# Patient Record
Sex: FEMALE | Race: WHITE | NOT HISPANIC OR LATINO | Employment: UNEMPLOYED | URBAN - METROPOLITAN AREA
[De-identification: names, ages, dates, MRNs, and addresses within clinical notes are randomized per-mention and may not be internally consistent; named-entity substitution may affect disease eponyms.]

---

## 2017-01-17 ENCOUNTER — TRANSCRIBE ORDERS (OUTPATIENT)
Dept: ADMINISTRATIVE | Facility: HOSPITAL | Age: 52
End: 2017-01-17

## 2017-01-17 DIAGNOSIS — M54.2 NECK PAIN: Primary | ICD-10-CM

## 2017-01-23 ENCOUNTER — HOSPITAL ENCOUNTER (OUTPATIENT)
Dept: NEUROLOGY | Facility: AMBULATORY SURGERY CENTER | Age: 52
Discharge: HOME | End: 2017-01-23
Payer: COMMERCIAL

## 2017-01-23 DIAGNOSIS — M54.2 NECK PAIN: ICD-10-CM

## 2017-01-23 DIAGNOSIS — R20.2 TINGLING IN EXTREMITIES: ICD-10-CM

## 2017-01-23 PROCEDURE — 95911 NRV CNDJ TEST 9-10 STUDIES: CPT

## 2017-01-23 PROCEDURE — 95886 MUSC TEST DONE W/N TEST COMP: CPT

## 2017-05-17 ENCOUNTER — HOSPITAL ENCOUNTER (OUTPATIENT)
Dept: RADIOLOGY | Facility: HOSPITAL | Age: 52
Discharge: HOME | End: 2017-05-17
Payer: COMMERCIAL

## 2017-05-17 ENCOUNTER — TRANSCRIBE ORDERS (OUTPATIENT)
Dept: RADIOLOGY | Facility: HOSPITAL | Age: 52
End: 2017-05-17

## 2017-05-17 DIAGNOSIS — R52 PAIN: ICD-10-CM

## 2017-05-17 DIAGNOSIS — R52 PAIN: Primary | ICD-10-CM

## 2017-05-17 PROCEDURE — 73522 X-RAY EXAM HIPS BI 3-4 VIEWS: CPT

## 2017-06-06 ENCOUNTER — TRANSCRIBE ORDERS (OUTPATIENT)
Dept: ADMINISTRATIVE | Facility: HOSPITAL | Age: 52
End: 2017-06-06

## 2017-06-06 ENCOUNTER — ALLSCRIPTS OFFICE VISIT (OUTPATIENT)
Dept: OTHER | Facility: OTHER | Age: 52
End: 2017-06-06

## 2017-06-06 DIAGNOSIS — M79.604 RIGHT LEG PAIN: Primary | ICD-10-CM

## 2017-06-06 DIAGNOSIS — M79.604 PAIN OF RIGHT LEG: ICD-10-CM

## 2017-06-13 ENCOUNTER — HOSPITAL ENCOUNTER (OUTPATIENT)
Dept: RADIOLOGY | Facility: HOSPITAL | Age: 52
Discharge: HOME | End: 2017-06-13
Attending: ORTHOPAEDIC SURGERY
Payer: COMMERCIAL

## 2017-06-13 DIAGNOSIS — M79.604 PAIN OF RIGHT LEG: ICD-10-CM

## 2017-06-13 PROCEDURE — 72148 MRI LUMBAR SPINE W/O DYE: CPT

## 2017-07-10 ENCOUNTER — GENERIC CONVERSION - ENCOUNTER (OUTPATIENT)
Dept: OTHER | Facility: OTHER | Age: 52
End: 2017-07-10

## 2018-01-11 NOTE — MISCELLANEOUS
Signatures   Electronically signed by : Cassius Ponce DO; Jul 10 2017 10:23AM EST                       (Author)

## 2018-01-12 VITALS
RESPIRATION RATE: 18 BRPM | DIASTOLIC BLOOD PRESSURE: 81 MMHG | BODY MASS INDEX: 33.49 KG/M2 | HEART RATE: 108 BPM | SYSTOLIC BLOOD PRESSURE: 161 MMHG | WEIGHT: 207.5 LBS

## 2019-06-20 ENCOUNTER — HOSPITAL ENCOUNTER (EMERGENCY)
Facility: HOSPITAL | Age: 54
Discharge: HOME | End: 2019-06-20
Attending: EMERGENCY MEDICINE | Admitting: EMERGENCY MEDICINE
Payer: COMMERCIAL

## 2019-06-20 VITALS
OXYGEN SATURATION: 99 % | RESPIRATION RATE: 17 BRPM | HEART RATE: 72 BPM | TEMPERATURE: 98.2 F | BODY MASS INDEX: 32.88 KG/M2 | SYSTOLIC BLOOD PRESSURE: 124 MMHG | DIASTOLIC BLOOD PRESSURE: 59 MMHG | WEIGHT: 203.71 LBS

## 2019-06-20 DIAGNOSIS — R51.9 HEADACHE: Primary | ICD-10-CM

## 2019-06-20 PROCEDURE — 99284 EMERGENCY DEPT VISIT MOD MDM: CPT | Performed by: EMERGENCY MEDICINE

## 2019-06-20 PROCEDURE — 96372 THER/PROPH/DIAG INJ SC/IM: CPT

## 2019-06-20 PROCEDURE — 99283 EMERGENCY DEPT VISIT LOW MDM: CPT

## 2019-06-20 RX ORDER — KETOROLAC TROMETHAMINE 30 MG/ML
15 INJECTION, SOLUTION INTRAMUSCULAR; INTRAVENOUS ONCE
Status: COMPLETED | OUTPATIENT
Start: 2019-06-20 | End: 2019-06-20

## 2019-06-20 RX ORDER — ACETAMINOPHEN 325 MG/1
975 TABLET ORAL ONCE
Status: COMPLETED | OUTPATIENT
Start: 2019-06-20 | End: 2019-06-20

## 2019-06-20 RX ORDER — METOCLOPRAMIDE 10 MG/1
10 TABLET ORAL ONCE
Status: COMPLETED | OUTPATIENT
Start: 2019-06-20 | End: 2019-06-20

## 2019-06-20 RX ADMIN — METOCLOPRAMIDE HYDROCHLORIDE 10 MG: 10 TABLET ORAL at 14:32

## 2019-06-20 RX ADMIN — ACETAMINOPHEN 975 MG: 325 TABLET ORAL at 15:31

## 2019-06-20 RX ADMIN — KETOROLAC TROMETHAMINE 15 MG: 30 INJECTION, SOLUTION INTRAMUSCULAR at 14:32

## 2019-11-08 ENCOUNTER — OFFICE VISIT (OUTPATIENT)
Dept: FAMILY MEDICINE CLINIC | Facility: CLINIC | Age: 54
End: 2019-11-08
Payer: COMMERCIAL

## 2019-11-08 VITALS
RESPIRATION RATE: 16 BRPM | HEIGHT: 64 IN | OXYGEN SATURATION: 98 % | DIASTOLIC BLOOD PRESSURE: 85 MMHG | BODY MASS INDEX: 34.56 KG/M2 | HEART RATE: 104 BPM | TEMPERATURE: 98 F | SYSTOLIC BLOOD PRESSURE: 120 MMHG | WEIGHT: 202.4 LBS

## 2019-11-08 DIAGNOSIS — F32.A DEPRESSION, UNSPECIFIED DEPRESSION TYPE: ICD-10-CM

## 2019-11-08 DIAGNOSIS — M25.551 PAIN OF BOTH HIP JOINTS: ICD-10-CM

## 2019-11-08 DIAGNOSIS — Z82.49 FAMILY HISTORY OF BRAIN ANEURYSM: ICD-10-CM

## 2019-11-08 DIAGNOSIS — K21.9 GASTROESOPHAGEAL REFLUX DISEASE, ESOPHAGITIS PRESENCE NOT SPECIFIED: ICD-10-CM

## 2019-11-08 DIAGNOSIS — Z00.00 ANNUAL PHYSICAL EXAM: ICD-10-CM

## 2019-11-08 DIAGNOSIS — Z12.39 SCREENING FOR BREAST CANCER: ICD-10-CM

## 2019-11-08 DIAGNOSIS — M25.552 PAIN OF BOTH HIP JOINTS: ICD-10-CM

## 2019-11-08 DIAGNOSIS — F17.219 CIGARETTE NICOTINE DEPENDENCE WITH NICOTINE-INDUCED DISORDER: Primary | ICD-10-CM

## 2019-11-08 DIAGNOSIS — Z12.11 SCREEN FOR COLON CANCER: ICD-10-CM

## 2019-11-08 PROCEDURE — 99396 PREV VISIT EST AGE 40-64: CPT | Performed by: FAMILY MEDICINE

## 2019-11-08 PROCEDURE — 3725F SCREEN DEPRESSION PERFORMED: CPT | Performed by: FAMILY MEDICINE

## 2019-11-08 PROCEDURE — 4004F PT TOBACCO SCREEN RCVD TLK: CPT | Performed by: FAMILY MEDICINE

## 2019-11-08 PROCEDURE — 99203 OFFICE O/P NEW LOW 30 MIN: CPT | Performed by: FAMILY MEDICINE

## 2019-11-08 RX ORDER — RANITIDINE 150 MG/1
150 TABLET ORAL 2 TIMES DAILY
COMMUNITY
End: 2020-06-09 | Stop reason: ALTCHOICE

## 2019-11-08 RX ORDER — PAROXETINE 10 MG/1
10 TABLET, FILM COATED ORAL DAILY
Qty: 30 TABLET | Refills: 5 | Status: SHIPPED | OUTPATIENT
Start: 2019-11-08 | End: 2020-06-09 | Stop reason: SDUPTHER

## 2019-11-08 RX ORDER — VARENICLINE TARTRATE 25 MG
KIT ORAL
Qty: 53 TABLET | Refills: 0 | Status: SHIPPED | OUTPATIENT
Start: 2019-11-08 | End: 2020-06-07

## 2019-11-08 RX ORDER — CALCIUM CARBONATE 200(500)MG
1 TABLET,CHEWABLE ORAL DAILY
COMMUNITY
End: 2020-06-09 | Stop reason: ALTCHOICE

## 2019-11-08 RX ORDER — ACETAMINOPHEN 500 MG
500 TABLET ORAL EVERY 6 HOURS PRN
COMMUNITY

## 2019-11-08 NOTE — PROGRESS NOTES
Assessment/Plan:         Diagnoses and all orders for this visit:    Cigarette nicotine dependence with nicotine-induced disorder  -     varenicline (CHANTIX RAFAEL) 0 5 MG X 11 & 1 MG X 42 tablet; Take one 0 5mg tab by mouth 1x daily for 3 days, then increase to one 0 5mg tab 2x daily for 3 days, then increase to one 1mg tab 2x daily    Depression, unspecified depression type  -     PARoxetine (PAXIL) 10 mg tablet; Take 1 tablet (10 mg total) by mouth daily    Pain of both hip joints  -     Ambulatory referral to Orthopedic Surgery; Future    Gastroesophageal reflux disease, esophagitis presence not specified  -     Ambulatory referral to Gastroenterology; Future    Annual physical exam  -     Ambulatory referral to Obstetrics / Gynecology; Future  -     CBC and differential; Future  -     Comprehensive metabolic panel; Future  -     Lipid Panel with Direct LDL reflex; Future  -     TSH, 3rd generation with Free T4 reflex; Future    Screening for breast cancer  -     Mammo screening bilateral w cad; Future    Screen for colon cancer  -     Ambulatory referral to Colorectal Surgery; Future    Other orders  -     acetaminophen (TYLENOL) 500 mg tablet; Take 500 mg by mouth every 6 (six) hours as needed for mild pain  -     ranitidine (ZANTAC) 150 mg tablet; Take 150 mg by mouth 2 (two) times a day  -     calcium carbonate (TUMS) 500 mg chewable tablet; Chew 1 tablet daily          Subjective:      Patient ID: Bryan Humphries is a 46 y o  female  HPI    Pt is here to establish care  Not seen PCP for more than 3 years  Pt states she had back pain and b/l hip pain for years  Constant  Worse recently  6/10, sharp pain  It affect her sleep  Pain does not radiating to legs, but sometimes radiating to groin area  She is on tylenol and motrin for pain  Saw orthopedics before  Would like to see them again  C/o GERD for years  Felt reflux symptoms constant  Denies nausea, vomiting or abdominal pain  Pt states she take zantac bid and TUMs which helped some  Saw GI Dr Rock Tamayo before  Would like to see him again  Depression---for years  She was on paxil 10mg qd before which helped  She would like to restart it  Denies suicidal ideations  Denies anxiety  Mother and brother passed away because of brain aneurysm  She had MRI and MRA done in 2015 which were unremarkable  Smoke 1ppd for 30 years  Would like to quit  Chantix helped her before  No alcohol  No drugs  The following portions of the patient's history were reviewed and updated as appropriate: allergies, current medications, past family history, past medical history, past social history, past surgical history and problem list     Review of Systems   Constitutional: Negative for appetite change, chills and fever  HENT: Negative for congestion, ear pain, sinus pain and sore throat  Eyes: Negative for discharge and itching  Respiratory: Negative for apnea, cough, chest tightness, shortness of breath and wheezing  Cardiovascular: Negative for chest pain, palpitations and leg swelling  Gastrointestinal: Negative for abdominal pain, anal bleeding, constipation, diarrhea, nausea and vomiting  Endocrine: Negative for cold intolerance, heat intolerance and polyuria  Genitourinary: Negative for difficulty urinating and dysuria  Musculoskeletal: Positive for arthralgias and back pain  Negative for myalgias  Skin: Negative for rash  Neurological: Negative for dizziness and headaches  Psychiatric/Behavioral: Negative for agitation  Objective:      /85 (BP Location: Left arm, Patient Position: Sitting, Cuff Size: Large)   Pulse 104   Temp 98 °F (36 7 °C) (Tympanic)   Resp 16   Ht 5' 3 75" (1 619 m)   Wt 91 8 kg (202 lb 6 4 oz)   SpO2 98%   BMI 35 01 kg/m²          Physical Exam   Constitutional: She appears well-developed  No distress  HENT:   Head: Normocephalic     Right Ear: External ear normal  Left Ear: External ear normal    Nose: Nose normal    Mouth/Throat: Oropharynx is clear and moist    Eyes: Pupils are equal, round, and reactive to light  Conjunctivae are normal  Right eye exhibits no discharge  Left eye exhibits no discharge  Neck: Normal range of motion  No thyromegaly present  Cardiovascular: Normal rate, regular rhythm and normal heart sounds  Exam reveals no gallop and no friction rub  No murmur heard  Pulmonary/Chest: Effort normal and breath sounds normal  No respiratory distress  She has no wheezes  She has no rales  She exhibits no tenderness  Abdominal: Soft  Bowel sounds are normal    Musculoskeletal: Normal range of motion  She exhibits no edema  Lower back tenderness, b/l hip tenderness, no swollen or erythema   Lymphadenopathy:     She has no cervical adenopathy  Neurological: She is alert  Psychiatric: She has a normal mood and affect

## 2019-11-08 NOTE — PROGRESS NOTES
[address]    NAME: Ryanne Schwab  AGE: 46 y o  SEX: female  : 1965     DATE: 2019  Chief Complaint   Patient presents with   [address] patient  Preventative PE  Health Maintenance   Topic Date Due    Hepatitis C Screening  1965    CRC Screening: Colonoscopy  1965    Pneumococcal Vaccine: Pediatrics (0 to 5 Years) and At-Risk Patients (6 to 59 Years) (1 of 1 - PPSV23) 1970    BMI: Followup Plan  1982    DTaP,Tdap,and Td Vaccines (1 - Tdap) 1985    Cervical Cancer Screening  1985    MAMMOGRAM  2016    INFLUENZA VACCINE  2019    Depression Screening PHQ  2020    BMI: Adult  2020    Pneumococcal Vaccine: 65+ Years (1 of 2 - PCV13) 2029    HEPATITIS B VACCINES  Aged Out     Depression Screening Follow-up Plan: Patient's depression screening was positive with a PHQ-2 score of 2  Their PHQ-9 score was 7  Patient assessed for underlying major depression  They have no active suicidal ideations  Brief counseling provided and recommend additional follow-up/re-evaluation next office visit  Tobacco Cessation Counseling: Tobacco cessation counseling and education was provided  The patient is sincerely urged to quit consumption of tobacco  She is ready to quit tobacco  The numerous health risks of tobacco consumption were discussed  Prescribed the following medications: varenicline (Chantix)  BMI Counseling: Body mass index is 35 01 kg/m²  The BMI is above normal  Nutrition recommendations include reducing portion sizes, decreasing overall calorie intake and 3-5 servings of fruits/vegetables daily  Exercise recommendations include moderate aerobic physical activity for 150 minutes/week        Assessment and Plan:     Problem List Items Addressed This Visit        Digestive    Gastroesophageal reflux disease    Relevant Medications    ranitidine (ZANTAC) 150 mg tablet    calcium carbonate (TUMS) 500 mg chewable tablet    Other Relevant Orders    Ambulatory referral to Gastroenterology       Cardiovascular and Mediastinum    Family history of brain aneurysm     2015 MRI and MRA unremarkable  Nervous and Auditory    Cigarette nicotine dependence with nicotine-induced disorder - Primary    Relevant Medications    varenicline (CHANTIX RAFAEL) 0 5 MG X 11 & 1 MG X 42 tablet    PARoxetine (PAXIL) 10 mg tablet       Other    Depression     PHQ-9 score 7 today  Restart paxil 10mg QD  SE educated pt  Relevant Medications    varenicline (CHANTIX RAFAEL) 0 5 MG X 11 & 1 MG X 42 tablet    PARoxetine (PAXIL) 10 mg tablet    Pain of both hip joints    Relevant Orders    Ambulatory referral to Orthopedic Surgery    Screening for breast cancer    Relevant Orders    Mammo screening bilateral w cad    Screen for colon cancer    Relevant Orders    Ambulatory referral to Colorectal Surgery      Other Visit Diagnoses     Annual physical exam        Relevant Orders    Ambulatory referral to Obstetrics / Gynecology    CBC and differential    Comprehensive metabolic panel    Lipid Panel with Direct LDL reflex    TSH, 3rd generation with Free T4 reflex          Immunizations and preventive care screenings were discussed with patient today  Appropriate education was printed on patient's after visit summary  Counseling:  Alcohol/drug use: discussed moderation in alcohol intake, the recommendations for healthy alcohol use, and avoidance of illicit drug use  Dental Health: discussed importance of regular tooth brushing, flossing, and dental visits  Injury prevention: discussed safety/seat belts, safety helmets, smoke detectors, carbon dioxide detectors, and smoking near bedding or upholstery    Sexual health: discussed sexually transmitted diseases, partner selection, use of condoms, avoidance of unintended pregnancy, and contraceptive alternatives  · Exercise: the importance of regular exercise/physical activity was discussed  Recommend exercise 3-5 times per week for at least 30 minutes  Refused flu shot  Give mammogram script  Refer to OBGYN for pap  Give colonoscopy script  RTO in 6 months or sooner as needed  Return in about 6 months (around 2020) for Recheck  Chief Complaint:     Chief Complaint   Patient presents with   [facility] Establish Care     New patient  Preventative PE  History of Present Illness:     Adult Annual Physical   Patient here for a comprehensive physical exam  The patient reports problems - see note  Diet and Physical Activity  · Diet/Nutrition: well balanced diet  · Exercise: walking  Depression Screening  PHQ-9 Depression Screening    PHQ-9:    Frequency of the following problems over the past two weeks:       Little interest or pleasure in doing things:  1 - several days  Feeling down, depressed, or hopeless:  1 - several days  Trouble falling or staying asleep, or sleeping too much:  3 - nearly every day  Feeling tired or having little energy:  2 - more than half the days  Poor appetite or overeatin - not at all  Feeling bad about yourself - or that you are a failure or have let yourself or your family down:  0 - not at all  Trouble concentrating on things, such as reading the newspaper or watching television:  0 - not at all  Moving or speaking so slowly that other people could have noticed  Or the opposite - being so fidgety or restless that you have been moving around a lot more than usual:  0 - not at all  Thoughts that you would be better off dead, or of hurting yourself in some way:  0 - not at all  PHQ-2 Score:  2  PHQ-9 Score:  7       General Health  · Sleep: sleeps poorly  · Hearing: normal - bilateral   · Vision: wears glasses  · Dental: no dental visits for >1 year         /GYN Health  · Last menstrual period: postmenapause, no abnormal vaginal discharge, FU Dr Medina Kemp  · History of STDs?: no      Review of Systems:     Review of Systems   Constitutional: Negative for appetite change, chills and fever  HENT: Negative for congestion, ear pain, sinus pain and sore throat  Eyes: Negative for discharge and itching  Respiratory: Negative for apnea, cough, chest tightness, shortness of breath and wheezing  Cardiovascular: Negative for chest pain, palpitations and leg swelling  Gastrointestinal: Negative for abdominal pain, anal bleeding, constipation, diarrhea, nausea and vomiting  Endocrine: Negative for cold intolerance, heat intolerance and polyuria  Genitourinary: Negative for difficulty urinating and dysuria  Musculoskeletal: Positive for arthralgias and back pain  Negative for myalgias  Skin: Negative for rash  Neurological: Negative for dizziness and headaches  Psychiatric/Behavioral: Negative for agitation  Past Medical History:     Past Medical History:   Diagnosis Date    Back pain     Depression     GERD (gastroesophageal reflux disease)       Past Surgical History:     Past Surgical History:   Procedure Laterality Date    CERVICAL DISC SURGERY      CHOLECYSTECTOMY      FOOT SURGERY      [place] EGD TRANSORAL BIOPSY SINGLE/MULTIPLE N/A 4/20/2016    Procedure: ESOPHAGOGASTRODUODENOSCOPY (EGD); Surgeon: Annie Reardon MD;  Location: [facility];   Service: Gastroenterology    TONSILLECTOMY      TUBAL LIGATION        Social History:     Social History     Socioeconomic History    Marital status: /Civil Union     Spouse name: None    Number of children: None    Years of education: None    Highest education level: None   Occupational History    Occupation: Caregiver   Social Needs    Financial resource strain: Not very hard    Food insecurity:     Worry: Never true     Inability: Never true    Transportation needs:     Medical: Yes     Non-medical: Yes   Tobacco Use    Smoking status: Current Every Day Smoker     Packs/day: 1 00     Types: Cigarettes    Smokeless tobacco: Never Used   Substance and Sexual Activity    Alcohol use: No    Drug use: No    Sexual activity: Not Currently   Lifestyle    Physical activity:     Days per week: 7 days     Minutes per session: 30 min    Stress: Rather much   Relationships    Social connections:     Talks on phone: Never     Gets together: Never     Attends [employer] service: Never     Active member of club or organization: No     Attends meetings of clubs or organizations: Never     Relationship status:     Intimate partner violence:     Fear of current or ex partner: No     Emotionally abused: No     Physically abused: No     Forced sexual activity: No   Other Topics Concern    None   Social History Narrative    None      Family History:     Family History   Problem Relation Age of Onset    Aneurysm Mother     Heart attack Father     Aneurysm Sister     Aneurysm Brother     Mental illness Son       Current Medications:     Current Outpatient Medications   Medication Sig Dispense Refill    acetaminophen (TYLENOL) 500 mg tablet Take 500 mg by mouth every 6 (six) hours as needed for mild pain      calcium carbonate (TUMS) 500 mg chewable tablet Chew 1 tablet daily      ibuprofen (MOTRIN) 600 mg tablet Take 600 mg by mouth every 8 (eight) hours as needed for mild pain   ranitidine (ZANTAC) 150 mg tablet Take 150 mg by mouth 2 (two) times a day      buPROPion (WELLBUTRIN XL) 150 mg 24 hr tablet Take 150 mg by mouth daily   cholecalciferol (VITAMIN D3) 1,000 units tablet Take 1,000 Units by mouth daily   multivitamin (THERAGRAN) TABS Take 1 tablet by mouth daily   omeprazole (PriLOSEC) 20 mg delayed release capsule Take 20 mg by mouth 2 (two) times a day   PARoxetine (PAXIL) 10 mg tablet Take 1 tablet (10 mg total) by mouth daily 30 tablet 5    traMADol (ULTRAM) 50 mg tablet Take 100 mg by mouth every 6 (six) hours as needed for moderate pain        varenicline (CHANTIX RAFAEL) 0 5 MG X 11 & 1 MG X 42 tablet Take one 0 5mg tab by mouth 1x daily for 3 days, then increase to one 0 5mg tab 2x daily for 3 days, then increase to one 1mg tab 2x daily 53 tablet 0     No current facility-administered medications for this visit  Allergies:     No Known Allergies   Physical Exam:     /85 (BP Location: Left arm, Patient Position: Sitting, Cuff Size: Large)   Pulse 104   Temp 98 °F (36 7 °C) (Tympanic)   Resp 16   Ht 5' 3 75" (1 619 m)   Wt 91 8 kg (202 lb 6 4 oz)   SpO2 98%   BMI 35 01 kg/m²     Physical Exam   Constitutional: She appears well-developed  No distress  HENT:   Head: Normocephalic  Right Ear: External ear normal    Left Ear: External ear normal    Nose: Nose normal    Mouth/Throat: Oropharynx is clear and moist    Eyes: Pupils are equal, round, and reactive to light  Conjunctivae are normal  Right eye exhibits no discharge  Left eye exhibits no discharge  Neck: Normal range of motion  No thyromegaly present  Cardiovascular: Normal rate, regular rhythm and normal heart sounds  Exam reveals no gallop and no friction rub  No murmur heard  Pulmonary/Chest: Effort normal and breath sounds normal  No respiratory distress  She has no wheezes  She has no rales  She exhibits no tenderness  Abdominal: Soft  Bowel sounds are normal  She exhibits no distension and no mass  There is no tenderness  There is no rebound and no guarding  Musculoskeletal: Normal range of motion  She exhibits no edema  Lower back tenderness and b/l hip tenderness, no swollen or erythema   Lymphadenopathy:     She has no cervical adenopathy  Neurological: She is alert  Psychiatric: She has a normal mood and affect         Berger MD Raul   [address] [place] [address]

## 2020-06-07 ENCOUNTER — HOSPITAL ENCOUNTER (EMERGENCY)
Facility: HOSPITAL | Age: 55
Discharge: HOME | End: 2020-06-07
Attending: EMERGENCY MEDICINE | Admitting: EMERGENCY MEDICINE
Payer: COMMERCIAL

## 2020-06-07 ENCOUNTER — APPOINTMENT (EMERGENCY)
Dept: RADIOLOGY | Facility: HOSPITAL | Age: 55
End: 2020-06-07
Payer: COMMERCIAL

## 2020-06-07 VITALS
HEART RATE: 70 BPM | RESPIRATION RATE: 18 BRPM | TEMPERATURE: 98.3 F | DIASTOLIC BLOOD PRESSURE: 77 MMHG | SYSTOLIC BLOOD PRESSURE: 119 MMHG | OXYGEN SATURATION: 97 %

## 2020-06-07 DIAGNOSIS — R07.9 CHEST PAIN: Primary | ICD-10-CM

## 2020-06-07 DIAGNOSIS — M54.9 BACK PAIN: ICD-10-CM

## 2020-06-07 LAB
ALBUMIN SERPL BCP-MCNC: 3.6 G/DL (ref 3.5–5)
ALP SERPL-CCNC: 71 U/L (ref 46–116)
ALT SERPL W P-5'-P-CCNC: 49 U/L (ref 12–78)
ANION GAP SERPL CALCULATED.3IONS-SCNC: 6 MMOL/L (ref 4–13)
AST SERPL W P-5'-P-CCNC: 30 U/L (ref 5–45)
ATRIAL RATE: 83 BPM
BASOPHILS # BLD AUTO: 0.04 THOUSANDS/ΜL (ref 0–0.1)
BASOPHILS NFR BLD AUTO: 0 % (ref 0–1)
BILIRUB SERPL-MCNC: 0.21 MG/DL (ref 0.2–1)
BUN SERPL-MCNC: 15 MG/DL (ref 5–25)
CALCIUM SERPL-MCNC: 9.4 MG/DL (ref 8.3–10.1)
CHLORIDE SERPL-SCNC: 106 MMOL/L (ref 100–108)
CO2 SERPL-SCNC: 28 MMOL/L (ref 21–32)
CREAT SERPL-MCNC: 0.89 MG/DL (ref 0.6–1.3)
EOSINOPHIL # BLD AUTO: 0.16 THOUSAND/ΜL (ref 0–0.61)
EOSINOPHIL NFR BLD AUTO: 2 % (ref 0–6)
ERYTHROCYTE [DISTWIDTH] IN BLOOD BY AUTOMATED COUNT: 13.1 % (ref 11.6–15.1)
GFR SERPL CREATININE-BSD FRML MDRD: 73 ML/MIN/1.73SQ M
GLUCOSE SERPL-MCNC: 96 MG/DL (ref 65–140)
HCT VFR BLD AUTO: 44.8 % (ref 34.8–46.1)
HGB BLD-MCNC: 14.5 G/DL (ref 11.5–15.4)
IMM GRANULOCYTES # BLD AUTO: 0.02 THOUSAND/UL (ref 0–0.2)
IMM GRANULOCYTES NFR BLD AUTO: 0 % (ref 0–2)
LIPASE SERPL-CCNC: 212 U/L (ref 73–393)
LYMPHOCYTES # BLD AUTO: 3.65 THOUSANDS/ΜL (ref 0.6–4.47)
LYMPHOCYTES NFR BLD AUTO: 36 % (ref 14–44)
MCH RBC QN AUTO: 31.7 PG (ref 26.8–34.3)
MCHC RBC AUTO-ENTMCNC: 32.4 G/DL (ref 31.4–37.4)
MCV RBC AUTO: 98 FL (ref 82–98)
MONOCYTES # BLD AUTO: 0.61 THOUSAND/ΜL (ref 0.17–1.22)
MONOCYTES NFR BLD AUTO: 6 % (ref 4–12)
NEUTROPHILS # BLD AUTO: 5.62 THOUSANDS/ΜL (ref 1.85–7.62)
NEUTS SEG NFR BLD AUTO: 56 % (ref 43–75)
NRBC BLD AUTO-RTO: 0 /100 WBCS
P AXIS: 67 DEGREES
PLATELET # BLD AUTO: 288 THOUSANDS/UL (ref 149–390)
PMV BLD AUTO: 9.8 FL (ref 8.9–12.7)
POTASSIUM SERPL-SCNC: 4 MMOL/L (ref 3.5–5.3)
PR INTERVAL: 140 MS
PROT SERPL-MCNC: 7.4 G/DL (ref 6.4–8.2)
QRS AXIS: 95 DEGREES
QRSD INTERVAL: 82 MS
QT INTERVAL: 342 MS
QTC INTERVAL: 401 MS
RBC # BLD AUTO: 4.57 MILLION/UL (ref 3.81–5.12)
SODIUM SERPL-SCNC: 140 MMOL/L (ref 136–145)
T WAVE AXIS: 63 DEGREES
TROPONIN I SERPL-MCNC: <0.02 NG/ML
VENTRICULAR RATE: 83 BPM
WBC # BLD AUTO: 10.1 THOUSAND/UL (ref 4.31–10.16)

## 2020-06-07 PROCEDURE — 84484 ASSAY OF TROPONIN QUANT: CPT | Performed by: EMERGENCY MEDICINE

## 2020-06-07 PROCEDURE — 71045 X-RAY EXAM CHEST 1 VIEW: CPT

## 2020-06-07 PROCEDURE — 85025 COMPLETE CBC W/AUTO DIFF WBC: CPT | Performed by: EMERGENCY MEDICINE

## 2020-06-07 PROCEDURE — 99285 EMERGENCY DEPT VISIT HI MDM: CPT

## 2020-06-07 PROCEDURE — 99285 EMERGENCY DEPT VISIT HI MDM: CPT | Performed by: EMERGENCY MEDICINE

## 2020-06-07 PROCEDURE — 93010 ELECTROCARDIOGRAM REPORT: CPT | Performed by: INTERNAL MEDICINE

## 2020-06-07 PROCEDURE — 80053 COMPREHEN METABOLIC PANEL: CPT | Performed by: EMERGENCY MEDICINE

## 2020-06-07 PROCEDURE — 93005 ELECTROCARDIOGRAM TRACING: CPT

## 2020-06-07 PROCEDURE — 36415 COLL VENOUS BLD VENIPUNCTURE: CPT

## 2020-06-07 PROCEDURE — 83690 ASSAY OF LIPASE: CPT | Performed by: EMERGENCY MEDICINE

## 2020-06-07 RX ORDER — LIDOCAINE 50 MG/G
2 PATCH TOPICAL ONCE
Status: DISCONTINUED | OUTPATIENT
Start: 2020-06-07 | End: 2020-06-07 | Stop reason: HOSPADM

## 2020-06-07 RX ADMIN — LIDOCAINE 2 PATCH: 50 PATCH TOPICAL at 16:55

## 2020-06-09 ENCOUNTER — OFFICE VISIT (OUTPATIENT)
Dept: FAMILY MEDICINE CLINIC | Facility: CLINIC | Age: 55
End: 2020-06-09
Payer: COMMERCIAL

## 2020-06-09 VITALS
HEART RATE: 96 BPM | HEIGHT: 64 IN | OXYGEN SATURATION: 97 % | SYSTOLIC BLOOD PRESSURE: 120 MMHG | TEMPERATURE: 98.9 F | DIASTOLIC BLOOD PRESSURE: 86 MMHG | BODY MASS INDEX: 34.46 KG/M2 | WEIGHT: 201.87 LBS | RESPIRATION RATE: 12 BRPM

## 2020-06-09 DIAGNOSIS — Z11.59 NEED FOR HEPATITIS C SCREENING TEST: ICD-10-CM

## 2020-06-09 DIAGNOSIS — K21.9 GASTROESOPHAGEAL REFLUX DISEASE, ESOPHAGITIS PRESENCE NOT SPECIFIED: ICD-10-CM

## 2020-06-09 DIAGNOSIS — F32.A DEPRESSION, UNSPECIFIED DEPRESSION TYPE: ICD-10-CM

## 2020-06-09 DIAGNOSIS — F17.219 CIGARETTE NICOTINE DEPENDENCE WITH NICOTINE-INDUCED DISORDER: ICD-10-CM

## 2020-06-09 DIAGNOSIS — Z13.220 SCREENING FOR HYPERLIPIDEMIA: ICD-10-CM

## 2020-06-09 DIAGNOSIS — M62.838 MUSCLE SPASM: Primary | ICD-10-CM

## 2020-06-09 DIAGNOSIS — Z13.29 SCREENING FOR THYROID DISORDER: ICD-10-CM

## 2020-06-09 PROCEDURE — 3008F BODY MASS INDEX DOCD: CPT | Performed by: FAMILY MEDICINE

## 2020-06-09 PROCEDURE — 4004F PT TOBACCO SCREEN RCVD TLK: CPT | Performed by: FAMILY MEDICINE

## 2020-06-09 PROCEDURE — 99214 OFFICE O/P EST MOD 30 MIN: CPT | Performed by: FAMILY MEDICINE

## 2020-06-09 RX ORDER — METHOCARBAMOL 500 MG/1
500 TABLET, FILM COATED ORAL 3 TIMES DAILY
Qty: 30 TABLET | Refills: 0 | Status: SHIPPED | OUTPATIENT
Start: 2020-06-09 | End: 2020-07-08 | Stop reason: ALTCHOICE

## 2020-06-09 RX ORDER — PAROXETINE 10 MG/1
10 TABLET, FILM COATED ORAL DAILY
Qty: 30 TABLET | Refills: 5 | Status: SHIPPED | OUTPATIENT
Start: 2020-06-09 | End: 2020-07-21 | Stop reason: HOSPADM

## 2020-06-15 ENCOUNTER — TELEPHONE (OUTPATIENT)
Dept: FAMILY MEDICINE CLINIC | Facility: CLINIC | Age: 55
End: 2020-06-15

## 2020-06-16 DIAGNOSIS — M51.26 LUMBAR DISC HERNIATION: Primary | ICD-10-CM

## 2020-06-16 RX ORDER — PREDNISONE 10 MG/1
TABLET ORAL
Qty: 28 TABLET | Refills: 0 | Status: SHIPPED | OUTPATIENT
Start: 2020-06-16 | End: 2020-07-08 | Stop reason: ALTCHOICE

## 2020-06-16 RX ORDER — GABAPENTIN 300 MG/1
300 CAPSULE ORAL 3 TIMES DAILY
Qty: 30 CAPSULE | Refills: 1 | Status: SHIPPED | OUTPATIENT
Start: 2020-06-16 | End: 2020-07-08 | Stop reason: ALTCHOICE

## 2020-06-24 ENCOUNTER — TELEMEDICINE (OUTPATIENT)
Dept: GASTROENTEROLOGY | Facility: CLINIC | Age: 55
End: 2020-06-24
Payer: COMMERCIAL

## 2020-06-24 VITALS — BODY MASS INDEX: 34.31 KG/M2 | WEIGHT: 201 LBS | HEIGHT: 64 IN

## 2020-06-24 DIAGNOSIS — F17.219 CIGARETTE NICOTINE DEPENDENCE WITH NICOTINE-INDUCED DISORDER: ICD-10-CM

## 2020-06-24 DIAGNOSIS — K21.9 GASTROESOPHAGEAL REFLUX DISEASE, ESOPHAGITIS PRESENCE NOT SPECIFIED: Primary | ICD-10-CM

## 2020-06-24 DIAGNOSIS — Z12.11 SCREEN FOR COLON CANCER: ICD-10-CM

## 2020-06-24 PROCEDURE — 99244 OFF/OP CNSLTJ NEW/EST MOD 40: CPT | Performed by: INTERNAL MEDICINE

## 2020-06-24 RX ORDER — PANTOPRAZOLE SODIUM 40 MG/1
40 TABLET, DELAYED RELEASE ORAL DAILY
Qty: 30 TABLET | Refills: 3 | Status: SHIPPED | OUTPATIENT
Start: 2020-06-24 | End: 2020-10-15 | Stop reason: SDUPTHER

## 2020-06-24 RX ORDER — CALCIUM CARBONATE 200(500)MG
1 TABLET,CHEWABLE ORAL DAILY
COMMUNITY
End: 2020-07-14

## 2020-06-26 ENCOUNTER — TELEPHONE (OUTPATIENT)
Dept: GASTROENTEROLOGY | Facility: CLINIC | Age: 55
End: 2020-06-26

## 2020-06-26 DIAGNOSIS — Z12.11 SCREEN FOR COLON CANCER: Primary | ICD-10-CM

## 2020-07-08 ENCOUNTER — TELEPHONE (OUTPATIENT)
Dept: FAMILY MEDICINE CLINIC | Facility: CLINIC | Age: 55
End: 2020-07-08

## 2020-07-08 DIAGNOSIS — M51.26 LUMBAR DISC HERNIATION: Primary | ICD-10-CM

## 2020-07-08 RX ORDER — MELOXICAM 15 MG/1
15 TABLET ORAL DAILY
Qty: 30 TABLET | Refills: 0 | Status: SHIPPED | OUTPATIENT
Start: 2020-07-08 | End: 2020-08-07

## 2020-07-08 NOTE — TELEPHONE ENCOUNTER
Patient called and stated that her medication is not helping and her back is locking up and she got stuck on the toilet for 30 mins  She has an appt this ortho in a week   Please advise

## 2020-07-13 ENCOUNTER — TELEPHONE (OUTPATIENT)
Dept: OBGYN CLINIC | Facility: HOSPITAL | Age: 55
End: 2020-07-13

## 2020-07-13 NOTE — TELEPHONE ENCOUNTER
Left a message with patients  to give us a call back  If the patient calls back, please ask the new COVID questions and document on the appointment line      Thank you

## 2020-07-14 ENCOUNTER — OFFICE VISIT (OUTPATIENT)
Dept: OBGYN CLINIC | Facility: HOSPITAL | Age: 55
End: 2020-07-14
Payer: COMMERCIAL

## 2020-07-14 ENCOUNTER — HOSPITAL ENCOUNTER (OUTPATIENT)
Dept: RADIOLOGY | Facility: HOSPITAL | Age: 55
Discharge: HOME | End: 2020-07-14
Attending: ORTHOPAEDIC SURGERY
Payer: COMMERCIAL

## 2020-07-14 VITALS
HEART RATE: 112 BPM | SYSTOLIC BLOOD PRESSURE: 123 MMHG | DIASTOLIC BLOOD PRESSURE: 85 MMHG | WEIGHT: 200.2 LBS | BODY MASS INDEX: 34.18 KG/M2 | HEIGHT: 64 IN

## 2020-07-14 DIAGNOSIS — M54.16 RADICULOPATHY, LUMBAR REGION: Primary | ICD-10-CM

## 2020-07-14 DIAGNOSIS — M16.0 PRIMARY OSTEOARTHRITIS OF BOTH HIPS: ICD-10-CM

## 2020-07-14 DIAGNOSIS — M25.552 PAIN OF BOTH HIP JOINTS: ICD-10-CM

## 2020-07-14 DIAGNOSIS — M25.551 PAIN OF BOTH HIP JOINTS: ICD-10-CM

## 2020-07-14 PROCEDURE — 4004F PT TOBACCO SCREEN RCVD TLK: CPT | Performed by: ORTHOPAEDIC SURGERY

## 2020-07-14 PROCEDURE — 73522 X-RAY EXAM HIPS BI 3-4 VIEWS: CPT

## 2020-07-14 PROCEDURE — 3008F BODY MASS INDEX DOCD: CPT | Performed by: ORTHOPAEDIC SURGERY

## 2020-07-14 PROCEDURE — 99203 OFFICE O/P NEW LOW 30 MIN: CPT | Performed by: ORTHOPAEDIC SURGERY

## 2020-07-14 NOTE — PROGRESS NOTES
Assessment:  1  Radiculopathy, lumbar region  MRI lumbar spine wo contrast    Ambulatory referral to Pain Management   2  Primary osteoarthritis of both hips  Ambulatory referral to Orthopedic Surgery    CANCELED: XR hips bilateral 2 vw w pelvis if performed       Plan:  The patient has an examination consistent with lumbar radiculopathy, with bilateral hip osteoarthritis  I have discussed with the patient the pathophysiology of this diagnosis and reviewed how the examination correlates with this diagnosis  Discussed with the patient that although she does have B/L hip OA the majority of her symptoms are related to her sudden worsening lumbar radiculopathy  Patient has had a sudden worsening of her symptoms since her last MRI in 2017 to include weakness in the LLE  I recommend that we repeat the MRI and have her follow up with spine and pain  Patient has been compliant with her physical therapist directed home exercise program which she had learned in the past this has not been beneficial in reducing her pain symptoms  Follow Up:  Spine and Pain    The above stated was discussed in layman's terms and the patient expressed understanding  All questions were answered to the patient's satisfaction  Subjective:   Tyshawn Pride is a 53 y o  female who presents with a chief complaint of Bilateral hip pain left > right  The pain began several year(s) ago but had a sudden worsening of her symptoms and is not associated with an acute injury  The patient describes the pain as aching, burning and sharp in intensity,  intermittent in timing, and localizes the pain to the bilateral groin, left buttock pain with radicular pain to the left ankle  The pain is worse with standing and walking and relieved by rest   The pain is associated with numbness and tingling  The pain is not associated with constitutional symptoms  The patient is awoken at night by the pain and numbness     The patient states she has pain even at rest   She states she was stuck on her toilet for 20 minutes the other day because she couldn't get up  She states she also get a burning pain under her right breast and radiate posteriorly to the midline spine  He states all of her flips developed a hole on the ball of her foot because her gait is off  Patient does have a history of lumbar radiculopathy  Past Medical History:   Diagnosis Date    Back pain     Depression     GERD (gastroesophageal reflux disease)        Past Surgical History:   Procedure Laterality Date    CERVICAL DISC SURGERY      CHOLECYSTECTOMY      FOOT SURGERY      [place] EGD TRANSORAL BIOPSY SINGLE/MULTIPLE N/A 4/20/2016    Procedure: ESOPHAGOGASTRODUODENOSCOPY (EGD); Surgeon: Eddie Wu MD;  Location: [facility];   Service: Gastroenterology    TONSILLECTOMY      TUBAL LIGATION      UPPER GASTROINTESTINAL ENDOSCOPY         Family History   Problem Relation Age of Onset    Aneurysm Mother     Heart attack Father     Aneurysm Sister     Aneurysm Brother     Mental illness Son        Social History     Occupational History    Occupation: Caregiver   Tobacco Use    Smoking status: Current Every Day Smoker     Packs/day: 1 00     Types: Cigarettes    Smokeless tobacco: Never Used   Substance and Sexual Activity    Alcohol use: No    Drug use: No    Sexual activity: Not Currently         Current Outpatient Medications:     acetaminophen (TYLENOL) 500 mg tablet, Take 500 mg by mouth every 6 (six) hours as needed for mild pain, Disp: , Rfl:     meloxicam (MOBIC) 15 mg tablet, Take 1 tablet (15 mg total) by mouth daily, Disp: 30 tablet, Rfl: 0    pantoprazole (PROTONIX) 40 mg tablet, Take 1 tablet (40 mg total) by mouth daily, Disp: 30 tablet, Rfl: 3    PARoxetine (PAXIL) 10 mg tablet, Take 1 tablet (10 mg total) by mouth daily, Disp: 30 tablet, Rfl: 5    No Known Allergies      Objective:  Vitals:    07/14/20 0808   BP: 123/85   Pulse: (!) 112       Review of Systems   Constitutional: Negative for chills and fever  HENT: Negative for drooling and sneezing  Eyes: Negative for redness  Respiratory: Negative for cough and wheezing  Gastrointestinal: Negative for nausea and vomiting  Musculoskeletal:        Please see ortho exam   Psychiatric/Behavioral: Negative for behavioral problems  The patient is not nervous/anxious  Right Hip Exam     Tenderness   The patient is experiencing no tenderness  Range of Motion   The patient has normal right hip ROM  Muscle Strength   The patient has normal right hip strength  Other   Erythema: absent  Sensation: normal  Pulse: present      Left Hip Exam     Tenderness   The patient is experiencing no tenderness  Range of Motion   The patient has normal left hip ROM  Left hip flexion: with groin and lumbar pain  Left hip external rotation: produces lumbar pain  Other   Erythema: absent  Sensation: normal  Pulse: present    Comments:    ELOISA produces lumbar pain  Straight leg raise produces lumbar pain without radic  Hip flexion 5/5  Knee flexion 5/5  Knee extension 5/5  Ankle dorsi flexion 4+/5  Ankle plantar flexion 4+/5            Physical Exam   Constitutional: She is oriented to person, place, and time  She appears well-developed and well-nourished  Eyes: Pupils are equal, round, and reactive to light  Pulmonary/Chest: Effort normal and breath sounds normal    Neurological: She is alert and oriented to person, place, and time  Skin: Skin is warm and dry  Psychiatric: She has a normal mood and affect  Her behavior is normal  Judgment and thought content normal    Vitals reviewed  Diagnostics, reviewed and taken today if performed as documented:     The attending physician has personally reviewed the pertinent films in PACS and interpretation is as follows: Bilateral hip x-rays demonstrates no fracture or dislocation, degenerative changes present  Procedures, if performed today:      None performed        Scribe Attestation    I,:   Jorge L Roman am acting as a scribe while in the presence of the attending physician :        I,:   Ulises Juan MD personally performed the services described in this documentation    as scribed in my presence :                Portions of the record may have been created with voice recognition software  Occasional wrong word or "sound a like" substitutions may have occurred due to the inherent limitations of voice recognition software  Read the chart carefully and recognize, using context, where substitutions have occurred

## 2020-07-17 ENCOUNTER — TELEPHONE (OUTPATIENT)
Dept: OBGYN CLINIC | Facility: HOSPITAL | Age: 55
End: 2020-07-17

## 2020-07-17 ENCOUNTER — HOSPITAL ENCOUNTER (EMERGENCY)
Facility: HOSPITAL | Age: 55
Discharge: PSYCHIATRIC OR SUBSTANCE USE FACILITY | End: 2020-07-18
Attending: EMERGENCY MEDICINE
Payer: COMMERCIAL

## 2020-07-17 DIAGNOSIS — T42.6X1A GABAPENTIN OVERDOSE: Primary | ICD-10-CM

## 2020-07-17 DIAGNOSIS — R45.851 SUICIDAL IDEATIONS: ICD-10-CM

## 2020-07-17 DIAGNOSIS — F43.9 STRESS AT HOME: ICD-10-CM

## 2020-07-17 DIAGNOSIS — F41.9 ANXIETY: ICD-10-CM

## 2020-07-17 DIAGNOSIS — M54.16 RADICULOPATHY, LUMBAR REGION: Primary | ICD-10-CM

## 2020-07-17 LAB
ALBUMIN SERPL BCP-MCNC: 3.2 G/DL (ref 3.5–5)
ALP SERPL-CCNC: 52 U/L (ref 46–116)
ALT SERPL W P-5'-P-CCNC: 27 U/L (ref 12–78)
ANION GAP SERPL CALCULATED.3IONS-SCNC: 7 MMOL/L (ref 4–13)
APAP SERPL-MCNC: <2 UG/ML (ref 10–20)
AST SERPL W P-5'-P-CCNC: 15 U/L (ref 5–45)
BASOPHILS # BLD AUTO: 0.03 THOUSANDS/ΜL (ref 0–0.1)
BASOPHILS NFR BLD AUTO: 0 % (ref 0–1)
BILIRUB SERPL-MCNC: 0.18 MG/DL (ref 0.2–1)
BUN SERPL-MCNC: 16 MG/DL (ref 5–25)
CALCIUM SERPL-MCNC: 8.6 MG/DL (ref 8.3–10.1)
CHLORIDE SERPL-SCNC: 111 MMOL/L (ref 100–108)
CO2 SERPL-SCNC: 23 MMOL/L (ref 21–32)
CREAT SERPL-MCNC: 0.73 MG/DL (ref 0.6–1.3)
EOSINOPHIL # BLD AUTO: 0.28 THOUSAND/ΜL (ref 0–0.61)
EOSINOPHIL NFR BLD AUTO: 3 % (ref 0–6)
ERYTHROCYTE [DISTWIDTH] IN BLOOD BY AUTOMATED COUNT: 13.3 % (ref 11.6–15.1)
ETHANOL EXG-MCNC: 0 MG/DL
ETHANOL SERPL-MCNC: <3 MG/DL (ref 0–3)
GFR SERPL CREATININE-BSD FRML MDRD: 93 ML/MIN/1.73SQ M
GLUCOSE SERPL-MCNC: 127 MG/DL (ref 65–140)
HCT VFR BLD AUTO: 39.9 % (ref 34.8–46.1)
HGB BLD-MCNC: 12.6 G/DL (ref 11.5–15.4)
IMM GRANULOCYTES # BLD AUTO: 0.03 THOUSAND/UL (ref 0–0.2)
IMM GRANULOCYTES NFR BLD AUTO: 0 % (ref 0–2)
LYMPHOCYTES # BLD AUTO: 3.4 THOUSANDS/ΜL (ref 0.6–4.47)
LYMPHOCYTES NFR BLD AUTO: 39 % (ref 14–44)
MCH RBC QN AUTO: 32.1 PG (ref 26.8–34.3)
MCHC RBC AUTO-ENTMCNC: 31.6 G/DL (ref 31.4–37.4)
MCV RBC AUTO: 102 FL (ref 82–98)
MONOCYTES # BLD AUTO: 0.65 THOUSAND/ΜL (ref 0.17–1.22)
MONOCYTES NFR BLD AUTO: 7 % (ref 4–12)
NEUTROPHILS # BLD AUTO: 4.37 THOUSANDS/ΜL (ref 1.85–7.62)
NEUTS SEG NFR BLD AUTO: 51 % (ref 43–75)
NRBC BLD AUTO-RTO: 0 /100 WBCS
PLATELET # BLD AUTO: 283 THOUSANDS/UL (ref 149–390)
PMV BLD AUTO: 10.3 FL (ref 8.9–12.7)
POTASSIUM SERPL-SCNC: 4.4 MMOL/L (ref 3.5–5.3)
PROT SERPL-MCNC: 6.3 G/DL (ref 6.4–8.2)
RBC # BLD AUTO: 3.92 MILLION/UL (ref 3.81–5.12)
SALICYLATES SERPL-MCNC: 3 MG/DL (ref 3–20)
SODIUM SERPL-SCNC: 141 MMOL/L (ref 136–145)
WBC # BLD AUTO: 8.76 THOUSAND/UL (ref 4.31–10.16)

## 2020-07-17 PROCEDURE — 80320 DRUG SCREEN QUANTALCOHOLS: CPT | Performed by: EMERGENCY MEDICINE

## 2020-07-17 PROCEDURE — 85025 COMPLETE CBC W/AUTO DIFF WBC: CPT | Performed by: EMERGENCY MEDICINE

## 2020-07-17 PROCEDURE — 84443 ASSAY THYROID STIM HORMONE: CPT | Performed by: EMERGENCY MEDICINE

## 2020-07-17 PROCEDURE — 99285 EMERGENCY DEPT VISIT HI MDM: CPT | Performed by: EMERGENCY MEDICINE

## 2020-07-17 PROCEDURE — 80307 DRUG TEST PRSMV CHEM ANLYZR: CPT | Performed by: EMERGENCY MEDICINE

## 2020-07-17 PROCEDURE — 36415 COLL VENOUS BLD VENIPUNCTURE: CPT | Performed by: EMERGENCY MEDICINE

## 2020-07-17 PROCEDURE — 80329 ANALGESICS NON-OPIOID 1 OR 2: CPT | Performed by: EMERGENCY MEDICINE

## 2020-07-17 PROCEDURE — 82075 ASSAY OF BREATH ETHANOL: CPT | Performed by: EMERGENCY MEDICINE

## 2020-07-17 PROCEDURE — 80053 COMPREHEN METABOLIC PANEL: CPT | Performed by: EMERGENCY MEDICINE

## 2020-07-17 PROCEDURE — 93005 ELECTROCARDIOGRAM TRACING: CPT

## 2020-07-17 PROCEDURE — 81025 URINE PREGNANCY TEST: CPT | Performed by: EMERGENCY MEDICINE

## 2020-07-17 PROCEDURE — 99285 EMERGENCY DEPT VISIT HI MDM: CPT

## 2020-07-17 PROCEDURE — U0003 INFECTIOUS AGENT DETECTION BY NUCLEIC ACID (DNA OR RNA); SEVERE ACUTE RESPIRATORY SYNDROME CORONAVIRUS 2 (SARS-COV-2) (CORONAVIRUS DISEASE [COVID-19]), AMPLIFIED PROBE TECHNIQUE, MAKING USE OF HIGH THROUGHPUT TECHNOLOGIES AS DESCRIBED BY CMS-2020-01-R: HCPCS | Performed by: EMERGENCY MEDICINE

## 2020-07-17 RX ADMIN — ACTIVATED CHARCOAL 50 G: 208 SUSPENSION ORAL at 19:39

## 2020-07-17 NOTE — PROGRESS NOTES
Addendum:  The patient states performing lumbar spine stretching and strengthening exercises 2-3 times weekly for at least the past 6 weeks
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
## 2020-07-17 NOTE — TELEPHONE ENCOUNTER
Patient sees Dr Wilber Victoria  MRI is calling because the patient's MRI script needs to be e-signed by the doctor  They are asking if this could be completed  It will be in the inbasket

## 2020-07-17 NOTE — ED ATTENDING ATTESTATION
7/17/2020  Ligia Bradford DO, saw and evaluated the patient  I have discussed the patient with the resident/non-physician practitioner and agree with the resident's/non-physician practitioner's findings, Plan of Care, and MDM as documented in the resident's/non-physician practitioner's note, except where noted  All available labs and Radiology studies were reviewed  I was present for key portions of any procedure(s) performed by the resident/non-physician practitioner and I was immediately available to provide assistance  At this point I agree with the current assessment done in the Emergency Department  I have conducted an independent evaluation of this patient a history and physical is as follows:      53 yof with anxiety and depression that she attributes to arguing with   +Passive SI w/o plan, no HI  No hallucinations  She feels safe at home  Past Medical History:   Diagnosis Date    Back pain     Depression     GERD (gastroesophageal reflux disease)      Past Surgical History:   Procedure Laterality Date    CERVICAL DISC SURGERY      CHOLECYSTECTOMY      FOOT SURGERY      [place] EGD TRANSORAL BIOPSY SINGLE/MULTIPLE N/A 4/20/2016    Procedure: ESOPHAGOGASTRODUODENOSCOPY (EGD); Surgeon: Silver Downey MD;  Location: [facility]; Service: Gastroenterology    TONSILLECTOMY      TUBAL LIGATION      UPPER GASTROINTESTINAL ENDOSCOPY         /61 (BP Location: Right arm)   Pulse 88   Temp 98 3 °F (36 8 °C)   Resp 20   Ht 5' 3" (1 6 m)   Wt 90 7 kg (200 lb)   SpO2 97%   BMI 35 43 kg/m²   Anxious, A&Ox4, CTA, RRR, abd soft/NT, ambulates well  Patient requesting resources for counseling      7:23 PM  Patient further discloses that she overdosed on a handful of gabapentin because she wants to die  She is requesting inpatient psychiatric treatment    Overdose was 1 hour ago when she will be given 50 g of activated charcoal, observe for 6 hours and then and disposition to Psychiatry of asymptomatic at midnight tonight  Symptoms to observe for include CNS depression, ataxia and nystagmus  Asymptomatic prior to minute, will admit for observation overnight  12:08 AM  Patient is resting comfortably but is awake and and can ambulate well  She is medically cleared and awaiting crisis eval to sign a 201       ED Course         Critical Care Time  Procedures

## 2020-07-18 ENCOUNTER — HOSPITAL ENCOUNTER (INPATIENT)
Facility: HOSPITAL | Age: 55
LOS: 3 days | Discharge: HOME | DRG: 751 | End: 2020-07-21
Attending: PSYCHIATRY & NEUROLOGY | Admitting: PSYCHIATRY & NEUROLOGY
Payer: COMMERCIAL

## 2020-07-18 VITALS
HEART RATE: 55 BPM | DIASTOLIC BLOOD PRESSURE: 52 MMHG | BODY MASS INDEX: 35.44 KG/M2 | SYSTOLIC BLOOD PRESSURE: 103 MMHG | TEMPERATURE: 98.3 F | OXYGEN SATURATION: 97 % | WEIGHT: 200 LBS | HEIGHT: 63 IN | RESPIRATION RATE: 16 BRPM

## 2020-07-18 DIAGNOSIS — G47.00 INSOMNIA: Primary | ICD-10-CM

## 2020-07-18 DIAGNOSIS — F41.9 ANXIETY: ICD-10-CM

## 2020-07-18 DIAGNOSIS — F33.2 MDD (MAJOR DEPRESSIVE DISORDER), RECURRENT SEVERE, WITHOUT PSYCHOSIS (HCC): ICD-10-CM

## 2020-07-18 PROBLEM — F15.10 METHAMPHETAMINE ABUSE, EPISODIC (HCC): Status: ACTIVE | Noted: 2020-07-18

## 2020-07-18 LAB
AMPHETAMINES SERPL QL SCN: POSITIVE
ATRIAL RATE: 64 BPM
BARBITURATES UR QL: NEGATIVE
BENZODIAZ UR QL: NEGATIVE
COCAINE UR QL: NEGATIVE
EXT PREG TEST URINE: NEGATIVE
EXT. CONTROL ED NAV: NORMAL
METHADONE UR QL: NEGATIVE
OPIATES UR QL SCN: NEGATIVE
OXYCODONE+OXYMORPHONE UR QL SCN: NEGATIVE
P AXIS: 75 DEGREES
PCP UR QL: NEGATIVE
PR INTERVAL: 150 MS
QRS AXIS: 78 DEGREES
QRSD INTERVAL: 88 MS
QT INTERVAL: 400 MS
QTC INTERVAL: 412 MS
SARS-COV-2 RNA RESP QL NAA+PROBE: NEGATIVE
T WAVE AXIS: 70 DEGREES
THC UR QL: NEGATIVE
TSH SERPL DL<=0.05 MIU/L-ACNC: 1.46 UIU/ML (ref 0.36–3.74)
VENTRICULAR RATE: 64 BPM

## 2020-07-18 PROCEDURE — 99222 1ST HOSP IP/OBS MODERATE 55: CPT | Performed by: PSYCHIATRY & NEUROLOGY

## 2020-07-18 PROCEDURE — 93010 ELECTROCARDIOGRAM REPORT: CPT | Performed by: INTERNAL MEDICINE

## 2020-07-18 RX ORDER — LORAZEPAM 2 MG/ML
1 INJECTION INTRAMUSCULAR EVERY 6 HOURS PRN
Status: CANCELLED | OUTPATIENT
Start: 2020-07-18

## 2020-07-18 RX ORDER — ACETAMINOPHEN 325 MG/1
650 TABLET ORAL EVERY 6 HOURS PRN
Status: DISCONTINUED | OUTPATIENT
Start: 2020-07-18 | End: 2020-07-21 | Stop reason: HOSPADM

## 2020-07-18 RX ORDER — OLANZAPINE 10 MG/1
10 INJECTION, POWDER, LYOPHILIZED, FOR SOLUTION INTRAMUSCULAR 2 TIMES DAILY PRN
Status: CANCELLED | OUTPATIENT
Start: 2020-07-18

## 2020-07-18 RX ORDER — LORAZEPAM 2 MG/ML
1 INJECTION INTRAMUSCULAR EVERY 6 HOURS PRN
Status: DISCONTINUED | OUTPATIENT
Start: 2020-07-18 | End: 2020-07-21 | Stop reason: HOSPADM

## 2020-07-18 RX ORDER — ACETAMINOPHEN 325 MG/1
975 TABLET ORAL ONCE
Status: COMPLETED | OUTPATIENT
Start: 2020-07-18 | End: 2020-07-18

## 2020-07-18 RX ORDER — LORAZEPAM 0.5 MG/1
1 TABLET ORAL EVERY 6 HOURS PRN
Status: CANCELLED | OUTPATIENT
Start: 2020-07-18

## 2020-07-18 RX ORDER — MAGNESIUM HYDROXIDE/ALUMINUM HYDROXICE/SIMETHICONE 120; 1200; 1200 MG/30ML; MG/30ML; MG/30ML
30 SUSPENSION ORAL EVERY 4 HOURS PRN
Status: CANCELLED | OUTPATIENT
Start: 2020-07-18

## 2020-07-18 RX ORDER — BENZTROPINE MESYLATE 1 MG/1
1 TABLET ORAL EVERY 6 HOURS PRN
Status: DISCONTINUED | OUTPATIENT
Start: 2020-07-18 | End: 2020-07-21 | Stop reason: HOSPADM

## 2020-07-18 RX ORDER — BENZTROPINE MESYLATE 1 MG/ML
1 INJECTION INTRAMUSCULAR; INTRAVENOUS EVERY 6 HOURS PRN
Status: CANCELLED | OUTPATIENT
Start: 2020-07-18

## 2020-07-18 RX ORDER — IBUPROFEN 600 MG/1
600 TABLET ORAL EVERY 6 HOURS PRN
Status: DISCONTINUED | OUTPATIENT
Start: 2020-07-18 | End: 2020-07-21 | Stop reason: HOSPADM

## 2020-07-18 RX ORDER — ACETAMINOPHEN 325 MG/1
650 TABLET ORAL EVERY 6 HOURS PRN
Status: CANCELLED | OUTPATIENT
Start: 2020-07-18

## 2020-07-18 RX ORDER — TRAZODONE HYDROCHLORIDE 50 MG/1
50 TABLET ORAL
Status: CANCELLED | OUTPATIENT
Start: 2020-07-18

## 2020-07-18 RX ORDER — MAGNESIUM HYDROXIDE/ALUMINUM HYDROXICE/SIMETHICONE 120; 1200; 1200 MG/30ML; MG/30ML; MG/30ML
30 SUSPENSION ORAL EVERY 4 HOURS PRN
Status: DISCONTINUED | OUTPATIENT
Start: 2020-07-18 | End: 2020-07-21 | Stop reason: HOSPADM

## 2020-07-18 RX ORDER — BENZTROPINE MESYLATE 1 MG/ML
1 INJECTION INTRAMUSCULAR; INTRAVENOUS EVERY 6 HOURS PRN
Status: DISCONTINUED | OUTPATIENT
Start: 2020-07-18 | End: 2020-07-21 | Stop reason: HOSPADM

## 2020-07-18 RX ORDER — PAROXETINE HYDROCHLORIDE 20 MG/1
20 TABLET, FILM COATED ORAL DAILY
Status: DISCONTINUED | OUTPATIENT
Start: 2020-07-18 | End: 2020-07-21 | Stop reason: HOSPADM

## 2020-07-18 RX ORDER — IBUPROFEN 400 MG/1
400 TABLET ORAL EVERY 6 HOURS PRN
Status: DISCONTINUED | OUTPATIENT
Start: 2020-07-18 | End: 2020-07-19 | Stop reason: ALTCHOICE

## 2020-07-18 RX ORDER — GABAPENTIN 300 MG/1
300 CAPSULE ORAL 3 TIMES DAILY
COMMUNITY
End: 2020-07-21 | Stop reason: HOSPADM

## 2020-07-18 RX ORDER — TRAZODONE HYDROCHLORIDE 50 MG/1
50 TABLET ORAL
Status: DISCONTINUED | OUTPATIENT
Start: 2020-07-18 | End: 2020-07-21 | Stop reason: HOSPADM

## 2020-07-18 RX ORDER — IBUPROFEN 800 MG/1
800 TABLET ORAL EVERY 6 HOURS PRN
Status: DISCONTINUED | OUTPATIENT
Start: 2020-07-18 | End: 2020-07-21 | Stop reason: HOSPADM

## 2020-07-18 RX ORDER — BENZTROPINE MESYLATE 1 MG/1
1 TABLET ORAL EVERY 6 HOURS PRN
Status: CANCELLED | OUTPATIENT
Start: 2020-07-18

## 2020-07-18 RX ORDER — NICOTINE 21 MG/24HR
21 PATCH, TRANSDERMAL 24 HOURS TRANSDERMAL DAILY
Status: DISCONTINUED | OUTPATIENT
Start: 2020-07-18 | End: 2020-07-21 | Stop reason: HOSPADM

## 2020-07-18 RX ORDER — LORAZEPAM 1 MG/1
1 TABLET ORAL EVERY 6 HOURS PRN
Status: DISCONTINUED | OUTPATIENT
Start: 2020-07-18 | End: 2020-07-21 | Stop reason: HOSPADM

## 2020-07-18 RX ORDER — OLANZAPINE 10 MG/1
10 INJECTION, POWDER, LYOPHILIZED, FOR SOLUTION INTRAMUSCULAR 2 TIMES DAILY PRN
Status: DISCONTINUED | OUTPATIENT
Start: 2020-07-18 | End: 2020-07-21 | Stop reason: HOSPADM

## 2020-07-18 RX ADMIN — ACETAMINOPHEN 650 MG: 325 TABLET, FILM COATED ORAL at 22:19

## 2020-07-18 RX ADMIN — PAROXETINE HYDROCHLORIDE 20 MG: 20 TABLET, FILM COATED ORAL at 12:31

## 2020-07-18 RX ADMIN — ACETAMINOPHEN 975 MG: 325 TABLET, FILM COATED ORAL at 05:25

## 2020-07-18 RX ADMIN — TRAZODONE HYDROCHLORIDE 50 MG: 50 TABLET ORAL at 21:09

## 2020-07-18 RX ADMIN — IBUPROFEN 800 MG: 800 TABLET ORAL at 12:24

## 2020-07-18 RX ADMIN — NICOTINE 21 MG: 21 PATCH TRANSDERMAL at 12:24
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
## 2020-07-18 NOTE — ED NOTES
Patient is accepted at [address]    Patient is accepted by Dr Casey Gomez per [address] [facility] is arranged with [facility]	[place] is scheduled for 0830     Patient may go to the floor at anytime except shift change          Nurse report is to be called to [phone] prior to patient transfer

## 2020-07-18 NOTE — TREATMENT PLAN
TREATMENT PLAN REVIEW - [address] [facility] 53 y o  1965 female MRN: [id number]    [address] [place] [address] Room / Bed: [place] [address]/[facility] 976-42 Encounter: [id number]          Admit Date/Time:  7/18/2020  8:48 AM    Treatment Team: Attending Provider: Baylee Camilo DO; Medications RN: Poornima Thomas RN; Registered Nurse: Christine Patricia RN; Registered Nurse: Clau Almonte RN    Diagnosis: Principal Problem:    MDD (major depressive disorder), recurrent severe, without psychosis ([facility] )  Active Problems:    Anxiety    Methamphetamine abuse, episodic ([place] [address] )      Patient Strengths: family ties, patient is on a voluntary commitment      Patient Barriers: family conflict    Short Term Goals: decrease in depressive symptoms, decrease in anxiety symptoms    Long Term Goals: improvement in depression, improvement in anxiety, free of suicidal thoughts, improved insight    Progress Towards Goals: starting psychiatric medications as prescribed    Recommended Treatment: medication management, patient medication education, group therapy, milieu therapy, continued Behavioral Health psychiatric evaluation/assessment process    Treatment Frequency: daily medication monitoring, group and milieu therapy daily, monitoring through interdisciplinary rounds, monitoring through weekly patient care conferences    Expected Discharge Date:  5-6 days    Discharge Plan: referral for outpatient medication management with a psychiatrist, referral for outpatient psychotherapy, referrals as indicated, return to previous living arrangement, consider PHP      Treatment Plan Created/Updated By: Merlyn Driver III, DO
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
## 2020-07-18 NOTE — ED NOTES
Insurance Authorization for admission:   [facility]  Phone number:[phone]   Spoke to [facility]     4 days approved  Level of care: [facility]  Review on 7/21/2020  Authorization # CALL UPON ARRIVAL        EVS (Eligibility Verification System) called  [phone]    Automated system indicates:ELIGIBLE
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
## 2020-07-18 NOTE — PROGRESS NOTES
Belongings Pt   Admitted With    At bedside:  Glasses  Bra   Underwear  sandals    Locker:  Tank top  shorts
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
## 2020-07-18 NOTE — ED NOTES
Patient calm and cooperative when leaving the department via EMS to go to [facility], [facility]  07/18/20 1894

## 2020-07-19 PROBLEM — Z72.0 TOBACCO ABUSE: Status: ACTIVE | Noted: 2020-07-19

## 2020-07-19 PROBLEM — Z00.8 MEDICAL CLEARANCE FOR PSYCHIATRIC ADMISSION: Status: ACTIVE | Noted: 2019-11-08

## 2020-07-19 LAB
BASOPHILS # BLD AUTO: 0.03 THOUSANDS/ΜL (ref 0–0.1)
BASOPHILS NFR BLD AUTO: 0 % (ref 0–1)
EOSINOPHIL # BLD AUTO: 0.19 THOUSAND/ΜL (ref 0–0.61)
EOSINOPHIL NFR BLD AUTO: 2 % (ref 0–6)
ERYTHROCYTE [DISTWIDTH] IN BLOOD BY AUTOMATED COUNT: 13.4 % (ref 11.6–15.1)
HCT VFR BLD AUTO: 42.6 % (ref 34.8–46.1)
HGB BLD-MCNC: 13.5 G/DL (ref 11.5–15.4)
IMM GRANULOCYTES # BLD AUTO: 0.03 THOUSAND/UL (ref 0–0.2)
IMM GRANULOCYTES NFR BLD AUTO: 0 % (ref 0–2)
LYMPHOCYTES # BLD AUTO: 3.14 THOUSANDS/ΜL (ref 0.6–4.47)
LYMPHOCYTES NFR BLD AUTO: 33 % (ref 14–44)
MCH RBC QN AUTO: 32.1 PG (ref 26.8–34.3)
MCHC RBC AUTO-ENTMCNC: 31.7 G/DL (ref 31.4–37.4)
MCV RBC AUTO: 101 FL (ref 82–98)
MONOCYTES # BLD AUTO: 0.62 THOUSAND/ΜL (ref 0.17–1.22)
MONOCYTES NFR BLD AUTO: 7 % (ref 4–12)
NEUTROPHILS # BLD AUTO: 5.53 THOUSANDS/ΜL (ref 1.85–7.62)
NEUTS SEG NFR BLD AUTO: 58 % (ref 43–75)
NRBC BLD AUTO-RTO: 0 /100 WBCS
PLATELET # BLD AUTO: 276 THOUSANDS/UL (ref 149–390)
PMV BLD AUTO: 10.3 FL (ref 8.9–12.7)
RBC # BLD AUTO: 4.2 MILLION/UL (ref 3.81–5.12)
WBC # BLD AUTO: 9.54 THOUSAND/UL (ref 4.31–10.16)

## 2020-07-19 PROCEDURE — 85025 COMPLETE CBC W/AUTO DIFF WBC: CPT | Performed by: PSYCHIATRY & NEUROLOGY

## 2020-07-19 PROCEDURE — 99232 SBSQ HOSP IP/OBS MODERATE 35: CPT | Performed by: PSYCHIATRY & NEUROLOGY

## 2020-07-19 PROCEDURE — 99253 IP/OBS CNSLTJ NEW/EST LOW 45: CPT | Performed by: PHYSICIAN ASSISTANT

## 2020-07-19 RX ORDER — PANTOPRAZOLE SODIUM 40 MG/1
40 TABLET, DELAYED RELEASE ORAL
Status: DISCONTINUED | OUTPATIENT
Start: 2020-07-19 | End: 2020-07-21 | Stop reason: HOSPADM

## 2020-07-19 RX ADMIN — IBUPROFEN 600 MG: 600 TABLET ORAL at 15:16

## 2020-07-19 RX ADMIN — ACETAMINOPHEN 650 MG: 325 TABLET, FILM COATED ORAL at 08:34

## 2020-07-19 RX ADMIN — PANTOPRAZOLE SODIUM 40 MG: 40 TABLET, DELAYED RELEASE ORAL at 08:33

## 2020-07-19 RX ADMIN — NICOTINE 21 MG: 21 PATCH TRANSDERMAL at 08:34

## 2020-07-19 RX ADMIN — PAROXETINE HYDROCHLORIDE 20 MG: 20 TABLET, FILM COATED ORAL at 08:33

## 2020-07-19 RX ADMIN — ACETAMINOPHEN 650 MG: 325 TABLET, FILM COATED ORAL at 21:04

## 2020-07-19 RX ADMIN — TRAZODONE HYDROCHLORIDE 50 MG: 50 TABLET ORAL at 21:05

## 2020-07-19 NOTE — PROGRESS NOTES
Pt remains pleasant and cooperative throughout the day  Denies SI/HI  Mild anxiety and depression  Pt attending some groups  Denies any concerns at this time

## 2020-07-19 NOTE — TREATMENT TEAM
AM rounds- tearful in the afternoon  patient signed 72hr notice  Denies SI  Diarrhea yesterday  Slept overnight

## 2020-07-19 NOTE — ASSESSMENT & PLAN NOTE
· Vital signs stable at time of assessment  · CBC, CMP, TSH WNL  · EKG:  NSR, normal QTc  · Patient appears medically stable at this time for inpatient psychiatric treatment

## 2020-07-19 NOTE — PLAN OF CARE
Problem: SELF HARM/SUICIDALITY  Goal: Will have no self-injury during hospital stay  Description  INTERVENTIONS:  - Q 15 MINUTES: Routine safety checks  - Q WAKING SHIFT & PRN: Assess risk to determine if routine checks are adequate to maintain patient safety  - Encourage patient to participate actively in care by formulating a plan to combat response to suicidal ideation, identify supports and resources  Outcome: Progressing     Problem: DEPRESSION  Goal: Will be euthymic at discharge  Description  INTERVENTIONS:  - Administer medication as ordered  - Provide emotional support via 1:1 interaction with staff  - Encourage involvement in milieu/groups/activities  - Monitor for social isolation  Outcome: Progressing     Problem: ANXIETY  Goal: Will report anxiety at manageable levels  Description  INTERVENTIONS:  - Administer medication as ordered  - Teach and encourage coping skills  - Provide emotional support  - Assess patient/family for anxiety and ability to cope  Outcome: Progressing  Goal: By discharge: Patient will verbalize 2 strategies to deal with anxiety  Description  Interventions:  - Identify any obvious source/trigger to anxiety  - Staff will assist patient in applying identified coping technique/skills  - Encourage attendance of scheduled groups and activities  Outcome: Progressing     Problem: DISCHARGE PLANNING  Goal: Discharge to home or other facility with appropriate resources  Description  INTERVENTIONS:  - Identify barriers to discharge w/patient and caregiver  - Arrange for needed discharge resources and transportation as appropriate  - Identify discharge learning needs (meds, wound care, etc )  - Arrange for interpretive services to assist at discharge as needed  - Refer to Case Management Department for coordinating discharge planning if the patient needs post-hospital services based on physician/advanced practitioner order or complex needs related to functional status, cognitive ability, or social support system  Outcome: Progressing     Problem: Ineffective Coping  Goal: Participates in unit activities  Description  Interventions:  - Provide therapeutic environment   - Provide required programming   - Redirect inappropriate behaviors   Outcome: Not Progressing

## 2020-07-19 NOTE — CONSULTS
Consult- Jhonathan Rosen 1965, 53 y o  female MRN: [id number]    Unit/Bed#: [facility] 255-02 Encounter: [id number]    Primary Care Provider: Austin Paz MD   Date and time admitted to hospital: 7/18/2020  8:48 AM      Inpatient consult for Medical Clearance for [address] patient  Consult performed by: Yony Bennett PA-C  Consult ordered by: Joseph Christopher MD        Medical clearance for psychiatric admission  Assessment & Plan  · Vital signs stable at time of assessment  · CBC, CMP, TSH WNL  · EKG:  NSR, normal QTc  · Patient appears medically stable at this time for inpatient psychiatric treatment    Tobacco abuse  Assessment & Plan  · Counseled cessation  · Nicotine patch ordered    Methamphetamine abuse, episodic ([facility] )  Assessment & Plan  · UDS + methamphetamines  · Counseled cessation    Gastroesophageal reflux disease  Assessment & Plan  · Continue PPI    * MDD (major depressive disorder), recurrent severe, without psychosis ([facility] )  Assessment & Plan  · Presented to the ED with depression/anxiety, suicide attempt with OD on gabapentin and protonix  · Patient was medically cleared from the ED by medical toxicology  · Further plan per psychiatry      VTE Prophylaxis: Reason for no pharmacologic prophylaxis low risk, ambulate  / reason for no mechanical VTE prophylaxis psychiatric unit, ambulate     Recommendations for Discharge:  · Follow up with PCP after discharge    Counseling / Coordination of Care Time: 30 minutes  Greater than 50% of total time spent on patient counseling and coordination of care  Collaboration of Care: Were Recommendations Directly Discussed with Primary Treatment Team? - No     History of Present Illness:    Jhonathan Rosen is a 53 y o  female who is originally admitted to the psychiatry service due to depression/anxiety/suicide attempt  We are consulted for medical clearance  Past medical history significant for tobacco abuse, methamphetamine abuse, depression, GERD    Patient presented to the ED after reportedly attempting to overdose on gabapentin due to increasing depression/anxiety  Patient was medically cleared from the ED by medical toxicology  Currently patient denies any physical complaints including chest pain/palpitations, shortness of breath, nausea/vomiting, abdominal pain  Review of Systems:    Review of Systems   Constitutional: Negative for chills, fatigue, fever and unexpected weight change  HENT: Negative for congestion, sore throat and trouble swallowing  Eyes: Negative for photophobia, pain and visual disturbance  Respiratory: Negative for cough, shortness of breath and wheezing  Cardiovascular: Negative for chest pain, palpitations and leg swelling  Gastrointestinal: Negative for abdominal pain, constipation, diarrhea, nausea and vomiting  Endocrine: Negative for polyuria  Genitourinary: Negative for difficulty urinating, dysuria, flank pain, hematuria and urgency  Musculoskeletal: Negative for back pain, myalgias, neck pain and neck stiffness  Skin: Negative for pallor and rash  Neurological: Negative for dizziness, tremors, syncope, weakness, light-headedness, numbness and headaches  Hematological: Does not bruise/bleed easily  Psychiatric/Behavioral: Positive for dysphoric mood and suicidal ideas  Negative for agitation and confusion  The patient is nervous/anxious  Past Medical and Surgical History:     Past Medical History:   Diagnosis Date    Back pain     Depression     GERD (gastroesophageal reflux disease)        Past Surgical History:   Procedure Laterality Date    CERVICAL DISC SURGERY      CHOLECYSTECTOMY      FOOT SURGERY      TX EGD TRANSORAL BIOPSY SINGLE/MULTIPLE N/A 4/20/2016    Procedure: ESOPHAGOGASTRODUODENOSCOPY (EGD); Surgeon: Clifford Juarez MD;  Location: [facility];   Service: Gastroenterology    TONSILLECTOMY      TUBAL LIGATION      UPPER GASTROINTESTINAL ENDOSCOPY         Meds/Allergies:    all medications and allergies reviewed    Allergies: No Known Allergies    Social History:     Marital Status: /Civil Union    Substance Use History:   Social History     Substance and Sexual Activity   Alcohol Use No    Frequency: Never    Binge frequency: Never     Social History     Tobacco Use   Smoking Status Current Every Day Smoker    Packs/day: 1 00    Types: Cigarettes   Smokeless Tobacco Never Used     Social History     Substance and Sexual Activity   Drug Use No       Family History:    Family History   Problem Relation Age of Onset    Aneurysm Mother     Heart attack Father     Aneurysm Sister     Aneurysm Brother     Mental illness Son        Physical Exam:     Vitals:   Blood Pressure: 97/51 (07/18/20 1620)  Pulse: 55 (07/18/20 1620)  Temperature: (!) 96 8 °F (36 °C) (07/18/20 1620)  Temp Source: Tympanic (07/18/20 1620)  Respirations: 16 (07/18/20 1620)  Height: 5' 5" (165 1 cm) (07/18/20 0850)  Weight - Scale: 91 4 kg (201 lb 9 6 oz) (07/18/20 0850)  SpO2: 97 % (07/18/20 0850)    Physical Exam   Constitutional: She is oriented to person, place, and time  Vital signs are normal  She appears well-developed  Appears comfortable, no acute distress   HENT:   Head: Normocephalic  Eyes: Pupils are equal, round, and reactive to light  Conjunctivae and EOM are normal  No scleral icterus  Neck: Normal range of motion  Cardiovascular: Normal rate, regular rhythm and normal heart sounds  No murmur heard  Pulmonary/Chest: Effort normal and breath sounds normal  No respiratory distress  She has no wheezes  She has no rhonchi  She has no rales  Abdominal: Soft  Bowel sounds are normal  There is no tenderness  There is no rigidity, no rebound and no guarding  Musculoskeletal: She exhibits no edema, tenderness or deformity  Able to ambulate without difficulty   Neurological: She is alert and oriented to person, place, and time  Skin: Skin is warm and dry     Psychiatric: She has a normal mood and affect  Her speech is normal and behavior is normal    Nursing note and vitals reviewed  Additional Data:     Lab Results: I have personally reviewed pertinent reports  Results from last 7 days   Lab Units 07/17/20 2001   WBC Thousand/uL 8 76   HEMOGLOBIN g/dL 12 6   HEMATOCRIT % 39 9   PLATELETS Thousands/uL 283   NEUTROS PCT % 51   LYMPHS PCT % 39   MONOS PCT % 7   EOS PCT % 3     Results from last 7 days   Lab Units 07/17/20 2001   SODIUM mmol/L 141   POTASSIUM mmol/L 4 4   CHLORIDE mmol/L 111*   CO2 mmol/L 23   BUN mg/dL 16   CREATININE mg/dL 0 73   ANION GAP mmol/L 7   CALCIUM mg/dL 8 6   ALBUMIN g/dL 3 2*   TOTAL BILIRUBIN mg/dL 0 18*   ALK PHOS U/L 52   ALT U/L 27   AST U/L 15   GLUCOSE RANDOM mg/dL 127             No results found for: HGBA1C            Imaging: I have personally reviewed pertinent reports  No orders to display       EKG, Pathology, and Other Studies Reviewed on Admission:   · Reviewed as above    ** Please Note: This note has been constructed using a voice recognition system   **
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
## 2020-07-19 NOTE — ASSESSMENT & PLAN NOTE
· Presented to the ED with depression/anxiety, suicide attempt with OD on gabapentin and protonix  · Patient was medically cleared from the ED by medical toxicology  · Further plan per psychiatry

## 2020-07-19 NOTE — PROGRESS NOTES
Seclusive to room, but cooperative with assessment when approached  Denies SI/HI & A/V hallucinations  Rates her depression at a 5/10 & anxiety a 6/10  Denies questions/concerns, but does state that she needs to cancel an MRI scheduled for Tuesday 7/22  Asked if a relative should cancel it  Advised that it would be ok if she had someone to postpone it, or that she could do that with her  on Monday  Did not attend group, though encouraged to do so  States that she will attend groups tomorrow  No other changes in status

## 2020-07-20 ENCOUNTER — TELEPHONE (OUTPATIENT)
Dept: FAMILY MEDICINE CLINIC | Facility: CLINIC | Age: 55
End: 2020-07-20

## 2020-07-20 PROCEDURE — 99232 SBSQ HOSP IP/OBS MODERATE 35: CPT | Performed by: PSYCHIATRY & NEUROLOGY

## 2020-07-20 RX ORDER — DIAPER,BRIEF,INFANT-TODD,DISP
EACH MISCELLANEOUS 4 TIMES DAILY PRN
Status: DISCONTINUED | OUTPATIENT
Start: 2020-07-20 | End: 2020-07-21 | Stop reason: HOSPADM

## 2020-07-20 RX ADMIN — IBUPROFEN 600 MG: 600 TABLET ORAL at 14:49

## 2020-07-20 RX ADMIN — TRAZODONE HYDROCHLORIDE 50 MG: 50 TABLET ORAL at 21:58

## 2020-07-20 RX ADMIN — HYDROCORTISONE: 1 CREAM TOPICAL at 14:50

## 2020-07-20 RX ADMIN — PAROXETINE HYDROCHLORIDE 20 MG: 20 TABLET, FILM COATED ORAL at 08:14

## 2020-07-20 RX ADMIN — PANTOPRAZOLE SODIUM 40 MG: 40 TABLET, DELAYED RELEASE ORAL at 06:28

## 2020-07-20 RX ADMIN — ACETAMINOPHEN 650 MG: 325 TABLET, FILM COATED ORAL at 21:57

## 2020-07-20 RX ADMIN — ACETAMINOPHEN 650 MG: 325 TABLET, FILM COATED ORAL at 08:14

## 2020-07-20 RX ADMIN — NICOTINE 21 MG: 21 PATCH TRANSDERMAL at 08:15

## 2020-07-20 NOTE — PROGRESS NOTES
Remains pleasant & cooperative  Denies SI/HI & A/V hallucinations  Reports that depression and anxiety are still moderate, but denies questions/concerns  Will continue to monitor

## 2020-07-20 NOTE — PROGRESS NOTES
Progress Note - [address] 53 y o  female MRN: [id number]  Unit/Bed#: [facility] 255-02 Encounter: [id number]    Assessment/Plan   Principal Problem:    MDD (major depressive disorder), recurrent severe, without psychosis ([facility] )  Active Problems:    Gastroesophageal reflux disease    Medical clearance for psychiatric admission    Anxiety    Methamphetamine abuse, episodic ([facility])    Tobacco abuse      Behavior over the last 24 hours:  improved  Sleep: normal  Appetite: normal  Medication side effects: No  ROS: no complaints    Mental Status Evaluation:  Appearance:  age appropriate and casually dressed   Behavior:  cooperative   Speech:  normal pitch and normal volume   Mood:  euthymic   Affect:  normal   Thought Process:  goal directed and logical   Thought Content:  normal   Perceptual Disturbances: None   Risk Potential: Suicidal Ideations none  Homicidal Ideations none  Potential for Aggression No   Sensorium:  person, place, time/date and situation   Memory:  recent and remote memory grossly intact   Consciousness:  alert and awake    Attention: attention span and concentration were age appropriate   Insight:  limited   Judgment: limited   Gait/Station: normal gait/station and normal balance   Motor Activity: no abnormal movements     Progress Toward Goals: Patient is compliant with medications and denies side effects  She stated she has responded well to dose increase on Paxil from 10 mg to 20 mg and switching Paxil from HS to AM has helped improved her sleep  No longer on Gabapentin (s/p OD) stated with Trazodone at bedtime and prn Tylenol she has managed to sleep and managed her pain at night  Signed 72 hr notice and is due tomorrow  She agrees to f/u at [facility] because it is within walking distance from her home  Recommended Treatment: Continue with group therapy, milieu therapy and occupational therapy        Risks, benefits and possible side effects of Medications:   Risks, benefits, and possible side effects of medications explained to patient and patient verbalizes understanding  Medications:   all current active meds have been reviewed, continue current psychiatric medications and current meds:   Current Facility-Administered Medications   Medication Dose Route Frequency    acetaminophen (TYLENOL) tablet 650 mg  650 mg Oral Q6H PRN    aluminum-magnesium hydroxide-simethicone (MYLANTA) 200-200-20 mg/5 mL oral suspension 30 mL  30 mL Oral Q4H PRN    benztropine (COGENTIN) injection 1 mg  1 mg Intramuscular Q6H PRN    benztropine (COGENTIN) tablet 1 mg  1 mg Oral Q6H PRN    hydrocortisone 1 % cream   Topical 4x Daily PRN    ibuprofen (MOTRIN) tablet 600 mg  600 mg Oral Q6H PRN    ibuprofen (MOTRIN) tablet 800 mg  800 mg Oral Q6H PRN    LORazepam (ATIVAN) injection 1 mg  1 mg Intramuscular Q6H PRN    LORazepam (ATIVAN) tablet 1 mg  1 mg Oral Q6H PRN    magnesium hydroxide (MILK OF MAGNESIA) 400 mg/5 mL oral suspension 30 mL  30 mL Oral Daily PRN    nicotine (NICODERM CQ) 21 mg/24 hr TD 24 hr patch 21 mg  21 mg Transdermal Daily    nicotine polacrilex (NICORETTE) gum 2 mg  2 mg Oral Q2H PRN    OLANZapine (ZyPREXA) IM injection 10 mg  10 mg Intramuscular BID PRN    pantoprazole (PROTONIX) EC tablet 40 mg  40 mg Oral Early Morning    PARoxetine (PAXIL) tablet 20 mg  20 mg Oral Daily    traZODone (DESYREL) tablet 50 mg  50 mg Oral HS PRN     Labs: I have personally reviewed all pertinent laboratory/tests results     Most Recent Labs:   Lab Results   Component Value Date    WBC 9 54 07/19/2020    RBC 4 20 07/19/2020    HGB 13 5 07/19/2020    HCT 42 6 07/19/2020     07/19/2020    RDW 13 4 07/19/2020    NEUTROABS 5 53 07/19/2020    SODIUM 141 07/17/2020    K 4 4 07/17/2020     (H) 07/17/2020    CO2 23 07/17/2020    BUN 16 07/17/2020    CREATININE 0 73 07/17/2020    GLUC 127 07/17/2020    CALCIUM 8 6 07/17/2020    AST 15 07/17/2020    ALT 27 07/17/2020 ALKPHOS 52 07/17/2020    TP 6 3 (L) 07/17/2020    ALB 3 2 (L) 07/17/2020    TBILI 0 18 (L) 07/17/2020    HDL 47 07/27/2015    TRIG 123 07/27/2015    LDLCALC 76 07/27/2015    [id number] 1 460 07/17/2020       Counseling / Coordination of Care  Total floor / unit time spent today n/a minutes  Greater than 50% of total time was spent with the patient and / or family counseling and / or coordination of care   A description of the counseling / coordination of care:

## 2020-07-20 NOTE — PLAN OF CARE
Problem: SELF HARM/SUICIDALITY  Goal: Will have no self-injury during hospital stay  Description  INTERVENTIONS:  - Q 15 MINUTES: Routine safety checks  - Q WAKING SHIFT & PRN: Assess risk to determine if routine checks are adequate to maintain patient safety  - Encourage patient to participate actively in care by formulating a plan to combat response to suicidal ideation, identify supports and resources  Outcome: Progressing     Problem: DEPRESSION  Goal: Will be euthymic at discharge  Description  INTERVENTIONS:  - Administer medication as ordered  - Provide emotional support via 1:1 interaction with staff  - Encourage involvement in milieu/groups/activities  - Monitor for social isolation  Outcome: Progressing     Problem: ANXIETY  Goal: Will report anxiety at manageable levels  Description  INTERVENTIONS:  - Administer medication as ordered  - Teach and encourage coping skills  - Provide emotional support  - Assess patient/family for anxiety and ability to cope  Outcome: Progressing  Goal: By discharge: Patient will verbalize 2 strategies to deal with anxiety  Description  Interventions:  - Identify any obvious source/trigger to anxiety  - Staff will assist patient in applying identified coping technique/skills  - Encourage attendance of scheduled groups and activities  Outcome: Progressing

## 2020-07-20 NOTE — PROGRESS NOTES
Pt claimed that she was feeling safe here and was glad that she came in for treatment  After just this 3 day stay, she denies any SI or urges  Able to CFS  Depression on a rating scale = #0  Anxiety is = #2  Pt feels that the Paxil being moved to day time vs  Bedtime will help with mood, along with the increase of dose from 10 mg to 20 mg  Attending group setting here and appetite is reported fair  (C/O food here not tasting good rather than an issue with her appetite) Using Hydrocortisone Cream for skin rash on arms an hands prn, claiming it is worse upon awakening  Looking to leave treatment tomorrow
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
## 2020-07-20 NOTE — PROGRESS NOTES
07/20/20 1000 07/20/20 1035 07/20/20 1100   Activity/Group Checklist   Group Community meeting  (goal setting) Admission/Discharge  (Admisson Self Assessment) Nursing Education  (nursing students-coping skills)   Attendance Attended Attended Attended   Attendance Duration (min) 16-30 16-30  --    Interactions Interacted appropriately Interacted appropriately  --    Affect/Mood Appropriate Appropriate  --    Goals Achieved Able to listen to others; Able to engage in interactions; Identified feelings Identified feelings; Identified triggers; Discussed coping strategies  --       07/20/20 1315   Activity/Group Checklist   Group Life Skills  (leisure exploration)   Attendance Attended   Attendance Duration (min) 46-60   Interactions Interacted appropriately   Affect/Mood Appropriate;Bright   Goals Achieved Discussed coping strategies; Able to listen to others; Able to engage in interactions; Able to self-disclose Medication: CLOPIDOGREL BISULFATE 75 MG     Date of last refill: 5/7/19 (#90/3)  [place]    Last office visit: 5/7/19  Due back to clinic per last office note:  1 year  Date next office visit scheduled:    Future [employer]

## 2020-07-20 NOTE — PROGRESS NOTES
Treatment Plan Meeting:  Diagnosis of major depressive disorder, anxiety, & methamphetamine abuse episodic discussed  Reviewed short term goals for decrease in depression, anxiety, & free of suicidal thoughts, & improved insight  Pt signed a 72 hour notice on 7/18 at 1800, so at this time, discharge is planned for tomorrow  All parties in agreement & treatment plan was signed       07/20/20 0935   Team Meeting   Meeting Type Tx Team Meeting   Initial Conference Date 07/20/20   Next Conference Date 08/15/20   Team Members Present   Team Members Present Physician;;Nurse   Physician Team Member Dr Lucho Beaulieu Team Member [facility] Management Team Member Ebenezer Fan   Patient/Family Present   Patient Present No   Patient's Family Present No

## 2020-07-20 NOTE — PROGRESS NOTES
Pt remains pleasant and cooperative  Denies SI/HI, expressed having mild anxiety  Denies depression  Social with peers on the unit  Denies any questions or concerns at this time

## 2020-07-20 NOTE — PROGRESS NOTES
Requested & received trazadone 50 mg po prn/sleep at 2105  Good effect obtained, as observed asleep within the hr  Will continue to monitor

## 2020-07-20 NOTE — QUICK NOTE
Pt requested and received PRN Tylenol 650mg po at 0814 for Left leg pain  Upon reassessment pt with decrease in pain, mildly effective for use

## 2020-07-21 VITALS
HEART RATE: 60 BPM | RESPIRATION RATE: 17 BRPM | HEIGHT: 65 IN | TEMPERATURE: 96.6 F | BODY MASS INDEX: 33.59 KG/M2 | SYSTOLIC BLOOD PRESSURE: 117 MMHG | OXYGEN SATURATION: 97 % | DIASTOLIC BLOOD PRESSURE: 56 MMHG | WEIGHT: 201.6 LBS

## 2020-07-21 LAB
HBV CORE AB SER QL: NORMAL
HBV CORE IGM SER QL: NORMAL
HBV SURFACE AG SER QL: NORMAL
HCV AB SER QL: NORMAL

## 2020-07-21 PROCEDURE — 99239 HOSP IP/OBS DSCHRG MGMT >30: CPT | Performed by: PHYSICIAN ASSISTANT

## 2020-07-21 PROCEDURE — 86704 HEP B CORE ANTIBODY TOTAL: CPT | Performed by: PSYCHIATRY & NEUROLOGY

## 2020-07-21 PROCEDURE — 87340 HEPATITIS B SURFACE AG IA: CPT | Performed by: PSYCHIATRY & NEUROLOGY

## 2020-07-21 PROCEDURE — 86705 HEP B CORE ANTIBODY IGM: CPT | Performed by: PSYCHIATRY & NEUROLOGY

## 2020-07-21 PROCEDURE — 86803 HEPATITIS C AB TEST: CPT | Performed by: PSYCHIATRY & NEUROLOGY

## 2020-07-21 RX ORDER — TRAZODONE HYDROCHLORIDE 50 MG/1
50 TABLET ORAL
Qty: 30 TABLET | Refills: 2 | Status: SHIPPED | OUTPATIENT
Start: 2020-07-21 | End: 2021-04-30 | Stop reason: HOSPADM

## 2020-07-21 RX ORDER — PAROXETINE HYDROCHLORIDE 20 MG/1
20 TABLET, FILM COATED ORAL DAILY
Qty: 30 TABLET | Refills: 2 | Status: SHIPPED | OUTPATIENT
Start: 2020-07-22 | End: 2021-01-13 | Stop reason: ALTCHOICE

## 2020-07-21 RX ADMIN — ACETAMINOPHEN 650 MG: 325 TABLET, FILM COATED ORAL at 08:06

## 2020-07-21 RX ADMIN — PAROXETINE HYDROCHLORIDE 20 MG: 20 TABLET, FILM COATED ORAL at 08:04

## 2020-07-21 RX ADMIN — NICOTINE 21 MG: 21 PATCH TRANSDERMAL at 08:12

## 2020-07-21 RX ADMIN — PANTOPRAZOLE SODIUM 40 MG: 40 TABLET, DELAYED RELEASE ORAL at 06:30

## 2020-07-21 RX ADMIN — HYDROCORTISONE: 1 CREAM TOPICAL at 08:07

## 2020-07-21 NOTE — DISCHARGE INSTRUCTIONS
Anxiety   WHAT YOU SHOULD KNOW:   Anxiety is a condition that causes you to feel excessive worry, uneasiness, or fear  Family or work stress, smoking, caffeine, and alcohol can increase your risk for anxiety  Certain medicines or health conditions can also increase your risk  Anxiety may begin gradually, and can become a long-term condition if it is not managed or treated  AFTER YOU LEAVE:   Medicines:   · Medicines  can help you feel more calm and relaxed, and decrease your symptoms  · Take your medicine as directed  Contact your healthcare provider if you think your medicine is not helping or if you have side effects  Tell him if you are allergic to any medicine  Keep a list of the medicines, vitamins, and herbs you take  Include the amounts, and when and why you take them  Bring the list or the pill bottles to follow-up visits  Carry your medicine list with you in case of an emergency  Follow up with your healthcare provider within 2 weeks or as directed:  Write down your questions so you remember to ask them during your visits  Manage anxiety:   · Go to counseling as directed  Cognitive behavioral therapy can help you understand and change how you react to events that trigger your symptoms  · Find ways to manage your symptoms  Activities such as exercise, meditation, or listening to music can help you relax  · Practice deep breathing  Breathing can change how your body reacts to stress  Focus on taking slow, deep breaths several times a day, or during an anxiety attack  Breathe in through your nose, and out through your mouth  · Avoid caffeine  Caffeine can make your symptoms worse  Avoid foods or drinks that are meant to increase your energy level  · Limit or avoid alcohol  Ask your healthcare provider if alcohol is safe for you  You may not be able to drink alcohol if you take certain anxiety or depression medicines  Limit alcohol to 1 drink per day if you are a woman   Limit alcohol to 2 drinks per day if you are a man  A drink of alcohol is 12 ounces of beer, 5 ounces of wine, or 1½ ounces of liquor  Contact your healthcare provider if:   · Your symptoms get worse or do not get better with treatment  · You think your medicine may be causing side effects  · Your anxiety keeps you from doing your regular daily activities  · You have new symptoms since your last visit  · You have questions or concerns about your condition or care  Seek care immediately or call 911 if:   · You have chest pain, tightness, or heaviness that may spread to your shoulders, arms, jaw, neck, or back  · You feel like hurting yourself or someone else  · You feel dizzy, lightheaded, or faint  © 2014 [address] is for End User's use only and may not be sold, redistributed or otherwise used for commercial purposes  All illustrations and images included in CareNotes® are the copyrighted property of A D A M , Inc  or Andrea Madrigal  The above information is an  only  It is not intended as medical advice for individual conditions or treatments  Talk to your doctor, nurse or pharmacist before following any medical regimen to see if it is safe and effective for you  Depression   WHAT YOU NEED TO KNOW:   Depression is a medical condition that causes feelings of sadness or hopelessness that do not go away  Depression may cause you to lose interest in things you used to enjoy  These feelings may interfere with your daily life  DISCHARGE INSTRUCTIONS:   Call 911 for any of the following:   · You think about harming yourself or someone else  Contact your healthcare provider if:   · Your symptoms do not improve  · You cannot make it to your next appointment  · You have new symptoms  · You have questions or concerns about your condition or care  Medicines:   · Antidepressants  may be given to improve or balance your mood   You may need to take this medicine for several weeks before you begin to feel better  · Take your medicine as directed  Contact your healthcare provider if you think your medicine is not helping or if you have side effects  Tell him of her if you are allergic to any medicine  Keep a list of the medicines, vitamins, and herbs you take  Include the amounts, and when and why you take them  Bring the list or the pill bottles to follow-up visits  Carry your medicine list with you in case of an emergency  Therapy  may be used to treat your depression  A therapist will help you learn to cope with your thoughts and feelings  This can be done alone or in a group  It may also be done with family members or a significant other  Self-care:   · Get regular physical activity  Try to exercise for 30 minutes, 3 to 5 days a week  Work with your healthcare provider to develop an exercise plan that you enjoy  Physical activity may improve your symptoms  · Get enough sleep  Create a routine to help you relax before bed  You can listen to music, read, or do yoga  Try to go to bed and wake up at the same time every day  Sleep is important for emotional health  · Eat a variety of healthy foods from all of the food groups  A healthy meal plan is low in fat, salt, and added sugar  Ask your healthcare provider for more information about a meal plan that is right for you  · Do not drink alcohol or use drugs  Alcohol and drugs can make your symptoms worse  Follow up with your healthcare provider as directed: Your healthcare provider will monitor your progress at follow-up visits  He or she will also monitor your medicine if you take antidepressants  Your healthcare provider will ask if the medicine is helping  Tell him or her about any side effects or problems you may have with your medicine  The type or amount of medicine may need to be changed  Write down your questions so you remember to ask them during your visits    © 2017 [facility] [address] is for End User's use only and may not be sold, redistributed or otherwise used for commercial purposes  All illustrations and images included in CareNotes® are the copyrighted property of A D A M , Inc  or Andrea Madrigal  The above information is an  only  It is not intended as medical advice for individual conditions or treatments  Talk to your doctor, nurse or pharmacist before following any medical regimen to see if it is safe and effective for you

## 2020-07-21 NOTE — PROGRESS NOTES
Pt is awake this morning for breakfast, social with roommate  Denies SI, HI, AVH  Calm, cooperative  Denies questions or concerns, excited to discharge today

## 2020-07-21 NOTE — PROGRESS NOTES
Status: Pt has a hepatitis panel pending/  She is social & pleasant & calm on the unit     Medication: no changes / PRN - Tylenol, Hydrocortisone 1% cream, Motrin, & Trazodone  D/C: Today / Pt was referred to [facility], CM is just waiting for a call back      07/21/20 9720   Team Meeting   Meeting Type Daily Rounds   Team Members Present   Team Members Present Physician;;Nurse;Occupational Therapist   Physician Team Member Dr Edgar Hodge / Tiffany Mcdonald Team Member [address] Management Team Member Meaghan Stanford / Monica   OT Team Member Priscila   Patient/Family Present   Patient Present No   Patient's Family Present No

## 2020-07-21 NOTE — DISCHARGE SUMMARY
Discharge Summary - Suki [address] 53 y o  female MRN: [id number]  Unit/Bed#: [facility] 064-55 Encounter: [id number]     Admission Date:   Admission Orders (From admission, onward)     Ordered        07/18/20 0850  ED TO DIFFERENT CAMPUS IP [facility] UNIT or INPATIENT MEDICAL UNIT to [facility] (using Discharge Readmit Navigator) - Admit Patient to [address] [place] [address]  Once                         Discharge Date: 7/21/2020    Attending Psychiatrist: Mary Quintero DO    Reason for Admission/HPI:   History of Present Illness     Patient is a 79-year-old female who presented to  [facility] ED due to intentional overdose of 5-6 capsules of 300mg Neurontin and 4-5 tabs of 40mg Protonix  Precipitating event is getting in an argument with her , being accused of being unfaithful, and continued verbal altercations  Patient was evaluated by medical toxicology physician and medically cleared  On initial psychiatric evaluation patient reported additional stressors of finances and being increasingly isolated  She did admit that she did abused methamphetamine before the fight with her   She did not elaborate on methamphetamine abuse  She was recently placed on Paxil 1 month ago due to increased depression and anxiety  She reported ongoing depressive symptoms of poor sleep, lack of energy, anhedonia, and suicidal thoughts  She did report increased anxiety without panic attacks  She did not show signs of ayse  She does not have history of ayse  She denied psychosis  She denied delusional material   She did not suggest symptoms consistent with PTSD diagnosis    Patient denied previous inpatient psychiatric hospitalization, denied previous suicide attempts, and did not have current outpatient psychiatrist or therapist     Psychosocial Stressors:  Marital, financial, methamphetamine abuse    Hospital Course:   Behavioral Health Medications:   current meds:   Current Facility-Administered Medications   Medication Dose Route Frequency    acetaminophen (TYLENOL) tablet 650 mg  650 mg Oral Q6H PRN    aluminum-magnesium hydroxide-simethicone (MYLANTA) 200-200-20 mg/5 mL oral suspension 30 mL  30 mL Oral Q4H PRN    benztropine (COGENTIN) injection 1 mg  1 mg Intramuscular Q6H PRN    benztropine (COGENTIN) tablet 1 mg  1 mg Oral Q6H PRN    hydrocortisone 1 % cream   Topical 4x Daily PRN    ibuprofen (MOTRIN) tablet 600 mg  600 mg Oral Q6H PRN    ibuprofen (MOTRIN) tablet 800 mg  800 mg Oral Q6H PRN    LORazepam (ATIVAN) injection 1 mg  1 mg Intramuscular Q6H PRN    LORazepam (ATIVAN) tablet 1 mg  1 mg Oral Q6H PRN    magnesium hydroxide (MILK OF MAGNESIA) 400 mg/5 mL oral suspension 30 mL  30 mL Oral Daily PRN    nicotine (NICODERM CQ) 21 mg/24 hr TD 24 hr patch 21 mg  21 mg Transdermal Daily    nicotine polacrilex (NICORETTE) gum 2 mg  2 mg Oral Q2H PRN    OLANZapine (ZyPREXA) IM injection 10 mg  10 mg Intramuscular BID PRN    pantoprazole (PROTONIX) EC tablet 40 mg  40 mg Oral Early Morning    PARoxetine (PAXIL) tablet 20 mg  20 mg Oral Daily    traZODone (DESYREL) tablet 50 mg  50 mg Oral HS PRN       Patient was admitted to [address] [place] [address] inpatient psychiatric unit on voluntary 201 commitment for safety and stabilization  On admission patient was increased on Paxil to 20mg QD for depression/anxiety  She did not require additional titration of medications  She tolerated medications with no acute side effects  She did require Trazodone 50mg HS PRN for insomnia, which was effective  She signed 72 hour notice and did not endorse 302 behavior  Her mood brightened over the course of her treatment, and she was seen in [facility] interacting appropriately with peers  She did not demonstrate dangerous behavior to self or others during her inpatient stay    On day of discharge patient had reduced depression, controllable anxiety, denied psychosis, did not show signs of ayse, and denied suicidal/homicidal ideations  Mental Status at time of Discharge:     Appearance:  casually dressed   Behavior:  cooperative   Speech:  normal pitch and normal volume   Mood:  euthymic   Affect:  mood-congruent   Thought Process:  normal   Thought Content:  normal   Perceptual Disturbances: None   Risk Potential: Denied SI/HI  Potential for aggression: No   Sensorium:  person, place and time/date   Cognition:  recent and remote memory grossly intact   Consciousness:  alert and awake    Attention: attention span and concentration were age appropriate   Insight:  fair   Judgment: fair   Gait/Station: normal gait/station and normal balance   Motor Activity: no abnormal movements       Discharge Diagnosis:   MDD (major depressive disorder), recurrent severe, without psychosis   Anxiety  Methamphetamine abuse, episodic       Discharge Medications:  See after visit summary for reconciled discharge medications provided to patient and family  Discharge instructions/Information to patient and family:   See after visit summary for information provided to patient and family  Provisions for Follow-Up Care:  See after visit summary for information related to follow-up care and any pertinent home health orders  Discharge Statement   I spent 32 minutes discharging the patient  This time was spent on the day of discharge  I had direct contact with the patient on the day of discharge  On day of discharge patient had mental status exam performed, discharge instructions/medications reviewed, and outpatient planning discussed  She was given 1 month plus 2 refills of scripts  She denied tobacco cessation therapy after discharge      Tierra Johnson PA-C

## 2020-07-21 NOTE — DISCHARGE INSTR - OTHER ORDERS
Hannah Clement is a confidential 7 days/week telephone support service manned by trained mental health consumers  [facility] operates daily but is not able to accept calls between 2AM-6AM    [facility] provides support, a listening ear and can provide information about available services  [facility] specializes in the concerns of mental health consumers, their families and friends  However, we are also here for anyone who has a mental health concern, is confused about or just doesn't know anything about mental health or where to get information  To reach Hannah Clement, call [phone] accepts calls between 6:00 AM to 10:00 AM and from 4:00 PM to 12:00 AM      Text CONNECT to [phone] from anywhere in the [place], anytime, about any type of crisis  A live, trained Crisis Counselor receives the text and lets you know that they are here to listen  The volunteer Crisis Counselor will help you move from a hot moment to a cool moment  [facility] ([facility]) AT [facility] - licensed telephone and mobile crisis services that provide mental health assessments to all age groups regardless of income or insurance  [facility] operates 24-hour/7 days a week  [address] [place] [address] assists consumer who are experiencing a mental health emergency and lack the resources to assist themselves  Immediate intervention for suicidal and depressed individuals with home visits/outreach being top priority  Crisis can be contacted at [phone]  The [facility]ness [facility]) offers various education & support groups for you & your family  For more information visit their website at http://www [employer]/     Dial 2-1-1 to get connected/get help  Free, confidential information & referral available 24/7: Aging Services, Child & Youth Services, Counseling, Education/Training, Food/Shelter/Clothing, Health Services, Parenting, Substance Abuse, Support Groups, Volunteer Opportunities, & much more    Phone: 2-1-1 or [phone], Web: DAVID [email or web address] [facility], Email: August@yahoo com          What you need to know aboutcoronavirus disease 2019 (COVID-19)     What is coronavirus disease 2019 (COVID-19)? Coronavirus disease 2019 (COVID-19) is a respiratory illness that can spread from person to person  The virus that causes COVID-19 is a novel coronavirus that was first identified during an investigation into an outbreak in [place], [place]  Can people in the [place]  get COVID-19? Yes  COVID-19 is spreading from person to person in parts of the [place] [facility]  Risk of infection with COVID-19 is higher for people who are close contacts of someone known to have COVID-19, for example healthcare workers, or household members  Other people at higher risk for infection are those who live in or have recently been in an area with ongoing spread of COVID-19  Learn more about places with ongoing spread at   [employer] tn  html#geographic  Have there been cases of COVID-19 in the [place] ?   Yes  The first case of COVID-19 in the [place] was reported on January 21, 2020  The current count of cases of COVID-19 in the [place] is available on [employer] at [facility]  How does COVID-19 spread? The virus that causes COVID-19 probably emerged from an animal source, but is now spreading from person to person  The virus is thought to spread mainly between people who are in close contact with one another (within about 6 feet) through respiratory droplets produced when an infected person coughs or sneezes  It also may be possible that a person can get COVID-19 by touching a surface or object that has the virus on it and then touching their own mouth, nose, or possibly their eyes, but this is not thought to be the main way the virus spreads   Learn what is known about the spread of newly emerged coronaviruses at [employer]  What are the symptoms of COVID-19? Patients with COVID-19 have had mild to severe respiratory illness with symptoms of   fever   cough   shortness of breath    What are severe complications from this virus? Some patients have pneumonia in both lungs, multi-organ failure and in some cases death  How can I help protect myself? People can help protect themselves from respiratory illness with everyday preventive actions  Avoid close contact with people who are sick  Avoid touching your eyes, nose, and mouth withunwashed hands  Wash your hands often with soap and water for at least 20 seconds  Use an alcohol-based hand  that contains at least 60% alcohol if soap and water are not available  If you are sick, to keep from spreading respiratory illness to others, you should   Stay home when you are sick  Cover your cough or sneeze with a tissue, then throw the tissue in the trash  Clean and disinfect frequently touched objectsand surfaces  What should I do if I recently traveled from an area with ongoing spread of COVID-19? If you have traveled from an affected area, there may be restrictions on your movements for up to 2 weeks  If you develop symptoms during that period (fever, cough, trouble breathing), seek medical advice  Call the office of your health care provider before you go, and tell them about your travel and your symptoms  They will give you instructions on how to get care without exposing other people to your illness  While sick, avoid contact with people, don't go out and delay any travel to reduce the possibility of spreading illness to others  Is there a vaccine? There is currently no vaccine to protect against COVID-19  The best way to prevent infection is to take everyday preventive actions, like avoiding close contact with people who are sick and washing your hands often  Is there a treatment? There is no specific antiviral treatment for COVID-19  People with COVID-19 can seek medical care to helprelieve symptoms  For more information: www cdc gov/MDVUJ89TQ 471540-E 03/03/2020            What to do if you are sick withcoronavirus disease 2019 (COVID-19)     If you are sick with COVID-19 or suspect you are infected with the virus that causes COVID-19, follow the steps below to help prevent the disease from spreading to people in your home and community  Stay home except to get medical care   You should restrict activities outside your home, except for getting medical care  Do not go to work, school, or public areas  Avoid using public transportation, ride-sharing, or taxis  Separate yourself from other people and animals inyour home  People: As much as possible, you should stay in a specific room and away from other people in your home  Also, you should use a separate bathroom, if available  Animals: Do not handle pets or other animals while sick  See COVID-19 and Animals for more information  Call ahead before visiting your doctor   If you have a medical appointment, call the healthcare provider and tell them that you have or may have COVID-19  This will help the healthcare provider's office take steps to keep other people from getting infected or exposed  Wear a facemask  You should wear a facemask when you are around other people (e g , sharing a room or vehicle) or pets and before you enter a healthcare provider's office  If you are not able to wear a facemask (for example, because it causes trouble breathing), then people who live with you should not stay in the same room with you, or they should wear a facemask if they enteryour room  Cover your coughs and sneezes   Cover your mouth and nose with a tissue when you cough or sneeze   Throw used tissues in a lined trash can; immediately wash your hands with soap and water for at least 20 seconds or clean your hands with an alcohol-based hand  that contains at least 60 to 95% alcohol, covering all surfaces of your hands and rubbing them together until they feel dry  Soap and water should be used preferentially if hands are visibly dirty  Avoid sharing personal household items   You should not share dishes, drinking glasses, cups, eating utensils, towels, or bedding with other people or pets in your home  After using these items, they should be washed thoroughly with soap and water  Clean your hands often  Wash your hands often with soap and water for at least 20 seconds  If soap and water are not available, clean your hands with an alcohol-based hand  that contains at least 60% alcohol, covering all surfaces of your hands and rubbing them together until they feel dry  Soap and water should be used preferentially if hands are visibly dirty  Avoid touching your eyes, nose, and mouth with unwashed hands  Clean all "high-touch" surfaces every day  High touch surfaces include counters, tabletops, doorknobs, bathroom fixtures, toilets, phones, keyboards, tablets, and bedside tables  Also, clean any surfaces that may have blood, stool, or body fluids on them  Use a household cleaning spray or wipe, according to the label instructions  Labels contain instructions for safe and effective use of the cleaning product including precautions you should take when applying the product, such as wearing gloves and making sure you have good ventilation during use of the product  Monitor your symptoms  Seek prompt medical attention if your illness is worsening (e g , difficulty breathing)  Before seeking care, call your healthcare provider and tell them that you have, or are being evaluated for, COVID-19  Put on a facemask before you enter the facility  These steps will help the healthcare provider's office to keep other people in the office or waiting room from getting infectedor exposed     Ask your healthcare provider to call the local or [facility] health department  Persons who are placed under active monitoring or facilitated self-monitoring should follow instructions provided by their local health department or occupational health professionals, as appropriate  If you have a medical emergency and need to call 911, notify the dispatch personnel that you have, or are being evaluated for COVID-19  If possible, put on a facemask before emergency medical services arrive  Discontinuing home isolation  Patients with confirmed COVID-19 should remain under home isolation precautions until the risk of secondary transmission to others is thought to be low  The decision to discontinue home isolation precautions should be made on a case-by-case basis, in consultation with healthcare providers and state and local health departments  For more information: www cdc gov/SNVKB12UH 827285-M 02/24/2020           Stay home when you are sick,except to get medical care  Wash your hands often with soap and water for at least 20 seconds  Cover your cough or sneeze with a tissue, then throw the tissue in the trash  Clean and disinfect frequently touched objects and surfaces  Avoid touching your eyes, nose, and mouth  STOP THE SPREAD OF GERMS  For more information: www cdc gov/COVID19 Avoid close contact with people who are sick  Help prevent the spread of respiratory diseases like COVID-19

## 2020-07-21 NOTE — NURSING NOTE
AVS reviewed and prescriptions given  Pt expresses understanding  Denies questions or concerns  Pt discharged to home via son

## 2020-07-21 NOTE — CASE MANAGEMENT
CM contacted [facility] @ [phone] & was able to get Pt an appointment for 7/28 at 10:00 AM with [place]  This appointment will be in person, however, Pt can speak to the therapist at that time about doing phone sessions in the future if she that is what she is requesting  CM met with Pt who verbalized readiness for discharge  CM reviewed the above & Pt agreeable  She said that she would prefer in person, however, is concerned about bring germs/viruses home to her mother-in-law    Pt had no questions about her d/c plans & confirmed her son had said he would pick her up at 11 AM

## 2020-07-21 NOTE — BH TRANSITION RECORD
Contact Information: If you have any questions, concerns, pended studies, tests and/or procedures, or emergencies regarding your inpatient behavioral health visit  Please contact [place] [facility] [phone] and ask to speak to a , nurse or physician  A contact is available 24 hours/ 7 days a week at this number  Summary of Procedures Performed During your Stay:  Below is a list of major procedures performed during your hospital stay and a summary of results:  - No major procedures performed  Pending Studies (From admission, onward)     Start     Ordered    07/21/20 0600  Chronic Hepatitis Panel  Once      07/20/20 1311              If studies are pending at discharge, follow up with your PCP and/or referring provider

## 2020-07-26 ENCOUNTER — HOSPITAL ENCOUNTER (OUTPATIENT)
Dept: RADIOLOGY | Facility: HOSPITAL | Age: 55
Discharge: HOME | End: 2020-07-26
Attending: ORTHOPAEDIC SURGERY
Payer: COMMERCIAL

## 2020-07-26 DIAGNOSIS — M54.16 RADICULOPATHY, LUMBAR REGION: ICD-10-CM

## 2020-07-26 PROCEDURE — 72148 MRI LUMBAR SPINE W/O DYE: CPT

## 2020-07-27 ENCOUNTER — TELEPHONE (OUTPATIENT)
Dept: FAMILY MEDICINE CLINIC | Facility: CLINIC | Age: 55
End: 2020-07-27

## 2020-07-27 NOTE — TELEPHONE ENCOUNTER
Patient was discharged from a psychiatric hospital last Tuesday and is experiencing anxiety this afternoon    Patient's anxiety medication was increased at [facility] upon discharge last week - she is going to contact them-she has an appointment 7/28/2020 with her therapist

## 2020-07-31 ENCOUNTER — CONSULT (OUTPATIENT)
Dept: PAIN MEDICINE | Facility: CLINIC | Age: 55
End: 2020-07-31
Payer: COMMERCIAL

## 2020-07-31 VITALS
BODY MASS INDEX: 35.93 KG/M2 | WEIGHT: 202.8 LBS | HEART RATE: 72 BPM | SYSTOLIC BLOOD PRESSURE: 118 MMHG | DIASTOLIC BLOOD PRESSURE: 80 MMHG | TEMPERATURE: 98.9 F | HEIGHT: 63 IN

## 2020-07-31 DIAGNOSIS — M46.1 SACROILIITIS (HCC): ICD-10-CM

## 2020-07-31 DIAGNOSIS — M48.061 SPINAL STENOSIS OF LUMBAR REGION, UNSPECIFIED WHETHER NEUROGENIC CLAUDICATION PRESENT: ICD-10-CM

## 2020-07-31 DIAGNOSIS — M51.16 LUMBAR DISC DISEASE WITH RADICULOPATHY: ICD-10-CM

## 2020-07-31 DIAGNOSIS — M54.16 RADICULOPATHY, LUMBAR REGION: Primary | ICD-10-CM

## 2020-07-31 DIAGNOSIS — M47.816 LUMBAR SPONDYLOSIS: ICD-10-CM

## 2020-07-31 PROCEDURE — 99244 OFF/OP CNSLTJ NEW/EST MOD 40: CPT | Performed by: ANESTHESIOLOGY

## 2020-07-31 PROCEDURE — 3008F BODY MASS INDEX DOCD: CPT | Performed by: ANESTHESIOLOGY

## 2020-07-31 NOTE — PATIENT INSTRUCTIONS
Epidural Steroid Injection   WHAT YOU NEED TO KNOW:   What do I need to know about an epidural steroid injection (MARY)? An MARY is a procedure to inject steroid medicine into the epidural space  The epidural space is between your spinal cord and vertebrae  Steroids reduce inflammation and fluid buildup in your spine that may be causing pain  You may be given pain medicine along with the steroids  How do I prepare for an MARY? Your healthcare provider will talk to you about how to prepare for your procedure  He will tell you what medicines to take or not take on the day of your procedure  You may need to stop taking blood thinners or other medicines several days before your procedure  You may need to adjust any diabetes medicine you take on the day of your procedure  Steroid medicine can increase your blood sugar level  What will happen during an MARY? · You will be given medicine to numb the procedure area  You will be awake for the procedure, but you will not feel pain  You may also be given medicine to help you relax during the procedure  Contrast liquid will be used to help your healthcare provider see the area better  Tell the healthcare provider if you have ever had an allergic reaction to contrast liquid  · Your healthcare provider may place the needle into your neck area, middle of your back, or tailbone area  He may inject the medicine next to the nerves that are causing your pain  He may instead inject the medicine into a larger area of the epidural space  This helps the medicine spread to more nerves  Your healthcare provider will use a fluoroscope to help guide the needle to the right place  A fluoroscope is a type of x-ray  After the procedure, a bandage will be placed over the injection site to prevent infection  What are the risks of an MARY? You may have temporary or permanent nerve damage or paralysis   You may have bleeding or develop a serious infection, such as meningitis (swelling of the brain coverings)  An abscess may also develop  An abscess is a pus-filled area under the skin  You may need surgery to fix the abscess  You may have a seizure, anxiety, or trouble sleeping  If you are a man, you may have temporary erectile dysfunction (not able to have an erection)  CARE AGREEMENT:   You have the right to help plan your care  Learn about your health condition and how it may be treated  Discuss treatment options with your caregivers to decide what care you want to receive  You always have the right to refuse treatment  The above information is an  only  It is not intended as medical advice for individual conditions or treatments  Talk to your doctor, nurse or pharmacist before following any medical regimen to see if it is safe and effective for you  © 2017 [facility] Information is for End User's use only and may not be sold, redistributed or otherwise used for commercial purposes  All illustrations and images included in CareNotes® are the copyrighted property of A D A M , Inc  or Andrea Madrigal

## 2020-07-31 NOTE — PROGRESS NOTES
Assessment  1  Radiculopathy, lumbar region    2  Lumbar disc disease with radiculopathy    3  Lumbar spondylosis    4  Spinal stenosis of lumbar region, unspecified whether neurogenic claudication present    5  Sacroiliitis 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  The patient had a recent flare of her symptoms without any recent trauma or inciting event roughly 3-4 weeks ago  The patient did have an updated MRI of her lumbar spine revealing degenerative disc disease and spondylosis with varying degrees of central and foraminal stenosis from L3-4-L5-S1, particularly at L4-5 and L5-S1  The patient was previously taking gabapentin, however this was discontinued as the patient try to overdose on her gabapentin and Protonix  The patient does currently feel mentally stable  Physical therapy has been ineffective in the past although she has not tried any recent physical therapy  Tylenol and NSAIDs are ineffective  1  I will schedule the patient for left L4 and L5 TFESI to reduce the inflammatory component of her pain  2  I did offer the patient physical therapy, however the patient declined  3  I will follow up the patient in 4 weeks      Complete risks and benefits including bleeding, infection, tissue reaction, nerve injury and allergic reaction were discussed  The approach was demonstrated using models and literature was provided  Verbal and written consent was obtained  My impressions and treatment recommendations were discussed in detail with the patient who verbalized understanding and had no further questions  Discharge instructions were provided  I personally saw and examined the patient and I agree with the above discussed plan of care  No orders of the defined types were placed in this encounter  No orders of the defined types were placed in this encounter  History of Present Illness    Tyshawn Pride is a 53 y o  female referred by Dr Prabhu Medley, with a history of methamphetamine abuse and suicidal ideation/attempt and recent hospital admission for this June 17, 2020, presenting for initial consultation regarding a long-standing history of lumbosacral back pain with radiation into the left lower extremity in no specific dermatomal fashion with associated numbness, paresthesias, and subjective weakness  She denies any right lower extremity symptoms, bladder bowel incontinence, or saddle anesthesia  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  The patient had a recent flare of her symptoms without any recent trauma or inciting event roughly 3-4 weeks ago  The patient did have an updated MRI of her lumbar spine revealing degenerative disc disease and spondylosis with varying degrees of central and foraminal stenosis from L3-4-L5-S1, particularly at L4-5 and L5-S1  The patient was previously taking gabapentin, however this was discontinued as the patient try to overdose on her gabapentin and Protonix  The patient does currently feel mentally stable  Physical therapy has been ineffective in the past although she has not tried any recent physical therapy  Tylenol and NSAIDs are ineffective  The patient rates her pain an 8/10 on the pain is nearly constant  The pain is not follow any particular pattern throughout the day  The pain is described as burning, cramping, shooting, numbness, and pins and needles  The pain is increased with lying down, standing, and sitting  The pain is alleviated somewhat with relaxation      Other than as stated above, the patient denies any interval changes in medications, medical condition, mental condition, symptoms, or allergies since the last office visit  I have personally reviewed and/or updated the patient's past medical history, past surgical history, family history, social history, current medications, allergies, and vital signs today  Review of Systems   Constitutional: Negative for fever and unexpected weight change  HENT: Negative for trouble swallowing  Eyes: Negative for visual disturbance  Respiratory: Negative for shortness of breath and wheezing  Cardiovascular: Negative for chest pain and palpitations  Gastrointestinal: Negative for constipation, diarrhea, nausea and vomiting  Endocrine: Negative for cold intolerance, heat intolerance and polydipsia  Genitourinary: Negative for difficulty urinating and frequency  Musculoskeletal: Positive for joint swelling and myalgias  Negative for arthralgias and gait problem  Skin: Negative for rash  Neurological: Negative for dizziness, seizures, syncope, weakness and headaches  Hematological: Does not bruise/bleed easily  Psychiatric/Behavioral: Positive for suicidal ideas  Negative for dysphoric mood  Anxiety, depression   All other systems reviewed and are negative        Patient Active Problem List   Diagnosis    Lower back pain    Greater trochanteric bursitis of left hip    Labral tear of hip, degenerative    Lumbar disc herniation    Cigarette nicotine dependence with nicotine-induced disorder    Depression    Pain of both hip joints    Gastroesophageal reflux disease    Screening for breast cancer    Medical clearance for psychiatric admission    Family history of brain aneurysm    MDD (major depressive disorder), recurrent severe, without psychosis ([facility] )    Anxiety    Methamphetamine abuse, episodic ([facility] )    Tobacco abuse       Past Medical History:   Diagnosis Date    Back pain     Depression     GERD (gastroesophageal reflux disease)        Past Surgical History:   Procedure Laterality Date    CERVICAL DISC SURGERY      CHOLECYSTECTOMY      FOOT SURGERY      SD EGD TRANSORAL BIOPSY SINGLE/MULTIPLE N/A 4/20/2016    Procedure: ESOPHAGOGASTRODUODENOSCOPY (EGD); Surgeon: Milan Mtz MD;  [facility]; Service: Gastroenterology    TONSILLECTOMY      TUBAL LIGATION      UPPER GASTROINTESTINAL ENDOSCOPY         Family History   Problem Relation Age of Onset    Aneurysm Mother     Heart attack Father     Aneurysm Sister     Aneurysm Brother     Mental illness Son        Social History     Occupational History    Occupation: Caregiver   Tobacco Use    Smoking status: Current Every Day Smoker     Packs/day: 1 00     Types: Cigarettes    Smokeless tobacco: Never Used   Substance and Sexual Activity    Alcohol use: No     Frequency: Never     Binge frequency: Never    Drug use: No    Sexual activity: Not Currently       Current Outpatient Medications on File Prior to Visit   Medication Sig    acetaminophen (TYLENOL) 500 mg tablet Take 500 mg by mouth every 6 (six) hours as needed for mild pain    meloxicam (MOBIC) 15 mg tablet Take 1 tablet (15 mg total) by mouth daily    pantoprazole (PROTONIX) 40 mg tablet Take 1 tablet (40 mg total) by mouth daily    PARoxetine (PAXIL) 20 mg tablet Take 1 tablet (20 mg total) by mouth daily At 9am    traZODone (DESYREL) 50 mg tablet Take 1 tablet (50 mg total) by mouth daily at bedtime as needed for sleep     No current facility-administered medications on file prior to visit  No Known Allergies    Physical Exam    Temp 98 9 °F (37 2 °C) (Oral)   Ht 5' 3" (1 6 m)   Wt 92 kg (202 lb 12 8 oz)   BMI 35 92 kg/m²     Constitutional: normal, well developed, well nourished, alert, in no distress and non-toxic and no overt pain behavior    Eyes: anicteric  HEENT: grossly intact  Neck: supple, symmetric, trachea midline and no masses   Pulmonary:even and unlabored  Cardiovascular:No edema or pitting edema present  Skin:Normal without rashes or lesions and well hydrated  Psychiatric:Mood and affect appropriate  Neurologic:Cranial Nerves II-XII grossly intact  Musculoskeletal:antalgic gait  Bilateral lumbar paraspinals tender to palpation from L4-L5 worse on the left than the right  Bilateral SI joints mildly tender to palpation  Bilateral patellar and Achilles reflexes were 2/4 and symmetrical   No clonus was noted bilaterally  Bilateral lower extremity strength 5/5 in all muscle groups  Sensation intact to light touch in L3 through S1 dermatomes bilaterally  Positive straight leg raise on the left and negative on the right  Positive Yrn's test bilaterally  Positive Gaenslen's and AP compression test bilaterally  Imaging        PACS Images      Show images for MRI lumbar spine wo contrast   Study Result     MRI LUMBAR SPINE WITHOUT CONTRAST     INDICATION: M54 16: Radiculopathy, lumbar region      COMPARISON:  MRI dated 6/13/2017      TECHNIQUE:  Sagittal T1, sagittal T2, sagittal inversion recovery, axial T1 and axial T2, coronal T2     IMAGE QUALITY:  Diagnostic     FINDINGS:     VERTEBRAL BODIES:  There are 5 lumbar type vertebral bodies  Grade 1 retrolisthesis at L3-4 and L5-S1 is unchanged  Hemangiomas at L3 5 unchanged  Degenerative endplate marrow changes are present  Several small scattered Schmorl's nodes      SACRUM:  Normal signal within the sacrum  No evidence of insufficiency or stress fracture      DISTAL CORD AND CONUS:  Normal size and signal within the distal cord and conus      PARASPINAL SOFT TISSUES:  Paraspinal soft tissues are unremarkable      LOWER THORACIC DISC SPACES:  Normal disc height and signal   No disc herniation, canal stenosis or foraminal narrowing      LUMBAR DISC SPACES:       L1-L2:  Facet hypertrophy  No disc herniation or canal stenosis      L2-L3:  Facet hypertrophy    No disc herniation, canal or neuroforaminal stenosis      L3-L4: Grade 1 retrolisthesis  Disc bulge and facet hypertrophy  Mild canal stenosis  Mild to moderate right neuroforaminal stenosis  No significant left neuroforaminal stenosis      L4-L5:  Disc bulge with marginal osteophyte, facet and ligamentum flavum hypertrophy  Mild canal stenosis  Moderate lateral recess stenosis  No significant neuroforaminal stenosis      L5-S1:  Grade 1 retrolisthesis with diffuse disc bulge and marginal osteophyte  New left far lateral protrusion abuts the exiting left L5 nerve root  Left Mild facet hypertrophy  Mild canal stenosis  Mild right and moderate left neuroforaminal   stenosis      IMPRESSION:  New small left far lateral protrusion abutting the exiting L5 nerve root  Degenerative spondylosis otherwise without significant change      Workstation performed: [id number]           PACS Images      Show images for XR spine lumbar minimum 4 views   Study Result     LUMBAR SPINE   INDICATION-  Low back pain  History of back injury  COMPARISON- 6/26/2014   VIEWS-  AP, lateral, bilateral oblique and coned down projections    5   images   FINDINGS-   Alignment is unremarkable  There is no radiographic evidence of acute fracture or destructive   osseous lesion  Mild to moderate degenerative changes redemonstrated  Vacuum disc   phenomenon noted at L5-S1  Atherosclerotic calcifications noted  Surgical clips noted in the right upper quadrant, likely related to   prior cholecystectomy  IMPRESSION-   Lumbar spondylosis is stable     Transcribed on- [id number]           OMAR Laura MD        [place] Radiologist- OMAR Mason MD        Electronically OMAR Benton MD        Released Date Time- 09/02/15 1054      ------------------------------------------------------------------------------   [address]

## 2020-08-04 ENCOUNTER — HOSPITAL ENCOUNTER (OUTPATIENT)
Dept: RADIOLOGY | Facility: CLINIC | Age: 55
Discharge: HOME | End: 2020-08-04
Attending: ANESTHESIOLOGY | Admitting: ANESTHESIOLOGY
Payer: COMMERCIAL

## 2020-08-04 VITALS
DIASTOLIC BLOOD PRESSURE: 80 MMHG | RESPIRATION RATE: 20 BRPM | SYSTOLIC BLOOD PRESSURE: 112 MMHG | OXYGEN SATURATION: 98 % | HEART RATE: 70 BPM | TEMPERATURE: 98.2 F

## 2020-08-04 DIAGNOSIS — M54.16 RADICULOPATHY, LUMBAR REGION: ICD-10-CM

## 2020-08-04 PROCEDURE — 64483 NJX AA&/STRD TFRM EPI L/S 1: CPT | Performed by: ANESTHESIOLOGY

## 2020-08-04 PROCEDURE — 64484 NJX AA&/STRD TFRM EPI L/S EA: CPT | Performed by: ANESTHESIOLOGY

## 2020-08-04 RX ORDER — LIDOCAINE HYDROCHLORIDE 10 MG/ML
5 INJECTION, SOLUTION EPIDURAL; INFILTRATION; INTRACAUDAL; PERINEURAL ONCE
Status: COMPLETED | OUTPATIENT
Start: 2020-08-04 | End: 2020-08-04

## 2020-08-04 RX ORDER — PAPAVERINE HCL 150 MG
20 CAPSULE, EXTENDED RELEASE ORAL ONCE
Status: COMPLETED | OUTPATIENT
Start: 2020-08-04 | End: 2020-08-04

## 2020-08-04 RX ADMIN — DEXAMETHASONE SODIUM PHOSPHATE 15 MG: 10 INJECTION, SOLUTION INTRAMUSCULAR; INTRAVENOUS at 11:49

## 2020-08-04 RX ADMIN — LIDOCAINE HYDROCHLORIDE 4 ML: 10 INJECTION, SOLUTION EPIDURAL; INFILTRATION; INTRACAUDAL; PERINEURAL at 11:44

## 2020-08-04 RX ADMIN — LIDOCAINE HYDROCHLORIDE 2 ML: 20 INJECTION, SOLUTION EPIDURAL; INFILTRATION; INTRACAUDAL; PERINEURAL at 11:49

## 2020-08-04 RX ADMIN — IOHEXOL 2 ML: 300 INJECTION, SOLUTION INTRAVENOUS at 11:47

## 2020-08-04 NOTE — H&P
History of Present Illness: The patient is a 53 y o  female who presents with complaints of low back and left leg pain  Patient Active Problem List   Diagnosis    Lower back pain    Greater trochanteric bursitis of left hip    Labral tear of hip, degenerative    Lumbar disc herniation    Cigarette nicotine dependence with nicotine-induced disorder    Depression    Pain of both hip joints    Gastroesophageal reflux disease    Screening for breast cancer    Medical clearance for psychiatric admission    Family history of brain aneurysm    MDD (major depressive disorder), recurrent severe, without psychosis ([facility] [address] )    Anxiety    Methamphetamine abuse, episodic ([facility] )    Tobacco abuse    Lumbar disc disease with radiculopathy    Lumbar spondylosis    Spinal stenosis of lumbar region    Sacroiliitis [facility])       Past Medical History:   Diagnosis Date    Back pain     Depression     GERD (gastroesophageal reflux disease)        Past Surgical History:   Procedure Laterality Date    CERVICAL DISC SURGERY      CHOLECYSTECTOMY      FOOT SURGERY      CO EGD TRANSORAL BIOPSY SINGLE/MULTIPLE N/A 4/20/2016    Procedure: ESOPHAGOGASTRODUODENOSCOPY (EGD); Surgeon: Krissy Martell MD;  [facility];   Service: Gastroenterology    TONSILLECTOMY      TUBAL LIGATION      UPPER GASTROINTESTINAL ENDOSCOPY           Current Outpatient Medications:     acetaminophen (TYLENOL) 500 mg tablet, Take 500 mg by mouth every 6 (six) hours as needed for mild pain, Disp: , Rfl:     meloxicam (MOBIC) 15 mg tablet, Take 1 tablet (15 mg total) by mouth daily (Patient not taking: Reported on 7/31/2020), Disp: 30 tablet, Rfl: 0    pantoprazole (PROTONIX) 40 mg tablet, Take 1 tablet (40 mg total) by mouth daily, Disp: 30 tablet, Rfl: 3    PARoxetine (PAXIL) 20 mg tablet, Take 1 tablet (20 mg total) by mouth daily At 9am, Disp: 30 tablet, Rfl: 2    traZODone (DESYREL) 50 mg tablet, Take 1 tablet (50 mg total) by mouth daily at bedtime as needed for sleep, Disp: 30 tablet, Rfl: 2    Current Facility-Administered Medications:     dexamethasone (PF) (DECADRON) injection 20 mg, 20 mg, Epidural, Once, Jaylon Glover,     iohexol (OMNIPAQUE) 300 mg/mL injection 50 mL, 50 mL, Epidural, Once, Jaylon Glover,     lidocaine (PF) (XYLOCAINE-MPF) 1 % injection 5 mL, 5 mL, Other, Once, Jaylon Glover,     lidocaine (PF) (XYLOCAINE-MPF) 2 % injection 2 mL, 2 mL, Epidural, Once, Franklin Melendez, DO    No Known Allergies    Physical Exam:   Vitals:    08/04/20 1123   BP: 115/74   Pulse: 88   Resp: 20   Temp: 98 2 °F (36 8 °C)   SpO2: 98%     General: Awake, Alert, Oriented x 3, Mood and affect appropriate  Respiratory: Respirations even and unlabored  Cardiovascular: Peripheral pulses intact; no edema  Musculoskeletal Exam:  Left lumbar paraspinals tender to palpation  ASA Score: 2    Patient/Chart Verification  Patient ID Verified: Verbal  ID Band Applied: No  Consents Confirmed: Procedural  H&P( within 30 days) Verified: To be obtained in the Pre-Procedure area  Interval H&P(within 24 hr) Complete (required for Outpatients and Surgery Admit only): To be obtained in the Pre-Procedure area  Allergies Reviewed: Yes  Anticoag/NSAID held?: No  Currently on antibiotics?: No    Assessment:   1   Radiculopathy, lumbar region        Plan: Left L4 and L5 TFESI

## 2020-08-04 NOTE — DISCHARGE INSTR - LAB
Epidural Steroid Injection   WHAT YOU NEED TO KNOW:   An epidural steroid injection (MARY) is a procedure to inject steroid medicine into the epidural space  The epidural space is between your spinal cord and vertebrae  Steroids reduce inflammation and fluid buildup in your spine that may be causing pain  You may be given pain medicine along with the steroids  ACTIVITY  · Do not drive or operate machinery today  · No strenuous activity today - bending, lifting, etc   · You may resume normal activites starting tomorrow - start slowly and as tolerated  · You may shower today, but no tub baths or hot tubs  · You may have numbness for several hours from the local anesthetic  Please use caution and common sense, especially with weight-bearing activities  CARE OF THE INJECTION SITE  · If you have soreness or pain, apply ice to the area today (20 minutes on/20 minutes off)  · Starting tomorrow, you may use warm, moist heat or ice if needed  · You may have an increase or change in your discomfort for 36-48 hours after your treatment  · Apply ice and continue with any pain medication you have been prescribed  · Notify [facility] if you have any of the following: redness, drainage, swelling, headache, stiff neck or fever above 100°F     SPECIAL INSTRUCTIONS  · Our office will contact you in approximately 7 days for a progress report  MEDICATIONS  · Continue to take all routine medications  · Our office may have instructed you to hold some medications  If you have a problem specifically related to your procedure, please call our office at [phone]  Problems not related to your procedure should be directed to your primary care physician

## 2020-08-11 ENCOUNTER — TELEPHONE (OUTPATIENT)
Dept: PAIN MEDICINE | Facility: CLINIC | Age: 55
End: 2020-08-11

## 2020-08-11 DIAGNOSIS — M54.16 LUMBAR RADICULOPATHY: Primary | ICD-10-CM

## 2020-08-11 NOTE — TELEPHONE ENCOUNTER
Pt reports no improvement post inj   She said last night she had bad muscle spasms  She said yesterday her entire left leg was numb when she got up   She said her toes are numb   Pain level 7-8/10   Pt aware takes up to 2wks for full effect

## 2020-08-18 NOTE — TELEPHONE ENCOUNTER
Pt reports 50% improvement 2wk post inj   Pain level 5/10   She said sometimes when she gets up she gets shooting pain   Some numbness still in her toes

## 2020-08-28 NOTE — TELEPHONE ENCOUNTER
Patient called stating she woke up today & lower back pain radiating left leg & toes  She now has numbness on her left leg & toes  She is concerned & would like to discuss her status with a nurse   Please adviserandall    Call back# [phone]

## 2020-08-28 NOTE — TELEPHONE ENCOUNTER
S/w pt, states the symptoms she'd experienced prior to L L4-5 TFESI on 8/4 have returned and are even more severe than in the past  Pt c/o lower back pain that radiates into left leg and causes numbness in her toes  Pt said she did have good relief from injection up until today when the symptoms worsened  Pt has upcoming office visit on 9/8  Please advise, thank you

## 2020-08-31 NOTE — TELEPHONE ENCOUNTER
Called pt, she was not home at the time, person answering could not take my phone number but said the patient will be back after 10am

## 2020-08-31 NOTE — TELEPHONE ENCOUNTER
Pt is returning call and stated she doesn't want to schedule an injection until she discuss further at her next appt

## 2020-09-03 RX ORDER — METHYLPREDNISOLONE 4 MG/1
TABLET ORAL
Qty: 1 EACH | Refills: 0 | Status: SHIPPED | OUTPATIENT
Start: 2020-09-03 | End: 2020-10-01 | Stop reason: CLARIF

## 2020-09-03 NOTE — TELEPHONE ENCOUNTER
SW patient, states that she has an appointment with [facility] on 9/8/2020 to discuss her plan of care and to follow up post injection  Patient states she just needs something to help with her plan until 9/8/20202  Patient said she has taken medrol dosepak before and it did help calm down everything  Patient states her pain has moved more to her outer left side, but she still has the pain  Patient said ice nor heat is helping now  Please advise   TY

## 2020-09-03 NOTE — TELEPHONE ENCOUNTER
Patient called stating she's experiencing severe left lower back, left leg & foot pain / numbness  She adds she's not able to tolerate this pain any more & is requesting pain medication  Pain level 10/10   Please advise, randall    Call back# [phone]

## 2020-09-03 NOTE — TELEPHONE ENCOUNTER
RAOUL patient and advised prescription has been sent to the pharmacy  Patient appreciative of call back

## 2020-09-08 ENCOUNTER — OFFICE VISIT (OUTPATIENT)
Dept: PAIN MEDICINE | Facility: CLINIC | Age: 55
End: 2020-09-08
Payer: COMMERCIAL

## 2020-09-08 VITALS
SYSTOLIC BLOOD PRESSURE: 141 MMHG | DIASTOLIC BLOOD PRESSURE: 87 MMHG | BODY MASS INDEX: 36.5 KG/M2 | HEART RATE: 95 BPM | HEIGHT: 63 IN | WEIGHT: 206 LBS | TEMPERATURE: 98.5 F

## 2020-09-08 DIAGNOSIS — M24.159 LABRAL TEAR OF HIP, DEGENERATIVE: ICD-10-CM

## 2020-09-08 DIAGNOSIS — M47.816 LUMBAR SPONDYLOSIS: ICD-10-CM

## 2020-09-08 DIAGNOSIS — M25.552 PAIN OF BOTH HIP JOINTS: ICD-10-CM

## 2020-09-08 DIAGNOSIS — M54.16 RADICULOPATHY, LUMBAR REGION: Primary | ICD-10-CM

## 2020-09-08 DIAGNOSIS — M46.1 SACROILIITIS (HCC): ICD-10-CM

## 2020-09-08 DIAGNOSIS — M25.551 PAIN OF BOTH HIP JOINTS: ICD-10-CM

## 2020-09-08 DIAGNOSIS — M48.061 SPINAL STENOSIS OF LUMBAR REGION, UNSPECIFIED WHETHER NEUROGENIC CLAUDICATION PRESENT: ICD-10-CM

## 2020-09-08 DIAGNOSIS — M54.16 LUMBAR RADICULOPATHY: ICD-10-CM

## 2020-09-08 DIAGNOSIS — M51.26 LUMBAR DISC HERNIATION: ICD-10-CM

## 2020-09-08 PROCEDURE — 99214 OFFICE O/P EST MOD 30 MIN: CPT | Performed by: NURSE PRACTITIONER

## 2020-09-08 NOTE — PROGRESS NOTES
Assessment:  1  Radiculopathy, lumbar region    2  Labral tear of hip, degenerative    3  Lumbar spondylosis    4  Lumbar disc herniation    5  Sacroiliitis (HCC)    6  Pain of both hip joints    7  Spinal stenosis of lumbar region, unspecified whether neurogenic claudication present    8  Lumbar radiculopathy        Plan:  1  I will schedule the patient for repeat left L4 and L5 TFESI to address the inflammatory component the patient's pain  2  The patient may benefit from physical therapy, however not interested in this at this time  3  If no relief from conservative treatment, may consider surgical evaluation  4  I will avoid opioids secondary to history of illicit drug use and suicidal ideation   5  I will follow up with the patient 4 weeks after the procedure or sooner if needed  M*Modal software was used to dictate this note  It may contain errors with dictating incorrect words or incorrect spelling  Please contact the provider directly with any questions  History of Present Illness: The patient is a 53 y o  female with a history of methamphetamine abuse and suicidal ideation/attempt and recent hospitalization in July 2020 last seen on 7/31/20 who presents for a follow up office visit in regards to chronic lumbosacral back pain that radiates into the lateral aspect of the left lower extremity secondary to lumbar degenerative disc disease, lumbar spondylosis and stenosis  The patient denies right lower extremity symptoms, bowel or bladder incontinence or saddle anesthesia  The patient is status post left L4 and L5 TFESI with Dr Byron Cabrera on July 31, 2020 and reports about a 30% improvement of her pain for approximately 2 weeks from this procedure before the pain returned to baseline  She subsequently was prescribed an oral Medrol Dosepak, and states that this also slightly improved her pain  She has never been evaluated by a surgeon   MRI of her lumbar spine revealing degenerative disc disease and spondylosis with varying degrees of central and foraminal stenosis from L3-4-L5-S1, particularly at L4-5 and L5-S1  The patient was previously taking gabapentin, however this was discontinued as the patient try to overdose on her gabapentin and Protonix  She has found minimal relief with Tylenol and NSAIDs  The patient rates her pain a 6/10 on the numeric pain rating scale  She states her pain is intermittent in nature and bothersome in the morning and at night  She characterizes the pain as dull aching, cramping, numbness and pins and needles  I have personally reviewed and/or updated the patient's past medical history, past surgical history, family history, social history, current medications, allergies, and vital signs today  Review of Systems:    Review of Systems   Respiratory: Negative for shortness of breath  Cardiovascular: Negative for chest pain  Gastrointestinal: Negative for constipation, diarrhea, nausea and vomiting  Musculoskeletal: Positive for gait problem  Negative for arthralgias, joint swelling and myalgias  Skin: Negative for rash  Neurological: Negative for dizziness, seizures and weakness  All other systems reviewed and are negative  Past Medical History:   Diagnosis Date    Back pain     Depression     GERD (gastroesophageal reflux disease)        Past Surgical History:   Procedure Laterality Date    CERVICAL DISC SURGERY      CHOLECYSTECTOMY      FOOT SURGERY      IN EGD TRANSORAL BIOPSY SINGLE/MULTIPLE N/A 4/20/2016    Procedure: ESOPHAGOGASTRODUODENOSCOPY (EGD); Surgeon: Tressa Grace MD;  Location: [facility];   Service: Gastroenterology    TONSILLECTOMY      TUBAL LIGATION      UPPER GASTROINTESTINAL ENDOSCOPY         Family History   Problem Relation Age of Onset    Aneurysm Mother     Heart attack Father     Aneurysm Sister     Aneurysm Brother     Mental illness Son        Social History     Occupational History    Occupation: Caregiver   Tobacco Use    Smoking status: Current Every Day Smoker     Packs/day: 1 00     Types: Cigarettes    Smokeless tobacco: Never Used   Substance and Sexual Activity    Alcohol use: No     Frequency: Never     Binge frequency: Never    Drug use: No    Sexual activity: Not Currently         Current Outpatient Medications:     acetaminophen (TYLENOL) 500 mg tablet, Take 500 mg by mouth every 6 (six) hours as needed for mild pain, Disp: , Rfl:     methylPREDNISolone 4 MG tablet therapy pack, Use as directed on package, Disp: 1 each, Rfl: 0    pantoprazole (PROTONIX) 40 mg tablet, Take 1 tablet (40 mg total) by mouth daily, Disp: 30 tablet, Rfl: 3    PARoxetine (PAXIL) 20 mg tablet, Take 1 tablet (20 mg total) by mouth daily At 9am, Disp: 30 tablet, Rfl: 2    traZODone (DESYREL) 50 mg tablet, Take 1 tablet (50 mg total) by mouth daily at bedtime as needed for sleep, Disp: 30 tablet, Rfl: 2    No Known Allergies    Physical Exam:    /87   Pulse 95   Temp 98 5 °F (36 9 °C)   Ht 5' 3" (1 6 m)   Wt 93 4 kg (206 lb)   BMI 36 49 kg/m²     Constitutional:normal, well developed, well nourished, alert, in no distress and non-toxic and no overt pain behavior  Eyes:anicteric  HEENT:grossly intact  Neck:supple, symmetric, trachea midline and no masses   Pulmonary:even and unlabored  Cardiovascular:No edema or pitting edema present  Skin:Normal without rashes or lesions and well hydrated  Psychiatric:Mood and affect appropriate  Neurologic:Cranial Nerves II-XII grossly intact  Musculoskeletal:Slightly antalgic gait but steady without the use of assistive devices    Positive seated straight leg raise on the left and negative on the right      Imaging  FL spine and pain procedure    (Results Pending)     MRI LUMBAR SPINE WITHOUT CONTRAST     INDICATION: M54 16: Radiculopathy, lumbar region      COMPARISON:  MRI dated 6/13/2017      TECHNIQUE:  Sagittal T1, sagittal T2, sagittal inversion recovery, axial T1 and axial T2, coronal T2     IMAGE QUALITY:  Diagnostic     FINDINGS:     VERTEBRAL BODIES:  There are 5 lumbar type vertebral bodies  Grade 1 retrolisthesis at L3-4 and L5-S1 is unchanged  Hemangiomas at L3 5 unchanged  Degenerative endplate marrow changes are present  Several small scattered Schmorl's nodes      SACRUM:  Normal signal within the sacrum  No evidence of insufficiency or stress fracture      DISTAL CORD AND CONUS:  Normal size and signal within the distal cord and conus      PARASPINAL SOFT TISSUES:  Paraspinal soft tissues are unremarkable      LOWER THORACIC DISC SPACES:  Normal disc height and signal   No disc herniation, canal stenosis or foraminal narrowing      LUMBAR DISC SPACES:       L1-L2:  Facet hypertrophy  No disc herniation or canal stenosis      L2-L3:  Facet hypertrophy  No disc herniation, canal or neuroforaminal stenosis      L3-L4: Grade 1 retrolisthesis  Disc bulge and facet hypertrophy  Mild canal stenosis  Mild to moderate right neuroforaminal stenosis  No significant left neuroforaminal stenosis      L4-L5:  Disc bulge with marginal osteophyte, facet and ligamentum flavum hypertrophy  Mild canal stenosis  Moderate lateral recess stenosis  No significant neuroforaminal stenosis      L5-S1:  Grade 1 retrolisthesis with diffuse disc bulge and marginal osteophyte  New left far lateral protrusion abuts the exiting left L5 nerve root  Left Mild facet hypertrophy  Mild canal stenosis  Mild right and moderate left neuroforaminal   stenosis      IMPRESSION:  New small left far lateral protrusion abutting the exiting L5 nerve root    Degenerative spondylosis otherwise without significant change      Workstation performed: [id number]    Orders Placed This Encounter   Procedures    FL spine and pain procedure

## 2020-09-15 ENCOUNTER — TELEPHONE (OUTPATIENT)
Dept: PAIN MEDICINE | Facility: MEDICAL CENTER | Age: 55
End: 2020-09-15

## 2020-09-22 ENCOUNTER — HOSPITAL ENCOUNTER (EMERGENCY)
Facility: HOSPITAL | Age: 55
Discharge: HOME | End: 2020-09-23
Attending: EMERGENCY MEDICINE | Admitting: EMERGENCY MEDICINE
Payer: COMMERCIAL

## 2020-09-22 DIAGNOSIS — R07.9 CHEST PAIN: ICD-10-CM

## 2020-09-22 DIAGNOSIS — R06.00 DYSPNEA: Primary | ICD-10-CM

## 2020-09-22 DIAGNOSIS — F41.9 ANXIETY: ICD-10-CM

## 2020-09-22 LAB
BASOPHILS # BLD AUTO: 0.04 THOUSANDS/ΜL (ref 0–0.1)
BASOPHILS NFR BLD AUTO: 0 % (ref 0–1)
EOSINOPHIL # BLD AUTO: 0.3 THOUSAND/ΜL (ref 0–0.61)
EOSINOPHIL NFR BLD AUTO: 3 % (ref 0–6)
ERYTHROCYTE [DISTWIDTH] IN BLOOD BY AUTOMATED COUNT: 13.2 % (ref 11.6–15.1)
HCT VFR BLD AUTO: 39.7 % (ref 34.8–46.1)
HGB BLD-MCNC: 12.6 G/DL (ref 11.5–15.4)
IMM GRANULOCYTES # BLD AUTO: 0.01 THOUSAND/UL (ref 0–0.2)
IMM GRANULOCYTES NFR BLD AUTO: 0 % (ref 0–2)
LYMPHOCYTES # BLD AUTO: 4.2 THOUSANDS/ΜL (ref 0.6–4.47)
LYMPHOCYTES NFR BLD AUTO: 46 % (ref 14–44)
MCH RBC QN AUTO: 32.2 PG (ref 26.8–34.3)
MCHC RBC AUTO-ENTMCNC: 31.7 G/DL (ref 31.4–37.4)
MCV RBC AUTO: 102 FL (ref 82–98)
MONOCYTES # BLD AUTO: 0.67 THOUSAND/ΜL (ref 0.17–1.22)
MONOCYTES NFR BLD AUTO: 7 % (ref 4–12)
NEUTROPHILS # BLD AUTO: 4.03 THOUSANDS/ΜL (ref 1.85–7.62)
NEUTS SEG NFR BLD AUTO: 44 % (ref 43–75)
NRBC BLD AUTO-RTO: 0 /100 WBCS
PLATELET # BLD AUTO: 241 THOUSANDS/UL (ref 149–390)
PMV BLD AUTO: 9.9 FL (ref 8.9–12.7)
RBC # BLD AUTO: 3.91 MILLION/UL (ref 3.81–5.12)
WBC # BLD AUTO: 9.25 THOUSAND/UL (ref 4.31–10.16)

## 2020-09-22 PROCEDURE — 99285 EMERGENCY DEPT VISIT HI MDM: CPT

## 2020-09-22 PROCEDURE — 80053 COMPREHEN METABOLIC PANEL: CPT | Performed by: EMERGENCY MEDICINE

## 2020-09-22 PROCEDURE — 36415 COLL VENOUS BLD VENIPUNCTURE: CPT

## 2020-09-22 PROCEDURE — 84484 ASSAY OF TROPONIN QUANT: CPT | Performed by: EMERGENCY MEDICINE

## 2020-09-22 PROCEDURE — 93005 ELECTROCARDIOGRAM TRACING: CPT

## 2020-09-22 PROCEDURE — 85025 COMPLETE CBC W/AUTO DIFF WBC: CPT | Performed by: EMERGENCY MEDICINE

## 2020-09-23 ENCOUNTER — APPOINTMENT (EMERGENCY)
Dept: RADIOLOGY | Facility: HOSPITAL | Age: 55
End: 2020-09-23
Payer: COMMERCIAL

## 2020-09-23 VITALS
DIASTOLIC BLOOD PRESSURE: 62 MMHG | BODY MASS INDEX: 36.14 KG/M2 | RESPIRATION RATE: 22 BRPM | HEART RATE: 72 BPM | TEMPERATURE: 98.4 F | WEIGHT: 204 LBS | SYSTOLIC BLOOD PRESSURE: 124 MMHG | OXYGEN SATURATION: 97 %

## 2020-09-23 LAB
ALBUMIN SERPL BCP-MCNC: 3.3 G/DL (ref 3.5–5)
ALP SERPL-CCNC: 59 U/L (ref 46–116)
ALT SERPL W P-5'-P-CCNC: 26 U/L (ref 12–78)
ANION GAP SERPL CALCULATED.3IONS-SCNC: 5 MMOL/L (ref 4–13)
AST SERPL W P-5'-P-CCNC: 21 U/L (ref 5–45)
ATRIAL RATE: 77 BPM
BILIRUB SERPL-MCNC: 0.23 MG/DL (ref 0.2–1)
BUN SERPL-MCNC: 16 MG/DL (ref 5–25)
CALCIUM ALBUM COR SERPL-MCNC: 9.2 MG/DL (ref 8.3–10.1)
CALCIUM SERPL-MCNC: 8.6 MG/DL (ref 8.3–10.1)
CHLORIDE SERPL-SCNC: 109 MMOL/L (ref 100–108)
CO2 SERPL-SCNC: 29 MMOL/L (ref 21–32)
CREAT SERPL-MCNC: 0.81 MG/DL (ref 0.6–1.3)
GFR SERPL CREATININE-BSD FRML MDRD: 82 ML/MIN/1.73SQ M
GLUCOSE SERPL-MCNC: 121 MG/DL (ref 65–140)
P AXIS: 71 DEGREES
POTASSIUM SERPL-SCNC: 4 MMOL/L (ref 3.5–5.3)
PR INTERVAL: 146 MS
PROT SERPL-MCNC: 6.6 G/DL (ref 6.4–8.2)
QRS AXIS: 89 DEGREES
QRSD INTERVAL: 86 MS
QT INTERVAL: 364 MS
QTC INTERVAL: 411 MS
SODIUM SERPL-SCNC: 143 MMOL/L (ref 136–145)
T WAVE AXIS: 72 DEGREES
TROPONIN I SERPL-MCNC: <0.02 NG/ML
VENTRICULAR RATE: 77 BPM

## 2020-09-23 PROCEDURE — 96374 THER/PROPH/DIAG INJ IV PUSH: CPT

## 2020-09-23 PROCEDURE — 71046 X-RAY EXAM CHEST 2 VIEWS: CPT

## 2020-09-23 PROCEDURE — 99285 EMERGENCY DEPT VISIT HI MDM: CPT | Performed by: EMERGENCY MEDICINE

## 2020-09-23 PROCEDURE — 93010 ELECTROCARDIOGRAM REPORT: CPT | Performed by: INTERNAL MEDICINE

## 2020-09-23 RX ORDER — ACETAMINOPHEN 325 MG/1
975 TABLET ORAL ONCE
Status: COMPLETED | OUTPATIENT
Start: 2020-09-23 | End: 2020-09-23

## 2020-09-23 RX ORDER — KETOROLAC TROMETHAMINE 30 MG/ML
15 INJECTION, SOLUTION INTRAMUSCULAR; INTRAVENOUS ONCE
Status: COMPLETED | OUTPATIENT
Start: 2020-09-23 | End: 2020-09-23

## 2020-09-23 RX ADMIN — ACETAMINOPHEN 975 MG: 325 TABLET, FILM COATED ORAL at 01:43

## 2020-09-23 RX ADMIN — KETOROLAC TROMETHAMINE 15 MG: 30 INJECTION, SOLUTION INTRAMUSCULAR at 01:43

## 2020-09-23 NOTE — ED ATTENDING ATTESTATION
9/22/2020  IBird MD, saw and evaluated the patient  I have discussed the patient with the resident/non-physician practitioner and agree with the resident's/non-physician practitioner's findings, Plan of Care, and MDM as documented in the resident's/non-physician practitioner's note, except where noted  All available labs and Radiology studies were reviewed  I was present for key portions of any procedure(s) performed by the resident/non-physician practitioner and I was immediately available to provide assistance  At this point I agree with the current assessment done in the Emergency  Department  I have conducted an independent evaluation of this patient a history and physical is as follows: This is a 53 y o  old female who presents to the ED for evaluation of chest pain, dyspnea  Feels anxious  Mild cough with reproducible CP  VS and nursing notes reviewed  General: Appears in NAD, awake, alert, speaking normally in full sentences  Well-nourished, well-developed  Appears stated age  Head: Normocephalic, atraumatic  Eyes: EOMI  Vision grossly normal  No subconjunctival hemorrhages or occular discharge noted  Symmetrical lids  ENT: Atraumatic external nose and ears  No stridor  Normal phonation  No drooling  Normal swallowing  Neck: No JVD  FROM  No goiter  CV: No pallor  Normal rate  Lungs: No tachypnea  No respiratory distress  MSK: Moving all extremities equally, no peripheral edema  Skin: Dry, intact  No cyanosis  Neuro: Awake, alert, GCS15  CN II-XII grossly intact  Grossly normal gait  Psychiatric/Behavioral: Appropriate mood and affect  A/P: This is a 53 y o  female who presents to the ED for evaluation of cp  johnny msk vs anxiety  Labs, EKG, CXR  Reassurance      ED Course       Critical Care Time  Procedures

## 2020-09-23 NOTE — ED PROVIDER NOTES
History  Chief Complaint   Patient presents with    Shortness of Breath     Per EMS, "pt began to have a cough, chest pain (reproducable), SOB"  Pt confirms story  Pt stated that she did feels anxious and that she feels better when she is talking with people and her breathing gets worse when she is alone  54 y/o female with PMHx of GERD and depression presents to the ED for SOB and chest tightness that started while she was watching a movie in bed with her  tonight, just prior to arrival  She states that she has experienced similar symptoms in the past with her anxiety and that she feels somewhat better now that she is in the ER  She is currently pain free and not short of breath  She denies fever, chills, cough, abdominal pain, N/V/D, back pain, neck pain, headache, numbness, and weakness  Prior to Admission Medications   Prescriptions Last Dose Informant Patient Reported? Taking? PARoxetine (PAXIL) 20 mg tablet   No No   Sig: Take 1 tablet (20 mg total) by mouth daily At 9am   acetaminophen (TYLENOL) 500 mg tablet  Self Yes No   Sig: Take 500 mg by mouth every 6 (six) hours as needed for mild pain   methylPREDNISolone 4 MG tablet therapy pack   No No   Sig: Use as directed on package   pantoprazole (PROTONIX) 40 mg tablet   No No   Sig: Take 1 tablet (40 mg total) by mouth daily   traZODone (DESYREL) 50 mg tablet   No No   Sig: Take 1 tablet (50 mg total) by mouth daily at bedtime as needed for sleep      Facility-Administered Medications: None       Past Medical History:   Diagnosis Date    Back pain     Depression     GERD (gastroesophageal reflux disease)     SOB (shortness of breath)        Past Surgical History:   Procedure Laterality Date    CERVICAL DISC SURGERY      CHOLECYSTECTOMY      FOOT SURGERY      [place] EGD TRANSORAL BIOPSY SINGLE/MULTIPLE N/A 4/20/2016    Procedure: ESOPHAGOGASTRODUODENOSCOPY (EGD); Surgeon: Thuy Ornelas MD;  Location: [facility];   Service: Gastroenterology    TONSILLECTOMY      TUBAL LIGATION      UPPER GASTROINTESTINAL ENDOSCOPY         Family History   Problem Relation Age of Onset    Aneurysm Mother     Heart attack Father     Aneurysm Sister     Aneurysm Brother     Mental illness Son      I have reviewed and agree with the history as documented  E-Cigarette/Vaping    E-Cigarette Use Never User      E-Cigarette/Vaping Substances     Social History     Tobacco Use    Smoking status: Current Every Day Smoker     Packs/day: 1 00     Types: Cigarettes    Smokeless tobacco: Never Used   Substance Use Topics    Alcohol use: No     Frequency: Never     Binge frequency: Never    Drug use: No        Review of Systems   Constitutional: Negative for chills and fever  HENT: Negative for congestion and rhinorrhea  Respiratory: Positive for shortness of breath  Negative for cough and wheezing  Cardiovascular: Positive for chest pain  Negative for palpitations and leg swelling  Gastrointestinal: Negative for abdominal pain, diarrhea, nausea and vomiting  Genitourinary: Negative for dysuria and hematuria  Musculoskeletal: Negative for back pain and neck pain  Neurological: Negative for syncope, weakness, light-headedness, numbness and headaches  All other systems reviewed and are negative  Physical Exam  ED Triage Vitals   Temperature Pulse Respirations Blood Pressure SpO2   09/22/20 2320 09/22/20 2320 09/22/20 2320 09/22/20 2320 09/22/20 2320   98 4 °F (36 9 °C) 77 22 158/88 99 %      Temp Source Heart Rate Source Patient Position - Orthostatic VS BP Location FiO2 (%)   09/22/20 2320 09/23/20 0030 09/23/20 0030 -- --   Oral Monitor Lying        Pain Score       09/22/20 2320       8             Orthostatic Vital Signs  Vitals:    09/22/20 2320 09/23/20 0030   BP: 158/88 124/62   Pulse: 77 72   Patient Position - Orthostatic VS:  Lying       Physical Exam  Vitals signs and nursing note reviewed     Constitutional: General: She is not in acute distress  Appearance: She is well-developed and normal weight  HENT:      Head: Normocephalic and atraumatic  Mouth/Throat:      Mouth: Mucous membranes are moist       Pharynx: Oropharynx is clear  No pharyngeal swelling or oropharyngeal exudate  Eyes:      Extraocular Movements: Extraocular movements intact  Pupils: Pupils are equal, round, and reactive to light  Neck:      Musculoskeletal: Normal range of motion and neck supple  Vascular: No JVD  Cardiovascular:      Rate and Rhythm: Normal rate and regular rhythm  Pulses: Normal pulses  Heart sounds: Normal heart sounds  No murmur  Pulmonary:      Effort: Pulmonary effort is normal  No tachypnea or respiratory distress  Breath sounds: Normal breath sounds  No decreased breath sounds, wheezing, rhonchi or rales  Chest:      Chest wall: No deformity  Comments: Mild sternal tenderness to palpation over the sternum, which reproduces the patient's chest discomfort  Abdominal:      General: Bowel sounds are normal       Palpations: Abdomen is soft  Tenderness: There is no abdominal tenderness  There is no guarding  Musculoskeletal: Normal range of motion  Right lower leg: She exhibits no tenderness  No edema  Left lower leg: She exhibits no tenderness  No edema  Lymphadenopathy:      Cervical: No cervical adenopathy  Skin:     General: Skin is warm and dry  Capillary Refill: Capillary refill takes less than 2 seconds  Neurological:      General: No focal deficit present  Mental Status: She is alert and oriented to person, place, and time           ED Medications  Medications   ketorolac (TORADOL) injection 15 mg (15 mg Intravenous Given 9/23/20 0143)   acetaminophen (TYLENOL) tablet 975 mg (975 mg Oral Given 9/23/20 0143)       Diagnostic Studies  Results Reviewed     Procedure Component Value Units Date/Time    Comprehensive metabolic panel [[id number]] (Abnormal) Collected:  09/22/20 2328    Lab Status:  Final result Specimen:  Blood from Arm, Left Updated:  09/23/20 0000     Sodium 143 mmol/L      Potassium 4 0 mmol/L      Chloride 109 mmol/L      CO2 29 mmol/L      ANION GAP 5 mmol/L      BUN 16 mg/dL      Creatinine 0 81 mg/dL      Glucose 121 mg/dL      Calcium 8 6 mg/dL      Corrected Calcium 9 2 mg/dL      AST 21 U/L      ALT 26 U/L      Alkaline Phosphatase 59 U/L      Total Protein 6 6 g/dL      Albumin 3 3 g/dL      Total Bilirubin 0 23 mg/dL      eGFR 82 ml/min/1 73sq m     Narrative:       National Kidney Disease Foundation guidelines for Chronic Kidney Disease (CKD):     Stage 1 with normal or high GFR (GFR > 90 mL/min/1 73 square meters)    Stage 2 Mild CKD (GFR = 60-89 mL/min/1 73 square meters)    Stage 3A Moderate CKD (GFR = 45-59 mL/min/1 73 square meters)    Stage 3B Moderate CKD (GFR = 30-44 mL/min/1 73 square meters)    Stage 4 Severe CKD (GFR = 15-29 mL/min/1 73 square meters)    Stage 5 End Stage CKD (GFR <15 mL/min/1 73 square meters)  Note: GFR calculation is accurate only with a steady state creatinine    Troponin I [[id number]]  (Normal) Collected:  09/22/20 2328    Lab Status:  Final result Specimen:  Blood from Arm, Left Updated:  09/23/20 0000     Troponin I <0 02 ng/mL     CBC and differential [[id number]]  (Abnormal) Collected:  09/22/20 2328    Lab Status:  Final result Specimen:  Blood from Arm, Left Updated:  09/22/20 2336     WBC 9 25 Thousand/uL      RBC 3 91 Million/uL      Hemoglobin 12 6 g/dL      Hematocrit 39 7 %       fL      MCH 32 2 pg      MCHC 31 7 g/dL      RDW 13 2 %      MPV 9 9 fL      Platelets 731 Thousands/uL      nRBC 0 /100 WBCs      Neutrophils Relative 44 %      Immat GRANS % 0 %      Lymphocytes Relative 46 %      Monocytes Relative 7 %      Eosinophils Relative 3 %      Basophils Relative 0 %      Neutrophils Absolute 4 03 Thousands/µL      Immature Grans Absolute 0 01 Thousand/uL Lymphocytes Absolute 4 20 Thousands/µL      Monocytes Absolute 0 67 Thousand/µL      Eosinophils Absolute 0 30 Thousand/µL      Basophils Absolute 0 04 Thousands/µL                  XR chest 2 views   ED Interpretation by Faina Tan MD (09/23 0132)   No evidence of focal consolidation, pleural effusion, osseous abnormality, or pneumothroax, as interpreted by me  Final Result by Brianna Talbert MD (09/23 0809)      No acute cardiopulmonary disease  Workstation performed: [id number]               Procedures  Procedures      ED Course  ED Course as of Sep 29 0546   Wed Sep 23, 2020   0137 Procedure Note: EKG  Date/Time: 09/23/20 1:37 AM   Interpreted by: Jan Baum MD  Indications / Diagnosis: SOB, chest pain  ECG reviewed by me, the ED Physician: yes   The EKG demonstrates:  Rhythm: normal sinus rhythm 77 bpm  Intervals: normal intervals  Axis: normal axis  QRS/Blocks: normal QRS  ST Changes: No acute ST Changes, no STD/SHAYY  No significant change compared to ECG from 07/17/2020  HEART Risk Score      Most Recent Value   Heart Score Risk Calculator   History  0 Filed at: 09/23/2020 0138   ECG  0 Filed at: 09/23/2020 0138   Age  1 Filed at: 09/23/2020 0138   Risk Factors  0 Filed at: 09/23/2020 0138   Troponin  0 Filed at: 09/23/2020 0138   HEART Score  1 Filed at: 09/23/2020 0138                      SBIRT 22yo+      Most Recent Value   SBIRT (25 yo +)   In order to provide better care to our patients, we are screening all of our patients for alcohol and drug use  Would it be okay to ask you these screening questions? Yes Filed at: 09/23/2020 0037   Initial Alcohol Screen: US AUDIT-C    1  How often do you have a drink containing alcohol?  0 Filed at: 09/23/2020 0037   2  How many drinks containing alcohol do you have on a typical day you are drinking? 0 Filed at: 09/23/2020 0037   3a  Male UNDER 65: How often do you have five or more drinks on one occasion?   0 Filed at: 09/23/2020 0037   3b  FEMALE Any Age, or MALE 65+: How often do you have 4 or more drinks on one occassion? 0 Filed at: 09/23/2020 0037   Audit-C Score  0 Filed at: 09/23/2020 7711   HERMINIA: How many times in the past year have you    Used an illegal drug or used a prescription medication for non-medical reasons? Never Filed at: 09/23/2020 0037                  MDM  Number of Diagnoses or Management Options  Anxiety:   Chest pain:   Dyspnea:   Diagnosis management comments: 52 y/o female with PMHx of GERD and depression presents to the ED for SOB and chest tightness that started while she was watching a movie in bed with her  tonight, just prior to arrival  Similar symptoms in the past with anxiety  Currently asymptomatic in the ER  Exam within normal limits  Cardiac work-up negative  Normal CBC, CMP, Trop, EKG, and CXR  Patient given Tylenol and Toradol for musculoskeletal aches, with improvement noted  HEART score 2  I discussed with the patient the treatment options of obtaining a second negative troponin vs discharge home with outpatient follow-up and the patient would prefer discharge  I discussed the findings, red flags/return precautions, and outpatient follow-up with the patient and she understands and agrees  Disposition  Final diagnoses:   Chest pain   Dyspnea   Anxiety     Time reflects when diagnosis was documented in both MDM as applicable and the Disposition within this note     Time User Action Codes Description Comment    9/23/2020  1:36 AM Lovena Leisure Add [R07 9] Chest pain     9/23/2020  1:36 AM Lovena Leisure Add [R06 00] Dyspnea     9/23/2020  1:36 AM Lovena Leisure Modify [R07 9] Chest pain     9/23/2020  1:36 AM Lovena Leisure Modify [R06 00] Dyspnea     9/23/2020  1:36 AM Lovena Leisure Add [F41 9] Anxiety       ED Disposition     ED Disposition Condition Date/Time Comment    Discharge Stable Wed Sep 23, 2020  1:36 AM Ashleigh Cancer discharge to home/self care  Follow-up Information     Follow up With Specialties Details Why Contact Info Additional Information    Del Norris MD Family Medicine Call  As needed [address] [facility]       [address] [facility] Call  As needed [address] [facility] [place] [address] [facility] [place], [address] [place], [place], [place] [address]          Discharge Medication List as of 9/23/2020  1:37 AM      CONTINUE these medications which have NOT CHANGED    Details   acetaminophen (TYLENOL) 500 mg tablet Take 500 mg by mouth every 6 (six) hours as needed for mild pain, Historical Med      methylPREDNISolone 4 MG tablet therapy pack Use as directed on package, Normal      pantoprazole (PROTONIX) 40 mg tablet Take 1 tablet (40 mg total) by mouth daily, Starting Wed 6/24/2020, Normal      PARoxetine (PAXIL) 20 mg tablet Take 1 tablet (20 mg total) by mouth daily At 9am, Starting Wed 7/22/2020, Print      traZODone (DESYREL) 50 mg tablet Take 1 tablet (50 mg total) by mouth daily at bedtime as needed for sleep, Starting Tue 7/21/2020, Print           No discharge procedures on file  PDMP Review       Value Time User    PDMP Reviewed  Yes 7/31/2020 10:39 AM Dario Kirna DO           ED Provider  Attending physically available and evaluated Mg Jaime  I managed the patient along with the ED Attending      Electronically Signed by         Jeri Rodriguez MD  09/29/20 0140

## 2020-09-29 ENCOUNTER — HOSPITAL ENCOUNTER (OUTPATIENT)
Dept: RADIOLOGY | Facility: CLINIC | Age: 55
Discharge: HOME | End: 2020-09-29
Attending: ANESTHESIOLOGY
Payer: COMMERCIAL

## 2020-09-29 VITALS
OXYGEN SATURATION: 95 % | RESPIRATION RATE: 20 BRPM | SYSTOLIC BLOOD PRESSURE: 99 MMHG | HEART RATE: 75 BPM | TEMPERATURE: 97.8 F | DIASTOLIC BLOOD PRESSURE: 68 MMHG

## 2020-09-29 DIAGNOSIS — M54.16 LUMBAR RADICULOPATHY: ICD-10-CM

## 2020-09-29 PROCEDURE — 64483 NJX AA&/STRD TFRM EPI L/S 1: CPT | Performed by: ANESTHESIOLOGY

## 2020-09-29 PROCEDURE — 64484 NJX AA&/STRD TFRM EPI L/S EA: CPT | Performed by: ANESTHESIOLOGY

## 2020-09-29 RX ORDER — LIDOCAINE HYDROCHLORIDE 10 MG/ML
5 INJECTION, SOLUTION EPIDURAL; INFILTRATION; INTRACAUDAL; PERINEURAL ONCE
Status: COMPLETED | OUTPATIENT
Start: 2020-09-29 | End: 2020-09-29

## 2020-09-29 RX ORDER — ARIPIPRAZOLE 2 MG/1
5 TABLET ORAL DAILY
COMMUNITY
End: 2021-04-30 | Stop reason: HOSPADM

## 2020-09-29 RX ORDER — PAPAVERINE HCL 150 MG
20 CAPSULE, EXTENDED RELEASE ORAL ONCE
Status: COMPLETED | OUTPATIENT
Start: 2020-09-29 | End: 2020-09-29

## 2020-09-29 RX ADMIN — IOHEXOL 2 ML: 300 INJECTION, SOLUTION INTRAVENOUS at 11:28

## 2020-09-29 RX ADMIN — LIDOCAINE HYDROCHLORIDE 4 ML: 10 INJECTION, SOLUTION EPIDURAL; INFILTRATION; INTRACAUDAL; PERINEURAL at 11:23

## 2020-09-29 RX ADMIN — LIDOCAINE HYDROCHLORIDE 2 ML: 20 INJECTION, SOLUTION EPIDURAL; INFILTRATION; INTRACAUDAL; PERINEURAL at 11:27

## 2020-09-29 RX ADMIN — DEXAMETHASONE SODIUM PHOSPHATE 15 MG: 10 INJECTION, SOLUTION INTRAMUSCULAR; INTRAVENOUS at 11:29

## 2020-10-01 ENCOUNTER — OFFICE VISIT (OUTPATIENT)
Dept: CARDIOLOGY CLINIC | Facility: CLINIC | Age: 55
End: 2020-10-01
Payer: COMMERCIAL

## 2020-10-01 VITALS
SYSTOLIC BLOOD PRESSURE: 106 MMHG | OXYGEN SATURATION: 97 % | BODY MASS INDEX: 35.14 KG/M2 | HEIGHT: 65 IN | DIASTOLIC BLOOD PRESSURE: 68 MMHG | WEIGHT: 210.9 LBS | HEART RATE: 80 BPM | TEMPERATURE: 98.3 F

## 2020-10-01 DIAGNOSIS — F41.9 ANXIETY: ICD-10-CM

## 2020-10-01 DIAGNOSIS — Z72.0 TOBACCO ABUSE: ICD-10-CM

## 2020-10-01 DIAGNOSIS — R06.02 SHORTNESS OF BREATH: Primary | ICD-10-CM

## 2020-10-01 DIAGNOSIS — E66.9 OBESITY (BMI 35.0-39.9 WITHOUT COMORBIDITY): ICD-10-CM

## 2020-10-01 PROCEDURE — 99243 OFF/OP CNSLTJ NEW/EST LOW 30: CPT | Performed by: NURSE PRACTITIONER

## 2020-10-01 RX ORDER — FAMOTIDINE 20 MG/1
20 TABLET, FILM COATED ORAL AS NEEDED
COMMUNITY

## 2020-10-04 ENCOUNTER — ANESTHESIA EVENT (OUTPATIENT)
Dept: GASTROENTEROLOGY | Facility: HOSPITAL | Age: 55
End: 2020-10-04

## 2020-10-05 ENCOUNTER — ANESTHESIA (OUTPATIENT)
Dept: GASTROENTEROLOGY | Facility: HOSPITAL | Age: 55
End: 2020-10-05

## 2020-10-05 ENCOUNTER — HOSPITAL ENCOUNTER (OUTPATIENT)
Dept: NON INVASIVE DIAGNOSTICS | Facility: HOSPITAL | Age: 55
Discharge: HOME | End: 2020-10-05
Payer: COMMERCIAL

## 2020-10-05 ENCOUNTER — HOSPITAL ENCOUNTER (OUTPATIENT)
Dept: GASTROENTEROLOGY | Facility: HOSPITAL | Age: 55
Setting detail: OUTPATIENT SURGERY
Discharge: HOME | End: 2020-10-05
Attending: INTERNAL MEDICINE | Admitting: INTERNAL MEDICINE
Payer: COMMERCIAL

## 2020-10-05 VITALS
SYSTOLIC BLOOD PRESSURE: 103 MMHG | HEART RATE: 69 BPM | WEIGHT: 210 LBS | RESPIRATION RATE: 18 BRPM | TEMPERATURE: 98.3 F | HEIGHT: 65 IN | BODY MASS INDEX: 34.99 KG/M2 | DIASTOLIC BLOOD PRESSURE: 61 MMHG | OXYGEN SATURATION: 99 %

## 2020-10-05 VITALS — HEART RATE: 74 BPM

## 2020-10-05 DIAGNOSIS — R06.02 SHORTNESS OF BREATH: ICD-10-CM

## 2020-10-05 DIAGNOSIS — Z12.11 SCREEN FOR COLON CANCER: ICD-10-CM

## 2020-10-05 DIAGNOSIS — K21.9 GASTROESOPHAGEAL REFLUX DISEASE: ICD-10-CM

## 2020-10-05 PROBLEM — F17.200 SMOKING: Status: ACTIVE | Noted: 2020-07-19

## 2020-10-05 PROBLEM — IMO0001 SMOKING: Status: ACTIVE | Noted: 2020-07-19

## 2020-10-05 PROCEDURE — NC001 PR NO CHARGE: Performed by: INTERNAL MEDICINE

## 2020-10-05 PROCEDURE — 93306 TTE W/DOPPLER COMPLETE: CPT | Performed by: INTERNAL MEDICINE

## 2020-10-05 PROCEDURE — 88305 TISSUE EXAM BY PATHOLOGIST: CPT | Performed by: PATHOLOGY

## 2020-10-05 PROCEDURE — 93306 TTE W/DOPPLER COMPLETE: CPT

## 2020-10-05 PROCEDURE — 45385 COLONOSCOPY W/LESION REMOVAL: CPT | Performed by: INTERNAL MEDICINE

## 2020-10-05 PROCEDURE — 43239 EGD BIOPSY SINGLE/MULTIPLE: CPT | Performed by: INTERNAL MEDICINE

## 2020-10-05 RX ORDER — SODIUM CHLORIDE 9 MG/ML
INJECTION, SOLUTION INTRAVENOUS CONTINUOUS PRN
Status: DISCONTINUED | OUTPATIENT
Start: 2020-10-05 | End: 2020-10-05

## 2020-10-05 RX ORDER — PROPOFOL 10 MG/ML
INJECTION, EMULSION INTRAVENOUS AS NEEDED
Status: DISCONTINUED | OUTPATIENT
Start: 2020-10-05 | End: 2020-10-05

## 2020-10-05 RX ORDER — KETAMINE HYDROCHLORIDE 50 MG/ML
INJECTION, SOLUTION, CONCENTRATE INTRAMUSCULAR; INTRAVENOUS AS NEEDED
Status: DISCONTINUED | OUTPATIENT
Start: 2020-10-05 | End: 2020-10-05

## 2020-10-05 RX ORDER — PROPOFOL 10 MG/ML
INJECTION, EMULSION INTRAVENOUS CONTINUOUS PRN
Status: DISCONTINUED | OUTPATIENT
Start: 2020-10-05 | End: 2020-10-05

## 2020-10-05 RX ADMIN — SODIUM CHLORIDE: 0.9 INJECTION, SOLUTION INTRAVENOUS at 12:38

## 2020-10-05 RX ADMIN — KETAMINE HYDROCHLORIDE 20 MG: 50 INJECTION, SOLUTION INTRAMUSCULAR; INTRAVENOUS at 12:42

## 2020-10-05 RX ADMIN — PROPOFOL 110 MCG/KG/MIN: 10 INJECTION, EMULSION INTRAVENOUS at 12:41

## 2020-10-05 RX ADMIN — PROPOFOL 50 MG: 10 INJECTION, EMULSION INTRAVENOUS at 12:52

## 2020-10-05 RX ADMIN — PROPOFOL 50 MG: 10 INJECTION, EMULSION INTRAVENOUS at 12:43

## 2020-10-05 RX ADMIN — KETAMINE HYDROCHLORIDE 10 MG: 50 INJECTION, SOLUTION INTRAMUSCULAR; INTRAVENOUS at 12:44

## 2020-10-06 ENCOUNTER — TELEPHONE (OUTPATIENT)
Dept: CARDIOLOGY CLINIC | Facility: CLINIC | Age: 55
End: 2020-10-06

## 2020-10-06 ENCOUNTER — TELEPHONE (OUTPATIENT)
Dept: PAIN MEDICINE | Facility: CLINIC | Age: 55
End: 2020-10-06

## 2020-10-13 ENCOUNTER — TELEPHONE (OUTPATIENT)
Dept: PAIN MEDICINE | Facility: CLINIC | Age: 55
End: 2020-10-13

## 2020-10-15 ENCOUNTER — TELEPHONE (OUTPATIENT)
Dept: GASTROENTEROLOGY | Facility: CLINIC | Age: 55
End: 2020-10-15

## 2020-10-15 DIAGNOSIS — K21.9 GASTROESOPHAGEAL REFLUX DISEASE: ICD-10-CM

## 2020-10-15 RX ORDER — PANTOPRAZOLE SODIUM 40 MG/1
40 TABLET, DELAYED RELEASE ORAL DAILY
Qty: 30 TABLET | Refills: 3 | Status: SHIPPED | OUTPATIENT
Start: 2020-10-15 | End: 2021-03-01 | Stop reason: SDUPTHER

## 2020-10-16 ENCOUNTER — OFFICE VISIT (OUTPATIENT)
Dept: PAIN MEDICINE | Facility: CLINIC | Age: 55
End: 2020-10-16
Payer: COMMERCIAL

## 2020-10-16 VITALS
HEIGHT: 65 IN | BODY MASS INDEX: 34.99 KG/M2 | DIASTOLIC BLOOD PRESSURE: 65 MMHG | SYSTOLIC BLOOD PRESSURE: 110 MMHG | TEMPERATURE: 98.2 F | WEIGHT: 210 LBS | HEART RATE: 79 BPM

## 2020-10-16 DIAGNOSIS — M48.062 SPINAL STENOSIS OF LUMBAR REGION WITH NEUROGENIC CLAUDICATION: ICD-10-CM

## 2020-10-16 DIAGNOSIS — M51.26 LUMBAR DISC HERNIATION: ICD-10-CM

## 2020-10-16 DIAGNOSIS — M47.816 LUMBAR SPONDYLOSIS: ICD-10-CM

## 2020-10-16 DIAGNOSIS — M54.16 LUMBAR RADICULOPATHY: Primary | ICD-10-CM

## 2020-10-16 PROCEDURE — 4004F PT TOBACCO SCREEN RCVD TLK: CPT | Performed by: NURSE PRACTITIONER

## 2020-10-16 PROCEDURE — 99214 OFFICE O/P EST MOD 30 MIN: CPT | Performed by: NURSE PRACTITIONER

## 2020-11-09 ENCOUNTER — OFFICE VISIT (OUTPATIENT)
Dept: OBGYN CLINIC | Facility: HOSPITAL | Age: 55
End: 2020-11-09
Payer: COMMERCIAL

## 2020-11-09 ENCOUNTER — TELEPHONE (OUTPATIENT)
Dept: GASTROENTEROLOGY | Facility: CLINIC | Age: 55
End: 2020-11-09

## 2020-11-09 VITALS — WEIGHT: 222 LBS | HEIGHT: 65 IN | BODY MASS INDEX: 36.99 KG/M2

## 2020-11-09 DIAGNOSIS — M54.41 CHRONIC BILATERAL LOW BACK PAIN WITH BILATERAL SCIATICA: Primary | ICD-10-CM

## 2020-11-09 DIAGNOSIS — M54.42 CHRONIC BILATERAL LOW BACK PAIN WITH BILATERAL SCIATICA: Primary | ICD-10-CM

## 2020-11-09 DIAGNOSIS — M54.16 LUMBAR RADICULOPATHY: ICD-10-CM

## 2020-11-09 DIAGNOSIS — M54.16 RADICULOPATHY, LUMBAR REGION: ICD-10-CM

## 2020-11-09 DIAGNOSIS — G89.29 CHRONIC BILATERAL LOW BACK PAIN WITH BILATERAL SCIATICA: Primary | ICD-10-CM

## 2020-11-09 PROCEDURE — 4004F PT TOBACCO SCREEN RCVD TLK: CPT | Performed by: ORTHOPAEDIC SURGERY

## 2020-11-09 PROCEDURE — 99214 OFFICE O/P EST MOD 30 MIN: CPT | Performed by: ORTHOPAEDIC SURGERY

## 2020-11-09 PROCEDURE — 3008F BODY MASS INDEX DOCD: CPT | Performed by: ORTHOPAEDIC SURGERY

## 2020-11-09 RX ORDER — DULOXETIN HYDROCHLORIDE 20 MG/1
20 CAPSULE, DELAYED RELEASE ORAL DAILY
COMMUNITY
End: 2021-01-13 | Stop reason: ALTCHOICE

## 2020-11-12 ENCOUNTER — TELEPHONE (OUTPATIENT)
Dept: FAMILY MEDICINE CLINIC | Facility: CLINIC | Age: 55
End: 2020-11-12

## 2020-11-12 DIAGNOSIS — M79.605 CHRONIC PAIN OF BOTH LOWER EXTREMITIES: ICD-10-CM

## 2020-11-12 DIAGNOSIS — G89.29 CHRONIC PAIN OF BOTH LOWER EXTREMITIES: ICD-10-CM

## 2020-11-12 DIAGNOSIS — M54.16 LUMBAR RADICULOPATHY: Primary | ICD-10-CM

## 2020-11-12 DIAGNOSIS — M79.604 CHRONIC PAIN OF BOTH LOWER EXTREMITIES: ICD-10-CM

## 2020-11-12 PROBLEM — G89.4 CHRONIC PAIN SYNDROME: Status: ACTIVE | Noted: 2020-11-12

## 2020-11-16 ENCOUNTER — HOSPITAL ENCOUNTER (OUTPATIENT)
Dept: NEUROLOGY | Facility: CLINIC | Age: 55
Discharge: HOME | End: 2020-11-16
Payer: COMMERCIAL

## 2020-11-16 DIAGNOSIS — M54.41 CHRONIC BILATERAL LOW BACK PAIN WITH BILATERAL SCIATICA: ICD-10-CM

## 2020-11-16 DIAGNOSIS — M54.16 RADICULOPATHY, LUMBAR REGION: ICD-10-CM

## 2020-11-16 DIAGNOSIS — G89.29 CHRONIC BILATERAL LOW BACK PAIN WITH BILATERAL SCIATICA: ICD-10-CM

## 2020-11-16 DIAGNOSIS — M54.42 CHRONIC BILATERAL LOW BACK PAIN WITH BILATERAL SCIATICA: ICD-10-CM

## 2020-11-16 PROCEDURE — 95886 MUSC TEST DONE W/N TEST COMP: CPT | Performed by: PSYCHIATRY & NEUROLOGY

## 2020-11-16 PROCEDURE — 95910 NRV CNDJ TEST 7-8 STUDIES: CPT | Performed by: PSYCHIATRY & NEUROLOGY

## 2020-12-14 ENCOUNTER — OFFICE VISIT (OUTPATIENT)
Dept: OBGYN CLINIC | Facility: HOSPITAL | Age: 55
End: 2020-12-14
Payer: COMMERCIAL

## 2020-12-14 VITALS
HEART RATE: 96 BPM | SYSTOLIC BLOOD PRESSURE: 138 MMHG | BODY MASS INDEX: 37.15 KG/M2 | DIASTOLIC BLOOD PRESSURE: 77 MMHG | WEIGHT: 223 LBS | HEIGHT: 65 IN

## 2020-12-14 DIAGNOSIS — M48.07 FORAMINAL STENOSIS OF LUMBOSACRAL REGION: ICD-10-CM

## 2020-12-14 DIAGNOSIS — M54.41 CHRONIC BILATERAL LOW BACK PAIN WITH BILATERAL SCIATICA: Primary | ICD-10-CM

## 2020-12-14 DIAGNOSIS — G89.29 CHRONIC BILATERAL LOW BACK PAIN WITH BILATERAL SCIATICA: Primary | ICD-10-CM

## 2020-12-14 DIAGNOSIS — M54.16 LUMBAR RADICULOPATHY: ICD-10-CM

## 2020-12-14 DIAGNOSIS — M43.10 RETROLISTHESIS OF VERTEBRAE: ICD-10-CM

## 2020-12-14 DIAGNOSIS — M54.42 CHRONIC BILATERAL LOW BACK PAIN WITH BILATERAL SCIATICA: Primary | ICD-10-CM

## 2020-12-14 PROCEDURE — 3008F BODY MASS INDEX DOCD: CPT | Performed by: ORTHOPAEDIC SURGERY

## 2020-12-14 PROCEDURE — 99214 OFFICE O/P EST MOD 30 MIN: CPT | Performed by: ORTHOPAEDIC SURGERY

## 2020-12-14 RX ORDER — CEFAZOLIN SODIUM 2 G/50ML
2000 SOLUTION INTRAVENOUS ONCE
Status: CANCELLED | OUTPATIENT
Start: 2020-12-14 | End: 2020-12-14

## 2020-12-14 RX ORDER — CHLORHEXIDINE GLUCONATE 0.12 MG/ML
15 RINSE ORAL ONCE
Status: CANCELLED | OUTPATIENT
Start: 2020-12-14 | End: 2020-12-14

## 2020-12-14 RX ORDER — ACETAMINOPHEN 325 MG/1
975 TABLET ORAL ONCE
Status: CANCELLED | OUTPATIENT
Start: 2020-12-14 | End: 2020-12-14

## 2020-12-14 RX ORDER — GABAPENTIN 300 MG/1
300 CAPSULE ORAL ONCE
Status: CANCELLED | OUTPATIENT
Start: 2020-12-14 | End: 2020-12-14

## 2021-01-13 ENCOUNTER — OFFICE VISIT (OUTPATIENT)
Dept: FAMILY MEDICINE CLINIC | Facility: CLINIC | Age: 56
End: 2021-01-13
Payer: COMMERCIAL

## 2021-01-13 VITALS
DIASTOLIC BLOOD PRESSURE: 78 MMHG | HEIGHT: 64 IN | WEIGHT: 227.4 LBS | TEMPERATURE: 97.9 F | HEART RATE: 100 BPM | RESPIRATION RATE: 20 BRPM | BODY MASS INDEX: 38.82 KG/M2 | SYSTOLIC BLOOD PRESSURE: 110 MMHG | OXYGEN SATURATION: 97 %

## 2021-01-13 DIAGNOSIS — K21.9 GASTROESOPHAGEAL REFLUX DISEASE, UNSPECIFIED WHETHER ESOPHAGITIS PRESENT: ICD-10-CM

## 2021-01-13 DIAGNOSIS — Z01.818 PREOP EXAMINATION: Primary | ICD-10-CM

## 2021-01-13 DIAGNOSIS — M54.16 RADICULOPATHY, LUMBAR REGION: ICD-10-CM

## 2021-01-13 DIAGNOSIS — F17.219 CIGARETTE NICOTINE DEPENDENCE WITH NICOTINE-INDUCED DISORDER: ICD-10-CM

## 2021-01-13 DIAGNOSIS — F32.A DEPRESSION, UNSPECIFIED DEPRESSION TYPE: ICD-10-CM

## 2021-01-13 PROCEDURE — 3008F BODY MASS INDEX DOCD: CPT | Performed by: FAMILY MEDICINE

## 2021-01-13 PROCEDURE — 99244 OFF/OP CNSLTJ NEW/EST MOD 40: CPT | Performed by: FAMILY MEDICINE

## 2021-01-13 PROCEDURE — 4004F PT TOBACCO SCREEN RCVD TLK: CPT | Performed by: FAMILY MEDICINE

## 2021-01-13 RX ORDER — NICOTINE 21 MG/24HR
1 PATCH, TRANSDERMAL 24 HOURS TRANSDERMAL EVERY 24 HOURS
Qty: 28 PATCH | Refills: 0 | Status: SHIPPED | OUTPATIENT
Start: 2021-01-13

## 2021-01-13 NOTE — LETTER
January 21, 2021     Destiny Juan MD  [address] [address]    Patient: Bryan Humphries   YOB: 1965   Date of Visit: 1/13/2021       Dear Dr Mayra Hogan: Thank you for referring Africa Angela to me for evaluation  Below are my notes for this consultation  If you have questions, please do not hesitate to call me  I look forward to following your patient along with you  Sincerely,        Chelo Hanson MD        CC: No Recipients  Chelo Hanson MD  1/21/2021  9:05 AM  Addendum  Chief Complaint   Patient presents with    Pre-op Clearance     Anterior lumbar interbody fusion L5-S1 surgery scheduled 02/02/21 with Dr Jennie Toussaint  Subjective:     Bryan Humphries is a 63 y o  female who presents to the office today for a preoperative consultation at the request of surgeon Dr Mayra Hogan who plans on performing anterior lumbar interbody fusion L5-S1 on 2/2/2021  This consultation is requested for the specific conditions prompting preoperative evaluation (i e  because of potential affect on operative risk): preop  Planned anesthesia: general  The patient has the following known anesthesia issues: no problem  Patients bleeding risk: no recent abnormal bleeding  Patient does not have objections to receiving blood products if needed  Depression---for years  FU psychiatrist [facility] Q month and therapist Q week  She is on abilify 5mg QD, lexapro 20mg QD and trazodone for insomnia  Mood is ok now  Denies HI/SI  Stressful life  Taking care of her mother-in-law who is 80years old with dementia       GERD for years---Felt reflux symptoms constant  Denies nausea, vomiting or abdominal pain  FU GI  She is on pantoprazole 40mg QD and pepcid as needed  Had EGD/colonoscopy in 2020       Smoke 1ppd for 30 years  Would like to try nicotine patch  No alcohol  No drugs      Refused flu shot or pneumovax today         The following portions of the patient's history were reviewed and updated as appropriate: allergies, current medications, past family history, past medical history, past social history, past surgical history and problem list     Review of Systems  Pertinent items are noted in HPI  Objective:     /78 (BP Location: Left arm, Patient Position: Sitting, Cuff Size: Large)   Pulse 100   Temp 97 9 °F (36 6 °C) (Temporal)   Resp 20   Ht 5' 4" (1 626 m)   Wt 103 kg (227 lb 6 4 oz)   SpO2 97%   BMI 39 03 kg/m²     General Appearance:    Alert, cooperative, no distress, appears stated age   Head:    Normocephalic, without obvious abnormality, atraumatic   Eyes:    PERRL, conjunctiva/corneas clear, EOM's intact, fundi     benign, both eyes               Neck:   Supple, symmetrical, trachea midline, no adenopathy;     thyroid:  no enlargement/tenderness/nodules; no carotid    bruit or JVD       Lungs:     Clear to auscultation bilaterally, respirations unlabored        Heart:    Regular rate and rhythm, S1 and S2 normal, no murmur, rub   or gallop       Abdomen:     Soft, non-tender, bowel sounds active all four quadrants,     no masses, no organomegaly           Extremities:   Extremities normal, atraumatic, no cyanosis or edema                       Predictors of intubation difficulty:   Morbid obesity? no     Cardiographics  Will do EKG in cardiology office on 1/19/2021  Imaging  Chest x-ray: Will do it tomorrow per pt  Lab Review   Will do it tomorrow per pt  Assessment:     63 y o  female with planned surgery as above  Known risk factors for perioperative complications: None    Difficulty with intubation is not anticipated  Cardiac Risk Estimation: Wait for labs, xray and EKG    Current medications which may produce withdrawal symptoms if withheld perioperatively: no     Plan:     1  Preoperative workup as follows chest x-ray, ECG, hemoglobin, creatinine, liver function studies    2  Change in medication regimen before surgery: none, continue medication regimen including morning of surgery, with sip of water  3  Prophylaxis for cardiac events with perioperative beta-blockers: not indicated  4  Invasive hemodynamic monitoring perioperatively: not indicated  5  Deep vein thrombosis prophylaxis postoperatively:regimen to be chosen by surgical team     Addendum  Reviewed labs, Xray and EKG which are normal    Pt is cleared for back surgery

## 2021-01-13 NOTE — H&P (VIEW-ONLY)
Chief Complaint   Patient presents with    Pre-op Clearance     Anterior lumbar interbody fusion L5-S1 surgery scheduled 02/02/21 with Dr Camila Adams  Subjective:     Keiko Cuevas is a 63 y o  female who presents to the office today for a preoperative consultation at the request of surgeon Dr Dbe Douglas who plans on performing anterior lumbar interbody fusion L5-S1 on 2/2/2021  This consultation is requested for the specific conditions prompting preoperative evaluation (i e  because of potential affect on operative risk): preop  Planned anesthesia: general  The patient has the following known anesthesia issues: no problem  Patients bleeding risk: no recent abnormal bleeding  Patient does not have objections to receiving blood products if needed  Depression---for years  FU psychiatrist [employer] [facility] Q month and therapist Q week  She is on abilify 5mg QD, lexapro 20mg QD and trazodone for insomnia  Mood is ok now  Denies HI/SI  Stressful life  Taking care of her mother-in-law who is 80years old with dementia       GERD for years---Felt reflux symptoms constant  Denies nausea, vomiting or abdominal pain  FU GI  She is on pantoprazole 40mg QD and pepcid as needed  Had EGD/colonoscopy in 2020       Smoke 1ppd for 30 years  Would like to try nicotine patch  No alcohol  No drugs      Refused flu shot or pneumovax today  The following portions of the patient's history were reviewed and updated as appropriate: allergies, current medications, past family history, past medical history, past social history, past surgical history and problem list     Review of Systems  Pertinent items are noted in HPI       Objective:     /78 (BP Location: Left arm, Patient Position: Sitting, Cuff Size: Large)   Pulse 100   Temp 97 9 °F (36 6 °C) (Temporal)   Resp 20   Ht 5' 4" (1 626 m)   Wt 103 kg (227 lb 6 4 oz)   SpO2 97%   BMI 39 03 kg/m²     General Appearance:    Alert, cooperative, no distress, appears stated age   Head:    Normocephalic, without obvious abnormality, atraumatic   Eyes:    PERRL, conjunctiva/corneas clear, EOM's intact, fundi     benign, both eyes               Neck:   Supple, symmetrical, trachea midline, no adenopathy;     thyroid:  no enlargement/tenderness/nodules; no carotid    bruit or JVD       Lungs:     Clear to auscultation bilaterally, respirations unlabored        Heart:    Regular rate and rhythm, S1 and S2 normal, no murmur, rub   or gallop       Abdomen:     Soft, non-tender, bowel sounds active all four quadrants,     no masses, no organomegaly           Extremities:   Extremities normal, atraumatic, no cyanosis or edema                       Predictors of intubation difficulty:   Morbid obesity? no     Cardiographics  Will do EKG in cardiology office on 1/19/2021  Imaging  Chest x-ray: Will do it tomorrow per pt  Lab Review   Will do it tomorrow per pt  Assessment:     63 y o  female with planned surgery as above  Known risk factors for perioperative complications: None    Difficulty with intubation is not anticipated  Cardiac Risk Estimation: Wait for labs, xray and EKG    Current medications which may produce withdrawal symptoms if withheld perioperatively: no     Plan:     1  Preoperative workup as follows chest x-ray, ECG, hemoglobin, creatinine, liver function studies  2  Change in medication regimen before surgery: none, continue medication regimen including morning of surgery, with sip of water  3  Prophylaxis for cardiac events with perioperative beta-blockers: not indicated  4  Invasive hemodynamic monitoring perioperatively: not indicated  5  Deep vein thrombosis prophylaxis postoperatively:regimen to be chosen by surgical team     Addendum  Reviewed labs, Xray and EKG which are normal    Pt is cleared for back surgery

## 2021-01-13 NOTE — PROGRESS NOTES
Chief Complaint   Patient presents with    Pre-op Clearance     Anterior lumbar interbody fusion L5-S1 surgery scheduled 02/02/21 with Dr David Vines  Subjective:     Parrish Lawson is a 63 y o  female who presents to the office today for a preoperative consultation at the request of surgeon Dr Shoaib Hankins who plans on performing anterior lumbar interbody fusion L5-S1 on 2/2/2021  This consultation is requested for the specific conditions prompting preoperative evaluation (i e  because of potential affect on operative risk): preop  Planned anesthesia: general  The patient has the following known anesthesia issues: no problem  Patients bleeding risk: no recent abnormal bleeding  Patient does not have objections to receiving blood products if needed  Depression---for years  FU psychiatrist [employer] [facility] Q month and therapist Q week  She is on abilify 5mg QD, lexapro 20mg QD and trazodone for insomnia  Mood is ok now  Denies HI/SI  Stressful life  Taking care of her mother-in-law who is 80years old with dementia       GERD for years---Felt reflux symptoms constant  Denies nausea, vomiting or abdominal pain  FU GI  She is on pantoprazole 40mg QD and pepcid as needed  Had EGD/colonoscopy in 2020       Smoke 1ppd for 30 years  Would like to try nicotine patch  No alcohol  No drugs      Refused flu shot or pneumovax today  The following portions of the patient's history were reviewed and updated as appropriate: allergies, current medications, past family history, past medical history, past social history, past surgical history and problem list     Review of Systems  Pertinent items are noted in HPI       Objective:     /78 (BP Location: Left arm, Patient Position: Sitting, Cuff Size: Large)   Pulse 100   Temp 97 9 °F (36 6 °C) (Temporal)   Resp 20   Ht 5' 4" (1 626 m)   Wt 103 kg (227 lb 6 4 oz)   SpO2 97%   BMI 39 03 kg/m²     General Appearance:    Alert, cooperative, no distress, appears stated age   Head:    Normocephalic, without obvious abnormality, atraumatic   Eyes:    PERRL, conjunctiva/corneas clear, EOM's intact, fundi     benign, both eyes               Neck:   Supple, symmetrical, trachea midline, no adenopathy;     thyroid:  no enlargement/tenderness/nodules; no carotid    bruit or JVD       Lungs:     Clear to auscultation bilaterally, respirations unlabored        Heart:    Regular rate and rhythm, S1 and S2 normal, no murmur, rub   or gallop       Abdomen:     Soft, non-tender, bowel sounds active all four quadrants,     no masses, no organomegaly           Extremities:   Extremities normal, atraumatic, no cyanosis or edema                       Predictors of intubation difficulty:   Morbid obesity? no     Cardiographics  Will do EKG in cardiology office on 1/19/2021  Imaging  Chest x-ray: Will do it tomorrow per pt  Lab Review   Will do it tomorrow per pt  Assessment:     63 y o  female with planned surgery as above  Known risk factors for perioperative complications: None    Difficulty with intubation is not anticipated  Cardiac Risk Estimation: Wait for labs, xray and EKG    Current medications which may produce withdrawal symptoms if withheld perioperatively: no     Plan:     1  Preoperative workup as follows chest x-ray, ECG, hemoglobin, creatinine, liver function studies  2  Change in medication regimen before surgery: none, continue medication regimen including morning of surgery, with sip of water  3  Prophylaxis for cardiac events with perioperative beta-blockers: not indicated  4  Invasive hemodynamic monitoring perioperatively: not indicated  5  Deep vein thrombosis prophylaxis postoperatively:regimen to be chosen by surgical team     Addendum  Reviewed labs, Xray and EKG which are normal    Pt is cleared for back surgery

## 2021-01-18 ENCOUNTER — OFFICE VISIT (OUTPATIENT)
Dept: LAB | Facility: HOSPITAL | Age: 56
DRG: 304 | End: 2021-01-18
Attending: ORTHOPAEDIC SURGERY
Payer: COMMERCIAL

## 2021-01-18 ENCOUNTER — HOSPITAL ENCOUNTER (OUTPATIENT)
Dept: RADIOLOGY | Facility: HOSPITAL | Age: 56
Discharge: HOME | End: 2021-01-18
Payer: COMMERCIAL

## 2021-01-18 DIAGNOSIS — G89.29 CHRONIC BILATERAL LOW BACK PAIN WITH BILATERAL SCIATICA: ICD-10-CM

## 2021-01-18 DIAGNOSIS — Z13.220 SCREENING FOR HYPERLIPIDEMIA: ICD-10-CM

## 2021-01-18 DIAGNOSIS — M54.16 LUMBAR RADICULOPATHY: ICD-10-CM

## 2021-01-18 DIAGNOSIS — Z13.29 SCREENING FOR THYROID DISORDER: ICD-10-CM

## 2021-01-18 DIAGNOSIS — Z11.59 NEED FOR HEPATITIS C SCREENING TEST: ICD-10-CM

## 2021-01-18 DIAGNOSIS — M54.42 CHRONIC BILATERAL LOW BACK PAIN WITH BILATERAL SCIATICA: ICD-10-CM

## 2021-01-18 DIAGNOSIS — M54.41 CHRONIC BILATERAL LOW BACK PAIN WITH BILATERAL SCIATICA: ICD-10-CM

## 2021-01-18 DIAGNOSIS — M43.10 RETROLISTHESIS OF VERTEBRAE: ICD-10-CM

## 2021-01-18 DIAGNOSIS — M48.07 FORAMINAL STENOSIS OF LUMBOSACRAL REGION: ICD-10-CM

## 2021-01-18 LAB
ABO GROUP BLD: NORMAL
ALBUMIN SERPL BCP-MCNC: 3.5 G/DL (ref 3.5–5)
ALP SERPL-CCNC: 66 U/L (ref 46–116)
ALT SERPL W P-5'-P-CCNC: 20 U/L (ref 12–78)
ANION GAP SERPL CALCULATED.3IONS-SCNC: 5 MMOL/L (ref 4–13)
APTT PPP: 30 SECONDS (ref 23–37)
AST SERPL W P-5'-P-CCNC: 17 U/L (ref 5–45)
ATRIAL RATE: 79 BPM
BACTERIA UR QL AUTO: ABNORMAL /HPF
BILIRUB SERPL-MCNC: 0.39 MG/DL (ref 0.2–1)
BILIRUB UR QL STRIP: NEGATIVE
BLD GP AB SCN SERPL QL: NEGATIVE
BUN SERPL-MCNC: 15 MG/DL (ref 5–25)
CALCIUM SERPL-MCNC: 9 MG/DL (ref 8.3–10.1)
CHLORIDE SERPL-SCNC: 110 MMOL/L (ref 100–108)
CHOLEST SERPL-MCNC: 162 MG/DL (ref 50–200)
CLARITY UR: ABNORMAL
CO2 SERPL-SCNC: 28 MMOL/L (ref 21–32)
COLOR UR: YELLOW
CREAT SERPL-MCNC: 0.82 MG/DL (ref 0.6–1.3)
ERYTHROCYTE [DISTWIDTH] IN BLOOD BY AUTOMATED COUNT: 12.8 % (ref 11.6–15.1)
EST. AVERAGE GLUCOSE BLD GHB EST-MCNC: 111 MG/DL
GFR SERPL CREATININE-BSD FRML MDRD: 80 ML/MIN/1.73SQ M
GLUCOSE P FAST SERPL-MCNC: 101 MG/DL (ref 65–99)
GLUCOSE UR STRIP-MCNC: NEGATIVE MG/DL
HBA1C MFR BLD: 5.5 %
HCT VFR BLD AUTO: 42.7 % (ref 34.8–46.1)
HCV AB SER QL: NORMAL
HDLC SERPL-MCNC: 39 MG/DL
HGB BLD-MCNC: 13.4 G/DL (ref 11.5–15.4)
HGB UR QL STRIP.AUTO: ABNORMAL
INR PPP: 0.91 (ref 0.84–1.19)
KETONES UR STRIP-MCNC: NEGATIVE MG/DL
LDLC SERPL CALC-MCNC: 89 MG/DL (ref 0–100)
LEUKOCYTE ESTERASE UR QL STRIP: NEGATIVE
MCH RBC QN AUTO: 31.1 PG (ref 26.8–34.3)
MCHC RBC AUTO-ENTMCNC: 31.4 G/DL (ref 31.4–37.4)
MCV RBC AUTO: 99 FL (ref 82–98)
MUCOUS THREADS UR QL AUTO: ABNORMAL
NITRITE UR QL STRIP: NEGATIVE
NON-SQ EPI CELLS URNS QL MICRO: ABNORMAL /HPF
P AXIS: 65 DEGREES
PH UR STRIP.AUTO: 6 [PH]
PLATELET # BLD AUTO: 261 THOUSANDS/UL (ref 149–390)
PMV BLD AUTO: 10.3 FL (ref 8.9–12.7)
POTASSIUM SERPL-SCNC: 4.1 MMOL/L (ref 3.5–5.3)
PR INTERVAL: 148 MS
PROT SERPL-MCNC: 7 G/DL (ref 6.4–8.2)
PROT UR STRIP-MCNC: NEGATIVE MG/DL
PROTHROMBIN TIME: 12.2 SECONDS (ref 11.6–14.5)
QRS AXIS: 70 DEGREES
QRSD INTERVAL: 84 MS
QT INTERVAL: 382 MS
QTC INTERVAL: 438 MS
RBC # BLD AUTO: 4.31 MILLION/UL (ref 3.81–5.12)
RBC #/AREA URNS AUTO: ABNORMAL /HPF
RH BLD: POSITIVE
SODIUM SERPL-SCNC: 143 MMOL/L (ref 136–145)
SP GR UR STRIP.AUTO: 1.02 (ref 1–1.03)
SPECIMEN EXPIRATION DATE: NORMAL
T WAVE AXIS: 77 DEGREES
TRIGL SERPL-MCNC: 172 MG/DL
TSH SERPL DL<=0.05 MIU/L-ACNC: 2.29 UIU/ML (ref 0.36–3.74)
UROBILINOGEN UR QL STRIP.AUTO: 0.2 E.U./DL
VENTRICULAR RATE: 79 BPM
WBC # BLD AUTO: 6.63 THOUSAND/UL (ref 4.31–10.16)
WBC #/AREA URNS AUTO: ABNORMAL /HPF

## 2021-01-18 PROCEDURE — 86850 RBC ANTIBODY SCREEN: CPT

## 2021-01-18 PROCEDURE — 93010 ELECTROCARDIOGRAM REPORT: CPT | Performed by: INTERNAL MEDICINE

## 2021-01-18 PROCEDURE — 80061 LIPID PANEL: CPT

## 2021-01-18 PROCEDURE — 81001 URINALYSIS AUTO W/SCOPE: CPT | Performed by: ORTHOPAEDIC SURGERY

## 2021-01-18 PROCEDURE — 86900 BLOOD TYPING SEROLOGIC ABO: CPT

## 2021-01-18 PROCEDURE — 85610 PROTHROMBIN TIME: CPT

## 2021-01-18 PROCEDURE — 86920 COMPATIBILITY TEST SPIN: CPT

## 2021-01-18 PROCEDURE — 85730 THROMBOPLASTIN TIME PARTIAL: CPT

## 2021-01-18 PROCEDURE — 84443 ASSAY THYROID STIM HORMONE: CPT

## 2021-01-18 PROCEDURE — 93005 ELECTROCARDIOGRAM TRACING: CPT

## 2021-01-18 PROCEDURE — 85027 COMPLETE CBC AUTOMATED: CPT

## 2021-01-18 PROCEDURE — 83036 HEMOGLOBIN GLYCOSYLATED A1C: CPT

## 2021-01-18 PROCEDURE — 80053 COMPREHEN METABOLIC PANEL: CPT

## 2021-01-18 PROCEDURE — 36415 COLL VENOUS BLD VENIPUNCTURE: CPT

## 2021-01-18 PROCEDURE — 86803 HEPATITIS C AB TEST: CPT

## 2021-01-18 PROCEDURE — 71046 X-RAY EXAM CHEST 2 VIEWS: CPT

## 2021-01-18 PROCEDURE — 86901 BLOOD TYPING SEROLOGIC RH(D): CPT

## 2021-01-22 ENCOUNTER — ANESTHESIA EVENT (OUTPATIENT)
Dept: PERIOP | Facility: HOSPITAL | Age: 56
DRG: 304 | End: 2021-01-22
Payer: COMMERCIAL

## 2021-01-22 RX ORDER — DULOXETIN HYDROCHLORIDE 60 MG/1
120 CAPSULE, DELAYED RELEASE ORAL DAILY
Status: ON HOLD | COMMUNITY
End: 2021-04-29 | Stop reason: SDUPTHER

## 2021-01-22 RX ORDER — IBUPROFEN 200 MG
TABLET ORAL EVERY 6 HOURS PRN
COMMUNITY
End: 2021-02-06 | Stop reason: HOSPADM

## 2021-01-22 NOTE — PRE-PROCEDURE INSTRUCTIONS
Pre-Surgery Instructions:   Medication Instructions    acetaminophen (TYLENOL) 500 mg tablet PRN    ARIPiprazole (ABILIFY) 2 mg tablet take am DOS with small sip of water    DULoxetine (CYMBALTA) 60 mg delayed release capsule take am DOS with small sip of water    famotidine (PEPCID) 20 mg tablet PRN    ibuprofen (MOTRIN) 200 mg tablet PRN    nicotine (NICODERM CQ) 21 mg/24 hr TD 24 hr patch continue as prescribed    pantoprazole (PROTONIX) 40 mg tablet take am DOS with small sip of water    traZODone (DESYREL) 50 mg tablet takes @ hs      Spoke to pt  Med list reviewed & instructed as above   NPO after midnight  Avoid NSAIDs 7 days prior, tylenol only  Showering instructions provided by surgeon office, reviewed @ time of call   Negative COVID screening, 1/26 [address]   Reviewed visitation policy   Smoking 4 40 ppd  Interested in cessation consultation  Using nicotine patch  Instructed no smoking am DOS  All instructions verbally understood by patient  All questions answered

## 2021-01-24 NOTE — ANESTHESIA PREPROCEDURE EVALUATION
Procedure:  LAPAROTOMY FOR SPINAL SURGERY (N/A Abdomen)  Anterior lumbar interbody fusion L5-S1; Lumbar laminectomy decompression instrumented fusion L5-S1 with allograft and neuromonitoring (N/A Spine Lumbar)    Relevant Problems   GI/HEPATIC   (+) Gastroesophageal reflux disease      MUSCULOSKELETAL   (+) Lower back pain   (+) Lumbar spondylosis   (+) Sacroiliitis (HCC)      NEURO/PSYCH   (+) Anxiety   (+) Chronic pain syndrome   (+) Depression   (+) MDD (major depressive disorder), recurrent severe, without psychosis ([facility] [address] )      PULMONARY   (+) Smoking      Other   (+) Cigarette nicotine dependence with nicotine-induced disorder   (+) Family history of brain aneurysm   (+) Greater trochanteric bursitis of left hip   (+) Labral tear of hip, degenerative   (+) Lumbar disc herniation   (+) Radiculopathy, lumbar region      Pain doc: maureen  Pain regimen: tylenol, motrin  Psych meds: abilify, cymbalta  Hx opioid/meth abuse (last taken 7/2020)  Tylenol and gabapentin ordered preop by surgeon  Consider ketamine/precedex intraop  Consider bilateral TAP block for postop pain control    Physical Exam    Airway    Mallampati score: II  TM Distance: >3 FB  Neck ROM: full     Dental   Comment: edentulous,     Cardiovascular  Rhythm: regular, Rate: normal,     Pulmonary  Breath sounds clear to auscultation,     Other Findings        Anesthesia Plan  ASA Score- 3     Anesthesia Type- general with ASA Monitors  Additional Monitors: arterial line  Airway Plan: ETT  Plan Factors-Exercise tolerance (METS): >4 METS  Chart reviewed  EKG reviewed  Imaging results reviewed  Existing labs reviewed  Patient summary reviewed  Patient is a current smoker  Patient instructed to abstain from smoking on day of procedure  Patient smoked on day of surgery  Obstructive sleep apnea risk education given perioperatively  Induction- intravenous  Postoperative Plan- Plan for postoperative opioid use       Informed Consent- Anesthetic plan and risks discussed with patient  I personally reviewed this patient with the CRNA  Discussed and agreed on the Anesthesia Plan with the CRNA             Lab Results   Component Value Date    WBC 6 63 01/18/2021    HGB 13 4 01/18/2021    HCT 42 7 01/18/2021    MCV 99 (H) 01/18/2021     01/18/2021     Lab Results   Component Value Date    GLUCOSE 85 07/27/2015    CALCIUM 9 0 01/18/2021     07/27/2015    K 4 1 01/18/2021    CO2 28 01/18/2021     (H) 01/18/2021    BUN 15 01/18/2021    CREATININE 0 82 01/18/2021     Lab Results   Component Value Date    INR 0 91 01/18/2021    PROTIME 12 2 01/18/2021     Lab Results   Component Value Date    PTT 30 01/18/2021       Type and Screen:  A

## 2021-01-26 ENCOUNTER — TELEPHONE (OUTPATIENT)
Dept: OBGYN CLINIC | Facility: HOSPITAL | Age: 56
End: 2021-01-26

## 2021-01-26 ENCOUNTER — PREP FOR PROCEDURE (OUTPATIENT)
Dept: OBGYN CLINIC | Facility: HOSPITAL | Age: 56
End: 2021-01-26

## 2021-01-26 DIAGNOSIS — Z11.59 SCREENING FOR VIRAL DISEASE: ICD-10-CM

## 2021-01-26 DIAGNOSIS — M54.41 CHRONIC BILATERAL LOW BACK PAIN WITH BILATERAL SCIATICA: ICD-10-CM

## 2021-01-26 DIAGNOSIS — M48.07 FORAMINAL STENOSIS OF LUMBOSACRAL REGION: Primary | ICD-10-CM

## 2021-01-26 DIAGNOSIS — M48.07 FORAMINAL STENOSIS OF LUMBOSACRAL REGION: ICD-10-CM

## 2021-01-26 DIAGNOSIS — M54.42 CHRONIC BILATERAL LOW BACK PAIN WITH BILATERAL SCIATICA: ICD-10-CM

## 2021-01-26 DIAGNOSIS — G89.29 CHRONIC BILATERAL LOW BACK PAIN WITH BILATERAL SCIATICA: ICD-10-CM

## 2021-01-26 DIAGNOSIS — M54.16 LUMBAR RADICULOPATHY: ICD-10-CM

## 2021-01-26 DIAGNOSIS — M43.10 RETROLISTHESIS OF VERTEBRAE: ICD-10-CM

## 2021-01-26 PROCEDURE — U0003 INFECTIOUS AGENT DETECTION BY NUCLEIC ACID (DNA OR RNA); SEVERE ACUTE RESPIRATORY SYNDROME CORONAVIRUS 2 (SARS-COV-2) (CORONAVIRUS DISEASE [COVID-19]), AMPLIFIED PROBE TECHNIQUE, MAKING USE OF HIGH THROUGHPUT TECHNOLOGIES AS DESCRIBED BY CMS-2020-01-R: HCPCS

## 2021-01-26 PROCEDURE — U0005 INFEC AGEN DETEC AMPLI PROBE: HCPCS

## 2021-01-26 NOTE — TELEPHONE ENCOUNTER
Patient sees Dr Anish Sheldon  Patient is calling because she is at [facility] to have her covid vaccine and they advised her the order was   Warm transferred patient to surgery coordinator

## 2021-01-28 LAB — SARS-COV-2 RNA RESP QL NAA+PROBE: NEGATIVE

## 2021-01-29 ENCOUNTER — TELEPHONE (OUTPATIENT)
Dept: OBGYN CLINIC | Facility: HOSPITAL | Age: 56
End: 2021-01-29

## 2021-01-29 NOTE — TELEPHONE ENCOUNTER
Patient sees Dr Collette Campos from [facility] was making a courtesy call for [facility]  She states the patient was denied of a bone growth stimulator  She states there is no record of spondylolisthesis or spinal instability  She mentioned the physician has a right to do a peer to peer review  It must be set up within 3 business days  In order to set this up, call [phone] with ref # [id number]

## 2021-02-02 ENCOUNTER — HOSPITAL ENCOUNTER (INPATIENT)
Facility: HOSPITAL | Age: 56
LOS: 4 days | Discharge: HOME | DRG: 304 | End: 2021-02-06
Attending: SURGERY | Admitting: ORTHOPAEDIC SURGERY
Payer: COMMERCIAL

## 2021-02-02 ENCOUNTER — ANESTHESIA (OUTPATIENT)
Dept: PERIOP | Facility: HOSPITAL | Age: 56
DRG: 304 | End: 2021-02-02
Payer: COMMERCIAL

## 2021-02-02 ENCOUNTER — APPOINTMENT (OUTPATIENT)
Dept: RADIOLOGY | Facility: HOSPITAL | Age: 56
DRG: 304 | End: 2021-02-02
Payer: COMMERCIAL

## 2021-02-02 VITALS — HEART RATE: 55 BPM

## 2021-02-02 DIAGNOSIS — Z98.1 S/P LUMBAR FUSION: ICD-10-CM

## 2021-02-02 DIAGNOSIS — F32.A DEPRESSION, UNSPECIFIED DEPRESSION TYPE: ICD-10-CM

## 2021-02-02 DIAGNOSIS — T65.291A TOXIC EFFECT OF TOBACCO AND NICOTINE, ACCIDENTAL OR UNINTENTIONAL, INITIAL ENCOUNTER: Primary | ICD-10-CM

## 2021-02-02 DIAGNOSIS — K21.9 GASTROESOPHAGEAL REFLUX DISEASE, UNSPECIFIED WHETHER ESOPHAGITIS PRESENT: ICD-10-CM

## 2021-02-02 DIAGNOSIS — F33.2 MDD (MAJOR DEPRESSIVE DISORDER), RECURRENT SEVERE, WITHOUT PSYCHOSIS (HCC): ICD-10-CM

## 2021-02-02 DIAGNOSIS — F17.219 CIGARETTE NICOTINE DEPENDENCE WITH NICOTINE-INDUCED DISORDER: ICD-10-CM

## 2021-02-02 LAB
ABO GROUP BLD: NORMAL
GLUCOSE SERPL-MCNC: 144 MG/DL (ref 65–140)
RH BLD: POSITIVE

## 2021-02-02 PROCEDURE — C1713 ANCHOR/SCREW BN/BN,TIS/BN: HCPCS | Performed by: SURGERY

## 2021-02-02 PROCEDURE — 63047 LAM FACETEC & FORAMOT LUMBAR: CPT | Performed by: ORTHOPAEDIC SURGERY

## 2021-02-02 PROCEDURE — 22558 ARTHRD ANT NTRBD MIN DSC LUM: CPT | Performed by: ORTHOPAEDIC SURGERY

## 2021-02-02 PROCEDURE — 82948 REAGENT STRIP/BLOOD GLUCOSE: CPT

## 2021-02-02 PROCEDURE — 20936 SP BONE AGRFT LOCAL ADD-ON: CPT | Performed by: ORTHOPAEDIC SURGERY

## 2021-02-02 PROCEDURE — 0SG3071 FUSION OF LUMBOSACRAL JOINT WITH AUTOLOGOUS TISSUE SUBSTITUTE, POSTERIOR APPROACH, POSTERIOR COLUMN, OPEN APPROACH: ICD-10-PCS | Performed by: ORTHOPAEDIC SURGERY

## 2021-02-02 PROCEDURE — 20930 SP BONE ALGRFT MORSEL ADD-ON: CPT | Performed by: ORTHOPAEDIC SURGERY

## 2021-02-02 PROCEDURE — 22558 ARTHRD ANT NTRBD MIN DSC LUM: CPT | Performed by: SURGERY

## 2021-02-02 PROCEDURE — 22612 ARTHRD PST TQ 1NTRSPC LUMBAR: CPT | Performed by: ORTHOPAEDIC SURGERY

## 2021-02-02 PROCEDURE — 63048 LAM FACETEC &FORAMOT EA ADDL: CPT | Performed by: ORTHOPAEDIC SURGERY

## 2021-02-02 PROCEDURE — 22842 INSERT SPINE FIXATION DEVICE: CPT | Performed by: ORTHOPAEDIC SURGERY

## 2021-02-02 PROCEDURE — 22853 INSJ BIOMECHANICAL DEVICE: CPT | Performed by: ORTHOPAEDIC SURGERY

## 2021-02-02 PROCEDURE — 72100 X-RAY EXAM L-S SPINE 2/3 VWS: CPT

## 2021-02-02 PROCEDURE — 0SG30A0 FUSION OF LUMBOSACRAL JOINT WITH INTERBODY FUSION DEVICE, ANTERIOR APPROACH, ANTERIOR COLUMN, OPEN APPROACH: ICD-10-PCS | Performed by: SURGERY

## 2021-02-02 PROCEDURE — 22614 ARTHRD PST TQ 1NTRSPC EA ADD: CPT | Performed by: ORTHOPAEDIC SURGERY

## 2021-02-02 DEVICE — BLOCKER
Type: IMPLANTABLE DEVICE | Site: SPINE LUMBAR | Status: FUNCTIONAL
Brand: XIA 3

## 2021-02-02 DEVICE — RAD ROD
Type: IMPLANTABLE DEVICE | Site: SPINE LUMBAR | Status: FUNCTIONAL
Brand: ES2 SPINAL SYSTEM

## 2021-02-02 DEVICE — BONE GRAFT SUBSTITUTE, FOAM PACK, BETA-TRICALCIUM PHOSPHATE AND TYPE I BOVINE COLLAGEN
Type: IMPLANTABLE DEVICE | Site: SPINE LUMBAR | Status: FUNCTIONAL
Brand: VITOSS

## 2021-02-02 DEVICE — IMPLANTABLE DEVICE: Type: IMPLANTABLE DEVICE | Site: SPINE LUMBAR | Status: FUNCTIONAL

## 2021-02-02 DEVICE — POLYAXIAL SCREW
Type: IMPLANTABLE DEVICE | Site: SPINE LUMBAR | Status: FUNCTIONAL
Brand: XIA 3 SYSTEM - SERRATO

## 2021-02-02 DEVICE — BIO DBM PUTTY
Type: IMPLANTABLE DEVICE | Site: SPINE LUMBAR | Status: FUNCTIONAL
Brand: BIO DBM

## 2021-02-02 RX ORDER — ONDANSETRON 2 MG/ML
4 INJECTION INTRAMUSCULAR; INTRAVENOUS EVERY 6 HOURS PRN
Status: DISCONTINUED | OUTPATIENT
Start: 2021-02-02 | End: 2021-02-06 | Stop reason: HOSPADM

## 2021-02-02 RX ORDER — FAMOTIDINE 20 MG/1
20 TABLET, FILM COATED ORAL DAILY PRN
Status: DISCONTINUED | OUTPATIENT
Start: 2021-02-02 | End: 2021-02-06 | Stop reason: HOSPADM

## 2021-02-02 RX ORDER — MIDAZOLAM HYDROCHLORIDE 2 MG/2ML
INJECTION, SOLUTION INTRAMUSCULAR; INTRAVENOUS AS NEEDED
Status: DISCONTINUED | OUTPATIENT
Start: 2021-02-02 | End: 2021-02-02

## 2021-02-02 RX ORDER — SUCCINYLCHOLINE/SOD CL,ISO/PF 100 MG/5ML
SYRINGE (ML) INTRAVENOUS AS NEEDED
Status: DISCONTINUED | OUTPATIENT
Start: 2021-02-02 | End: 2021-02-02

## 2021-02-02 RX ORDER — CEFAZOLIN SODIUM 2 G/50ML
2000 SOLUTION INTRAVENOUS EVERY 8 HOURS
Status: COMPLETED | OUTPATIENT
Start: 2021-02-02 | End: 2021-02-05

## 2021-02-02 RX ORDER — GABAPENTIN 300 MG/1
300 CAPSULE ORAL 2 TIMES DAILY
Qty: 60 CAPSULE | Refills: 0 | Status: SHIPPED | OUTPATIENT
Start: 2021-02-02 | End: 2021-03-09 | Stop reason: SDUPTHER

## 2021-02-02 RX ORDER — HYDROMORPHONE HCL/PF 1 MG/ML
SYRINGE (ML) INJECTION AS NEEDED
Status: DISCONTINUED | OUTPATIENT
Start: 2021-02-02 | End: 2021-02-02

## 2021-02-02 RX ORDER — VANCOMYCIN HYDROCHLORIDE 1 G/20ML
INJECTION, POWDER, LYOPHILIZED, FOR SOLUTION INTRAVENOUS AS NEEDED
Status: DISCONTINUED | OUTPATIENT
Start: 2021-02-02 | End: 2021-02-02 | Stop reason: HOSPADM

## 2021-02-02 RX ORDER — DOCUSATE SODIUM 100 MG/1
100 CAPSULE, LIQUID FILLED ORAL 2 TIMES DAILY
Status: DISCONTINUED | OUTPATIENT
Start: 2021-02-02 | End: 2021-02-06 | Stop reason: HOSPADM

## 2021-02-02 RX ORDER — PANTOPRAZOLE SODIUM 40 MG/1
40 TABLET, DELAYED RELEASE ORAL
Status: DISCONTINUED | OUTPATIENT
Start: 2021-02-03 | End: 2021-02-06 | Stop reason: HOSPADM

## 2021-02-02 RX ORDER — CEFAZOLIN SODIUM 1 G/3ML
INJECTION, POWDER, FOR SOLUTION INTRAMUSCULAR; INTRAVENOUS AS NEEDED
Status: DISCONTINUED | OUTPATIENT
Start: 2021-02-02 | End: 2021-02-02

## 2021-02-02 RX ORDER — TRAZODONE HYDROCHLORIDE 50 MG/1
75 TABLET ORAL
Status: DISCONTINUED | OUTPATIENT
Start: 2021-02-02 | End: 2021-02-06 | Stop reason: HOSPADM

## 2021-02-02 RX ORDER — ACETAMINOPHEN 325 MG/1
975 TABLET ORAL ONCE
Status: COMPLETED | OUTPATIENT
Start: 2021-02-02 | End: 2021-02-02

## 2021-02-02 RX ORDER — ACETAMINOPHEN 325 MG/1
975 TABLET ORAL EVERY 8 HOURS
Status: DISCONTINUED | OUTPATIENT
Start: 2021-02-02 | End: 2021-02-06 | Stop reason: HOSPADM

## 2021-02-02 RX ORDER — HYDROMORPHONE HCL/PF 1 MG/ML
0.5 SYRINGE (ML) INJECTION
Status: DISCONTINUED | OUTPATIENT
Start: 2021-02-02 | End: 2021-02-02 | Stop reason: HOSPADM

## 2021-02-02 RX ORDER — ARIPIPRAZOLE 5 MG/1
5 TABLET ORAL DAILY
Status: DISCONTINUED | OUTPATIENT
Start: 2021-02-03 | End: 2021-02-06 | Stop reason: HOSPADM

## 2021-02-02 RX ORDER — ROCURONIUM BROMIDE 10 MG/ML
INJECTION, SOLUTION INTRAVENOUS AS NEEDED
Status: DISCONTINUED | OUTPATIENT
Start: 2021-02-02 | End: 2021-02-02

## 2021-02-02 RX ORDER — DOCUSATE SODIUM 100 MG/1
100 CAPSULE, LIQUID FILLED ORAL 2 TIMES DAILY
Qty: 10 CAPSULE | Refills: 0 | Status: SHIPPED | OUTPATIENT
Start: 2021-02-02 | End: 2021-04-30 | Stop reason: HOSPADM

## 2021-02-02 RX ORDER — PROPOFOL 10 MG/ML
INJECTION, EMULSION INTRAVENOUS CONTINUOUS PRN
Status: DISCONTINUED | OUTPATIENT
Start: 2021-02-02 | End: 2021-02-02

## 2021-02-02 RX ORDER — PROPOFOL 10 MG/ML
INJECTION, EMULSION INTRAVENOUS AS NEEDED
Status: DISCONTINUED | OUTPATIENT
Start: 2021-02-02 | End: 2021-02-02

## 2021-02-02 RX ORDER — NEOSTIGMINE METHYLSULFATE 1 MG/ML
INJECTION INTRAVENOUS AS NEEDED
Status: DISCONTINUED | OUTPATIENT
Start: 2021-02-02 | End: 2021-02-02

## 2021-02-02 RX ORDER — NICOTINE 21 MG/24HR
1 PATCH, TRANSDERMAL 24 HOURS TRANSDERMAL EVERY 24 HOURS
Status: DISCONTINUED | OUTPATIENT
Start: 2021-02-03 | End: 2021-02-06 | Stop reason: HOSPADM

## 2021-02-02 RX ORDER — SODIUM CHLORIDE, SODIUM LACTATE, POTASSIUM CHLORIDE, CALCIUM CHLORIDE 600; 310; 30; 20 MG/100ML; MG/100ML; MG/100ML; MG/100ML
100 INJECTION, SOLUTION INTRAVENOUS CONTINUOUS
Status: DISPENSED | OUTPATIENT
Start: 2021-02-02 | End: 2021-02-03

## 2021-02-02 RX ORDER — DEXAMETHASONE SODIUM PHOSPHATE 10 MG/ML
INJECTION, SOLUTION INTRAMUSCULAR; INTRAVENOUS AS NEEDED
Status: DISCONTINUED | OUTPATIENT
Start: 2021-02-02 | End: 2021-02-02

## 2021-02-02 RX ORDER — SODIUM CHLORIDE 9 MG/ML
INJECTION, SOLUTION INTRAVENOUS CONTINUOUS PRN
Status: DISCONTINUED | OUTPATIENT
Start: 2021-02-02 | End: 2021-02-02

## 2021-02-02 RX ORDER — FENTANYL CITRATE 50 UG/ML
INJECTION, SOLUTION INTRAMUSCULAR; INTRAVENOUS AS NEEDED
Status: DISCONTINUED | OUTPATIENT
Start: 2021-02-02 | End: 2021-02-02

## 2021-02-02 RX ORDER — GLYCOPYRROLATE 0.2 MG/ML
INJECTION INTRAMUSCULAR; INTRAVENOUS AS NEEDED
Status: DISCONTINUED | OUTPATIENT
Start: 2021-02-02 | End: 2021-02-02

## 2021-02-02 RX ORDER — HEPARIN SODIUM 5000 [USP'U]/ML
5000 INJECTION, SOLUTION INTRAVENOUS; SUBCUTANEOUS EVERY 8 HOURS SCHEDULED
Status: DISCONTINUED | OUTPATIENT
Start: 2021-02-02 | End: 2021-02-06 | Stop reason: HOSPADM

## 2021-02-02 RX ORDER — HYDROMORPHONE HCL/PF 1 MG/ML
0.5 SYRINGE (ML) INJECTION EVERY 2 HOUR PRN
Status: DISCONTINUED | OUTPATIENT
Start: 2021-02-02 | End: 2021-02-04

## 2021-02-02 RX ORDER — OXYCODONE HYDROCHLORIDE 5 MG/1
5 TABLET ORAL EVERY 4 HOURS PRN
Status: DISCONTINUED | OUTPATIENT
Start: 2021-02-02 | End: 2021-02-06 | Stop reason: HOSPADM

## 2021-02-02 RX ORDER — ONDANSETRON 2 MG/ML
INJECTION INTRAMUSCULAR; INTRAVENOUS AS NEEDED
Status: DISCONTINUED | OUTPATIENT
Start: 2021-02-02 | End: 2021-02-02

## 2021-02-02 RX ORDER — GABAPENTIN 300 MG/1
300 CAPSULE ORAL ONCE
Status: COMPLETED | OUTPATIENT
Start: 2021-02-02 | End: 2021-02-02

## 2021-02-02 RX ORDER — METHOCARBAMOL 500 MG/1
500 TABLET, FILM COATED ORAL 4 TIMES DAILY PRN
Qty: 30 TABLET | Refills: 0 | Status: SHIPPED | OUTPATIENT
Start: 2021-02-02 | End: 2021-02-22 | Stop reason: SDUPTHER

## 2021-02-02 RX ORDER — KETAMINE HCL IN NACL, ISO-OSM 100MG/10ML
SYRINGE (ML) INJECTION AS NEEDED
Status: DISCONTINUED | OUTPATIENT
Start: 2021-02-02 | End: 2021-02-02

## 2021-02-02 RX ORDER — MAGNESIUM HYDROXIDE 1200 MG/15ML
LIQUID ORAL AS NEEDED
Status: DISCONTINUED | OUTPATIENT
Start: 2021-02-02 | End: 2021-02-02 | Stop reason: HOSPADM

## 2021-02-02 RX ORDER — SODIUM CHLORIDE, SODIUM LACTATE, POTASSIUM CHLORIDE, CALCIUM CHLORIDE 600; 310; 30; 20 MG/100ML; MG/100ML; MG/100ML; MG/100ML
INJECTION, SOLUTION INTRAVENOUS CONTINUOUS PRN
Status: DISCONTINUED | OUTPATIENT
Start: 2021-02-02 | End: 2021-02-02

## 2021-02-02 RX ORDER — CEFAZOLIN SODIUM 2 G/50ML
2000 SOLUTION INTRAVENOUS ONCE
Status: DISCONTINUED | OUTPATIENT
Start: 2021-02-02 | End: 2021-02-02 | Stop reason: HOSPADM

## 2021-02-02 RX ORDER — LIDOCAINE HYDROCHLORIDE 10 MG/ML
INJECTION, SOLUTION EPIDURAL; INFILTRATION; INTRACAUDAL; PERINEURAL AS NEEDED
Status: DISCONTINUED | OUTPATIENT
Start: 2021-02-02 | End: 2021-02-02

## 2021-02-02 RX ORDER — GABAPENTIN 300 MG/1
300 CAPSULE ORAL 2 TIMES DAILY
Status: DISCONTINUED | OUTPATIENT
Start: 2021-02-02 | End: 2021-02-03

## 2021-02-02 RX ORDER — OXYCODONE HYDROCHLORIDE 5 MG/1
5 TABLET ORAL EVERY 4 HOURS PRN
Qty: 30 TABLET | Refills: 0 | Status: SHIPPED | OUTPATIENT
Start: 2021-02-02 | End: 2021-02-11 | Stop reason: SDUPTHER

## 2021-02-02 RX ORDER — CHLORHEXIDINE GLUCONATE 0.12 MG/ML
15 RINSE ORAL ONCE
Status: COMPLETED | OUTPATIENT
Start: 2021-02-02 | End: 2021-02-02

## 2021-02-02 RX ORDER — CHLORHEXIDINE GLUCONATE 0.12 MG/ML
15 RINSE ORAL ONCE
Status: DISCONTINUED | OUTPATIENT
Start: 2021-02-02 | End: 2021-02-02 | Stop reason: HOSPADM

## 2021-02-02 RX ORDER — SULFAMETHOXAZOLE AND TRIMETHOPRIM 800; 160 MG/1; MG/1
1 TABLET ORAL 2 TIMES DAILY
Qty: 10 TABLET | Refills: 0 | Status: SHIPPED | OUTPATIENT
Start: 2021-02-02 | End: 2021-02-07

## 2021-02-02 RX ORDER — ONDANSETRON 2 MG/ML
4 INJECTION INTRAMUSCULAR; INTRAVENOUS ONCE AS NEEDED
Status: DISCONTINUED | OUTPATIENT
Start: 2021-02-02 | End: 2021-02-02 | Stop reason: HOSPADM

## 2021-02-02 RX ORDER — METHOCARBAMOL 750 MG/1
750 TABLET, FILM COATED ORAL EVERY 6 HOURS SCHEDULED
Status: DISCONTINUED | OUTPATIENT
Start: 2021-02-02 | End: 2021-02-06 | Stop reason: HOSPADM

## 2021-02-02 RX ORDER — ALBUTEROL SULFATE 2.5 MG/3ML
2.5 SOLUTION RESPIRATORY (INHALATION) ONCE AS NEEDED
Status: DISCONTINUED | OUTPATIENT
Start: 2021-02-02 | End: 2021-02-02 | Stop reason: HOSPADM

## 2021-02-02 RX ORDER — OXYCODONE HYDROCHLORIDE 10 MG/1
10 TABLET ORAL EVERY 4 HOURS PRN
Status: DISCONTINUED | OUTPATIENT
Start: 2021-02-02 | End: 2021-02-06 | Stop reason: HOSPADM

## 2021-02-02 RX ORDER — DULOXETIN HYDROCHLORIDE 60 MG/1
120 CAPSULE, DELAYED RELEASE ORAL DAILY
Status: DISCONTINUED | OUTPATIENT
Start: 2021-02-03 | End: 2021-02-06 | Stop reason: HOSPADM

## 2021-02-02 RX ORDER — SENNOSIDES 8.6 MG
1 TABLET ORAL DAILY
Status: DISCONTINUED | OUTPATIENT
Start: 2021-02-03 | End: 2021-02-06 | Stop reason: HOSPADM

## 2021-02-02 RX ADMIN — SODIUM CHLORIDE: 0.9 INJECTION, SOLUTION INTRAVENOUS at 07:40

## 2021-02-02 RX ADMIN — HYDROMORPHONE HYDROCHLORIDE 0.5 MG: 1 INJECTION, SOLUTION INTRAMUSCULAR; INTRAVENOUS; SUBCUTANEOUS at 09:40

## 2021-02-02 RX ADMIN — MIDAZOLAM 2 MG: 1 INJECTION INTRAMUSCULAR; INTRAVENOUS at 07:30

## 2021-02-02 RX ADMIN — ROCURONIUM BROMIDE 10 MG: 50 INJECTION, SOLUTION INTRAVENOUS at 10:44

## 2021-02-02 RX ADMIN — PROPOFOL 100 MCG/KG/MIN: 10 INJECTION, EMULSION INTRAVENOUS at 07:43

## 2021-02-02 RX ADMIN — CEFAZOLIN 2000 MG: 1 INJECTION, POWDER, FOR SOLUTION INTRAVENOUS at 07:49

## 2021-02-02 RX ADMIN — Medication 100 MG: at 07:36

## 2021-02-02 RX ADMIN — OXYCODONE HYDROCHLORIDE 10 MG: 10 TABLET ORAL at 15:32

## 2021-02-02 RX ADMIN — HEPARIN SODIUM 5000 UNITS: 5000 INJECTION INTRAVENOUS; SUBCUTANEOUS at 21:05

## 2021-02-02 RX ADMIN — SODIUM CHLORIDE 0.2 MCG/KG/HR: 9 INJECTION, SOLUTION INTRAVENOUS at 07:43

## 2021-02-02 RX ADMIN — CHLORHEXIDINE GLUCONATE 0.12% ORAL RINSE 15 ML: 1.2 LIQUID ORAL at 06:20

## 2021-02-02 RX ADMIN — Medication 20 MG: at 09:40

## 2021-02-02 RX ADMIN — REMIFENTANIL HYDROCHLORIDE 0.2 MCG/KG/MIN: 1 INJECTION, POWDER, LYOPHILIZED, FOR SOLUTION INTRAVENOUS at 07:43

## 2021-02-02 RX ADMIN — GABAPENTIN 300 MG: 300 CAPSULE ORAL at 17:26

## 2021-02-02 RX ADMIN — DOCUSATE SODIUM 100 MG: 100 CAPSULE, LIQUID FILLED ORAL at 17:27

## 2021-02-02 RX ADMIN — CEFAZOLIN SODIUM 2000 MG: 2 SOLUTION INTRAVENOUS at 19:05

## 2021-02-02 RX ADMIN — DEXAMETHASONE SODIUM PHOSPHATE 10 MG: 10 INJECTION, SOLUTION INTRAMUSCULAR; INTRAVENOUS at 08:36

## 2021-02-02 RX ADMIN — ROCURONIUM BROMIDE 20 MG: 50 INJECTION, SOLUTION INTRAVENOUS at 08:19

## 2021-02-02 RX ADMIN — METHOCARBAMOL TABLETS 750 MG: 750 TABLET, COATED ORAL at 17:27

## 2021-02-02 RX ADMIN — PROPOFOL 200 MG: 10 INJECTION, EMULSION INTRAVENOUS at 07:36

## 2021-02-02 RX ADMIN — SODIUM CHLORIDE, SODIUM LACTATE, POTASSIUM CHLORIDE, AND CALCIUM CHLORIDE 100 ML/HR: .6; .31; .03; .02 INJECTION, SOLUTION INTRAVENOUS at 13:55

## 2021-02-02 RX ADMIN — GABAPENTIN 300 MG: 300 CAPSULE ORAL at 06:18

## 2021-02-02 RX ADMIN — Medication 30 MG: at 07:36

## 2021-02-02 RX ADMIN — SODIUM CHLORIDE, SODIUM LACTATE, POTASSIUM CHLORIDE, AND CALCIUM CHLORIDE 100 ML/HR: .6; .31; .03; .02 INJECTION, SOLUTION INTRAVENOUS at 14:52

## 2021-02-02 RX ADMIN — ONDANSETRON 4 MG: 2 INJECTION INTRAMUSCULAR; INTRAVENOUS at 12:25

## 2021-02-02 RX ADMIN — HYDROMORPHONE HYDROCHLORIDE 0.5 MG: 1 INJECTION, SOLUTION INTRAMUSCULAR; INTRAVENOUS; SUBCUTANEOUS at 19:05

## 2021-02-02 RX ADMIN — GLYCOPYRROLATE 0.4 MG: 0.2 INJECTION, SOLUTION INTRAMUSCULAR; INTRAVENOUS at 11:30

## 2021-02-02 RX ADMIN — SODIUM CHLORIDE, SODIUM LACTATE, POTASSIUM CHLORIDE, AND CALCIUM CHLORIDE: .6; .31; .03; .02 INJECTION, SOLUTION INTRAVENOUS at 11:27

## 2021-02-02 RX ADMIN — SODIUM CHLORIDE, SODIUM LACTATE, POTASSIUM CHLORIDE, AND CALCIUM CHLORIDE: .6; .31; .03; .02 INJECTION, SOLUTION INTRAVENOUS at 07:30

## 2021-02-02 RX ADMIN — CEFAZOLIN 2000 MG: 1 INJECTION, POWDER, FOR SOLUTION INTRAVENOUS at 11:42

## 2021-02-02 RX ADMIN — ACETAMINOPHEN 975 MG: 325 TABLET, FILM COATED ORAL at 23:37

## 2021-02-02 RX ADMIN — ACETAMINOPHEN 975 MG: 325 TABLET, FILM COATED ORAL at 06:18

## 2021-02-02 RX ADMIN — FENTANYL CITRATE 100 MCG: 50 INJECTION INTRAMUSCULAR; INTRAVENOUS at 07:36

## 2021-02-02 RX ADMIN — NEOSTIGMINE METHYLSULFATE 3 MG: 1 INJECTION INTRAVENOUS at 11:30

## 2021-02-02 RX ADMIN — HYDROMORPHONE HYDROCHLORIDE 0.5 MG: 1 INJECTION, SOLUTION INTRAMUSCULAR; INTRAVENOUS; SUBCUTANEOUS at 13:35

## 2021-02-02 RX ADMIN — METHOCARBAMOL TABLETS 750 MG: 750 TABLET, COATED ORAL at 23:37

## 2021-02-02 RX ADMIN — ROCURONIUM BROMIDE 20 MG: 50 INJECTION, SOLUTION INTRAVENOUS at 10:16

## 2021-02-02 RX ADMIN — ACETAMINOPHEN 975 MG: 325 TABLET, FILM COATED ORAL at 15:32

## 2021-02-02 RX ADMIN — PHENYLEPHRINE HYDROCHLORIDE 100 MCG: 10 INJECTION INTRAVENOUS at 07:57

## 2021-02-02 RX ADMIN — PHENYLEPHRINE HYDROCHLORIDE 100 MCG: 10 INJECTION INTRAVENOUS at 11:50

## 2021-02-02 RX ADMIN — LIDOCAINE HYDROCHLORIDE 50 MG: 10 INJECTION, SOLUTION EPIDURAL; INFILTRATION; INTRACAUDAL; PERINEURAL at 07:36

## 2021-02-02 RX ADMIN — HYDROMORPHONE HYDROCHLORIDE 0.5 MG: 1 INJECTION, SOLUTION INTRAMUSCULAR; INTRAVENOUS; SUBCUTANEOUS at 13:46

## 2021-02-02 RX ADMIN — SODIUM CHLORIDE: 0.9 INJECTION, SOLUTION INTRAVENOUS at 11:22

## 2021-02-02 RX ADMIN — PHENYLEPHRINE HYDROCHLORIDE 20 MCG/MIN: 10 INJECTION INTRAVENOUS at 07:51

## 2021-02-02 RX ADMIN — PHENYLEPHRINE HYDROCHLORIDE 100 MCG: 10 INJECTION INTRAVENOUS at 12:30

## 2021-02-02 RX ADMIN — TRANEXAMIC ACID 1000 MG: 1 INJECTION, SOLUTION INTRAVENOUS at 09:51

## 2021-02-02 NOTE — ANESTHESIA PROCEDURE NOTES
Arterial Line Insertion  Performed by: Regino Wilburn CRNA  Authorized by: Chantale Cortez DO   Consent: Verbal consent obtained  Written consent obtained  Risks and benefits: risks, benefits and alternatives were discussed  Consent given by: patient  Patient understanding: patient states understanding of the procedure being performed  Patient consent: the patient's understanding of the procedure matches consent given  Procedure consent: procedure consent matches procedure scheduled  Patient identity confirmed: arm band and hospital-assigned identification number  Preparation: Patient was prepped and draped in the usual sterile fashion  Indications: hemodynamic monitoring  Orientation:  Right  Location: radial artery  Sedation:  Patient sedated: under GA      Procedure Details:  Needle gauge: 20  Seldinger technique: Seldinger technique used  Number of attempts: 1    Post-procedure:  Post-procedure: dressing applied  Waveform: good waveform  Patient tolerance: Patient tolerated the procedure well with no immediate complications

## 2021-02-02 NOTE — INTERVAL H&P NOTE
H&P reviewed  After examining the patient I find no changes in the patients condition since the H&P had been written  Vitals:    02/02/21 0610   BP: 104/72   Pulse: 84   Resp: 18   Temp: (!) 96 2 °F (35 7 °C)   SpO2: 95%                  Show:Clear all  [x]Manual[x]Template[]Copied    Added by:  [x]Aries Rivera MD[x]Farshad Mayers    []David for details  Assessment/Plan:     Patient with longstanding bilateral foraminal stenosis L5-S1 (and lateral recess stenosis at L4-5( due to severe disc space narrowing and retrolisthesis  Symptoms of leg pain and dysesthesias are refractory to conservative management including several injections as well as therapy  She is interested in surgical intervention  Options were discussed and she agrees to proceed with anterior lumbar interbody fusion to address complete disc space collapse and neural foraminal height combined with posterior decompression with instrumented fusion at L5-S1 *(likely decompression at L4/5 as well)  She understands that surgery does not guarantee complete resolution of symptoms not does not guarantee resolution of axial back pain but is designed to improve her radicular complaints  Risks discussed are but are not limited to bleeding, infection, CSF leak, bowel injury, blood vessel injury, hardware complications, possible persistent symptoms, possible need for reoperation     No problem-specific Assessment & Plan notes found for this encounter      Chronic low back pain > left > right leg pain and numbness  · Patient is a candidate for anterior lumbar interbody fusion with posterior laminectomy decompression and fusion with possible additional levels   she understands risks of surgery to include but not limited to bleeding, infection, CSF leak, nerve damage, possible persistent symptoms, possible need for reoperation   she will need medical clearance and blood work    · Follow up after surgery

## 2021-02-02 NOTE — DISCHARGE INSTRUCTIONS
Discharge Instruction - [address] 63 y o  female MRN: [id number]  Unit/Bed#: Operating Room    Weight Bearing Status:                                           Full weight bearing must wear back brace at all times while out of bed    DVT prophylaxis:  DO NOT take blood thinners until cleared by surgeon    Pain:  Continue analgesics as directed    Showering Instructions:   Do not shower until 2-3 days after the procedure  Dressing Instructions:   Keep surgical incision clean and dry at all times  A dry atmosphere helps the incision heal   No soaking or scrubbing of wound at any time  No ointment or soap on the incision  Keep dry dressing on until follow-up  OK to change dressing as needed if saturated  Driving Instructions:  No driving until cleared by Orthopaedic Surgery  PT/OT:  No PT/OT required until directed by surgeon at followup appointment    Appt Instructions: If you do not have your appointment, please call the clinic at [phone]  Otherwise followup as scheduled below: Follow-up with Dr Raquel Arceo in the office in 2 weeks  Contact the office sooner if you experience any increased numbness/tingling in the extremities  Miscellaneous:  No lifting greater than 5 lbs  No strenuous exercise  No repetitive bending or twisting

## 2021-02-02 NOTE — ORTHOTIC NOTE
Orthotic Note            Date: 2/2/2021      Patient Name: Dylon Lira            Reason for Consult:  Patient Active Problem List   Diagnosis    Lower back pain    Greater trochanteric bursitis of left hip    Labral tear of hip, degenerative    Lumbar disc herniation    Cigarette nicotine dependence with nicotine-induced disorder    Depression    Pain of both hip joints    Gastroesophageal reflux disease    Screening for breast cancer    Medical clearance for psychiatric admission    Family history of brain aneurysm    MDD (major depressive disorder), recurrent severe, without psychosis ([facility] [address] )    Anxiety    Methamphetamine abuse, episodic ([facility] [address] )    Smoking    Radiculopathy, lumbar region    Lumbar spondylosis    Spinal stenosis of lumbar region    Sacroiliitis ([facility] [address] )    Chronic pain syndrome     Fit patient for an [employer] while she was sitting EOB  Brace was adjusted to a size XL  Educated patient on donning/doffing of the brace  There were no further questions at this time  Patient doffed brace after fitting, and returned supine in bed  LSO brace was left on patient's bedside window sill  Will continue to follow up  Recommendations:  Please call [facility] at extension [phone] with any questions or adjustments      DEON Chiang

## 2021-02-02 NOTE — OP NOTE
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 10]    Post-Op Diagnosis Codes:     * Chronic bilateral low back pain with bilateral sciatica [M54 42, M54 41, G89 29]     * Lumbar radiculopathy [M54 16]     * Foraminal stenosis of lumbosacral region [M48 07]     * Retrolisthesis of vertebrae [M43 10]    Procedure:  Anterior lumbar interbody fusion L5-S1 with indirect decompression bilateral foramina L5-S1  Lumbar laminectomy decompression L4-S1 with instrumented posterolateral fusion L5-S1 with local autograft allograft and neural monitoring      First stage  Anterior lumbar interbody fusion L5-S1 with indirect decompression bilateral foramina L5-S1  Lumbar laminectomy decompression L4-S1 with instrumented posterolateral fusion L5-S1 with local autograft allograft and neural monitoring  Patient was correctly identified by both the anesthesia department and myself  The abdomen and back was marked  The patient was intubated  Antibiotics were administered  Gómez catheter and neural monitoring leads were placed  SCDs were attached was legs  Patient was positioned supine onto radiolucent flat top table ensuring that all bony prominences and neurovascular structures were adequately padded and protected  AP and lateral imaging demonstrated with the incision would be made overlying L5-S1    The abdomen was then prepped and draped in the usual sterile fashion by the general surgery team   A time-out was performed  And anterior laparotomy approach was performed by Dr Glynn  Upon confirmation of the appropriate level and adequate exposure of L5-S1 self-retaining retractors were placed  I then scrubbed in and performed disc preparation at the L5-S1 level followed by trial sizing and distraction across the L5-S1 level  The endplates were prepped with the use of curette  We trialed size up to a size 13 x 26 x 32 x 15°  We selected a Camber spine ALIF cage measuring 13 x 26 x 32 mm, 15°  This was packed with allograft bone  This was then GARY ROCA [facility] into place under AP and lateral fluoroscopic visualization  The locking mechanism into the L5 and S1 vertebral bodies was deployed and secured in place  Final AP and lateral imaging demonstrated appropriate height restoration of the disc space as well as foraminal height restoration  The wound was copiously irrigated  Hemostasis was obtained and no significant bleeding was encountered  The abdominal closure was performed by Dr Glynn  Sterile dressings were then applied to the abdominal wound  Second stage  The patient was then flipped into the prone position onto radiolucent Campbell table ensuring that all bony prominences and neurovascular structures were adequately padded and protected  AP and lateral imaging demonstrated with the incision would be made overlying L4-S1  The back was then prepped and draped in the usual sterile fashion  A repeat time-out was performed  Ten blade was used to create a longitudinal incision overlying L4-S1  Significant adipose tissue was encountered from the subdermal layer to the fascial layer measuring 7 cm  Subperiosteal dissection was then performed exposing the spinous processes and lamina at L4-L5 down to the S1 level  The facet joints were exposed  The transverse processes bilaterally at L5 were delineated  The posterior aspect the sacral ala was also dissected bilaterally    Lateral image confirmed the appropriate level  Pedicle screws were placed bilaterally at L5 and S1  The sequence was as follows  Gearshift awl, ball-tip probe, tap, ball-tip probe, screw placement  Isaiah size 7 0 x 45 mm screws were placed bilaterally at L5 and size 7 5 x 40 mm screws were placed bilaterally at S1  No breaches were palpated  AP lateral imaging demonstrated appropriate instrumentation  The screws were stimulated and found to be generally within acceptable threshold given the fact that no breaches were palpated  Attention was paid to the decompression  Leksell rongeur was used to remove the spinous processes at L4-L5 and S1  Complete laminectomy was performed at L4-5 and partial medial facetectomies were performed with Kerrison rongeur decompressing the lateral recesses  On the left side complete laminectomy was performed at L5 the facet complex on the left at L5-S1 was taken down decompressing the lateral recess and tracing the exiting L5 nerve root  The S1 nerve root was found to traverse the disc space without significant stenosis  Following adequate decompression local bone was saved and prepped  The thecal sac was found to be intact  Copious irrigation was then performed to the entire wound  The lateral gutters were decorticated with the use of high-speed bur  Bone graft was packed into lateral gutters  Two rods were selected and secured in place with locking caps  Final tightening was performed to the construct  The wound was once again copiously irrigated with normal saline solution followed by neomycin mixture  DuraSeal was applied over the thecal sac  Vancomycin powder was sprinkled into the deep and superficial soft tissues  A 10 Arabic Hemovac drain was placed deep to the fascial layer  The fascial layer was then closed in a watertight fashion using 0 PDS suture  The deep subcutaneous layer was closed in a layered fashion using 0 Vicryl followed by 2 Vicryl suture    The skin layer was closed with 2 0 nylon suture in interrupted fashion  The drain was sutured in place and attached to suction  Sterile dressings were applied to the wound  The patient was flipped into the supine position, extubated and taken to recovery room stable condition  Specimen(s):  * No specimens in log *    Estimated Blood Loss:   150 mL    Drains:  Closed/Suction Drain Right Back Accordion 10 Fr  (Active)   Number of days: 0       Urethral Catheter Latex 16 Fr  (Active)   Site Assessment Clean;Skin intact 02/02/21 0839   Collection Container Standard drainage bag 02/02/21 0839   Securement Method Securing device (Describe) 02/02/21 0839   Number of days: 0       Anesthesia Type:   General    Operative Indications:  Chronic bilateral low back pain with bilateral sciatica [M54 42, M54 41, G89 29]  Lumbar radiculopathy [M54 16]  Foraminal stenosis of lumbosacral region [M48 07]  Retrolisthesis of vertebrae [M43 10]      Operative Findings:  Obesity, significant adipose tissue above fascial layer measuring 7 cm with complex spinal closure postoperatively  Moderate to severe stenosis L4-S1  Retrolisthesis T9-H8    Complications:   None    Procedure and Technique:    Procedure:  Anterior lumbar interbody fusion L5-S1 with indirect decompression bilateral foramina L5-S1  Lumbar laminectomy decompression L4-S1 with instrumented posterolateral fusion L5-S1 with local autograft allograft and neural monitoring      First stage  Anterior lumbar interbody fusion L5-S1 with indirect decompression bilateral foramina L5-S1  Lumbar laminectomy decompression L4-S1 with instrumented posterolateral fusion L5-S1 with local autograft allograft and neural monitoring  Patient was correctly identified by both the anesthesia department and myself  The abdomen and back was marked  The patient was intubated  Antibiotics were administered  Gómez catheter and neural monitoring leads were placed  SCDs were attached was legs  Patient was positioned supine onto radiolucent flat top table ensuring that all bony prominences and neurovascular structures were adequately padded and protected  AP and lateral imaging demonstrated with the incision would be made overlying L5-S1  The abdomen was then prepped and draped in the usual sterile fashion by the general surgery team   A time-out was performed  And anterior laparotomy approach was performed by Dr Glynn  Upon confirmation of the appropriate level and adequate exposure of L5-S1 self-retaining retractors were placed  I then scrubbed in and performed disc preparation at the L5-S1 level followed by trial sizing and distraction across the L5-S1 level  The endplates were prepped with the use of curette  We trialed size up to a size 13 x 26 x 32 x 15°  We selected a Camber spine ALIF cage measuring 13 x 26 x 32 mm, 15°  This was packed with allograft bone  This was then GARY ROCA [facility] into place under AP and lateral fluoroscopic visualization  The locking mechanism into the L5 and S1 vertebral bodies was deployed and secured in place  Final AP and lateral imaging demonstrated appropriate height restoration of the disc space as well as foraminal height restoration  The wound was copiously irrigated  Hemostasis was obtained and no significant bleeding was encountered  The abdominal closure was performed by Dr Glynn  Sterile dressings were then applied to the abdominal wound  Second stage  The patient was then flipped into the prone position onto radiolucent Campbell table ensuring that all bony prominences and neurovascular structures were adequately padded and protected  AP and lateral imaging demonstrated with the incision would be made overlying L4-S1  The back was then prepped and draped in the usual sterile fashion  A repeat time-out was performed  Ten blade was used to create a longitudinal incision overlying L4-S1    Significant adipose tissue was encountered from the subdermal layer to the fascial layer measuring 7 cm  Subperiosteal dissection was then performed exposing the spinous processes and lamina at L4-L5 down to the S1 level  The facet joints were exposed  The transverse processes bilaterally at L5 were delineated  The posterior aspect the sacral ala was also dissected bilaterally  Lateral image confirmed the appropriate level  Pedicle screws were placed bilaterally at L5 and S1  The sequence was as follows  Gearshift awl, ball-tip probe, tap, ball-tip probe, screw placement  [place]on size 7 0 x 45 mm screws were placed bilaterally at L5 and size 7 5 x 40 mm screws were placed bilaterally at S1  No breaches were palpated  AP lateral imaging demonstrated appropriate instrumentation  The screws were stimulated and found to be generally within acceptable threshold given the fact that no breaches were palpated  Attention was paid to the decompression  Leksell rongeur was used to remove the spinous processes at L4-L5 and S1  Complete laminectomy was performed at L4-5 and partial medial facetectomies were performed with Kerrison rongeur decompressing the lateral recesses  On the left side complete laminectomy was performed at L5 the facet complex on the left at L5-S1 was taken down decompressing the lateral recess and tracing the exiting L5 nerve root  The S1 nerve root was found to traverse the disc space without significant stenosis  Following adequate decompression local bone was saved and prepped  The thecal sac was found to be intact  Copious irrigation was then performed to the entire wound  The lateral gutters were decorticated with the use of high-speed bur  Bone graft was packed into lateral gutters  Two rods were selected and secured in place with locking caps  Final tightening was performed to the construct  The wound was once again copiously irrigated with normal saline solution followed by neomycin mixture    DuraSeal was applied over the thecal sac  Vancomycin powder was sprinkled into the deep and superficial soft tissues  A 10 Spanish Hemovac drain was placed deep to the fascial layer  The fascial layer was then closed in a watertight fashion using 0 PDS suture  The deep subcutaneous layer was closed in a layered fashion using 0 Vicryl followed by 2 Vicryl suture  The skin layer was closed with 2 0 nylon suture in interrupted fashion  The drain was sutured in place and attached to suction  Sterile dressings were applied to the wound  The patient was flipped into the supine position, extubated and taken to recovery room stable condition         I was present for the entire procedure    Patient Disposition:  PACU      Implants: Anterior Camber Spine Toña ALIF cage, Posterior Isaiah, Rodriguez pedicle screw system, Vitoss allograft, DBM putty    SIGNATURE: Tatiana Lawler MD  DATE: February 2, 2021  TIME: 12:35 PM

## 2021-02-02 NOTE — ANESTHESIA POSTPROCEDURE EVALUATION
Post-Op Assessment Note    CV Status:  Stable  Pain Score: 0    Pain management: adequate     Mental Status:  Alert and awake   Hydration Status:  Euvolemic   PONV Controlled:  Controlled   Airway Patency:  Patent      Post Op Vitals Reviewed: Yes      Staff: CRNA         No complications documented      BP   92/50   Temp  98 0   Pulse  94   Resp   16   SpO2   99%

## 2021-02-02 NOTE — QUICK NOTE
Post- OP Note - General Surgery   Lisa Mazariegos 63 y o  female MRN: [id number]  Unit/Bed#: OR POOL Encounter: [id number]    Assessment:  63 F s/p ex lap for Anterior lumbar interbody fusion L5-S1 with indirect decompression bilateral foramina L5-S1  Lumbar laminectomy decompression L4-S1 with instrumented posterolateral fusion L5-S1 with local autograft allograft and neural monitoring    Plan:  Primary per Ortho  CLD- ADAT tomorrow morning  IVF  I/Os  DVT ppx  Pain/Nausea Control  OOB as tolerated  Incentive Spirometry    Subjective/Objective     Subjective:   Patient alert and oriented  No nausea or vomiting  Pain well controlled  Objective:    Blood pressure (!) 95/47, pulse 74, temperature (!) 97 1 °F (36 2 °C), temperature source Temporal, resp  rate 15, height 5' 5" (1 651 m), weight 101 kg (223 lb), SpO2 97 %, not currently breastfeeding  ,Body mass index is 37 11 kg/m²  Physical Exam:   Gen: NAD  HEENT: MMM  CV: well perfused  Lungs: Normal respiratory effort on 3 L NC  Abd: soft, appropriately tender, nondistended, Incisions clean dry and intact  Skin: warm/ dry  Neuro:  AxO x3    VTE Mechanical Prophylaxis: sequential compression device

## 2021-02-03 LAB
ANION GAP SERPL CALCULATED.3IONS-SCNC: 3 MMOL/L (ref 4–13)
BASOPHILS # BLD AUTO: 0.02 THOUSANDS/ΜL (ref 0–0.1)
BASOPHILS NFR BLD AUTO: 0 % (ref 0–1)
BUN SERPL-MCNC: 12 MG/DL (ref 5–25)
CALCIUM SERPL-MCNC: 8.7 MG/DL (ref 8.3–10.1)
CHLORIDE SERPL-SCNC: 109 MMOL/L (ref 100–108)
CO2 SERPL-SCNC: 28 MMOL/L (ref 21–32)
CREAT SERPL-MCNC: 0.66 MG/DL (ref 0.6–1.3)
EOSINOPHIL # BLD AUTO: 0.03 THOUSAND/ΜL (ref 0–0.61)
EOSINOPHIL NFR BLD AUTO: 0 % (ref 0–6)
ERYTHROCYTE [DISTWIDTH] IN BLOOD BY AUTOMATED COUNT: 13.2 % (ref 11.6–15.1)
GFR SERPL CREATININE-BSD FRML MDRD: 99 ML/MIN/1.73SQ M
GLUCOSE SERPL-MCNC: 102 MG/DL (ref 65–140)
HCT VFR BLD AUTO: 32.6 % (ref 34.8–46.1)
HGB BLD-MCNC: 10.4 G/DL (ref 11.5–15.4)
IMM GRANULOCYTES # BLD AUTO: 0.03 THOUSAND/UL (ref 0–0.2)
IMM GRANULOCYTES NFR BLD AUTO: 0 % (ref 0–2)
LYMPHOCYTES # BLD AUTO: 2.39 THOUSANDS/ΜL (ref 0.6–4.47)
LYMPHOCYTES NFR BLD AUTO: 22 % (ref 14–44)
MCH RBC QN AUTO: 31.4 PG (ref 26.8–34.3)
MCHC RBC AUTO-ENTMCNC: 31.9 G/DL (ref 31.4–37.4)
MCV RBC AUTO: 99 FL (ref 82–98)
MONOCYTES # BLD AUTO: 0.87 THOUSAND/ΜL (ref 0.17–1.22)
MONOCYTES NFR BLD AUTO: 8 % (ref 4–12)
NEUTROPHILS # BLD AUTO: 7.8 THOUSANDS/ΜL (ref 1.85–7.62)
NEUTS SEG NFR BLD AUTO: 70 % (ref 43–75)
NRBC BLD AUTO-RTO: 0 /100 WBCS
PLATELET # BLD AUTO: 209 THOUSANDS/UL (ref 149–390)
PMV BLD AUTO: 10.5 FL (ref 8.9–12.7)
POTASSIUM SERPL-SCNC: 4 MMOL/L (ref 3.5–5.3)
RBC # BLD AUTO: 3.31 MILLION/UL (ref 3.81–5.12)
SODIUM SERPL-SCNC: 140 MMOL/L (ref 136–145)
WBC # BLD AUTO: 11.14 THOUSAND/UL (ref 4.31–10.16)

## 2021-02-03 PROCEDURE — 97163 PT EVAL HIGH COMPLEX 45 MIN: CPT

## 2021-02-03 PROCEDURE — NC001 PR NO CHARGE: Performed by: ORTHOPAEDIC SURGERY

## 2021-02-03 PROCEDURE — 85025 COMPLETE CBC W/AUTO DIFF WBC: CPT | Performed by: PHYSICIAN ASSISTANT

## 2021-02-03 PROCEDURE — 80048 BASIC METABOLIC PNL TOTAL CA: CPT | Performed by: PHYSICIAN ASSISTANT

## 2021-02-03 PROCEDURE — 99024 POSTOP FOLLOW-UP VISIT: CPT | Performed by: SURGERY

## 2021-02-03 PROCEDURE — 97167 OT EVAL HIGH COMPLEX 60 MIN: CPT

## 2021-02-03 RX ORDER — GABAPENTIN 100 MG/1
100 CAPSULE ORAL 3 TIMES DAILY
Status: DISCONTINUED | OUTPATIENT
Start: 2021-02-03 | End: 2021-02-06 | Stop reason: HOSPADM

## 2021-02-03 RX ADMIN — HEPARIN SODIUM 5000 UNITS: 5000 INJECTION INTRAVENOUS; SUBCUTANEOUS at 05:01

## 2021-02-03 RX ADMIN — SENNOSIDES 8.6 MG: 8.6 TABLET, FILM COATED ORAL at 09:26

## 2021-02-03 RX ADMIN — ACETAMINOPHEN 975 MG: 325 TABLET, FILM COATED ORAL at 16:06

## 2021-02-03 RX ADMIN — HYDROMORPHONE HYDROCHLORIDE 0.5 MG: 1 INJECTION, SOLUTION INTRAMUSCULAR; INTRAVENOUS; SUBCUTANEOUS at 16:06

## 2021-02-03 RX ADMIN — DOCUSATE SODIUM 100 MG: 100 CAPSULE, LIQUID FILLED ORAL at 17:48

## 2021-02-03 RX ADMIN — GABAPENTIN 300 MG: 300 CAPSULE ORAL at 09:26

## 2021-02-03 RX ADMIN — OXYCODONE HYDROCHLORIDE 10 MG: 10 TABLET ORAL at 09:27

## 2021-02-03 RX ADMIN — OXYCODONE HYDROCHLORIDE 10 MG: 10 TABLET ORAL at 05:01

## 2021-02-03 RX ADMIN — PANTOPRAZOLE SODIUM 40 MG: 40 TABLET, DELAYED RELEASE ORAL at 05:01

## 2021-02-03 RX ADMIN — GABAPENTIN 100 MG: 100 CAPSULE ORAL at 16:06

## 2021-02-03 RX ADMIN — ARIPIPRAZOLE 5 MG: 5 TABLET ORAL at 09:27

## 2021-02-03 RX ADMIN — DULOXETINE HYDROCHLORIDE 120 MG: 60 CAPSULE, DELAYED RELEASE ORAL at 09:26

## 2021-02-03 RX ADMIN — OXYCODONE HYDROCHLORIDE 10 MG: 10 TABLET ORAL at 00:58

## 2021-02-03 RX ADMIN — METHOCARBAMOL TABLETS 750 MG: 750 TABLET, COATED ORAL at 05:01

## 2021-02-03 RX ADMIN — TRAZODONE HYDROCHLORIDE 75 MG: 50 TABLET ORAL at 22:12

## 2021-02-03 RX ADMIN — HEPARIN SODIUM 5000 UNITS: 5000 INJECTION INTRAVENOUS; SUBCUTANEOUS at 13:51

## 2021-02-03 RX ADMIN — NICOTINE 1 PATCH: 21 PATCH, EXTENDED RELEASE TRANSDERMAL at 09:27

## 2021-02-03 RX ADMIN — OXYCODONE HYDROCHLORIDE 10 MG: 10 TABLET ORAL at 13:51

## 2021-02-03 RX ADMIN — DOCUSATE SODIUM 100 MG: 100 CAPSULE, LIQUID FILLED ORAL at 09:26

## 2021-02-03 RX ADMIN — CEFAZOLIN SODIUM 2000 MG: 2 SOLUTION INTRAVENOUS at 19:48

## 2021-02-03 RX ADMIN — METHOCARBAMOL TABLETS 750 MG: 750 TABLET, COATED ORAL at 17:47

## 2021-02-03 RX ADMIN — CEFAZOLIN SODIUM 2000 MG: 2 SOLUTION INTRAVENOUS at 11:15

## 2021-02-03 RX ADMIN — OXYCODONE HYDROCHLORIDE 10 MG: 10 TABLET ORAL at 19:47

## 2021-02-03 RX ADMIN — HYDROMORPHONE HYDROCHLORIDE 0.5 MG: 1 INJECTION, SOLUTION INTRAMUSCULAR; INTRAVENOUS; SUBCUTANEOUS at 11:14

## 2021-02-03 RX ADMIN — HEPARIN SODIUM 5000 UNITS: 5000 INJECTION INTRAVENOUS; SUBCUTANEOUS at 22:12

## 2021-02-03 RX ADMIN — CEFAZOLIN SODIUM 2000 MG: 2 SOLUTION INTRAVENOUS at 04:53

## 2021-02-03 RX ADMIN — GABAPENTIN 100 MG: 100 CAPSULE ORAL at 22:12

## 2021-02-03 RX ADMIN — ACETAMINOPHEN 975 MG: 325 TABLET, FILM COATED ORAL at 09:26

## 2021-02-03 RX ADMIN — METHOCARBAMOL TABLETS 750 MG: 750 TABLET, COATED ORAL at 11:14

## 2021-02-03 NOTE — PLAN OF CARE
Problem: Prexisting or High Potential for Compromised Skin Integrity  Goal: Skin integrity is maintained or improved  Description: INTERVENTIONS:  - Identify patients at risk for skin breakdown  - Assess and monitor skin integrity  - Assess and monitor nutrition and hydration status  - Monitor labs   - Assess for incontinence   - Turn and reposition patient  - Assist with mobility/ambulation  - Relieve pressure over bony prominences  - Avoid friction and shearing  - Provide appropriate hygiene as needed including keeping skin clean and dry  - Evaluate need for skin moisturizer/barrier cream  - Collaborate with interdisciplinary team   - Patient/family teaching  - Consider wound care consult   Outcome: Progressing     Problem: Potential for Falls  Goal: Patient will remain free of falls  Description: INTERVENTIONS:  - Assess patient frequently for physical needs  -  Identify cognitive and physical deficits and behaviors that affect risk of falls    -  [place] fall precautions as indicated by assessment   - Educate patient/family on patient safety including physical limitations  - Instruct patient to call for assistance with activity based on assessment  - Modify environment to reduce risk of injury  - Consider OT/PT consult to assist with strengthening/mobility  Outcome: Progressing     Problem: PAIN - ADULT  Goal: Verbalizes/displays adequate comfort level or baseline comfort level  Description: Interventions:  - Encourage patient to monitor pain and request assistance  - Assess pain using appropriate pain scale  - Administer analgesics based on type and severity of pain and evaluate response  - Implement non-pharmacological measures as appropriate and evaluate response  - Consider cultural and social influences on pain and pain management  - Notify physician/advanced practitioner if interventions unsuccessful or patient reports new pain  Outcome: Progressing     Problem: INFECTION - ADULT  Goal: Absence or prevention of progression during hospitalization  Description: INTERVENTIONS:  - Assess and monitor for signs and symptoms of infection  - Monitor lab/diagnostic results  - Monitor all insertion sites, i e  indwelling lines, tubes, and drains  - Monitor endotracheal if appropriate and nasal secretions for changes in amount and color  - [place] appropriate cooling/warming therapies per order  - Administer medications as ordered  - Instruct and encourage patient and family to use good hand hygiene technique  - Identify and instruct in appropriate isolation precautions for identified infection/condition  Outcome: Progressing     Problem: SAFETY ADULT  Goal: Patient will remain free of falls  Description: INTERVENTIONS:  - Assess patient frequently for physical needs  -  Identify cognitive and physical deficits and behaviors that affect risk of falls    -  [place] fall precautions as indicated by assessment   - Educate patient/family on patient safety including physical limitations  - Instruct patient to call for assistance with activity based on assessment  - Modify environment to reduce risk of injury  - Consider OT/PT consult to assist with strengthening/mobility  Outcome: Progressing  Goal: Maintain or return to baseline ADL function  Description: INTERVENTIONS:  -  Assess patient's ability to carry out ADLs; assess patient's baseline for ADL function and identify physical deficits which impact ability to perform ADLs (bathing, care of mouth/teeth, toileting, grooming, dressing, etc )  - Assess/evaluate cause of self-care deficits   - Assess range of motion  - Assess patient's mobility; develop plan if impaired  - Assess patient's need for assistive devices and provide as appropriate  - Encourage maximum independence but intervene and supervise when necessary  - Involve family in performance of ADLs  - Assess for home care needs following discharge   - Consider OT consult to assist with ADL evaluation and planning for discharge  - Provide patient education as appropriate  Outcome: Progressing  Goal: Maintain or return mobility status to optimal level  Description: INTERVENTIONS:  - Assess patient's baseline mobility status (ambulation, transfers, stairs, etc )    - Identify cognitive and physical deficits and behaviors that affect mobility  - Identify mobility aids required to assist with transfers and/or ambulation (gait belt, sit-to-stand, lift, walker, cane, etc )  - [place] fall precautions as indicated by assessment  - Record patient progress and toleration of activity level on Mobility SBAR; progress patient to next Phase/Stage  - Instruct patient to call for assistance with activity based on assessment  - Consider rehabilitation consult to assist with strengthening/weightbearing, etc   Outcome: Progressing     Problem: DISCHARGE PLANNING  Goal: Discharge to home or other facility with appropriate resources  Description: INTERVENTIONS:  - Identify barriers to discharge w/patient and caregiver  - Arrange for needed discharge resources and transportation as appropriate  - Identify discharge learning needs (meds, wound care, etc )  - Arrange for interpretive services to assist at discharge as needed  - Refer to Case Management Department for coordinating discharge planning if the patient needs post-hospital services based on physician/advanced practitioner order or complex needs related to functional status, cognitive ability, or social support system  Outcome: Progressing     Problem: Knowledge Deficit  Goal: Patient/family/caregiver demonstrates understanding of disease process, treatment plan, medications, and discharge instructions  Description: Complete learning assessment and assess knowledge base    Interventions:  - Provide teaching at level of understanding  - Provide teaching via preferred learning methods  Outcome: Progressing

## 2021-02-03 NOTE — PHYSICAL THERAPY NOTE
PHYSICAL THERAPY EVALUATION          Patient Name: Will Helton  [id number]'[id number] Date: 2/3/2021     02/03/21 0900   PT Last Visit   PT Visit Date 02/03/21   Note Type   Note type Evaluation   Pain Assessment   Pain Assessment Tool 0-10   Pain Score 5  (5 at rest, 8 with mobility )   Pain Location/Orientation Location: Back;Orientation: Lower   Effect of Pain on Daily Activities increases   Patient's Stated Pain Goal No pain   Hospital Pain Intervention(s) Repositioned; Ambulation/increased activity; Emotional support;Elevated   Home Living   Type of [address] Two level;1/2 bath on main level;Stairs to enter with rails;Bed/bath upstairs  (5STE; full flight to 2nd floor; pts bedroom is on 1st floor)   Bathroom Shower/Tub Tub/shower unit   Bathroom Toilet Standard   Bathroom Equipment Shower chair   Home Equipment Walker;Cane  (, [facility])   Additional Comments pt reports living with  and son who are able to assist as needed; 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    Prior Function   Level of [place] Independent with ADLs and functional mobility   Lives With Spouse; Family   Receives Help From Family   ADL Assistance Independent   IADLs Independent   Falls in the last 6 months 0   Comments pt reports no use of AD/DME at baseline   Restrictions/Precautions   Weight Bearing Precautions Per Order No   Braces or Orthoses LSO  (to be worn OOB)   Other Precautions O2;Pain;Spinal precautions; Visual impairment  (1L O2, hemovac drain; wears glasses)   Cognition   Overall Cognitive Status WFL   Arousal/Participation Cooperative   Orientation Level Oriented X4   Memory Within functional limits   Following Commands Follows all commands and directions without difficulty   Comments pt pleasant to work with    RLE Assessment   RLE Assessment X   Strength RLE   RLE Overall Strength 4/5   LLE Assessment   LLE Assessment X   Strength LLE LLE Overall Strength 4/5   Bed Mobility   Supine to Sit 3  Moderate assistance   Additional items Assist x 1; Increased time required;Verbal cues  (VC for spinal precautions; UB management)   Sit to Supine Unable to assess  (pt seat in chair at conclusion of eval)   Transfers   Sit to Stand 4  Minimal assistance   Additional items Assist x 1; Increased time required;Verbal cues  (VC for hand placement)   Stand to Sit 4  Minimal assistance   Additional items Assist x 1; Increased time required;Verbal cues  (VC for hand placement)   Ambulation/Elevation   Gait pattern Decreased foot clearance; Foward flexed; Short stride   Gait Assistance 5  Supervision   Assistive Device Rolling walker   Distance 50'x2   Stair Management Assistance 4  Minimal assist   Additional items Assist x 1;Verbal cues; Increased time required  (VC for safety )   Stair Management Technique One rail R;Step to pattern;Nonreciprocal  (pt reported more difficulty going up than down stairs)   Number of Stairs 3   Balance   Static Sitting Fair -   Static Standing Fair -   Ambulatory Fair -   Endurance Deficit   Endurance Deficit Yes   Endurance Deficit Description limited by pain   Activity Tolerance   Activity Tolerance Patient tolerated treatment well   Medical Staff Made Aware Bernie Chahal PT; NIK Rousseau   Nurse Made Aware pt appropriate to see and mobilize her nursing   Assessment   Prognosis Good   Problem List Decreased strength;Decreased endurance; Impaired balance;Decreased mobility; Decreased skin integrity;Orthopedic restrictions;Pain   Assessment Pt is a 63 y o  female admitted to [facility] on 2/2/2021 with a preop dx of chronic bilateral LBP with B sciatica, lumbar radiculopathy, foraminal stenosis of lumbosacral region, and retrolisthesis of vertebrae  Pt underwent LAPAROTOMY FOR SPINAL SURGERY, Anterior lumbar interbody fusion L5-S1; Posterior lumbar laminectomy, decompression, instrumented fusion L5-S1 with allograft and neuromonitoring on 2/2/2021   Pt has lumbar spine precautions and LSO to be worn when OOB  Pt has the following comorbidities which affect her treatment: anxiety, depression, back pain, h/o SOB, and being a smoker, as well as 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 in a [facility] with full bath on 2nd floor  Pt has a high complexity clinical presentation due to being s/p surgery, spinal precautions, hemovac drain, O2 supplementation, increased reliance on an AD, decreased mobility compared to baseline, ongoing SPO2 monitoring, and pain  PT was consulted to evaluate pt's functional mobility and discharge needs  Upon evaluation, patient required ModAx1 for bed mobility with assistance required for UB management, minAx1 for sit to stand transfers, S for ambulation with RW, and MinAx1 for stairs  VC's were required for hand placement with transfers and to remind pt of spinal precautions throughout treatment  Pt's functional impairments include: decreased B LE strength, decreased balance, endurance, mobility, and increased pain   At conclusion of eval, pt remained seated in chair with phone, call bell, and all other personal needs within reach  Pt would benefit from skilled PT to address their functional mobility deficits  D/C recommendations are home with OPPT when appropriate  Barriers to Discharge None   Goals   Patient Goals to get better   STG Expiration Date 02/13/21   Short Term Goal #1 In 10 days, pt will: 1) perform bed mobility with Mod I to promote functional mobility and decrease caregiver burden  2) perform transfers with Mod I to promote functional mobility, safety, and QOL  3) ambulate 300' with Mod I and least restrictive device to promote safe return to community ambulation  4) negotiate 13 stairs with Mod I to improve ability to navigate home environment  5) improve balance grades by 1/2 to promote safety with functional mobility  6) improve strength grades by 1/2 to promote safety with functional mobility  Plan   Treatment/Interventions Functional transfer training;LE strengthening/ROM; Elevations; Therapeutic exercise; Endurance training;Patient/family training;Equipment eval/education; Bed mobility;Gait training;Spoke to nursing;OT   PT Frequency   (3-6x/wk)   Recommendation   PT Discharge Recommendation Return to previous environment with social support; Other (Comment)  (OPPT when appropriate)   Equipment Recommended Walker  (RW, Shower chair )   PT - OK to Discharge Yes   Additional Comments to home when medically cleared; OPPT when appropriate    AM-PAC Basic Mobility Inpatient   Turning in Bed Without Bedrails 4   Lying on Back to Sitting on Edge of Flat Bed 4   Moving Bed to Chair 4   Standing Up From Chair 4   Walk in Room 4   Climb 3-5 Stairs 4   Basic Mobility Inpatient Raw Score 24   Basic Mobility Standardized Score 57 68   Modified [place] Scale   Modified [place] Scale 4   Barthel Index   Feeding 10   Bathing 5   Grooming Score 5   Dressing Score 5   Bladder Score 10   Bowels Score 10   Toilet Use Score 5   Transfers (Bed/Chair) Score 5   Mobility (Level Surface) Score 0   Stairs Score 5   Barthel Index Score 60   Avtar Auguste, SPT

## 2021-02-03 NOTE — PLAN OF CARE
Problem: OCCUPATIONAL THERAPY ADULT  Goal: Performs self-care activities at highest level of function for planned discharge setting  See evaluation for individualized goals  Description: Treatment Interventions: ADL retraining, Functional transfer training, Endurance training, Patient/family training, Equipment evaluation/education, Compensatory technique education, Energy conservation, Activityengagement          See flowsheet documentation for full assessment, interventions and recommendations  Note: Limitation: Decreased ADL status, Decreased Safe judgement during ADL, Decreased endurance, Decreased self-care trans, Decreased high-level ADLs  Prognosis: Good  Assessment: PT 54 YO SEEN FOR OT EVALUATION WAS ADMITTED TO [facility] FOR LONGSTANDING BILATERAL FORAMINAL STENOSIS L5-S1 AND LATERAL RECESS STENOSIS AT L4-5  S/P ANTERIOR LUMBAR INTERBODY FUSION OF L5-S1 AND LUMBAR LAMINECTOMY DECOMPRESSION OF L4-S1 ON 2/2/21  PT HAS LUMBAR SPINE PRECAUTIONS, LSO BRACE, AND WBAT STATUS FOR BILATERAL LE  PT HAS A HISTORY OF DEPRESSION AND GERD  PT REPORTS LIVING ON THE 1ST FLOOR OF A HOUSE WITH THE SHOWER ON THE 2ND FLOOR  PT LIVES WITH FAMILY WHO CAN PROVIDE SUPPORT AS NEEDED  PT WAS INDEPENDENT WITH ADLS/IADLS AT BASELINE, AND NO LONGER DRIVES  PT CURRENTLY S FOR UB ADLS AND MIN A FOR LB ADLS  PT WAS MOD A FOR BED MOBILITY, MIN A FOR FUNCTIONAL TRANSFERS, AND S FOR FUNCTIONAL MOBILITY WITH RW  PT LIMITED BY PAIN, SPINAL PRECAUTIONS, LSO BRACE, FATIGUE, AND DECREASED ENDURANCE  PT EDUCATED ON SPINAL PRECAUTIONS, LSO BRACE MANAGEMENT, COMPENSATORY STRATEGIES, BODY MECHANICS, DME MANAGEMENT, AND ENERGY CONSERVATION TECHNIQUES  The patient's raw score on the AM-PAC Daily Activity inpatient short form is 19, standardized score is 40 22, greater than 39 4  Patients at this level are likely to benefit from DC to home  Please refer to the recommendation of the Occupational Therapist for safe DC planning   FROM OT PERSPECTIVE, D/C RECOMMENDATION HOME WITH ADDITIONAL SUPPORT  RECOMMEND USE OF SC AND LONG-HANDLED AE AS NEEDED, PT AGREEABLE  CONTINUE TO FOLLOW-UP 3-5X/WEEK TO ADDRESS THE FOLLOWING GOALS  OT Discharge Recommendation: Return to previous environment with social support  OT - OK to Discharge:  Yes

## 2021-02-03 NOTE — UTILIZATION REVIEW
Initial Clinical Review   [id number] S 02/02[facility]2022 PER ED @ [address] [facility] [phone] 01/26/2021    Elective inpatient surgical procedure  Age/Sex: 63 y o  female  Surgery Date: 2/2/21  Procedure: LAPAROTOMY FOR SPINAL SURGERY   Anterior lumbar interbody fusion L5-S1 with indirect decompression bilateral foramina L5-S1  Lumbar laminectomy decompression L4-S1 with instrumented posterolateral fusion L5-S1 with local autograft allograft and neural monitoring  Anesthesia: general    POD#1 Progress Note: 2/3: advancing diet, pain improving but persistent, c/o rizwan incisional burning  C/o subjective LLE paresthesias; Incision CDI  Pain management consulted for postop pain control, med regimen being adjusted   Oxycodone not helping       Admission Orders: Date/Time/Statement:   Admission Orders (From admission, onward)     Ordered        02/02/21 1304  INPATIENT ADMISSION  Once                   Orders Placed This Encounter   Procedures    INPATIENT ADMISSION     Standing Status:   Standing     Number of Occurrences:   1     Order Specific Question:   Level of Care     Answer:   Med Surg [16]     Order Specific Question:   Estimated length of stay     Answer:   Inpatient Only Surgery     Vital Signs: /62   Pulse 77   Temp 98 4 °F (36 9 °C)   Resp 17   Ht 5' 5" (1 651 m)   Wt 101 kg (223 lb)   SpO2 96%   BMI 37 11 kg/m²   Diet: house  Mobility: oob w/brace, WBAT  DVT Prophylaxis: ambulate  Medications/Pain Control:   Scheduled Medications:  acetaminophen, 975 mg, Oral, Q8H  ARIPiprazole, 5 mg, Oral, Daily  cefazolin, 2,000 mg, Intravenous, Q8H  docusate sodium, 100 mg, Oral, BID  DULoxetine, 120 mg, Oral, Daily  gabapentin, 300 mg, Oral, BID  heparin (porcine), 5,000 Units, Subcutaneous, Q8H LAVON  methocarbamol, 750 mg, Oral, Q6H LAVON  nicotine, 1 patch, Transdermal, Q24H  pantoprazole, 40 mg, Oral, Early Morning  senna, 1 tablet, Oral, Daily      Continuous IV Infusions:lactated ringers infusion   Rate: 100 mL/hr Dose: 100 mL/hr  Freq: Continuous Route: IV  Indications of Use: IV Hydration  Last Dose: Stopped (02/03/21 0500)  Start: 02/02/21 1330 End: 02/03/21 0154     PRN Meds:  famotidine, 20 mg, Oral, Daily PRN  HYDROmorphone, 0 5 mg, Intravenous, Q2H PRN x 2 in PACU, x 1 2/2 @1905  ondansetron, 4 mg, Intravenous, Q6H PRN  oxyCODONE, 10 mg, Oral, Q4H PRN x 1 2/2, x 3 2/3  oxyCODONE, 5 mg, Oral, Q4H PRN  traZODone, 75 mg, Oral, HS PRN        Network Utilization Review Department  ATTENTION: Please call with any questions or concerns to [phone] and carefully listen to the prompts so that you are directed to the right person  All voicemails are confidential   Cruz Hunt all requests for admission clinical reviews, approved or denied determinations and any other requests to dedicated fax number below belonging to the campus where the patient is receiving treatment   List of dedicated fax numbers for the Facilities:  [address] DENIALS (Administrative/Medical Necessity) [phone]   [address] (Maternity/NICU/Pediatrics) [phone]   [address] Lopez [facility] Joey Martinze Herman 6614 ([facility]  Robertłmarie Castaneda [facility]) [address] [place] [address] [address] [place] [address] [phone]

## 2021-02-03 NOTE — OCCUPATIONAL THERAPY NOTE
Occupational Therapy Evaluation     Patient Name: Deshawn Bunch  UVQCJ'[id number] Date: 2/3/2021  Problem List  Principal Problem:    Radiculopathy, lumbar region    Past Medical History  Past Medical History:   Diagnosis Date    Anxiety     Back pain     Depression     GERD (gastroesophageal reflux disease)     SOB (shortness of breath)      Past Surgical History  Past Surgical History:   Procedure Laterality Date    CERVICAL DISC SURGERY      CHOLECYSTECTOMY      FOOT SURGERY      LAPAROTOMY N/A 2/2/2021    Procedure: LAPAROTOMY FOR SPINAL SURGERY;  Surgeon: Laura Jennings MD;  Location: [facility];  Service: General    LUMBAR FUSION N/A 2/2/2021    Procedure: Anterior lumbar interbody fusion L5-S1; Posterior lumbar laminectomy, decompression, instrumented fusion L5-S1 with allograft and neuromonitoring;  Surgeon: Yazmin Giles MD;  Location: [facility];  Service: Orthopedics    SC EGD TRANSORAL BIOPSY SINGLE/MULTIPLE N/A 4/20/2016    Procedure: ESOPHAGOGASTRODUODENOSCOPY (EGD); Surgeon: Jb Del Valle MD;  Location:  GI LAB; Service: Gastroenterology    TONSILLECTOMY      TUBAL LIGATION      UPPER GASTROINTESTINAL ENDOSCOPY           02/03/21 0901   OT Last Visit   OT Visit Date 02/03/21   Note Type   Note type Evaluation   Restrictions/Precautions   Weight Bearing Precautions Per Order Yes   RLE Weight Bearing Per Order WBAT   LLE Weight Bearing Per Order WBAT   Braces or Orthoses LSO   Other Precautions O2;Spinal precautions; Visual impairment;Pain  (PT REPORTS BLURRY VISION SECONDARY TO MEDICATION )   Pain Assessment   Pain Assessment Tool 0-10   Pain Score 8  (5/10 AT REST, INCREASED TO 8/10 WITH ACTIVITY  )   Pain Location/Orientation Location: Back   Hospital Pain Intervention(s) Repositioned; Ambulation/increased activity; Emotional support   Home Living   Type of [address] [place] [address] Two level;1/2 bath on main level;Stairs to enter with rails  ([address] WITH FULL FLIGHT TO THE 2ND FLOOR )   Bathroom Shower/Tub Tub/shower unit   Bathroom Toilet Standard   Bathroom Equipment Shower chair   Home Equipment Walker;Cane;Wheelchair-manual;Reacher;Sock aid;Long-handled shoehorn  (PT REPORTS NOT USING DME AT BASELINE )   Prior Function   Level of [place] Independent with ADLs and functional mobility   Lives With Spouse; Family   Receives Help From Family   ADL Assistance Independent   IADLs Independent   Falls in the last 6 months 0   Vocational Retired   Comments 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  Lifestyle   Autonomy PT REPORTS BEING INDEPENDENT WITH ADLS/IADLS AT BASELINE  PT RECEIVES ASSISTANCE FOR DRIVING  Reciprocal Relationships PT REPORTS  AND SON ARE ABLE TO PROVIDE ASSISTANCE AS NEEDED  Service to Others PT IS A RETIRED   Intrinsic Gratification PT ENJOYS GARDENING  Psychosocial   Psychosocial (WDL) WDL   Subjective   Subjective "IT'S GOING TO TAKE SOME TIME TO GET USED TO THE SPINAL PRECAUTIONS "   ADL   Eating Assistance 6  Modified independent   Grooming Assistance 6  Modified Independent   UB Bathing Assistance 5  Supervision/Setup   LB Bathing Assistance 4  Minimal Assistance   UB Dressing Assistance 5  Supervision/Setup   LB Dressing Assistance 4  Minimal Assistance   Toileting Assistance  4  Minimal Assistance   Functional Assistance 4  Minimal Assistance   Additional Comments PT REPORTS THAT SHE HAS AE THAT SHE CAN USE   Bed Mobility   Supine to Sit 3  Moderate assistance   Additional items Assist x 1; Increased time required;Verbal cues   Sit to Supine Unable to assess  (PT LEFT SEATED IN CHAIR WITH ALL NEEDS IN REACH )   Transfers   Sit to Stand 4  Minimal assistance   Additional items Assist x 1; Increased time required;Verbal cues   Stand to Sit 4  Minimal assistance   Additional items Assist x 1; Increased time required;Verbal cues   Functional Mobility   Functional Mobility 5  Supervision   Additional items Rolling walker   Balance Static Sitting Fair -   Static Standing Fair -   Ambulatory Fair -   Activity Tolerance   Activity Tolerance Patient tolerated treatment well   Medical Staff Made Aware ROSENDA, OT; , PT; SPT   Nurse Made Aware PT APPROPRIATE TO SEE PER NURSING   RUE Assessment   RUE Assessment WFL   LUE Assessment   LUE Assessment WFL   Cognition   Overall Cognitive Status WFL   Arousal/Participation Alert; Cooperative   Attention Attends with cues to redirect   Orientation Level Oriented X4   Memory Within functional limits   Following Commands Follows all commands and directions without difficulty   Comments PT WAS PLEASANT AND COOPERATIVE  PT WAS ABLE TO RECALL PRECAUTIONS, HOWEVER NEEDED VC TO FOLLOW PRECAUTIONS  Assessment   Limitation Decreased ADL status; Decreased Safe judgement during ADL;Decreased endurance;Decreased self-care trans;Decreased high-level ADLs   Prognosis Good   Assessment PT 54 YO SEEN FOR OT EVALUATION WAS ADMITTED TO [facility] FOR LONGSTANDING BILATERAL FORAMINAL STENOSIS L5-S1 AND LATERAL RECESS STENOSIS AT L4-5  S/P ANTERIOR LUMBAR INTERBODY FUSION OF L5-S1 AND LUMBAR LAMINECTOMY DECOMPRESSION OF L4-S1 ON 2/2/21  PT HAS LUMBAR SPINE PRECAUTIONS, LSO BRACE, AND WBAT STATUS FOR BILATERAL LE  PT HAS A HISTORY OF DEPRESSION AND GERD  PT REPORTS LIVING ON THE 1ST FLOOR OF A HOUSE WITH THE SHOWER ON THE 2ND FLOOR  PT LIVES WITH FAMILY WHO CAN PROVIDE SUPPORT AS NEEDED  PT WAS INDEPENDENT WITH ADLS/IADLS AT BASELINE, AND NO LONGER DRIVES  PT CURRENTLY S FOR UB ADLS AND MIN A FOR LB ADLS  PT WAS MOD A FOR BED MOBILITY, MIN A FOR FUNCTIONAL TRANSFERS, AND S FOR FUNCTIONAL MOBILITY WITH RW  PT LIMITED BY PAIN, SPINAL PRECAUTIONS, LSO BRACE, FATIGUE, AND DECREASED ENDURANCE  PT EDUCATED ON SPINAL PRECAUTIONS, LSO BRACE MANAGEMENT, COMPENSATORY STRATEGIES, BODY MECHANICS, DME MANAGEMENT, AND ENERGY CONSERVATION TECHNIQUES   The patient's raw score on the AM-PAC Daily Activity inpatient short form is 19, standardized score is 40 22, greater than 39 4  Patients at this level are likely to benefit from DC to home  Please refer to the recommendation of the Occupational Therapist for safe DC planning  FROM OT PERSPECTIVE, D/C RECOMMENDATION HOME WITH ADDITIONAL SUPPORT  RECOMMEND USE OF SC AND LONG-HANDLED AE AS NEEDED, PT AGREEABLE  CONTINUE TO FOLLOW-UP 3-5X/WEEK TO ADDRESS THE FOLLOWING GOALS  Goals   Patient Goals TO GO HOME   Plan   Treatment Interventions ADL retraining;Functional transfer training; Endurance training;Patient/family training;Equipment evaluation/education; Compensatory technique education; Energy conservation; Activityengagement   Goal Expiration Date 02/17/21   OT Frequency 3-5x/wk   Recommendation   OT Discharge Recommendation Return to previous environment with social support   OT - OK to Discharge Yes   AM-PAC Daily Activity Inpatient   Lower Body Dressing 3   Bathing 3   Toileting 3   Upper Body Dressing 3   Grooming 3   Eating 4   Daily Activity Raw Score 19   Daily Activity Standardized Score (Calc for Raw Score >=11) 40 22   AM-PAC Applied Cognition Inpatient   Following a Speech/Presentation 4   Understanding Ordinary Conversation 4   Taking Medications 4   Remembering Where Things Are Placed or Put Away 4   Remembering List of 4-5 Errands 4   Taking Care of Complicated Tasks 4   Applied Cognition Raw Score 24   Applied Cognition Standardized Score 62 21   Modified [place] Scale   Modified [place] Scale 3     OT GOALS:    1  PT WILL DEMONSTRATE G CARRY OVER OF LEARNED LSO BRACE MANAGEMENT AND SPINAL PRECAUTIONS TO ENGAGE IN ADL ACTIVITIES  2  PT WILL COMPLETE BED MOBILITY AT MOD I LEVEL TO ENGAGE IN ADL ACTIVITIES AT EOB  3  PT WILL COMPLETE FUNCTIONAL TRANSFERS AT MOD I LEVEL TO ENGAGE IN ADLS DEMONSTRATING G BALANCE AND CARRY OVER OF LEARNED BODY MECHANICS  4  PT WILL COMPLETE FUNCTIONAL MOBILITY AT MOD I LEVEL TO ENGAGE IN ADLS DEMONSTRATING G BALANCE      5  PT WILL COMPLETE ADLS SITTING IN CHAIR WITH AE AT MOD I LEVEL DEMONSTRATING G BALANCE AND CARRY OVER OF LEARNED COMPENSATORY STRATEGIES AND SPINAL PRECAUTIONS  6  PT WILL TOLERATE ACTIVITY FOR 30 MIN TO COMPLETE IADL ACTIVITY DEMONSTRATING G CARRY OVER OF LEARNED ENERGY CONSERVATION TECHNIQUES  7  PT WILL ENGAGE IN IADL ACTIVITY DEMONSTRATING G CARRY OVER OF LEARNED ENERGY CONSERVATION TECHNIQUES        Basilia Broussard, OTS

## 2021-02-03 NOTE — PLAN OF CARE
Problem: PHYSICAL THERAPY ADULT  Goal: Performs mobility at highest level of function for planned discharge setting  See evaluation for individualized goals  Description: Treatment/Interventions: Functional transfer training, LE strengthening/ROM, Elevations, Therapeutic exercise, Endurance training, Patient/family training, Equipment eval/education, Bed mobility, Gait training, Spoke to nursing, OT  Equipment Recommended: Walker(RW, Shower chair )       See flowsheet documentation for full assessment, interventions and recommendations  Note: Prognosis: Good  Problem List: Decreased strength, Decreased endurance, Impaired balance, Decreased mobility, Decreased skin integrity, Orthopedic restrictions, Pain  Assessment: (P) Pt is a 63 y o  female admitted to [facility] on 2/2/2021 with a preop dx of chronic bilateral LBP with B sciatica, lumbar radiculopathy, foraminal stenosis of lumbosacral region, and retrolisthesis of vertebrae  Pt underwent LAPAROTOMY FOR SPINAL SURGERY, Anterior lumbar interbody fusion L5-S1; Posterior lumbar laminectomy, decompression, instrumented fusion L5-S1 with allograft and neuromonitoring on 2/2/2021  Pt has lumbar spine precautions and LSO to be worn when OOB  Pt has the following comorbidities which affect her treatment: anxiety, depression, back pain, h/o SOB, and being a smoker, as well as 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 in a [facility] with full bath on 2nd floor  Pt has a high complexity clinical presentation due to being s/p surgery, spinal precautions, hemovac drain, O2 supplementation, increased reliance on an AD, decreased mobility compared to baseline, ongoing SPO2 monitoring, and pain  PT was consulted to evaluate pt's functional mobility and discharge needs  Upon evaluation, patient required ModAx1 for bed mobility with assistance required for UB management, minAx1 for sit to stand transfers, S for ambulation with RW, and MinAx1 for stairs  VC's were required for hand placement with transfers and to remind pt of spinal precautions throughout treatment  Pt's functional impairments include: decreased B LE strength, decreased balance, endurance, mobility, and increased pain   At conclusion of eval, pt remained seated in chair with phone, call bell, and all other personal needs within reach  Pt would benefit from skilled PT to address their functional mobility deficits  D/C recommendations are home with OPPT when appropriate  Barriers to Discharge: None     PT Discharge Recommendation: Return to previous environment with social support, Other (Comment)(OPPT when appropriate)     PT - OK to Discharge: Yes    See flowsheet documentation for full assessment

## 2021-02-03 NOTE — PROGRESS NOTES
Progress Note - General Surgery   Damian Reeder 63 y o  female MRN: [id number]  Unit/Bed#: [facility] 536-13 Encounter: [id number]    Assessment:  Patient is a 63 y o  female s/p ALIF L5-S1 on this date 2/2/2021  AVSS    Plan:  Diet- Advance to regular diet today as tolerated   Pain Control per primary team   DVT PPX per primary team   PT/OT    Subjective/Objective     Subjective:   No acute events overnight  Pain improving but still has burning sensation around incision  Pertinent review of systems as above  All other review of systems negative  Objective:    Blood pressure 106/62, pulse 77, temperature 98 4 °F (36 9 °C), resp  rate 17, height 5' 5" (1 651 m), weight 101 kg (223 lb), SpO2 96 %, not currently breastfeeding  ,Body mass index is 37 11 kg/m²  Intake/Output Summary (Last 24 hours) at 2/3/2021 0748  Last data filed at 2/3/2021 0618  Gross per 24 hour   Intake 5653 33 ml   Output 3320 ml   Net 2333 33 ml       Invasive Devices     Peripheral Intravenous Line            Peripheral IV 02/02/21 Left Forearm 1 day    Peripheral IV 02/02/21 Right Forearm 1 day          Drain            Closed/Suction Drain Right Back Accordion 10 Fr  less than 1 day    Urethral Catheter Latex 16 Fr  less than 1 day                Physical Exam:   Gen:  NAD  HEENT: NCAT  MMM  CV: well perfused, pulses palpable  Lungs: Normal respiratory effort  Abd: soft, nt/nd, anterior inferior incision clean dry and intact with no erythema or fluctuance   Skin: warm/ dry  Extremities: no peripheral edema, no clubbing or cyanosis  Neuro:  AxO x3      Results from last 7 days   Lab Units 02/03/21  0611   WBC Thousand/uL 11 14*   HEMOGLOBIN g/dL 10 4*   HEMATOCRIT % 32 6*   PLATELETS Thousands/uL 209     Results from last 7 days   Lab Units 02/03/21  0611   POTASSIUM mmol/L 4 0   CHLORIDE mmol/L 109*   CO2 mmol/L 28   BUN mg/dL 12   CREATININE mg/dL 0 66   CALCIUM mg/dL 8 7            I have personally reviewed pertinent films in PACS     Medications:   Scheduled Meds:  Current Facility-Administered Medications   Medication Dose Route Frequency Provider Last Rate    acetaminophen  975 mg Oral Q8H [place], [place]      ARIPiprazole  5 mg Oral Daily [place], [place]      cefazolin  2,000 mg Intravenous Q8H [place], [place] Stopped (02/03/21 4748)    docusate sodium  100 mg Oral BID Maribell Bowman PA-C      DULoxetine  120 mg Oral Daily [place], [place]      famotidine  20 mg Oral Daily PRN Maribell Bowman PA-C      gabapentin  300 mg Oral BID sergei Bowman PA-C      heparin (porcine)  5,000 Units Subcutaneous [place], [place]      HYDROmorphone  0 5 mg Intravenous Q2H PRN [place], [place]      methocarbamol  750 mg Oral Q6H [place] , [place]      nicotine  1 patch Transdermal Q24H [place], [place]      ondansetron  4 mg Intravenous Q6H PRN [place], [place]      oxyCODONE  10 mg Oral Q4H PRN [place], [place]      oxyCODONE  5 mg Oral Q4H PRN Maribell Bowman PA-C      pantoprazole  40 mg Oral Early Morning [place], [place]      senna  1 tablet Oral Daily [place], [place]      traZODone  75 mg Oral HS PRN Maribell Bowman PA-C       Continuous Infusions:   PRN Meds:  famotidine, 20 mg, Daily PRN  HYDROmorphone, 0 5 mg, Q2H PRN  ondansetron, 4 mg, Q6H PRN  oxyCODONE, 10 mg, Q4H PRN  oxyCODONE, 5 mg, Q4H PRN  traZODone, 75 mg, HS PRN      VTE Pharmacologic Prophylaxis: Heparin  VTE Mechanical Prophylaxis: sequential compression device and foot pump applied

## 2021-02-03 NOTE — CASE MANAGEMENT
Not a readmission  Not a bundle  Met with pt & explained CM role  Pt lives with , son & mother-in-law in 2 sty home with 5 li  Bedroom on 2nd  Bath on both  Reports was IPTA  Caregiver to mother-in-law  Does not drive  Has 2 sons whom transport  DME cane, rw, w/c & shower chair  NO h/o vna or rhb  Has h/o IP psych for suicidal attempt in July 2020  Goes ot OP therapy now  Denies any D&A use  Uses [facility]  Emergency contact,  Marv Lew [phone]  Son to transport home  CM reviewed d/c planning process including the following: identifying help at home, patient preference for d/c planning needs, Discharge Lounge, [facility] Meds to Bed program, availability of treatment team to discuss questions or concerns patient and/or family may have regarding understanding medications and recognizing signs and symptoms once discharged  CM also encouraged patient to follow up with all recommended appointments after discharge  Patient advised of importance for patient and family to participate in managing patients medical well being  Informed by therapy pt is recommended OPPT  List of facilities provided  Pt will ask son to transport

## 2021-02-03 NOTE — OP NOTE
OPERATIVE REPORT  PATIENT NAME: Kelle Vo    :  1965  MRN: [id number]  Pt Location: BE OR ROOM 18    SURGERY DATE: 2021    Surgeon(s) and Role:  Panel 1:     * Vitaliy Mo MD - Primary     * Jayce Guerrier MD - Assisting  Panel 2:     * Radha Pena MD - Primary     * Brayan Aguirre MD - [place], [place] - Assisting    Preop Diagnosis:  Chronic bilateral low back pain with bilateral sciatica [M54 42, M54 41, G89 29]  Lumbar radiculopathy [M54 16]  Foraminal stenosis of lumbosacral region [M48 07]  Retrolisthesis of vertebrae [M43 10]    Post-Op Diagnosis Codes:     * Chronic bilateral low back pain with bilateral sciatica [M54 42, M54 41, G89 29]     * Lumbar radiculopathy [M54 16]     * Foraminal stenosis of lumbosacral region [M48 07]     * Retrolisthesis of vertebrae [M43 10]    Procedure(s) (LRB):  LAPAROTOMY FOR SPINAL SURGERY (N/A)  Anterior lumbar interbody fusion L5-S1; Posterior lumbar laminectomy, decompression, instrumented fusion L5-S1 with allograft and neuromonitoring (N/A)    Specimen(s):  * No specimens in log *    Estimated Blood Loss:   150 mL    Drains:  Closed/Suction Drain Right Back Accordion 10 Fr  (Active)   Site Description Unable to view 21   Dressing Status Clean;Dry; Intact 21   Drainage Appearance Bloody 21   Status Accordion suction 21   Output (mL) 140 mL 21 0618   Number of days: 1       Urethral Catheter Latex 16 Fr   (Active)   Reasons to continue Urinary Catheter  Post-operative urological requirements 21   Goal for Removal Remove POD#1 21   Site Assessment Clean;Skin intact 21   Collection Container Standard drainage bag 21   Securement Method Other (Comment) 21   Output (mL) 1450 mL 21 0618   Number of days: 1       Anesthesia Type:   General    Operative Indications:  Chronic bilateral low back pain with bilateral sciatica [M54 42, M54 41, G89 29]  Lumbar radiculopathy [M54 16]  Foraminal stenosis of lumbosacral region [M48 07]  Retrolisthesis of vertebrae [M43 10]      Operative Findings:    Normal vascular anatomy    Complications:   None    Procedure and Technique:   the patient was identified by visualization and conversation on the operating room table  After satisfactory induction general anesthesia, Gómez catheter was placed  Fluoroscopy was used and the approximate location of the L5-S1 disc space was identified  Patient's abdomen was then prepped and draped in usual sterile fashion  Time-out taken  Left lower abdominal paramedian incision was made through skin with knife  The Bovie was used to carry dissection down through subcutaneous tissue to the level of the fascia which was that dissected free and cleared off  The Bovie was used and incised the fascia overlying the muscle  The medial edge of the fascia was grasped with Kocher clamps  Blunt dissection was then carried out underneath the rectus muscle and a Camacho retractor was placed  Further blunt dissection was carried out towards the pelvis and laterally to the iliac vessels came into view  The viscera   Were swept medially off the vessels  The Ramirez retractor was then placed  The iliac vessels on the left side were identified     Blunt dissection with peanut dissected was carried down towards the spine  There is a small branch off the common iliac vein that was clipped  There was other small vessels going across the spine which were cauterized with bipolar cautery  What appeared to be the middle sacral artery was identified and cauterized with bipolar and then divided  Further blunt dissection was carried out onto the spine and spinal needle placed in the disc space and confirmation of the L5-S1 disc space was done  Using further blunt dissection the vessels were breast off of the spine    The Ramirez retractor was readjusted to hold things out of the way  Dr Donald Dee came in did his portion of procedure  I stayed to assist throughout the entire portion of his case  Once he was finished, the retractor was removed and the viscera allowed to fall back in a proper orientation  No further bleeding was seen  The fascia was then closed with a running 0 PDS suture  Subcutaneous tissue closed with running  2 0 Vicryl  Skin closed with subcuticular 4 Monocryl  Dressing was applied  Instrument needle sponge count  Was correct  RF wanding   Was clear  Patient remained in the OR for Dr Anais Hobbs 2nd procedure   I was present for the entire procedure, I was present for all critical portions of the procedure and A co-surgeon was required because of skills and techniques relevant to speciality    Patient Disposition:  patient   Remained in the OR for planned surgery as a staged procedure    SIGNATURE: Amalia Duong MD  DATE: February 3, 2021  TIME: 8:52 AM

## 2021-02-03 NOTE — PROGRESS NOTES
Subjective: No acute events overnight  No acute distress  Burning pain in low back  Subjective paresthesias in left leg           Lab Results   Component Value Date/Time    WBC 6 63 01/18/2021 08:47 AM    WBC 8 17 07/27/2015 07:10 AM    HGB 13 4 01/18/2021 08:47 AM    HGB 13 2 07/27/2015 07:10 AM       Vitals:    02/03/21 0251   BP: 118/64   Pulse: 67   Resp: 17   Temp: 98 4 °F (36 9 °C)   SpO2: 98%     Musculoskeletal: Spine   Dressing C/D/I   Drain in place   Motor and sensation grossly intact L3-S1  Toes warm and pink      Assessment: 63 y o  female post op day #1 from ALIF L5-S1, laminectomy, decompression, L4-S1 with instrumented posterolateral fusion     Plan:  WBAT in LSO when out of bed   Pain control  DVT ppx- subcutaneous heparin  Ancef while drain in  Drain check  PT/OT  Will continue to assess for acute blood loss anemia  Dispo: pending pain/PT/Drain

## 2021-02-03 NOTE — CONSULTS
Consult Note- Acute Pain Service   Alfred Damian 63 y o  female MRN: [id number]  Unit/Bed#: [facility] 619-01 Encounter: [id number]               Assessment/Plan     Assessment:   Patient Active Problem List   Diagnosis    Lower back pain    Greater trochanteric bursitis of left hip    Labral tear of hip, degenerative    Lumbar disc herniation    Cigarette nicotine dependence with nicotine-induced disorder    Depression    Pain of both hip joints    Gastroesophageal reflux disease    Screening for breast cancer    Medical clearance for psychiatric admission    Family history of brain aneurysm    MDD (major depressive disorder), recurrent severe, without psychosis ([facility] [address] )    Anxiety    [facility]    Smoking    Radiculopathy, lumbar region    Lumbar spondylosis    Spinal stenosis of lumbar region    Sacroiliitis [facility]    Chronic pain syndrome      Alfred Damian is a 63 y o  female  with past medical history significant for GERD, MDD, nicotine dependence, episodic methamphetamine use with last use 1 year ago, chronic pain syndrome in the setting of lumbar disc herniation with spinal stenosis and lumbar radiculopathy who presents for laparotomy for spinal surgery, anterior lumbar interbody fusion L5-S1, posterior lumbar laminectomy, decompression and instrumented fusion L5-S1 with allograft and neuro monitoring which was performed on 09/67/9955 without complication  Acute Pain Service consulted for postoperative pain control  Patient refers oxycodone does not relieve her pain  She states IV hydromorphone does improve pain significantly  Plan:   - continue acetaminophen 975 mg p o  Q 8 hours scheduled  - discontinue oxycodone  - start hydromorphone 2 mg p o  Q 4 hours p r n  For moderate pain  - start hydromorphone 4 mg p o  Q 4 hours p r n  For severe pain  - decrease frequency of hydromorphone 0 5 mg IV to q 4 hours p r n   For breakthrough pain (consider discontinuing 2/4)  - monitor hemodynamically - if BP would decrease with opioid rotation, can switch back to oxycodone products or other opioid  - continue duloxetine 120 mg p o  Daily  - decrease gabapentin to 100 mg p o  T i d  (not a home med)  - continue methocarbamol 750 mg p o  Q 6 hours scheduled  - start lidocaine patch x2 to lower back 12 hours on/12 hours off (avoid incision site)  - bowel regimen with senna and docusate scheduled, would add MiraLax daily p r n  APS will continue to follow  Please call  / [phone] or [facility]race Acute Pain Service - [facility] (/ between [phone] and on weekends) with questions or concerns  History of Present Illness    Admit Date:  2/2/2021  Hospital Day:  1 day  Primary Service:  Orthopedic Surgery  Attending Provider:  Ro Andrews MD  Reason for Consult / Principal Problem: Post op pain  HPI: Florence Ta is a 63 y o  female  with past medical history significant for GERD, MDD, nicotine dependence, episodic methamphetamine use with last use around 1 year ago, chronic pain syndrome in the setting of lumbar disc herniation with spinal stenosis and lumbar radiculopathy who presents for laparotomy for spinal surgery, anterior lumbar interbody fusion L5-S1, posterior lumbar laminectomy, decompression and instrumented fusion L5-S1 with allograft and neuro monitoring which was performed on 67/63/6639 without complication  Postoperatively, patient had been mildly hypotensive but this has improved  Otherwise she has been hemodynamically stable and afebrile  She has been requiring some oxygen by nasal cannula  Most recent laboratory studies significant for creatinine 0 66, WBC 11 14, hemoglobin 10 4, platelet count 495, INR 0 91, PTT 30  X-ray lumbar spine pending  Most recent EKG with normal sinus rhythm,        Current pain location(s): midline abdomen, low back rad to post LLE  Pain Scale:   7-9  Quality: sharp, pins/needles LLE  Current Analgesic regimen:    Acetaminophen 975 mg p o  Q 8 hours scheduled  Oxycodone 5 mg p o  Q 4 hours p r n  For moderate pain  Oxycodone 10 mg p o  Q 4 hours p r n  For severe pain  Hydromorphone 0 5 mg IV q 2 hours p r n  For breakthrough pain  Duloxetine 120 mg p o  Daily  Gabapentin 300 mg p o  B i d   Methocarbamol 750 mg p o  Q 6 hours scheduled    Pain History:  Patient does follow with pain management provider for lumbar radiculopathy, lumbar disc herniation, lumbar spondylosis and spinal stenosis of lumbar region with neurogenic claudication  She has had L4 and L5 left-sided TFESI without relief  She is also on duloxetine as well as ibuprofen daily at home  She is also on acetaminophen as needed  Pain Management Provider:  DO SEN Bermudez SPA  I have reviewed the patient's controlled substance dispensing history in the Prescription Drug Monitoring Program in compliance with the [facility] regulations before prescribing any controlled substances  Fill Date ID   Written Drug Qty Days Prescriber Rx # Pharmacy Refill   Daily Dose* Pymt Type      02/02/2021  1   02/02/2021  Oxycodone Hcl 5 MG Tablet  30 00  5 Ke Del   [id number]   St (9996)   0  45 00 MME  [place]         Inpatient consult to Acute Pain Service  Consult performed by: Cesar Maya MD  Consult ordered by: Ann Barcenas PA-C          Review of Systems   Constitutional: Negative for appetite change, chills, diaphoresis, fatigue, fever and unexpected weight change  HENT: Negative for sore throat  Eyes: Negative for visual disturbance  Respiratory: Negative for cough, chest tightness, shortness of breath and wheezing  Cardiovascular: Negative for chest pain, palpitations and leg swelling  Gastrointestinal: Positive for abdominal pain  Negative for abdominal distention, blood in stool, diarrhea, nausea and vomiting  Last BM 2/1   Genitourinary: Negative for difficulty urinating, flank pain and urgency  Musculoskeletal: Positive for back pain and myalgias  Negative for arthralgias  Skin: Negative for pallor and rash  Neurological: Negative for dizziness, weakness, light-headedness and headaches  Historical Information   Past Medical History:   Diagnosis Date    Anxiety     Back pain     Depression     GERD (gastroesophageal reflux disease)     SOB (shortness of breath)      Past Surgical History:   Procedure Laterality Date    CERVICAL DISC SURGERY      CHOLECYSTECTOMY      FOOT SURGERY      GA EGD TRANSORAL BIOPSY SINGLE/MULTIPLE N/A 4/20/2016    Procedure: ESOPHAGOGASTRODUODENOSCOPY (EGD); Surgeon: Jb Del Valle MD;  Location:  GI LAB; Service: Gastroenterology    TONSILLECTOMY      TUBAL LIGATION      UPPER GASTROINTESTINAL ENDOSCOPY       Social History   Social History     Substance and Sexual Activity   Alcohol Use No    Frequency: Never    Drinks per session: Patient refused    Binge frequency: Never     Social History     Substance and Sexual Activity   Drug Use Yes    Types: Oxycodone, Methamphetamines    Comment: 7/18/2020 last use     Social History     Tobacco Use   Smoking Status Current Every Day Smoker    Packs/day: 1 00    Types: Cigarettes   Smokeless Tobacco Never Used   Tobacco Comment    started patch 1/2021, still using 3/4 ppd        Family History:   Family History   Problem Relation Age of Onset    Aneurysm Mother     Hypertension Mother     Heart attack Father     Aneurysm Sister     Aneurysm Brother     Mental illness Son        Meds/Allergies   all current active meds have been reviewed, current meds:   Current Facility-Administered Medications   Medication Dose Route Frequency    acetaminophen (TYLENOL) tablet 975 mg  975 mg Oral Q8H    ARIPiprazole (ABILIFY) tablet 5 mg  5 mg Oral Daily    ceFAZolin (ANCEF) IVPB (premix in dextrose) 2,000 mg 50 mL  2,000 mg Intravenous Q8H    docusate sodium (COLACE) capsule 100 mg  100 mg Oral BID    DULoxetine (CYMBALTA) delayed release capsule 120 mg  120 mg Oral Daily    famotidine (PEPCID) tablet 20 mg  20 mg Oral Daily PRN    gabapentin (NEURONTIN) capsule 300 mg  300 mg Oral BID    heparin (porcine) subcutaneous injection 5,000 Units  5,000 Units Subcutaneous Q8H [address]    HYDROmorphone (DILAUDID) injection 0 5 mg  0 5 mg Intravenous Q2H PRN    methocarbamol (ROBAXIN) tablet 750 mg  750 mg Oral Q6H [address]    nicotine (NICODERM CQ) 21 mg/24 hr TD 24 hr patch 1 patch  1 patch Transdermal Q24H    ondansetron (ZOFRAN) injection 4 mg  4 mg Intravenous Q6H PRN    oxyCODONE (ROXICODONE) immediate release tablet 10 mg  10 mg Oral Q4H PRN    oxyCODONE (ROXICODONE) IR tablet 5 mg  5 mg Oral Q4H PRN    pantoprazole (PROTONIX) EC tablet 40 mg  40 mg Oral Early Morning    senna (SENOKOT) tablet 8 6 mg  1 tablet Oral Daily    traZODone (DESYREL) tablet 75 mg  75 mg Oral HS PRN    and PTA meds:   Prior to Admission Medications   Prescriptions Last Dose Informant Patient Reported? Taking?    ARIPiprazole (ABILIFY) 2 mg tablet 2/2/2021 at 0430 Self Yes Yes   Sig: Take 5 mg by mouth daily    DULoxetine (CYMBALTA) 60 mg delayed release capsule 2/2/2021 at 0430  Yes Yes   Sig: Take 120 mg by mouth daily   acetaminophen (TYLENOL) 500 mg tablet Past Week at Unknown time Self Yes Yes   Sig: Take 500 mg by mouth every 6 (six) hours as needed for mild pain   famotidine (PEPCID) 20 mg tablet More than a month at Unknown time Self Yes No   Sig: Take 20 mg by mouth as needed for heartburn   ibuprofen (MOTRIN) 200 mg tablet Past Month at Unknown time  Yes Yes   Sig: Take by mouth every 6 (six) hours as needed for mild pain   nicotine (NICODERM CQ) 21 mg/24 hr TD 24 hr patch 2/1/2021 at Unknown time  No Yes   Sig: Place 1 patch on the skin every 24 hours   pantoprazole (PROTONIX) 40 mg tablet 2/2/2021 at 0430 Self No Yes   Sig: Take 1 tablet (40 mg total) by mouth daily   traZODone (DESYREL) 50 mg tablet 1/29/2021 at Unknown time Self No Yes   Sig: Take 1 tablet (50 mg total) by mouth daily at bedtime as needed for sleep   Patient taking differently: Take 75 mg by mouth daily at bedtime as needed for sleep (Takes 75 mg po at HS)       Facility-Administered Medications: None       No Known Allergies    Objective   Temp:  [97 1 °F (36 2 °C)-99 5 °F (37 5 °C)] 98 4 °F (36 9 °C)  HR:  [] 77  Resp:  [13-21] 17  BP: ()/(45-81) 106/62    Intake/Output Summary (Last 24 hours) at 2/3/2021 0856  Last data filed at 2/3/2021 0815  Gross per 24 hour   Intake 5653 33 ml   Output 3670 ml   Net 1983 33 ml       Physical Exam  Vitals signs and nursing note reviewed  Constitutional:       Appearance: Normal appearance  She is not ill-appearing or toxic-appearing  HENT:      Head: Normocephalic and atraumatic  Eyes:      General: No scleral icterus  Conjunctiva/sclera: Conjunctivae normal    Cardiovascular:      Rate and Rhythm: Normal rate  Pulmonary:      Effort: Pulmonary effort is normal  No respiratory distress  Abdominal:      Tenderness: There is abdominal tenderness  Musculoskeletal:         General: Tenderness (low back) present  Skin:     General: Skin is warm and dry  Neurological:      Mental Status: She is alert  Comments: Awake, alert and oriented to person place time situation   Psychiatric:         Thought Content: Thought content normal          Lab Results:   I have personally reviewed pertinent labs  , CBC:   Lab Results   Component Value Date    WBC 11 14 (H) 02/03/2021    HGB 10 4 (L) 02/03/2021    HCT 32 6 (L) 02/03/2021    MCV 99 (H) 02/03/2021     02/03/2021    MCH 31 4 02/03/2021    MCHC 31 9 02/03/2021    RDW 13 2 02/03/2021    MPV 10 5 02/03/2021    NRBC 0 02/03/2021   , CMP:   Lab Results   Component Value Date    SODIUM 140 02/03/2021    K 4 0 02/03/2021     (H) 02/03/2021    CO2 28 02/03/2021    BUN 12 02/03/2021    CREATININE 0 66 02/03/2021    CALCIUM 8 7 02/03/2021    EGFR 99 02/03/2021   , BMP:  Lab Results   Component Value Date    SODIUM 140 02/03/2021    K 4 0 02/03/2021     (H) 02/03/2021    CO2 28 02/03/2021    BUN 12 02/03/2021    CREATININE 0 66 02/03/2021    GLUC 102 02/03/2021    CALCIUM 8 7 02/03/2021    AGAP 3 (L) 02/03/2021    EGFR 99 02/03/2021   , PT/PTT:No results found for: PT, PTT    Imaging Studies: I have personally reviewed pertinent reports  EKG, Pathology, and Other Studies: I have personally reviewed pertinent reports  Please note that the APS provides consultative services regarding pain management only  With the exception of ketamine and epidural infusions and except when indicated, final decisions regarding starting or changing doses of analgesic medications are at the discretion of the consulting service  Off hours consultation and/or medication management is generally not available      Filiberto Montalvo MD  Acute Pain Service

## 2021-02-03 NOTE — PLAN OF CARE
Problem: Prexisting or High Potential for Compromised Skin Integrity  Goal: Skin integrity is maintained or improved  Description: INTERVENTIONS:  - Identify patients at risk for skin breakdown  - Assess and monitor skin integrity  - Assess and monitor nutrition and hydration status  - Monitor labs   - Assess for incontinence   - Turn and reposition patient  - Assist with mobility/ambulation  - Relieve pressure over bony prominences  - Avoid friction and shearing  - Provide appropriate hygiene as needed including keeping skin clean and dry  - Evaluate need for skin moisturizer/barrier cream  - Collaborate with interdisciplinary team   - Patient/family teaching  - Consider wound care consult   Outcome: Progressing     Problem: Potential for Falls  Goal: Patient will remain free of falls  Description: INTERVENTIONS:  - Assess patient frequently for physical needs  -  Identify cognitive and physical deficits and behaviors that affect risk of falls    -  [place] fall precautions as indicated by assessment   - Educate patient/family on patient safety including physical limitations  - Instruct patient to call for assistance with activity based on assessment  - Modify environment to reduce risk of injury  - Consider OT/PT consult to assist with strengthening/mobility  Outcome: Progressing     Problem: PAIN - ADULT  Goal: Verbalizes/displays adequate comfort level or baseline comfort level  Description: Interventions:  - Encourage patient to monitor pain and request assistance  - Assess pain using appropriate pain scale  - Administer analgesics based on type and severity of pain and evaluate response  - Implement non-pharmacological measures as appropriate and evaluate response  - Consider cultural and social influences on pain and pain management  - Notify physician/advanced practitioner if interventions unsuccessful or patient reports new pain  Outcome: Progressing     Problem: INFECTION - ADULT  Goal: Absence or prevention of progression during hospitalization  Description: INTERVENTIONS:  - Assess and monitor for signs and symptoms of infection  - Monitor lab/diagnostic results  - Monitor all insertion sites, i e  indwelling lines, tubes, and drains  - Monitor endotracheal if appropriate and nasal secretions for changes in amount and color  - [place] appropriate cooling/warming therapies per order  - Administer medications as ordered  - Instruct and encourage patient and family to use good hand hygiene technique  - Identify and instruct in appropriate isolation precautions for identified infection/condition  Outcome: Progressing     Problem: SAFETY ADULT  Goal: Patient will remain free of falls  Description: INTERVENTIONS:  - Assess patient frequently for physical needs  -  Identify cognitive and physical deficits and behaviors that affect risk of falls    -  [place] fall precautions as indicated by assessment   - Educate patient/family on patient safety including physical limitations  - Instruct patient to call for assistance with activity based on assessment  - Modify environment to reduce risk of injury  - Consider OT/PT consult to assist with strengthening/mobility  Outcome: Progressing  Goal: Maintain or return to baseline ADL function  Description: INTERVENTIONS:  -  Assess patient's ability to carry out ADLs; assess patient's baseline for ADL function and identify physical deficits which impact ability to perform ADLs (bathing, care of mouth/teeth, toileting, grooming, dressing, etc )  - Assess/evaluate cause of self-care deficits   - Assess range of motion  - Assess patient's mobility; develop plan if impaired  - Assess patient's need for assistive devices and provide as appropriate  - Encourage maximum independence but intervene and supervise when necessary  - Involve family in performance of ADLs  - Assess for home care needs following discharge   - Consider OT consult to assist with ADL evaluation and planning for discharge  - Provide patient education as appropriate  Outcome: Progressing  Goal: Maintain or return mobility status to optimal level  Description: INTERVENTIONS:  - Assess patient's baseline mobility status (ambulation, transfers, stairs, etc )    - Identify cognitive and physical deficits and behaviors that affect mobility  - Identify mobility aids required to assist with transfers and/or ambulation (gait belt, sit-to-stand, lift, walker, cane, etc )  - [place] fall precautions as indicated by assessment  - Record patient progress and toleration of activity level on Mobility SBAR; progress patient to next Phase/Stage  - Instruct patient to call for assistance with activity based on assessment  - Consider rehabilitation consult to assist with strengthening/weightbearing, etc   Outcome: Progressing     Problem: DISCHARGE PLANNING  Goal: Discharge to home or other facility with appropriate resources  Description: INTERVENTIONS:  - Identify barriers to discharge w/patient and caregiver  - Arrange for needed discharge resources and transportation as appropriate  - Identify discharge learning needs (meds, wound care, etc )  - Arrange for interpretive services to assist at discharge as needed  - Refer to Case Management Department for coordinating discharge planning if the patient needs post-hospital services based on physician/advanced practitioner order or complex needs related to functional status, cognitive ability, or social support system  Outcome: Progressing     Problem: Knowledge Deficit  Goal: Patient/family/caregiver demonstrates understanding of disease process, treatment plan, medications, and discharge instructions  Description: Complete learning assessment and assess knowledge base    Interventions:  - Provide teaching at level of understanding  - Provide teaching via preferred learning methods  Outcome: Progressing

## 2021-02-04 LAB
ABO GROUP BLD BPU: NORMAL
ABO GROUP BLD BPU: NORMAL
ANION GAP SERPL CALCULATED.3IONS-SCNC: 4 MMOL/L (ref 4–13)
BASOPHILS # BLD AUTO: 0.04 THOUSANDS/ΜL (ref 0–0.1)
BASOPHILS NFR BLD AUTO: 0 % (ref 0–1)
BPU ID: NORMAL
BPU ID: NORMAL
BUN SERPL-MCNC: 11 MG/DL (ref 5–25)
CALCIUM SERPL-MCNC: 8 MG/DL (ref 8.3–10.1)
CHLORIDE SERPL-SCNC: 106 MMOL/L (ref 100–108)
CO2 SERPL-SCNC: 30 MMOL/L (ref 21–32)
CREAT SERPL-MCNC: 0.68 MG/DL (ref 0.6–1.3)
CROSSMATCH: NORMAL
CROSSMATCH: NORMAL
EOSINOPHIL # BLD AUTO: 0.14 THOUSAND/ΜL (ref 0–0.61)
EOSINOPHIL NFR BLD AUTO: 2 % (ref 0–6)
ERYTHROCYTE [DISTWIDTH] IN BLOOD BY AUTOMATED COUNT: 13.4 % (ref 11.6–15.1)
GFR SERPL CREATININE-BSD FRML MDRD: 98 ML/MIN/1.73SQ M
GLUCOSE SERPL-MCNC: 120 MG/DL (ref 65–140)
HCT VFR BLD AUTO: 31.7 % (ref 34.8–46.1)
HGB BLD-MCNC: 9.9 G/DL (ref 11.5–15.4)
IMM GRANULOCYTES # BLD AUTO: 0.02 THOUSAND/UL (ref 0–0.2)
IMM GRANULOCYTES NFR BLD AUTO: 0 % (ref 0–2)
LYMPHOCYTES # BLD AUTO: 2.77 THOUSANDS/ΜL (ref 0.6–4.47)
LYMPHOCYTES NFR BLD AUTO: 29 % (ref 14–44)
MCH RBC QN AUTO: 30.9 PG (ref 26.8–34.3)
MCHC RBC AUTO-ENTMCNC: 31.2 G/DL (ref 31.4–37.4)
MCV RBC AUTO: 99 FL (ref 82–98)
MONOCYTES # BLD AUTO: 0.8 THOUSAND/ΜL (ref 0.17–1.22)
MONOCYTES NFR BLD AUTO: 8 % (ref 4–12)
NEUTROPHILS # BLD AUTO: 5.88 THOUSANDS/ΜL (ref 1.85–7.62)
NEUTS SEG NFR BLD AUTO: 61 % (ref 43–75)
NRBC BLD AUTO-RTO: 0 /100 WBCS
PLATELET # BLD AUTO: 182 THOUSANDS/UL (ref 149–390)
PMV BLD AUTO: 10.6 FL (ref 8.9–12.7)
POTASSIUM SERPL-SCNC: 3.9 MMOL/L (ref 3.5–5.3)
RBC # BLD AUTO: 3.2 MILLION/UL (ref 3.81–5.12)
SODIUM SERPL-SCNC: 140 MMOL/L (ref 136–145)
UNIT DISPENSE STATUS: NORMAL
UNIT DISPENSE STATUS: NORMAL
UNIT PRODUCT CODE: NORMAL
UNIT PRODUCT CODE: NORMAL
UNIT RH: NORMAL
UNIT RH: NORMAL
WBC # BLD AUTO: 9.65 THOUSAND/UL (ref 4.31–10.16)

## 2021-02-04 PROCEDURE — 97535 SELF CARE MNGMENT TRAINING: CPT

## 2021-02-04 PROCEDURE — 80048 BASIC METABOLIC PNL TOTAL CA: CPT | Performed by: PHYSICIAN ASSISTANT

## 2021-02-04 PROCEDURE — NC001 PR NO CHARGE: Performed by: ORTHOPAEDIC SURGERY

## 2021-02-04 PROCEDURE — 85025 COMPLETE CBC W/AUTO DIFF WBC: CPT | Performed by: PHYSICIAN ASSISTANT

## 2021-02-04 RX ORDER — POLYETHYLENE GLYCOL 3350 17 G/17G
17 POWDER, FOR SOLUTION ORAL DAILY
Status: DISCONTINUED | OUTPATIENT
Start: 2021-02-04 | End: 2021-02-06 | Stop reason: HOSPADM

## 2021-02-04 RX ORDER — LIDOCAINE 50 MG/G
2 PATCH TOPICAL DAILY
Status: DISCONTINUED | OUTPATIENT
Start: 2021-02-04 | End: 2021-02-06 | Stop reason: HOSPADM

## 2021-02-04 RX ORDER — HYDROMORPHONE HCL/PF 1 MG/ML
0.5 SYRINGE (ML) INJECTION EVERY 4 HOURS PRN
Status: DISPENSED | OUTPATIENT
Start: 2021-02-04 | End: 2021-02-05

## 2021-02-04 RX ORDER — LIDOCAINE 50 MG/G
1 PATCH TOPICAL DAILY
Status: DISCONTINUED | OUTPATIENT
Start: 2021-02-04 | End: 2021-02-04

## 2021-02-04 RX ADMIN — CEFAZOLIN SODIUM 2000 MG: 2 SOLUTION INTRAVENOUS at 11:43

## 2021-02-04 RX ADMIN — GABAPENTIN 100 MG: 100 CAPSULE ORAL at 21:51

## 2021-02-04 RX ADMIN — SENNOSIDES 8.6 MG: 8.6 TABLET, FILM COATED ORAL at 08:44

## 2021-02-04 RX ADMIN — OXYCODONE HYDROCHLORIDE 10 MG: 10 TABLET ORAL at 17:48

## 2021-02-04 RX ADMIN — GABAPENTIN 100 MG: 100 CAPSULE ORAL at 15:53

## 2021-02-04 RX ADMIN — METHOCARBAMOL TABLETS 750 MG: 750 TABLET, COATED ORAL at 06:15

## 2021-02-04 RX ADMIN — OXYCODONE HYDROCHLORIDE 10 MG: 10 TABLET ORAL at 13:10

## 2021-02-04 RX ADMIN — OXYCODONE HYDROCHLORIDE 10 MG: 10 TABLET ORAL at 08:45

## 2021-02-04 RX ADMIN — DOCUSATE SODIUM 100 MG: 100 CAPSULE, LIQUID FILLED ORAL at 08:44

## 2021-02-04 RX ADMIN — DULOXETINE HYDROCHLORIDE 120 MG: 60 CAPSULE, DELAYED RELEASE ORAL at 08:44

## 2021-02-04 RX ADMIN — ACETAMINOPHEN 975 MG: 325 TABLET, FILM COATED ORAL at 00:07

## 2021-02-04 RX ADMIN — METHOCARBAMOL TABLETS 750 MG: 750 TABLET, COATED ORAL at 11:43

## 2021-02-04 RX ADMIN — GABAPENTIN 100 MG: 100 CAPSULE ORAL at 08:44

## 2021-02-04 RX ADMIN — DOCUSATE SODIUM 100 MG: 100 CAPSULE, LIQUID FILLED ORAL at 17:47

## 2021-02-04 RX ADMIN — METHOCARBAMOL TABLETS 750 MG: 750 TABLET, COATED ORAL at 17:47

## 2021-02-04 RX ADMIN — OXYCODONE HYDROCHLORIDE 5 MG: 5 TABLET ORAL at 00:09

## 2021-02-04 RX ADMIN — ACETAMINOPHEN 975 MG: 325 TABLET, FILM COATED ORAL at 15:53

## 2021-02-04 RX ADMIN — CEFAZOLIN SODIUM 2000 MG: 2 SOLUTION INTRAVENOUS at 03:57

## 2021-02-04 RX ADMIN — HEPARIN SODIUM 5000 UNITS: 5000 INJECTION INTRAVENOUS; SUBCUTANEOUS at 13:12

## 2021-02-04 RX ADMIN — HEPARIN SODIUM 5000 UNITS: 5000 INJECTION INTRAVENOUS; SUBCUTANEOUS at 06:15

## 2021-02-04 RX ADMIN — ACETAMINOPHEN 975 MG: 325 TABLET, FILM COATED ORAL at 08:44

## 2021-02-04 RX ADMIN — ARIPIPRAZOLE 5 MG: 5 TABLET ORAL at 08:45

## 2021-02-04 RX ADMIN — LIDOCAINE 2 PATCH: 50 PATCH TOPICAL at 11:43

## 2021-02-04 RX ADMIN — METHOCARBAMOL TABLETS 750 MG: 750 TABLET, COATED ORAL at 00:07

## 2021-02-04 RX ADMIN — POLYETHYLENE GLYCOL 3350 17 G: 17 POWDER, FOR SOLUTION ORAL at 11:43

## 2021-02-04 RX ADMIN — NICOTINE 1 PATCH: 21 PATCH, EXTENDED RELEASE TRANSDERMAL at 08:43

## 2021-02-04 RX ADMIN — CEFAZOLIN SODIUM 2000 MG: 2 SOLUTION INTRAVENOUS at 20:40

## 2021-02-04 RX ADMIN — TRAZODONE HYDROCHLORIDE 75 MG: 50 TABLET ORAL at 21:49

## 2021-02-04 RX ADMIN — HEPARIN SODIUM 5000 UNITS: 5000 INJECTION INTRAVENOUS; SUBCUTANEOUS at 21:51

## 2021-02-04 RX ADMIN — OXYCODONE HYDROCHLORIDE 5 MG: 5 TABLET ORAL at 03:57

## 2021-02-04 RX ADMIN — PANTOPRAZOLE SODIUM 40 MG: 40 TABLET, DELAYED RELEASE ORAL at 06:15

## 2021-02-04 NOTE — TELEPHONE ENCOUNTER
Phoned Niurka Cabrera in regards to this and being taken care off  Medical necessity letter is being done to get approved

## 2021-02-04 NOTE — PLAN OF CARE
Problem: OCCUPATIONAL THERAPY ADULT  Goal: Performs self-care activities at highest level of function for planned discharge setting  See evaluation for individualized goals  Description: Treatment Interventions: ADL retraining, Functional transfer training, Endurance training, Patient/family training, Equipment evaluation/education, Compensatory technique education, Energy conservation, Activityengagement          See flowsheet documentation for full assessment, interventions and recommendations  Note: Limitation: Decreased ADL status, Decreased Safe judgement during ADL, Decreased endurance, Decreased self-care trans, Decreased high-level ADLs  Prognosis: Good  Assessment: pt participated in pm ot session and was seen focusing on ub and lb adls seated bedside, lso donning seated eob, clothing management, education on rw techniques, car transfers and fall risk education  pt is motivated and cooperative this session  pt with limitation of [id number]  pain in stance limiting her to 5 min  pt wiht + drain inplace  pt anticipates asst fro husb at home as needed  husb owns lhae and pt reports she is aware of how to use same  pt states also husb will assist with homemaking and lb dressing  pt reports needing rw for home and that she owns higher toilet and shower seat  pt was provided with hand out for lso management and spinal precautions      OT Discharge Recommendation: Return to previous environment with social support  OT - OK to Discharge:  Yes     April A DMITRY Almaguer

## 2021-02-04 NOTE — PLAN OF CARE
Problem: Prexisting or High Potential for Compromised Skin Integrity  Goal: Skin integrity is maintained or improved  Description: INTERVENTIONS:  - Identify patients at risk for skin breakdown  - Assess and monitor skin integrity  - Assess and monitor nutrition and hydration status  - Monitor labs   - Assess for incontinence   - Turn and reposition patient  - Assist with mobility/ambulation  - Relieve pressure over bony prominences  - Avoid friction and shearing  - Provide appropriate hygiene as needed including keeping skin clean and dry  - Evaluate need for skin moisturizer/barrier cream  - Collaborate with interdisciplinary team   - Patient/family teaching  - Consider wound care consult   Outcome: Progressing     Problem: Potential for Falls  Goal: Patient will remain free of falls  Description: INTERVENTIONS:  - Assess patient frequently for physical needs  -  Identify cognitive and physical deficits and behaviors that affect risk of falls    -  [place] fall precautions as indicated by assessment   - Educate patient/family on patient safety including physical limitations  - Instruct patient to call for assistance with activity based on assessment  - Modify environment to reduce risk of injury  - Consider OT/PT consult to assist with strengthening/mobility  Outcome: Progressing     Problem: PAIN - ADULT  Goal: Verbalizes/displays adequate comfort level or baseline comfort level  Description: Interventions:  - Encourage patient to monitor pain and request assistance  - Assess pain using appropriate pain scale  - Administer analgesics based on type and severity of pain and evaluate response  - Implement non-pharmacological measures as appropriate and evaluate response  - Consider cultural and social influences on pain and pain management  - Notify physician/advanced practitioner if interventions unsuccessful or patient reports new pain  Outcome: Progressing     Problem: INFECTION - ADULT  Goal: Absence or prevention of progression during hospitalization  Description: INTERVENTIONS:  - Assess and monitor for signs and symptoms of infection  - Monitor lab/diagnostic results  - Monitor all insertion sites, i e  indwelling lines, tubes, and drains  - Monitor endotracheal if appropriate and nasal secretions for changes in amount and color  - [place] appropriate cooling/warming therapies per order  - Administer medications as ordered  - Instruct and encourage patient and family to use good hand hygiene technique  - Identify and instruct in appropriate isolation precautions for identified infection/condition  Outcome: Progressing     Problem: SAFETY ADULT  Goal: Patient will remain free of falls  Description: INTERVENTIONS:  - Assess patient frequently for physical needs  -  Identify cognitive and physical deficits and behaviors that affect risk of falls    -  [place] fall precautions as indicated by assessment   - Educate patient/family on patient safety including physical limitations  - Instruct patient to call for assistance with activity based on assessment  - Modify environment to reduce risk of injury  - Consider OT/PT consult to assist with strengthening/mobility  Outcome: Progressing  Goal: Maintain or return to baseline ADL function  Description: INTERVENTIONS:  -  Assess patient's ability to carry out ADLs; assess patient's baseline for ADL function and identify physical deficits which impact ability to perform ADLs (bathing, care of mouth/teeth, toileting, grooming, dressing, etc )  - Assess/evaluate cause of self-care deficits   - Assess range of motion  - Assess patient's mobility; develop plan if impaired  - Assess patient's need for assistive devices and provide as appropriate  - Encourage maximum independence but intervene and supervise when necessary  - Involve family in performance of ADLs  - Assess for home care needs following discharge   - Consider OT consult to assist with ADL evaluation and planning for discharge  - Provide patient education as appropriate  Outcome: Progressing  Goal: Maintain or return mobility status to optimal level  Description: INTERVENTIONS:  - Assess patient's baseline mobility status (ambulation, transfers, stairs, etc )    - Identify cognitive and physical deficits and behaviors that affect mobility  - Identify mobility aids required to assist with transfers and/or ambulation (gait belt, sit-to-stand, lift, walker, cane, etc )  - [place] fall precautions as indicated by assessment  - Record patient progress and toleration of activity level on Mobility SBAR; progress patient to next Phase/Stage  - Instruct patient to call for assistance with activity based on assessment  - Consider rehabilitation consult to assist with strengthening/weightbearing, etc   Outcome: Progressing     Problem: DISCHARGE PLANNING  Goal: Discharge to home or other facility with appropriate resources  Description: INTERVENTIONS:  - Identify barriers to discharge w/patient and caregiver  - Arrange for needed discharge resources and transportation as appropriate  - Identify discharge learning needs (meds, wound care, etc )  - Arrange for interpretive services to assist at discharge as needed  - Refer to Case Management Department for coordinating discharge planning if the patient needs post-hospital services based on physician/advanced practitioner order or complex needs related to functional status, cognitive ability, or social support system  Outcome: Progressing     Problem: Knowledge Deficit  Goal: Patient/family/caregiver demonstrates understanding of disease process, treatment plan, medications, and discharge instructions  Description: Complete learning assessment and assess knowledge base    Interventions:  - Provide teaching at level of understanding  - Provide teaching via preferred learning methods  Outcome: Progressing     Problem: SKIN/TISSUE INTEGRITY - ADULT  Goal: Skin integrity remains intact  Description: INTERVENTIONS  - Identify patients at risk for skin breakdown  - Assess and monitor skin integrity  - Assess and monitor nutrition and hydration status  - Monitor labs (i e  albumin)  - Assess for incontinence   - Turn and reposition patient  - Assist with mobility/ambulation  - Relieve pressure over bony prominences  - Avoid friction and shearing  - Provide appropriate hygiene as needed including keeping skin clean and dry  - Evaluate need for skin moisturizer/barrier cream  - Collaborate with interdisciplinary team (i e  Nutrition, Rehabilitation, etc )   - Patient/family teaching  Outcome: Progressing  Goal: Incision(s), wounds(s) or drain site(s) healing without S/S of infection  Description: INTERVENTIONS  - Assess and document risk factors for skin impairment   - Assess and document dressing, incision, wound bed, drain sites and surrounding tissue  - Consider nutrition services referral as needed  - Oral mucous membranes remain intact  - Provide patient/ family education  Outcome: Progressing  Goal: Oral mucous membranes remain intact  Description: INTERVENTIONS  - Assess oral mucosa and hygiene practices  - Implement preventative oral hygiene regimen  - Implement oral medicated treatments as ordered  - Initiate Nutrition services referral as needed  Outcome: Progressing     Problem: MUSCULOSKELETAL - ADULT  Goal: Maintain or return mobility to safest level of function  Description: INTERVENTIONS:  - Assess patient's ability to carry out ADLs; assess patient's baseline for ADL function and identify physical deficits which impact ability to perform ADLs (bathing, care of mouth/teeth, toileting, grooming, dressing, etc )  - Assess/evaluate cause of self-care deficits   - Assess range of motion  - Assess patient's mobility  - Assess patient's need for assistive devices and provide as appropriate  - Encourage maximum independence but intervene and supervise when necessary  - Involve family in performance of ADLs  - Assess for home care needs following discharge   - Consider OT consult to assist with ADL evaluation and planning for discharge  - Provide patient education as appropriate  Outcome: Progressing  Goal: Maintain proper alignment of affected body part  Description: INTERVENTIONS:  - Support, maintain and protect limb and body alignment  - Provide patient/ family with appropriate education  Outcome: Progressing

## 2021-02-04 NOTE — OCCUPATIONAL THERAPY NOTE
Occupational Therapy Treatment Note:       02/04/21 1558   OT Last Visit   OT Visit Date 02/04/21   Note Type   Note Type Treatment   Restrictions/Precautions   Braces or Orthoses LSO   Other Precautions Spinal precautions  (hemo vac drain)   Pain Assessment   Pain Assessment Tool 0-10   Pain Score 3   Pain Location/Orientation Location: Abdomen   ADL   Where Assessed Edge of bed   Grooming Assistance 5  Supervision/Setup   Grooming Comments standing sinkside, sues to stand and not lean on elbows   UB Bathing Assistance 5  Supervision/Setup   LB Bathing Assistance 4  Minimal Assistance   UB Dressing Assistance 5  Supervision/Setup   LB Dressing Assistance 4  Minimal Assistance   Toileting Assistance  4  Minimal Assistance   Toileting Comments asst for anal care, clothing management and  of underwear in stance   Functional Standing Tolerance   Time   (5 min f balance c rw for oral care in stance sinkside )   Bed Mobility   Supine to Sit 3  Moderate assistance   Additional items   (bed flat, log roll technique)   Transfers   Sit to Stand 5  Supervision   Stand to Sit 5  Supervision   Functional Mobility   Functional Mobility 5  Supervision   Additional items Rolling walker   Cognition   Overall Cognitive Status WFL   Comments pt requires vc's for reassurance    Activity Tolerance   Activity Tolerance Patient tolerated treatment well   Assessment   Assessment pt participated in pm ot session and was seen focusing on ub and lb adls seated bedside, lso donning seated eob, clothing management, education on rw techniques, car transfers and fall risk education  pt is motivated and cooperative this session  pt with limitation of [id number]  pain in stance limiting her to 5 min  pt wiht + drain inplace  pt anticipates asst fro husb at home as needed  greg owns lhae and pt reports she is aware of how to use same  pt states also husb will assist with homemaking and lb dressing   pt reports needing rw for home and that she owns higher toilet and shower seat  pt was provided with hand out for lso management and spinal precautions    Plan   Treatment Interventions ADL retraining;Functional transfer training; Endurance training;Patient/family training;Equipment evaluation/education; Activityengagement   Goal Expiration Date 02/17/21   OT Treatment Day 1   OT Frequency 3-5x/wk   Recommendation   OT Discharge Recommendation Return to previous environment with social support   OT - OK to Discharge Yes   AM-PAC Daily Activity Inpatient   Lower Body Dressing 3   Bathing 3   Toileting 3   Upper Body Dressing 3   Grooming 3   Eating 4   Daily Activity Raw Score 19   Daily Activity Standardized Score (Calc for Raw Score >=11) 40 22   AM-PAC Applied Cognition Inpatient   Following a Speech/Presentation 4   Understanding Ordinary Conversation 4   Taking Medications 4   Remembering Where Things Are Placed or Put Away 4   Remembering List of 4-5 Errands 4   Taking Care of Complicated Tasks 4   Applied Cognition Raw Score 24   Applied Cognition Standardized Score 62 21   April A DMITRY Almaguer

## 2021-02-04 NOTE — PROGRESS NOTES
Subjective: Laying comfortably in bed this AM   Voices no complaints           Lab Results   Component Value Date/Time    WBC 11 14 (H) 02/03/2021 06:11 AM    WBC 8 17 07/27/2015 07:10 AM    HGB 10 4 (L) 02/03/2021 06:11 AM    HGB 13 2 07/27/2015 07:10 AM       Vitals:    02/04/21 0339   BP:    Pulse: 91   Resp:    Temp: 99 9 °F (37 7 °C)   SpO2: 95%     Musculoskeletal: Spine   Dressing C/D/I   Hvac in place with bloody drainage   Motor and sensation stable L2-S1  Toes warm and pink       Assessment: 63 y o  female female post op day #2 from [facility] L5-S1, laminectomy, decompression, L4-S1 with instrumented posterolateral fusion     Plan:  WBAT in LSO when out of bed   Pain control  DVT ppx- continue subcutaneous heparin  Drain check  Continue ancef while drain is in   PT/OT  Diet regular per surgery   Patient noted to have acute blood loss anemia due to a drop in Hbg of > 2 0g from preop levels, will monitor vital signs and resuscitate with IV fluids as needed  Dispo: Pending pain/pt/drain

## 2021-02-04 NOTE — PROGRESS NOTES
Progress Note - Acute Pain Service    Keerthi Cline 63 y o  female MRN: [id number]  Unit/Bed#: [facility] 410-17 Encounter: [id number]      Assessment:   Principal Problem:    Radiculopathy, lumbar region       Keerthi Cline is a 63 y o  female  with past medical history significant for GERD, MDD, nicotine dependence, episodic methamphetamine use with last use 1 year ago, chronic pain syndrome in the setting of lumbar disc herniation with spinal stenosis and lumbar radiculopathy who presents for laparotomy for spinal surgery, anterior lumbar interbody fusion L5-S1, posterior lumbar laminectomy, decompression and instrumented fusion L5-S1 with allograft and neuro monitoring which was performed on 11/47/0260 without complication  Acute Pain Service consulted for postoperative pain control  Patient refers oxycodone does not relieve her pain  She states IV hydromorphone does improve pain significantly  Plan:   - continue acetaminophen 975 mg p o  Q 8 hours scheduled  - continue oxycodone 5 mg p o  Q 4 hours p r n  For moderate pain  - continue oxycodone 10 mg p o  Q 4 hours p r n  For severe pain  - decrease frequency of hydromorphone 0 5 mg IV to q 4 hours p r n  For breakthrough pain (consider discontinuing 2/5)  - continue duloxetine 120 mg p o  Daily  - decrease gabapentin to 100 mg p o  T i d  (not a home med)  - continue methocarbamol 750 mg p o  Q 6 hours scheduled  - start lidocaine patch x2 to lower back 12 hours on/12 hours off (avoid incision site)  - bowel regimen with senna and docusate scheduled, would add MiraLax daily p r n  APS will sign off at this time  Thank you for the consult   All opioids and other analgesics to be written at discretion of primary team  Please call  / [phone] or [place] [facility] (/ between [phone] and on weekends) with questions or concerns    Pain History  Current pain location(s): back, abd  Pain Scale:   6-8  Quality: sore  24 hour history:  Patient examined at bedside  Patient states that her pain is better controlled that she was not able to receive her IV hydromorphone as frequently as she had wanted  She does states that she has been eating well  She refers not having a bowel movement since 02/01/2021  All other review of systems negative at this time      Opioid requirement previous 24 hours:   Oxycodone 50 mg  Hydromorphone 1 mg IV    Meds/Allergies   all current active meds have been reviewed and current meds:   Current Facility-Administered Medications   Medication Dose Route Frequency    acetaminophen (TYLENOL) tablet 975 mg  975 mg Oral Q8H    ARIPiprazole (ABILIFY) tablet 5 mg  5 mg Oral Daily    ceFAZolin (ANCEF) IVPB (premix in dextrose) 2,000 mg 50 mL  2,000 mg Intravenous Q8H    docusate sodium (COLACE) capsule 100 mg  100 mg Oral BID    DULoxetine (CYMBALTA) delayed release capsule 120 mg  120 mg Oral Daily    famotidine (PEPCID) tablet 20 mg  20 mg Oral Daily PRN    gabapentin (NEURONTIN) capsule 100 mg  100 mg Oral TID    heparin (porcine) subcutaneous injection 5,000 Units  5,000 Units Subcutaneous Q8H [facility]    HYDROmorphone (DILAUDID) injection 0 5 mg  0 5 mg Intravenous Q2H PRN    methocarbamol (ROBAXIN) tablet 750 mg  750 mg Oral Q6H [facility]    nicotine (NICODERM CQ) 21 mg/24 hr TD 24 hr patch 1 patch  1 patch Transdermal Q24H    ondansetron (ZOFRAN) injection 4 mg  4 mg Intravenous Q6H PRN    oxyCODONE (ROXICODONE) immediate release tablet 10 mg  10 mg Oral Q4H PRN    oxyCODONE (ROXICODONE) IR tablet 5 mg  5 mg Oral Q4H PRN    pantoprazole (PROTONIX) EC tablet 40 mg  40 mg Oral Early Morning    senna (SENOKOT) tablet 8 6 mg  1 tablet Oral Daily    traZODone (DESYREL) tablet 75 mg  75 mg Oral HS PRN       No Known Allergies    Objective     Temp:  [98 4 °F (36 9 °C)-100 °F (37 8 °C)] 99 7 °F (37 6 °C)  HR:  [79-91] 79  Resp:  [16-17] 16  BP: ()/(49-66) 115/63    Physical Exam  Vitals signs and nursing note reviewed  Constitutional:       Appearance: Normal appearance  She is not ill-appearing or toxic-appearing  HENT:      Head: Normocephalic and atraumatic  Eyes:      General: No scleral icterus  Conjunctiva/sclera: Conjunctivae normal    Cardiovascular:      Rate and Rhythm: Normal rate  Pulmonary:      Effort: Pulmonary effort is normal  No respiratory distress  Abdominal:      Tenderness: There is abdominal tenderness  Musculoskeletal:         General: Tenderness present  Comments: drain   Skin:     General: Skin is warm and dry  Neurological:      Mental Status: She is alert  Comments: Awake, alert and oriented to person place time situation   Psychiatric:         Thought Content: Thought content normal          Lab Results:   Results from last 7 days   Lab Units 02/04/21  0523   WBC Thousand/uL 9 65   HEMOGLOBIN g/dL 9 9*   HEMATOCRIT % 31 7*   PLATELETS Thousands/uL 182      Results from last 7 days   Lab Units 02/04/21  0523   POTASSIUM mmol/L 3 9   CHLORIDE mmol/L 106   CO2 mmol/L 30   BUN mg/dL 11   CREATININE mg/dL 0 68   CALCIUM mg/dL 8 0*       Imaging Studies: I have personally reviewed pertinent reports  EKG, Pathology, and Other Studies: I have personally reviewed pertinent reports  Please note that the APS provides consultative services regarding pain management only  With the exception of ketamine and epidural infusions and except when indicated, final decisions regarding starting or changing doses of analgesic medications are at the discretion of the consulting service  Off hours consultation and/or medication management is generally not available      Zachary Arrieat MD  Acute Pain Service

## 2021-02-04 NOTE — PHYSICAL THERAPY NOTE
Physical Therapy Cancellation Note    PT session cancelled as patient's door was shut and noted read "I am sleeping  Please do not disturb"  Will continue to follow as able

## 2021-02-05 LAB
ANION GAP SERPL CALCULATED.3IONS-SCNC: 4 MMOL/L (ref 4–13)
BASOPHILS # BLD AUTO: 0.03 THOUSANDS/ΜL (ref 0–0.1)
BASOPHILS NFR BLD AUTO: 0 % (ref 0–1)
BUN SERPL-MCNC: 9 MG/DL (ref 5–25)
CALCIUM SERPL-MCNC: 8.5 MG/DL (ref 8.3–10.1)
CHLORIDE SERPL-SCNC: 107 MMOL/L (ref 100–108)
CO2 SERPL-SCNC: 31 MMOL/L (ref 21–32)
CREAT SERPL-MCNC: 0.62 MG/DL (ref 0.6–1.3)
EOSINOPHIL # BLD AUTO: 0.22 THOUSAND/ΜL (ref 0–0.61)
EOSINOPHIL NFR BLD AUTO: 3 % (ref 0–6)
ERYTHROCYTE [DISTWIDTH] IN BLOOD BY AUTOMATED COUNT: 13.3 % (ref 11.6–15.1)
GFR SERPL CREATININE-BSD FRML MDRD: 101 ML/MIN/1.73SQ M
GLUCOSE SERPL-MCNC: 100 MG/DL (ref 65–140)
HCT VFR BLD AUTO: 31.3 % (ref 34.8–46.1)
HGB BLD-MCNC: 9.8 G/DL (ref 11.5–15.4)
IMM GRANULOCYTES # BLD AUTO: 0.04 THOUSAND/UL (ref 0–0.2)
IMM GRANULOCYTES NFR BLD AUTO: 1 % (ref 0–2)
LYMPHOCYTES # BLD AUTO: 2.7 THOUSANDS/ΜL (ref 0.6–4.47)
LYMPHOCYTES NFR BLD AUTO: 32 % (ref 14–44)
MCH RBC QN AUTO: 31.2 PG (ref 26.8–34.3)
MCHC RBC AUTO-ENTMCNC: 31.3 G/DL (ref 31.4–37.4)
MCV RBC AUTO: 100 FL (ref 82–98)
MONOCYTES # BLD AUTO: 0.73 THOUSAND/ΜL (ref 0.17–1.22)
MONOCYTES NFR BLD AUTO: 9 % (ref 4–12)
NEUTROPHILS # BLD AUTO: 4.64 THOUSANDS/ΜL (ref 1.85–7.62)
NEUTS SEG NFR BLD AUTO: 55 % (ref 43–75)
NRBC BLD AUTO-RTO: 0 /100 WBCS
PLATELET # BLD AUTO: 204 THOUSANDS/UL (ref 149–390)
PMV BLD AUTO: 10.5 FL (ref 8.9–12.7)
POTASSIUM SERPL-SCNC: 4 MMOL/L (ref 3.5–5.3)
RBC # BLD AUTO: 3.14 MILLION/UL (ref 3.81–5.12)
SODIUM SERPL-SCNC: 142 MMOL/L (ref 136–145)
WBC # BLD AUTO: 8.36 THOUSAND/UL (ref 4.31–10.16)

## 2021-02-05 PROCEDURE — 85025 COMPLETE CBC W/AUTO DIFF WBC: CPT | Performed by: PHYSICIAN ASSISTANT

## 2021-02-05 PROCEDURE — 80048 BASIC METABOLIC PNL TOTAL CA: CPT | Performed by: PHYSICIAN ASSISTANT

## 2021-02-05 PROCEDURE — 99024 POSTOP FOLLOW-UP VISIT: CPT | Performed by: ORTHOPAEDIC SURGERY

## 2021-02-05 PROCEDURE — 97530 THERAPEUTIC ACTIVITIES: CPT

## 2021-02-05 PROCEDURE — 97116 GAIT TRAINING THERAPY: CPT

## 2021-02-05 RX ORDER — HYDROMORPHONE HCL/PF 1 MG/ML
0.5 SYRINGE (ML) INJECTION EVERY 4 HOURS PRN
Status: ACTIVE | OUTPATIENT
Start: 2021-02-05 | End: 2021-02-06

## 2021-02-05 RX ADMIN — CEFAZOLIN SODIUM 2000 MG: 2 SOLUTION INTRAVENOUS at 13:00

## 2021-02-05 RX ADMIN — HEPARIN SODIUM 5000 UNITS: 5000 INJECTION INTRAVENOUS; SUBCUTANEOUS at 21:12

## 2021-02-05 RX ADMIN — PANTOPRAZOLE SODIUM 40 MG: 40 TABLET, DELAYED RELEASE ORAL at 05:09

## 2021-02-05 RX ADMIN — CEFAZOLIN SODIUM 2000 MG: 2 SOLUTION INTRAVENOUS at 19:24

## 2021-02-05 RX ADMIN — GABAPENTIN 100 MG: 100 CAPSULE ORAL at 21:12

## 2021-02-05 RX ADMIN — SENNOSIDES 8.6 MG: 8.6 TABLET, FILM COATED ORAL at 09:06

## 2021-02-05 RX ADMIN — GABAPENTIN 100 MG: 100 CAPSULE ORAL at 09:06

## 2021-02-05 RX ADMIN — OXYCODONE HYDROCHLORIDE 10 MG: 10 TABLET ORAL at 14:00

## 2021-02-05 RX ADMIN — METHOCARBAMOL TABLETS 750 MG: 750 TABLET, COATED ORAL at 05:09

## 2021-02-05 RX ADMIN — NICOTINE 1 PATCH: 21 PATCH, EXTENDED RELEASE TRANSDERMAL at 09:06

## 2021-02-05 RX ADMIN — HYDROMORPHONE HYDROCHLORIDE 0.5 MG: 1 INJECTION, SOLUTION INTRAMUSCULAR; INTRAVENOUS; SUBCUTANEOUS at 02:33

## 2021-02-05 RX ADMIN — OXYCODONE HYDROCHLORIDE 10 MG: 10 TABLET ORAL at 09:08

## 2021-02-05 RX ADMIN — LIDOCAINE 2 PATCH: 50 PATCH TOPICAL at 09:09

## 2021-02-05 RX ADMIN — CEFAZOLIN SODIUM 2000 MG: 2 SOLUTION INTRAVENOUS at 05:09

## 2021-02-05 RX ADMIN — OXYCODONE HYDROCHLORIDE 10 MG: 10 TABLET ORAL at 19:24

## 2021-02-05 RX ADMIN — ACETAMINOPHEN 975 MG: 325 TABLET, FILM COATED ORAL at 09:06

## 2021-02-05 RX ADMIN — ARIPIPRAZOLE 5 MG: 5 TABLET ORAL at 09:07

## 2021-02-05 RX ADMIN — HEPARIN SODIUM 5000 UNITS: 5000 INJECTION INTRAVENOUS; SUBCUTANEOUS at 13:10

## 2021-02-05 RX ADMIN — DOCUSATE SODIUM 100 MG: 100 CAPSULE, LIQUID FILLED ORAL at 17:27

## 2021-02-05 RX ADMIN — METHOCARBAMOL TABLETS 750 MG: 750 TABLET, COATED ORAL at 00:09

## 2021-02-05 RX ADMIN — METHOCARBAMOL TABLETS 750 MG: 750 TABLET, COATED ORAL at 17:27

## 2021-02-05 RX ADMIN — DULOXETINE HYDROCHLORIDE 120 MG: 60 CAPSULE, DELAYED RELEASE ORAL at 09:06

## 2021-02-05 RX ADMIN — POLYETHYLENE GLYCOL 3350 17 G: 17 POWDER, FOR SOLUTION ORAL at 09:07

## 2021-02-05 RX ADMIN — OXYCODONE HYDROCHLORIDE 10 MG: 10 TABLET ORAL at 00:13

## 2021-02-05 RX ADMIN — METHOCARBAMOL TABLETS 750 MG: 750 TABLET, COATED ORAL at 23:34

## 2021-02-05 RX ADMIN — METHOCARBAMOL TABLETS 750 MG: 750 TABLET, COATED ORAL at 13:00

## 2021-02-05 RX ADMIN — ACETAMINOPHEN 975 MG: 325 TABLET, FILM COATED ORAL at 00:09

## 2021-02-05 RX ADMIN — ACETAMINOPHEN 975 MG: 325 TABLET, FILM COATED ORAL at 17:27

## 2021-02-05 RX ADMIN — DOCUSATE SODIUM 100 MG: 100 CAPSULE, LIQUID FILLED ORAL at 09:06

## 2021-02-05 RX ADMIN — GABAPENTIN 100 MG: 100 CAPSULE ORAL at 17:27

## 2021-02-05 RX ADMIN — OXYCODONE HYDROCHLORIDE 10 MG: 10 TABLET ORAL at 23:36

## 2021-02-05 RX ADMIN — TRAZODONE HYDROCHLORIDE 75 MG: 50 TABLET ORAL at 21:13

## 2021-02-05 RX ADMIN — HEPARIN SODIUM 5000 UNITS: 5000 INJECTION INTRAVENOUS; SUBCUTANEOUS at 05:16

## 2021-02-05 NOTE — PROGRESS NOTES
Subjective: No acute events overnight  No acute distress    Improved pain         Lab Results   Component Value Date/Time    WBC 8 36 02/05/2021 04:46 AM    WBC 8 17 07/27/2015 07:10 AM    HGB 9 8 (L) 02/05/2021 04:46 AM    HGB 13 2 07/27/2015 07:10 AM       Vitals:    02/05/21 0233   BP: 104/53   Pulse:    Resp:    Temp:    SpO2:      Musculoskeletal: Spine   Dressing with small amount of saturation    Drain in place   Motor and sensation stable L2-S1  Distal extremity warm and well perfused     Assessment: 63 y o  female post op day #3 from ALIF L5-S1, laminectomy, decompression, L4-S1 with instrumented posterolateral fusion     Plan:  WBAT with LSO  Pain control  DVT ppx- heparin  PT/OT   Drain check   Patient noted to have acute blood loss anemia due to a drop in Hbg of > 2 0g from preop levels, will monitor vital signs and resuscitate with IV fluids as needed  Dispo: DC pending drain

## 2021-02-05 NOTE — PLAN OF CARE
Problem: PHYSICAL THERAPY ADULT  Goal: Performs mobility at highest level of function for planned discharge setting  See evaluation for individualized goals  Description: Treatment/Interventions: Functional transfer training, LE strengthening/ROM, Elevations, Therapeutic exercise, Endurance training, Patient/family training, Equipment eval/education, Bed mobility, Gait training, Spoke to nursing, OT  Equipment Recommended: Walker(RW, Shower chair )       See flowsheet documentation for full assessment, interventions and recommendations  Outcome: Progressing  Note: Prognosis: Good  Problem List: Decreased strength, Decreased endurance, Impaired balance, Decreased mobility, Decreased skin integrity, Orthopedic restrictions, Pain  Assessment: Pt demonstrated improved functional endurance this session, ambulating repeated community distances & increasing number of steps in stairwell without need for seated rests in between tasks  Reports no significant change in pain levels with activity  Performed transfers safely, only requiring LE management to return to supine  Pt educated in donning/doffing LSO EOB, then educated in spinal precautions, reviewing handout already present in room  Pt verbalized understanding at this time & offers no furtehr questions or concerns presently  At this time, pt has demonstrated sufficient progress to return home with increased social support  Will continue to follow and treat pt during remainder of hospital stay to maximize independence prior to discharge  Barriers to Discharge: None     PT Discharge Recommendation: Return to previous environment with social support, Home with skilled therapy(OPPT when medically appropriate)     PT - OK to Discharge: Yes    See flowsheet documentation for full assessment

## 2021-02-05 NOTE — PHYSICAL THERAPY NOTE
Physical Therapy Progress Note     02/05/21 1050   PT Last Visit   PT Visit Date 02/05/21   Note Type   Note Type Treatment   Pain Assessment   Pain Assessment Tool FLACC   Pain Rating: FLACC (Rest) - Face 1   Pain Rating: FLACC (Rest) - Legs 0   Pain Rating: FLACC (Rest) - Activity 0   Pain Rating: FLACC (Rest) - Cry 1   Pain Rating: FLACC (Rest) - Consolability 0   Score: FLACC (Rest) 2   Pain Rating: FLACC (Activity) - Face 1   Pain Rating: FLACC (Activity) - Legs 1   Pain Rating: FLACC (Activity) - Activity 1   Pain Rating: FLACC (Activity) - Cry 1   Pain Rating: FLACC (Activity) - Consolability 0   Score: FLACC (Activity) 4   Restrictions/Precautions   Braces or Orthoses LSO   Other Precautions Spinal precautions;Pain; Fall Risk   Subjective   Subjective Pt encountered supine in bed, just finished getting washed up  Reports no new complaints & feels better walking than sitting for extended periods of time  Bed Mobility   Supine to Sit 5  Supervision   Additional items Assist x 1; Increased time required   Transfers   Sit to Stand 5  Supervision   Additional items Assist x 1; Armrests; Increased time required   Stand to Sit 5  Supervision   Additional items Assist x 1; Armrests; Increased time required   Ambulation/Elevation   Gait pattern Excessively slow; Short stride; Foward flexed;Decreased foot clearance; Improper Weight shift   Gait Assistance 5  Supervision   Additional items Assist x 1   Assistive Device Rolling walker   Distance 180', 230'   Stair Management Assistance 5  Supervision   Additional items Assist x 1;Verbal cues   Stair Management Technique One rail R;Step to pattern; Sideways; Foreward  (forward ascending/sideways descending)   Number of Stairs 7   Balance   Static Sitting Fair +   Static Standing Fair   Ambulatory Fair -   Endurance Deficit   Endurance Deficit Yes   Endurance Deficit Description pain   Activity Tolerance   Activity Tolerance Patient tolerated treatment well;Patient limited by fatigue;Patient limited by pain   Nurse Made Aware yes   Assessment   Prognosis Good   Problem List Decreased strength;Decreased endurance; Impaired balance;Decreased mobility; Decreased skin integrity;Orthopedic restrictions;Pain   Assessment Pt demonstrated improved functional endurance this session, ambulating repeated community distances & increasing number of steps in stairwell without need for seated rests in between tasks  Reports no significant change in pain levels with activity  Performed transfers safely, only requiring LE management to return to supine  Pt educated in donning/doffing LSO EOB, then educated in spinal precautions, reviewing handout already present in room  Pt verbalized understanding at this time & offers no furtehr questions or concerns presently  At this time, pt has demonstrated sufficient progress to return home with increased social support  Will continue to follow and treat pt during remainder of hospital stay to maximize independence prior to discharge  Barriers to Discharge None   Goals   Patient Goals to go home   STG Expiration Date 02/13/21   PT Treatment Day 1   Plan   Treatment/Interventions Functional transfer training;LE strengthening/ROM; Elevations; Therapeutic exercise; Endurance training;Patient/family training;Equipment eval/education; Bed mobility;Gait training   Progress Progressing toward goals   PT Frequency Other (Comment)  (3-6x/week)   Recommendation   PT Discharge Recommendation Return to previous environment with social support;Home with skilled therapy  (OPPT when medically appropriate)   Equipment Recommended Shailesh Herrera   PT - OK to Discharge Yes     Zackery Schafer PTA

## 2021-02-06 VITALS
OXYGEN SATURATION: 92 % | RESPIRATION RATE: 18 BRPM | HEART RATE: 76 BPM | DIASTOLIC BLOOD PRESSURE: 61 MMHG | SYSTOLIC BLOOD PRESSURE: 106 MMHG | BODY MASS INDEX: 37.15 KG/M2 | WEIGHT: 223 LBS | TEMPERATURE: 98.8 F | HEIGHT: 65 IN

## 2021-02-06 PROCEDURE — NC001 PR NO CHARGE: Performed by: PHYSICIAN ASSISTANT

## 2021-02-06 PROCEDURE — NC001 PR NO CHARGE: Performed by: ORTHOPAEDIC SURGERY

## 2021-02-06 RX ADMIN — HEPARIN SODIUM 5000 UNITS: 5000 INJECTION INTRAVENOUS; SUBCUTANEOUS at 05:22

## 2021-02-06 RX ADMIN — ACETAMINOPHEN 975 MG: 325 TABLET, FILM COATED ORAL at 14:45

## 2021-02-06 RX ADMIN — METHOCARBAMOL TABLETS 750 MG: 750 TABLET, COATED ORAL at 12:16

## 2021-02-06 RX ADMIN — GABAPENTIN 100 MG: 100 CAPSULE ORAL at 09:30

## 2021-02-06 RX ADMIN — METHOCARBAMOL TABLETS 750 MG: 750 TABLET, COATED ORAL at 17:00

## 2021-02-06 RX ADMIN — METHOCARBAMOL TABLETS 750 MG: 750 TABLET, COATED ORAL at 05:22

## 2021-02-06 RX ADMIN — DOCUSATE SODIUM 100 MG: 100 CAPSULE, LIQUID FILLED ORAL at 09:30

## 2021-02-06 RX ADMIN — GABAPENTIN 100 MG: 100 CAPSULE ORAL at 17:00

## 2021-02-06 RX ADMIN — OXYCODONE HYDROCHLORIDE 10 MG: 10 TABLET ORAL at 09:30

## 2021-02-06 RX ADMIN — OXYCODONE HYDROCHLORIDE 10 MG: 10 TABLET ORAL at 14:45

## 2021-02-06 RX ADMIN — LIDOCAINE 2 PATCH: 50 PATCH TOPICAL at 09:43

## 2021-02-06 RX ADMIN — HEPARIN SODIUM 5000 UNITS: 5000 INJECTION INTRAVENOUS; SUBCUTANEOUS at 14:46

## 2021-02-06 RX ADMIN — NICOTINE 1 PATCH: 21 PATCH, EXTENDED RELEASE TRANSDERMAL at 09:37

## 2021-02-06 RX ADMIN — SENNOSIDES 8.6 MG: 8.6 TABLET, FILM COATED ORAL at 09:30

## 2021-02-06 RX ADMIN — POLYETHYLENE GLYCOL 3350 17 G: 17 POWDER, FOR SOLUTION ORAL at 09:30

## 2021-02-06 RX ADMIN — PANTOPRAZOLE SODIUM 40 MG: 40 TABLET, DELAYED RELEASE ORAL at 05:22

## 2021-02-06 RX ADMIN — DULOXETINE HYDROCHLORIDE 120 MG: 60 CAPSULE, DELAYED RELEASE ORAL at 09:29

## 2021-02-06 RX ADMIN — ACETAMINOPHEN 975 MG: 325 TABLET, FILM COATED ORAL at 05:23

## 2021-02-06 RX ADMIN — ARIPIPRAZOLE 5 MG: 5 TABLET ORAL at 09:31

## 2021-02-06 RX ADMIN — DOCUSATE SODIUM 100 MG: 100 CAPSULE, LIQUID FILLED ORAL at 17:00

## 2021-02-06 NOTE — PLAN OF CARE
Problem: Prexisting or High Potential for Compromised Skin Integrity  Goal: Skin integrity is maintained or improved  Description: INTERVENTIONS:  - Identify patients at risk for skin breakdown  - Assess and monitor skin integrity  - Assess and monitor nutrition and hydration status  - Monitor labs   - Assess for incontinence   - Turn and reposition patient  - Assist with mobility/ambulation  - Relieve pressure over bony prominences  - Avoid friction and shearing  - Provide appropriate hygiene as needed including keeping skin clean and dry  - Evaluate need for skin moisturizer/barrier cream  - Collaborate with interdisciplinary team   - Patient/family teaching  - Consider wound care consult   Outcome: Progressing     Problem: Potential for Falls  Goal: Patient will remain free of falls  Description: INTERVENTIONS:  - Assess patient frequently for physical needs  -  Identify cognitive and physical deficits and behaviors that affect risk of falls    -  [place] fall precautions as indicated by assessment   - Educate patient/family on patient safety including physical limitations  - Instruct patient to call for assistance with activity based on assessment  - Modify environment to reduce risk of injury  - Consider OT/PT consult to assist with strengthening/mobility  Outcome: Progressing     Problem: PAIN - ADULT  Goal: Verbalizes/displays adequate comfort level or baseline comfort level  Description: Interventions:  - Encourage patient to monitor pain and request assistance  - Assess pain using appropriate pain scale  - Administer analgesics based on type and severity of pain and evaluate response  - Implement non-pharmacological measures as appropriate and evaluate response  - Consider cultural and social influences on pain and pain management  - Notify physician/advanced practitioner if interventions unsuccessful or patient reports new pain  Outcome: Progressing     Problem: SAFETY ADULT  Goal: Patient will remain free of falls  Description: INTERVENTIONS:  - Assess patient frequently for physical needs  -  Identify cognitive and physical deficits and behaviors that affect risk of falls    -  [place] fall precautions as indicated by assessment   - Educate patient/family on patient safety including physical limitations  - Instruct patient to call for assistance with activity based on assessment  - Modify environment to reduce risk of injury  - Consider OT/PT consult to assist with strengthening/mobility  Outcome: Progressing  Goal: Maintain or return to baseline ADL function  Description: INTERVENTIONS:  -  Assess patient's ability to carry out ADLs; assess patient's baseline for ADL function and identify physical deficits which impact ability to perform ADLs (bathing, care of mouth/teeth, toileting, grooming, dressing, etc )  - Assess/evaluate cause of self-care deficits   - Assess range of motion  - Assess patient's mobility; develop plan if impaired  - Assess patient's need for assistive devices and provide as appropriate  - Encourage maximum independence but intervene and supervise when necessary  - Involve family in performance of ADLs  - Assess for home care needs following discharge   - Consider OT consult to assist with ADL evaluation and planning for discharge  - Provide patient education as appropriate  Outcome: Progressing  Goal: Maintain or return mobility status to optimal level  Description: INTERVENTIONS:  - Assess patient's baseline mobility status (ambulation, transfers, stairs, etc )    - Identify cognitive and physical deficits and behaviors that affect mobility  - Identify mobility aids required to assist with transfers and/or ambulation (gait belt, sit-to-stand, lift, walker, cane, etc )  - [place] fall precautions as indicated by assessment  - Record patient progress and toleration of activity level on Mobility SBAR; progress patient to next Phase/Stage  - Instruct patient to call for assistance with activity based on assessment  - Consider rehabilitation consult to assist with strengthening/weightbearing, etc   Outcome: Progressing     Problem: SKIN/TISSUE INTEGRITY - ADULT  Goal: Skin integrity remains intact  Description: INTERVENTIONS  - Identify patients at risk for skin breakdown  - Assess and monitor skin integrity  - Assess and monitor nutrition and hydration status  - Monitor labs (i e  albumin)  - Assess for incontinence   - Turn and reposition patient  - Assist with mobility/ambulation  - Relieve pressure over bony prominences  - Avoid friction and shearing  - Provide appropriate hygiene as needed including keeping skin clean and dry  - Evaluate need for skin moisturizer/barrier cream  - Collaborate with interdisciplinary team (i e  Nutrition, Rehabilitation, etc )   - Patient/family teaching  Outcome: Progressing  Goal: Incision(s), wounds(s) or drain site(s) healing without S/S of infection  Description: INTERVENTIONS  - Assess and document risk factors for skin impairment   - Assess and document dressing, incision, wound bed, drain sites and surrounding tissue  - Consider nutrition services referral as needed  - Oral mucous membranes remain intact  - Provide patient/ family education  Outcome: Progressing  Goal: Oral mucous membranes remain intact  Description: INTERVENTIONS  - Assess oral mucosa and hygiene practices  - Implement preventative oral hygiene regimen  - Implement oral medicated treatments as ordered  - Initiate Nutrition services referral as needed  Outcome: Progressing     Problem: MUSCULOSKELETAL - ADULT  Goal: Maintain or return mobility to safest level of function  Description: INTERVENTIONS:  - Assess patient's ability to carry out ADLs; assess patient's baseline for ADL function and identify physical deficits which impact ability to perform ADLs (bathing, care of mouth/teeth, toileting, grooming, dressing, etc )  - Assess/evaluate cause of self-care deficits   - Assess range of motion  - Assess patient's mobility  - Assess patient's need for assistive devices and provide as appropriate  - Encourage maximum independence but intervene and supervise when necessary  - Involve family in performance of ADLs  - Assess for home care needs following discharge   - Consider OT consult to assist with ADL evaluation and planning for discharge  - Provide patient education as appropriate  Outcome: Progressing  Goal: Maintain proper alignment of affected body part  Description: INTERVENTIONS:  - Support, maintain and protect limb and body alignment  - Provide patient/ family with appropriate education  Outcome: Progressing

## 2021-02-06 NOTE — QUICK NOTE
Lumbar spine  Mepilex dressing removed, incision evaluated, no erythema, sutures intact, incision well approximated, no tenderness to palpation, no active drainage  Sterile gauze an ABD applied with 2 large tegaderms over lumbar incision  Patient tolerated dressing change

## 2021-02-06 NOTE — PROGRESS NOTES
Subjective: No acute events overnight  No acute distress          Lab Results   Component Value Date/Time    WBC 8 36 02/05/2021 04:46 AM    WBC 8 17 07/27/2015 07:10 AM    HGB 9 8 (L) 02/05/2021 04:46 AM    HGB 13 2 07/27/2015 07:10 AM       Vitals:    02/05/21 2318   BP: 107/56   Pulse: 87   Resp: 17   Temp: 99 8 °F (37 7 °C)   SpO2: 92%     Musculoskeletal: Spine   Dressing with small amount of saturation    Drain in place   Motor and sensation stable L2-S1  Distal extremity warm and well perfused     Assessment: 63 y o  female post op day #4 from ALIF L5-S1, laminectomy, decompression, L4-S1 with instrumented posterolateral fusion     Plan:  WBAT with LSO  Pain control  DVT ppx- heparin  PT/OT   Drain check   Patient noted to have acute blood loss anemia due to a drop in Hbg of > 2 0g from preop levels, will monitor vital signs and resuscitate with IV fluids as needed  Dispo: DC today

## 2021-02-06 NOTE — PLAN OF CARE
Problem: Prexisting or High Potential for Compromised Skin Integrity  Goal: Skin integrity is maintained or improved  Description: INTERVENTIONS:  - Identify patients at risk for skin breakdown  - Assess and monitor skin integrity  - Assess and monitor nutrition and hydration status  - Monitor labs   - Assess for incontinence   - Turn and reposition patient  - Assist with mobility/ambulation  - Relieve pressure over bony prominences  - Avoid friction and shearing  - Provide appropriate hygiene as needed including keeping skin clean and dry  - Evaluate need for skin moisturizer/barrier cream  - Collaborate with interdisciplinary team   - Patient/family teaching  - Consider wound care consult   Outcome: Progressing     Problem: Potential for Falls  Goal: Patient will remain free of falls  Description: INTERVENTIONS:  - Assess patient frequently for physical needs  -  Identify cognitive and physical deficits and behaviors that affect risk of falls    -  [place] fall precautions as indicated by assessment   - Educate patient/family on patient safety including physical limitations  - Instruct patient to call for assistance with activity based on assessment  - Modify environment to reduce risk of injury  - Consider OT/PT consult to assist with strengthening/mobility  Outcome: Progressing     Problem: PAIN - ADULT  Goal: Verbalizes/displays adequate comfort level or baseline comfort level  Description: Interventions:  - Encourage patient to monitor pain and request assistance  - Assess pain using appropriate pain scale  - Administer analgesics based on type and severity of pain and evaluate response  - Implement non-pharmacological measures as appropriate and evaluate response  - Consider cultural and social influences on pain and pain management  - Notify physician/advanced practitioner if interventions unsuccessful or patient reports new pain  Outcome: Progressing     Problem: SAFETY ADULT  Goal: Patient will remain free of falls  Description: INTERVENTIONS:  - Assess patient frequently for physical needs  -  Identify cognitive and physical deficits and behaviors that affect risk of falls    -  [place] fall precautions as indicated by assessment   - Educate patient/family on patient safety including physical limitations  - Instruct patient to call for assistance with activity based on assessment  - Modify environment to reduce risk of injury  - Consider OT/PT consult to assist with strengthening/mobility  Outcome: Progressing  Goal: Maintain or return to baseline ADL function  Description: INTERVENTIONS:  -  Assess patient's ability to carry out ADLs; assess patient's baseline for ADL function and identify physical deficits which impact ability to perform ADLs (bathing, care of mouth/teeth, toileting, grooming, dressing, etc )  - Assess/evaluate cause of self-care deficits   - Assess range of motion  - Assess patient's mobility; develop plan if impaired  - Assess patient's need for assistive devices and provide as appropriate  - Encourage maximum independence but intervene and supervise when necessary  - Involve family in performance of ADLs  - Assess for home care needs following discharge   - Consider OT consult to assist with ADL evaluation and planning for discharge  - Provide patient education as appropriate  Outcome: Progressing  Goal: Maintain or return mobility status to optimal level  Description: INTERVENTIONS:  - Assess patient's baseline mobility status (ambulation, transfers, stairs, etc )    - Identify cognitive and physical deficits and behaviors that affect mobility  - Identify mobility aids required to assist with transfers and/or ambulation (gait belt, sit-to-stand, lift, walker, cane, etc )  - [place] fall precautions as indicated by assessment  - Record patient progress and toleration of activity level on Mobility SBAR; progress patient to next Phase/Stage  - Instruct patient to call for assistance with activity based on assessment  - Consider rehabilitation consult to assist with strengthening/weightbearing, etc   Outcome: Progressing     Problem: SKIN/TISSUE INTEGRITY - ADULT  Goal: Skin integrity remains intact  Description: INTERVENTIONS  - Identify patients at risk for skin breakdown  - Assess and monitor skin integrity  - Assess and monitor nutrition and hydration status  - Monitor labs (i e  albumin)  - Assess for incontinence   - Turn and reposition patient  - Assist with mobility/ambulation  - Relieve pressure over bony prominences  - Avoid friction and shearing  - Provide appropriate hygiene as needed including keeping skin clean and dry  - Evaluate need for skin moisturizer/barrier cream  - Collaborate with interdisciplinary team (i e  Nutrition, Rehabilitation, etc )   - Patient/family teaching  Outcome: Progressing  Goal: Incision(s), wounds(s) or drain site(s) healing without S/S of infection  Description: INTERVENTIONS  - Assess and document risk factors for skin impairment   - Assess and document dressing, incision, wound bed, drain sites and surrounding tissue  - Consider nutrition services referral as needed  - Oral mucous membranes remain intact  - Provide patient/ family education  Outcome: Progressing  Goal: Oral mucous membranes remain intact  Description: INTERVENTIONS  - Assess oral mucosa and hygiene practices  - Implement preventative oral hygiene regimen  - Implement oral medicated treatments as ordered  - Initiate Nutrition services referral as needed  Outcome: Progressing     Problem: MUSCULOSKELETAL - ADULT  Goal: Maintain or return mobility to safest level of function  Description: INTERVENTIONS:  - Assess patient's ability to carry out ADLs; assess patient's baseline for ADL function and identify physical deficits which impact ability to perform ADLs (bathing, care of mouth/teeth, toileting, grooming, dressing, etc )  - Assess/evaluate cause of self-care deficits   - Assess range of motion  - Assess patient's mobility  - Assess patient's need for assistive devices and provide as appropriate  - Encourage maximum independence but intervene and supervise when necessary  - Involve family in performance of ADLs  - Assess for home care needs following discharge   - Consider OT consult to assist with ADL evaluation and planning for discharge  - Provide patient education as appropriate  Outcome: Progressing  Goal: Maintain proper alignment of affected body part  Description: INTERVENTIONS:  - Support, maintain and protect limb and body alignment  - Provide patient/ family with appropriate education  Outcome: Progressing

## 2021-02-06 NOTE — DISCHARGE SUMMARY
ORTHOPEDICS DISCHARGE SUMMARY  Sweta Geronimo 63 y o  female MRN: [id number]  Unit/Bed#: [facility] 619-01    Attending Physician: Jeanna Garrison    Admitting diagnosis: Chronic bilateral low back pain with bilateral sciatica [M54 42, M54 41, G89 29]  Lumbar radiculopathy [M54 16]  Foraminal stenosis of lumbosacral region [M48 07]  Retrolisthesis of vertebrae [M43 10]    Discharge diagnosis: Chronic bilateral low back pain with bilateral sciatica [M54 42, M54 41, G89 29]  Lumbar radiculopathy [M54 16]  Foraminal stenosis of lumbosacral region [M48 07]  Retrolisthesis of vertebrae [M43 10]    Date of admission: 2/2/2021    Date of discharge: 02/06/21    Procedure:  Anterior lumbar interbody fusion L5-S1 with indirect decompression bilateral foramina L5-S1  Lumbar laminectomy decompression L4-S1 with instrumented posterolateral fusion L5-S1 with local autograft allograft and neural monitoring    HPI:  This is a 63y o  year old female that presented to the office with signs and symptoms of  Chronic bilateral low or back pain lumbar radiculopathy retrolisthesis of vertebrae foraminal stenosis of lumbosacral region   They tried and failed conservative treatment measures and wished to proceed with surgical intervention  The risks, benefits, and complications of the procedure were discussed with the patient and informed consent was obtained  Hospital Course: The patient was admitted to the hospital on 2/2/2021 and underwent an uncomplicated  Anterior lumbar interbody fusion L5-S1 with indirect decompression bilateral foramina at L5-S1 lumbar laminectomy decompression L4-S1 with instrumented posterolateral fusion L5-S1 with local autograft allograft and neural monitoring  They were transferred to the floor after a brief stay in the post-anesthesia care unit  Their pain was well managed with IV and oral pain medications  They began therapy on post operative day #1    Daily discussion was had with the patient, nursing staff, orthopaedic team, and family members if present  All questions were answered to the patients satisfaction  On the date of discharge patient was discharged from hospital following general surgical and medical clearance  0   Lab Value Date/Time    HGB 9 8 (L) 02/05/2021 0446    HGB 9 9 (L) 02/04/2021 0523    HGB 10 4 (L) 02/03/2021 0611    HGB 13 4 01/18/2021 0847    HGB 12 6 09/22/2020 2328    HGB 13 5 07/19/2020 0636    HGB 12 6 07/17/2020 2001    HGB 14 5 06/07/2020 1517    HGB 13 2 07/27/2015 0710    HGB 13 9 03/13/2014 1922       Greater than 2 gram drop which qualifies for diagnosis of acute blood loss anemia  Vital signs remained stable and pt was resuscitated with IVF as needed   Body mass index is 37 11 kg/m²  moderately obese  Recommend behavior modifications, nutrition and physical activity  Discharge Instructions: The patient was discharged weight bearing as tolerated to all extremities  Take pain medications as instructed  Patient must keep  LSO brace on when out of bed  Discharge Medications: For the complete list of discharge medications, please refer to the patient's medication reconciliation

## 2021-02-09 ENCOUNTER — TRANSITIONAL CARE MANAGEMENT (OUTPATIENT)
Dept: FAMILY MEDICINE CLINIC | Facility: CLINIC | Age: 56
End: 2021-02-09

## 2021-02-09 ENCOUNTER — TELEPHONE (OUTPATIENT)
Dept: OTHER | Facility: OTHER | Age: 56
End: 2021-02-09

## 2021-02-09 NOTE — TELEPHONE ENCOUNTER
I spoke to the patient and message provided above  She would like to send a picture as her  says her incision looks beautiful  She is having problems with transporation right now and would like Dr to look at picture to see if necessary to come on Thursday  Drainage has decreased  She is sending a picture thru madyt when she changes the dressing today  Please advise

## 2021-02-09 NOTE — TELEPHONE ENCOUNTER
Msg: Pt post-op 7 days spinal fusion- Pt would like to consult w/ doctor on excessive blood leakage in bandages

## 2021-02-09 NOTE — TELEPHONE ENCOUNTER
Recommend continued dressing changes with Gauze and ABD as needed for saturation  She can be placed on my schedule this Thursday for a wound check

## 2021-02-11 ENCOUNTER — OFFICE VISIT (OUTPATIENT)
Dept: OBGYN CLINIC | Facility: HOSPITAL | Age: 56
End: 2021-02-11

## 2021-02-11 VITALS
BODY MASS INDEX: 37.15 KG/M2 | SYSTOLIC BLOOD PRESSURE: 115 MMHG | DIASTOLIC BLOOD PRESSURE: 75 MMHG | HEART RATE: 87 BPM | WEIGHT: 223 LBS | HEIGHT: 65 IN

## 2021-02-11 DIAGNOSIS — Z98.1 S/P LUMBAR FUSION: ICD-10-CM

## 2021-02-11 PROCEDURE — 99024 POSTOP FOLLOW-UP VISIT: CPT | Performed by: PHYSICIAN ASSISTANT

## 2021-02-11 RX ORDER — OXYCODONE HYDROCHLORIDE 5 MG/1
5 TABLET ORAL EVERY 4 HOURS PRN
Qty: 30 TABLET | Refills: 0 | Status: SHIPPED | OUTPATIENT
Start: 2021-02-11 | End: 2021-02-22 | Stop reason: SDUPTHER

## 2021-02-11 RX ORDER — SULFAMETHOXAZOLE AND TRIMETHOPRIM 800; 160 MG/1; MG/1
1 TABLET ORAL 2 TIMES DAILY
Qty: 10 TABLET | Refills: 0 | Status: SHIPPED | OUTPATIENT
Start: 2021-02-11 | End: 2021-02-16

## 2021-02-11 NOTE — PROGRESS NOTES
Assessment:    1 week s/p Anterior lumbar interbody fusion L5-S1 with indirect decompression bilateral foramina L5-S1  Lumbar laminectomy decompression L4-S1 with instrumented posterolateral fusion L5-S1 done 2/2/2021  Serosanginous wound drainage- no signs of infection or dehiscence  Plan:    Continue daily dressing changes and as needed for saturation with gauze and ABD, and skin tape  Refill Bactrim  Continue LSO and lumbar spine precautions  Pain meds as needed  Follow-up 1 week for repeat wound check and possible suture removal   We will also check x-rays of the lumbar spine next  Week  Call prior if symptoms worsening, or develop new symptoms  Problem List Items Addressed This Visit     None      Visit Diagnoses     S/P lumbar fusion        Relevant Medications    oxyCODONE (ROXICODONE) 5 mg immediate release tablet    sulfamethoxazole-trimethoprim (BACTRIM DS) 800-160 mg per tablet                   Subjective:     Patient ID:  Kelle Vo is a 63 y o  female    HPI    1 week s/p Anterior lumbar interbody fusion L5-S1 with indirect decompression bilateral foramina L5-S1  Lumbar laminectomy decompression L4-S1 with instrumented posterolateral fusion L5-S1 done 2/2/2021  Presents today with draining wound  No fever or chills  States her pre-op lower extremity symptoms improved  The following portions of the patient's history were reviewed and updated as appropriate: allergies, current medications, past family history, past medical history, past social history, past surgical history and problem list     Review of Systems     Objective:    Imaging:  None      Vitals:    02/11/21 1112   BP: 115/75   Pulse: 87           Physical Exam     Lumbar incision is draining serosanginous fluid, mild to moderate amount  Dressings saturation  There is minimal erythema  No wound dehiscence or pus drainage  No fluctuance  Sutures are intact and well-aligned

## 2021-02-16 ENCOUNTER — TELEPHONE (OUTPATIENT)
Dept: OBGYN CLINIC | Facility: HOSPITAL | Age: 56
End: 2021-02-16

## 2021-02-16 DIAGNOSIS — M48.061 SPINAL STENOSIS OF LUMBAR REGION WITHOUT NEUROGENIC CLAUDICATION: Primary | ICD-10-CM

## 2021-02-16 RX ORDER — NALOXONE HYDROCHLORIDE 4 MG/.1ML
SPRAY NASAL
Qty: 1 EACH | Refills: 0 | Status: SHIPPED | OUTPATIENT
Start: 2021-02-16 | End: 2021-04-30 | Stop reason: HOSPADM

## 2021-02-16 RX ORDER — OXYCODONE HYDROCHLORIDE 5 MG/1
5 TABLET ORAL EVERY 4 HOURS PRN
Qty: 30 TABLET | Refills: 0 | Status: SHIPPED | OUTPATIENT
Start: 2021-02-16 | End: 2021-02-22 | Stop reason: SDUPTHER

## 2021-02-16 RX ORDER — SULFAMETHOXAZOLE AND TRIMETHOPRIM 800; 160 MG/1; MG/1
1 TABLET ORAL EVERY 12 HOURS SCHEDULED
Qty: 20 TABLET | Refills: 0 | Status: SHIPPED | OUTPATIENT
Start: 2021-02-16 | End: 2021-02-26

## 2021-02-16 NOTE — TELEPHONE ENCOUNTER
Spoke with patient  Said drainage is improving, renewed meds  Denies fevers, chills  Keep apt for this week    GS

## 2021-02-16 NOTE — TELEPHONE ENCOUNTER
Dr Janet Morales    Incision site is still oozing and patient is out of antibiotics  Should she be prescribed more? She also needs a refill of Oxycodone  She has 6 pills left        # [phone]    Medication  oxyCODONE (ROXICODONE) 5 mg immediate release tablet [[id number]]  oxyCODONE (ROXICODONE) 5 mg immediate release tablet [[id number]]     Order Details  Dose: 5 mg Route: Oral Frequency: Every 4 hours PRN for severe pain   Dispense Quantity: 30 tablet Refills: 0 Fills remaining: --           Sig: Take 1 tablet (5 mg total) by mouth every 4 (four) hours as needed for severe painMax Daily Amount: 30 mg          Written Date: 02/11/21 Expiration Date: 04/12/21     Start Date: 02/11/21 End Date: --     Dianelys Forrester Date: 02/11/21             Ordering Provider: -- Phone:  -- Fax:  --    Address:  -- ÁNGEL #:  -- NPI:  --           Authorizing Provider: Tereso Parks PA-C Phone:  [phone] Fax:  [phone]    Address:  [address] [place] [address] ÁNGEL #:  [id number] NPI:  [id number]           Supervising Provider: Radha Pena MD Phone:  [phone] Fax:  [phone]    Address:  [address] [place] [address], [address] [facility] ÁNGEL #:  [id number] NPI:  [id number]           Ordering User:  Tereso Parks PA-C            Diagnosis Association: S/P lumbar fusion (Z98 1)      Original Order:  oxyCODONE (ROXICODONE) 5 mg immediate release tablet [[id number]]      Pharmacy:  [address] [place] [address], [facility] [address]   Phone:  [phone]   Fax:  [phone]   Address:  [address] [place] [address] [address] [facility], [address], [address]

## 2021-02-22 ENCOUNTER — TELEPHONE (OUTPATIENT)
Dept: OBGYN CLINIC | Facility: HOSPITAL | Age: 56
End: 2021-02-22

## 2021-02-22 DIAGNOSIS — Z98.1 S/P LUMBAR FUSION: ICD-10-CM

## 2021-02-22 DIAGNOSIS — M48.061 SPINAL STENOSIS OF LUMBAR REGION WITHOUT NEUROGENIC CLAUDICATION: ICD-10-CM

## 2021-02-22 RX ORDER — OXYCODONE HYDROCHLORIDE 5 MG/1
5 TABLET ORAL EVERY 6 HOURS PRN
Qty: 20 TABLET | Refills: 0 | Status: SHIPPED | OUTPATIENT
Start: 2021-02-22 | End: 2021-03-01 | Stop reason: SDUPTHER

## 2021-02-22 RX ORDER — METHOCARBAMOL 500 MG/1
500 TABLET, FILM COATED ORAL 4 TIMES DAILY PRN
Qty: 30 TABLET | Refills: 0 | Status: SHIPPED | OUTPATIENT
Start: 2021-02-22 | End: 2021-04-30 | Stop reason: HOSPADM

## 2021-02-22 NOTE — TELEPHONE ENCOUNTER
Pt contacted Call Center requested refill of their medication  Medication Name:  methocarbamol (ROBAXIN)  oxyCODONE (ROXICODONE)         Dosage of Med:  500mg  5 mg       Frequency of Med:   -Take 1 tablet (500 mg total) by mouth 4 (four) times a day as needed for muscle spasms  -Take 1 tablet (5 mg total) by mouth every 4 (four) hours as needed for moderate painMax Daily         Remaining Medication:  None  4 left      Pharmacy and Location:   [facility] pharmacy       Pt  Preferred Callback Phone Number: [phone]      Thank you  * PLEASE ADVISE PATIENTS:    REFILL REQUESTS WILL BE PROCESSED WITHIN 24-48 HOURS  *

## 2021-02-25 ENCOUNTER — OFFICE VISIT (OUTPATIENT)
Dept: OBGYN CLINIC | Facility: HOSPITAL | Age: 56
End: 2021-02-25

## 2021-02-25 ENCOUNTER — HOSPITAL ENCOUNTER (OUTPATIENT)
Dept: RADIOLOGY | Facility: HOSPITAL | Age: 56
Discharge: HOME | End: 2021-02-25
Payer: COMMERCIAL

## 2021-02-25 VITALS
SYSTOLIC BLOOD PRESSURE: 136 MMHG | HEIGHT: 65 IN | BODY MASS INDEX: 39.32 KG/M2 | DIASTOLIC BLOOD PRESSURE: 83 MMHG | WEIGHT: 236 LBS | HEART RATE: 99 BPM

## 2021-02-25 DIAGNOSIS — Z98.1 S/P LUMBAR FUSION: Primary | ICD-10-CM

## 2021-02-25 DIAGNOSIS — M48.061 SPINAL STENOSIS OF LUMBAR REGION WITHOUT NEUROGENIC CLAUDICATION: ICD-10-CM

## 2021-02-25 DIAGNOSIS — Z98.1 S/P LUMBAR FUSION: ICD-10-CM

## 2021-02-25 PROCEDURE — 72100 X-RAY EXAM L-S SPINE 2/3 VWS: CPT

## 2021-02-25 PROCEDURE — 99024 POSTOP FOLLOW-UP VISIT: CPT | Performed by: PHYSICIAN ASSISTANT

## 2021-02-25 NOTE — PROGRESS NOTES
Assessment:    3 weeks  s/p Anterior lumbar interbody fusion L5-S1 with indirect decompression bilateral foramina L5-S1  Lumbar laminectomy decompression L4-S1 with instrumented posterolateral fusion L5-S1 done 2/2/2021  Overall doing well with resolved lower extremity radicular symptoms  Lumbar incision without signs of infection or dehiscence  Sutures removed Steri-Strips applied  X-rays stable  Plan:    Weight-bearing as tolerated bilateral lower extremities  LSO brace when out of bed  Lumbar spine precautions  Walk and LE exercises as tolerated for exercise  Patient would like to hold off on outpatient physical therapy until her next visit  Pain medications as needed,   Can refill oxycodone up to 6 weeks post-op  Does not have to keep incision covered if there is no drainage  Follow-up 3 weeks for re-evaluation new x-rays of the lumbar spine  Will likely initiate outpatient physical therapy at that time  Problem List Items Addressed This Visit        Other    Spinal stenosis of lumbar region      Other Visit Diagnoses     S/P lumbar fusion    -  Primary    Relevant Orders    XR spine lumbar 2 or 3 views injury                   Subjective:     Patient ID:  Damian Burroughs is a 63 y o  female    HPI    3 weeks  s/p Anterior lumbar interbody fusion L5-S1 with indirect decompression bilateral foramina L5-S1  Lumbar laminectomy decompression L4-S1 with instrumented posterolateral fusion L5-S1 done 2/2/2021  Today, the patient states overall she is doing well with resolved preoperative lower extremity radicular symptoms  She has minimal lower back pain and soreness  She states her lumbar incision stopped Draining last week  No recent fever chills  She has been compliant with LSO brace  Patient is happy with the results of the procedure        The following portions of the patient's history were reviewed and updated as appropriate: allergies, current medications, past family history, past medical history, past social history, past surgical history and problem list     Review of Systems     Objective:    Imaging:  Lumbar spine xrays performed today demonstrate   Stable 360 fusion L5-S1 hardware in good alignment  Vitals:    02/25/21 1515   BP: 136/83   Pulse: 99       Physical Exam       No acute distress  Gait is assisted with a walker  Lumbar incision clean dry intact, sutures in place, no erythema drainage or wound dehiscence  Negative modified straight leg raise bilaterally  Strength is 5/5 L2-S1 bilaterally, sensation equal intact  No calf tenderness or pitting edema  Suture removal    Date/Time: 2/25/2021 3:46 PM  Performed by: Naty Kline PA-C  Authorized by: Naty Kline PA-C   Universal Protocol:  Consent given by: patient        Patient location:  Clinic  Location:     Location:  Trunk    Trunk location:  Back  Procedure details: Tools used:  Suture removal kit    Wound appearance:  No sign(s) of infection, good wound healing and clean  Post-procedure details:     Post-removal:  Steri-Strips applied    Patient tolerance of procedure:   Tolerated well, no immediate complications

## 2021-03-01 ENCOUNTER — TELEPHONE (OUTPATIENT)
Dept: OBGYN CLINIC | Facility: HOSPITAL | Age: 56
End: 2021-03-01

## 2021-03-01 DIAGNOSIS — F17.219 CIGARETTE NICOTINE DEPENDENCE WITH NICOTINE-INDUCED DISORDER: ICD-10-CM

## 2021-03-01 DIAGNOSIS — M48.061 SPINAL STENOSIS OF LUMBAR REGION WITHOUT NEUROGENIC CLAUDICATION: ICD-10-CM

## 2021-03-01 DIAGNOSIS — K21.9 GASTROESOPHAGEAL REFLUX DISEASE: ICD-10-CM

## 2021-03-01 RX ORDER — OXYCODONE HYDROCHLORIDE 5 MG/1
5 TABLET ORAL EVERY 8 HOURS PRN
Qty: 20 TABLET | Refills: 0 | Status: SHIPPED | OUTPATIENT
Start: 2021-03-01 | End: 2021-03-09 | Stop reason: SDUPTHER

## 2021-03-01 RX ORDER — PANTOPRAZOLE SODIUM 40 MG/1
40 TABLET, DELAYED RELEASE ORAL DAILY
Qty: 30 TABLET | Refills: 5 | Status: SHIPPED | OUTPATIENT
Start: 2021-03-01 | End: 2021-09-03 | Stop reason: SDUPTHER

## 2021-03-01 NOTE — TELEPHONE ENCOUNTER
Pt contacted Call Center requested refill of their medication  Medication Name: oxycodone        Dosage of Med: 5mg      Frequency of Med: Take 1 tablet (5 mg total) by mouth every 6 (six) hours as needed for severe pain      Remaining Medication:   1 left    [facility]         Pt  Preferred Callback Phone Number: [phone]      Thank you  * PLEASE ADVISE PATIENTS:    REFILL REQUESTS WILL BE PROCESSED WITHIN 24-48 HOURS  *

## 2021-03-09 ENCOUNTER — TELEPHONE (OUTPATIENT)
Dept: OBGYN CLINIC | Facility: HOSPITAL | Age: 56
End: 2021-03-09

## 2021-03-09 DIAGNOSIS — M48.061 SPINAL STENOSIS OF LUMBAR REGION WITHOUT NEUROGENIC CLAUDICATION: ICD-10-CM

## 2021-03-09 DIAGNOSIS — Z98.1 S/P LUMBAR FUSION: ICD-10-CM

## 2021-03-09 RX ORDER — OXYCODONE HYDROCHLORIDE 5 MG/1
5 TABLET ORAL EVERY 8 HOURS PRN
Qty: 15 TABLET | Refills: 0 | Status: SHIPPED | OUTPATIENT
Start: 2021-03-09 | End: 2021-03-18 | Stop reason: SDUPTHER

## 2021-03-09 RX ORDER — GABAPENTIN 300 MG/1
300 CAPSULE ORAL 2 TIMES DAILY
Qty: 60 CAPSULE | Refills: 0 | Status: SHIPPED | OUTPATIENT
Start: 2021-03-09 | End: 2021-04-30 | Stop reason: HOSPADM

## 2021-03-09 NOTE — TELEPHONE ENCOUNTER
Dr Moose Mena    Patient called for 2 refills,  # [phone]    Medication  gabapentin (NEURONTIN) 300 mg capsule [[id number]]  gabapentin (NEURONTIN) 300 mg capsule [[id number]]     Order Details  Dose: 300 mg Route: Oral Frequency: 2 times daily   Dispense Quantity: 60 capsule Refills: 0 Fills remaining: --           Sig: Take 1 capsule (300 mg total) by mouth 2 (two) times a day          Written Date: 02/02/21 Expiration Date: 02/02/22     Start Date: 02/02/21 End Date: --            Ordering Provider: Isaiah Herman PA-C Phone:  [phone] Fax:  [phone]    Address:  [address] [place] [address] ÁNGEL #:  [id number] NPI:  [id number]           Authorizing Provider: Isaiah Herman PA-C Phone:  [phone] Fax:  [phone]    Address:  [address] [place] [address] ÁNGEL #:  [id number] NPI:  [id number]           Ordering User:  Isaiah Herman PA-C      Cosigner:  Evangelina Sellers MD Signed:  2/2/2021 15:10           Diagnosis Association: S/P lumbar fusion (Z98 1)      Pharmacy:  [address] [place] [address]   Phone:  [phone]   Fax:  [phone]   Address:  [address] [place] [address]   ÁNGEL #:  --     Medication  oxyCODONE (ROXICODONE) 5 mg immediate release tablet [[id number]]  oxyCODONE (ROXICODONE) 5 mg immediate release tablet [[id number]]     Order Details  Dose: 5 mg Route: Oral Frequency: Every 8 hours PRN for severe pain   Dispense Quantity: 20 tablet Refills: 0 Fills remaining: --           Sig: Take 1 tablet (5 mg total) by mouth every 8 (eight) hours as needed for severe painMax Daily Amount: 15 mg          Written Date: 03/01/21 Expiration Date: 04/30/21     Start Date: 03/01/21 End Date: --     Skylar Left Date: 03/01/21             Ordering Provider: -- Phone:  -- Fax:  --    Address:  -- ÁNGEL #:  -- NPI:  --           Authorizing Provider: Isaiah Herman PA-C Phone:  [phone] Fax:  [phone]    Address:  [address] [place] [address], [address] [place] [address] ÁNGEL #:  [id number] NPI:  [id number]           Supervising Provider: Rachna Michel MD

## 2021-03-18 ENCOUNTER — OFFICE VISIT (OUTPATIENT)
Dept: OBGYN CLINIC | Facility: HOSPITAL | Age: 56
End: 2021-03-18

## 2021-03-18 ENCOUNTER — HOSPITAL ENCOUNTER (OUTPATIENT)
Dept: RADIOLOGY | Facility: HOSPITAL | Age: 56
Discharge: HOME | End: 2021-03-18
Payer: COMMERCIAL

## 2021-03-18 VITALS
WEIGHT: 236 LBS | DIASTOLIC BLOOD PRESSURE: 86 MMHG | HEART RATE: 106 BPM | BODY MASS INDEX: 39.32 KG/M2 | SYSTOLIC BLOOD PRESSURE: 125 MMHG | HEIGHT: 65 IN

## 2021-03-18 DIAGNOSIS — Z98.1 S/P LUMBAR FUSION: Primary | ICD-10-CM

## 2021-03-18 DIAGNOSIS — Z98.1 S/P LUMBAR FUSION: ICD-10-CM

## 2021-03-18 DIAGNOSIS — M48.061 SPINAL STENOSIS OF LUMBAR REGION WITHOUT NEUROGENIC CLAUDICATION: ICD-10-CM

## 2021-03-18 PROCEDURE — 99024 POSTOP FOLLOW-UP VISIT: CPT | Performed by: PHYSICIAN ASSISTANT

## 2021-03-18 PROCEDURE — 3008F BODY MASS INDEX DOCD: CPT | Performed by: FAMILY MEDICINE

## 2021-03-18 PROCEDURE — 72100 X-RAY EXAM L-S SPINE 2/3 VWS: CPT

## 2021-03-18 RX ORDER — OXYCODONE HYDROCHLORIDE 5 MG/1
5 TABLET ORAL EVERY 12 HOURS PRN
Qty: 15 TABLET | Refills: 0 | Status: SHIPPED | OUTPATIENT
Start: 2021-03-18 | End: 2021-04-30 | Stop reason: HOSPADM

## 2021-03-18 NOTE — PROGRESS NOTES
Assessment:    6 weeks s/p Anterior lumbar interbody fusion L5-S1 with indirect decompression bilateral foramina L5-S1  Lumbar laminectomy decompression L4-S1 with instrumented posterolateral fusion L5-S1 done 2/2/2021  Overall doing well  X-rays stable  Plan:    Weight-bearing as tolerated bilateral lower extremities  LSO brace when out of bed  Maintain lumbar spine precautions  Initiate outpatient physical therapy for lower extremity strengthening with progression to core and lower back stretching and strengthening program     Will provide one more refill of oxycodone  Follow-up 6 weeks for re-evaluation and new x-rays of the lumbar spine  Problem List Items Addressed This Visit        Other    S/P lumbar fusion - Primary    Relevant Orders    XR spine lumbar 2 or 3 views injury                   Subjective:     Patient ID:  Dhiraj Casey is a 63 y o  female    HPI    6 weeks s/p Anterior lumbar interbody fusion L5-S1 with indirect decompression bilateral foramina L5-S1  Lumbar laminectomy decompression L4-S1 with instrumented posterolateral fusion L5-S1 done 2/2/2021  Patient continues to do well with minimal low back and lower extremity pain  She has been mostly compliant with LSO brace however sometimes she forgets to wear  She denies any issues with her incision  Overall she is happy with the results of the procedure    The following portions of the patient's history were reviewed and updated as appropriate: allergies, current medications, past family history, past medical history, past social history, past surgical history and problem list     Review of Systems     Objective:    Imaging:  X-rays of the lumbar spine demonstrates stable 360 lumbar fusion   L5-S1      Vitals:    03/18/21 1450   BP: 125/86   Pulse: (!) 106           Physical Exam     No acute distress  Gait is without assistance  Lumbar incision is healed      Negative modified straight leg raise bilaterally  Strength is 5/5 L2-S1 bilaterally, sensation equal and intact  No calf tenderness, no pitting edema

## 2021-04-23 ENCOUNTER — HOSPITAL ENCOUNTER (EMERGENCY)
Facility: HOSPITAL | Age: 56
End: 2021-04-24
Attending: EMERGENCY MEDICINE | Admitting: EMERGENCY MEDICINE
Payer: COMMERCIAL

## 2021-04-23 DIAGNOSIS — F32.A DEPRESSION, UNSPECIFIED DEPRESSION TYPE: ICD-10-CM

## 2021-04-23 DIAGNOSIS — G89.4 CHRONIC PAIN SYNDROME: ICD-10-CM

## 2021-04-23 DIAGNOSIS — R45.851 SUICIDAL IDEATIONS: Primary | ICD-10-CM

## 2021-04-23 LAB
AMPHETAMINES SERPL QL SCN: NEGATIVE
ANION GAP SERPL CALCULATED.3IONS-SCNC: 4 MMOL/L (ref 4–13)
BACTERIA UR QL AUTO: ABNORMAL /HPF
BARBITURATES UR QL: NEGATIVE
BASOPHILS # BLD AUTO: 0.05 THOUSANDS/ΜL (ref 0–0.1)
BASOPHILS NFR BLD AUTO: 1 % (ref 0–1)
BENZODIAZ UR QL: NEGATIVE
BILIRUB UR QL STRIP: NEGATIVE
BUN SERPL-MCNC: 13 MG/DL (ref 5–25)
CALCIUM SERPL-MCNC: 9 MG/DL (ref 8.3–10.1)
CHLORIDE SERPL-SCNC: 111 MMOL/L (ref 100–108)
CLARITY UR: ABNORMAL
CO2 SERPL-SCNC: 25 MMOL/L (ref 21–32)
COCAINE UR QL: NEGATIVE
COLOR UR: YELLOW
CREAT SERPL-MCNC: 0.93 MG/DL (ref 0.6–1.3)
EOSINOPHIL # BLD AUTO: 0.29 THOUSAND/ΜL (ref 0–0.61)
EOSINOPHIL NFR BLD AUTO: 3 % (ref 0–6)
ERYTHROCYTE [DISTWIDTH] IN BLOOD BY AUTOMATED COUNT: 13.8 % (ref 11.6–15.1)
ETHANOL EXG-MCNC: 0 MG/DL
GFR SERPL CREATININE-BSD FRML MDRD: 69 ML/MIN/1.73SQ M
GLUCOSE SERPL-MCNC: 100 MG/DL (ref 65–140)
GLUCOSE UR STRIP-MCNC: NEGATIVE MG/DL
HCT VFR BLD AUTO: 44.1 % (ref 34.8–46.1)
HGB BLD-MCNC: 13.9 G/DL (ref 11.5–15.4)
HGB UR QL STRIP.AUTO: NEGATIVE
IMM GRANULOCYTES # BLD AUTO: 0.02 THOUSAND/UL (ref 0–0.2)
IMM GRANULOCYTES NFR BLD AUTO: 0 % (ref 0–2)
KETONES UR STRIP-MCNC: NEGATIVE MG/DL
LEUKOCYTE ESTERASE UR QL STRIP: NEGATIVE
LYMPHOCYTES # BLD AUTO: 3.51 THOUSANDS/ΜL (ref 0.6–4.47)
LYMPHOCYTES NFR BLD AUTO: 36 % (ref 14–44)
MCH RBC QN AUTO: 30 PG (ref 26.8–34.3)
MCHC RBC AUTO-ENTMCNC: 31.5 G/DL (ref 31.4–37.4)
MCV RBC AUTO: 95 FL (ref 82–98)
METHADONE UR QL: NEGATIVE
MONOCYTES # BLD AUTO: 0.66 THOUSAND/ΜL (ref 0.17–1.22)
MONOCYTES NFR BLD AUTO: 7 % (ref 4–12)
NEUTROPHILS # BLD AUTO: 5.2 THOUSANDS/ΜL (ref 1.85–7.62)
NEUTS SEG NFR BLD AUTO: 53 % (ref 43–75)
NITRITE UR QL STRIP: NEGATIVE
NON-SQ EPI CELLS URNS QL MICRO: ABNORMAL /HPF
NRBC BLD AUTO-RTO: 0 /100 WBCS
OPIATES UR QL SCN: NEGATIVE
OXYCODONE+OXYMORPHONE UR QL SCN: NEGATIVE
PCP UR QL: NEGATIVE
PH UR STRIP.AUTO: 5 [PH]
PLATELET # BLD AUTO: 310 THOUSANDS/UL (ref 149–390)
PMV BLD AUTO: 10.1 FL (ref 8.9–12.7)
POTASSIUM SERPL-SCNC: 4.1 MMOL/L (ref 3.5–5.3)
PROT UR STRIP-MCNC: NEGATIVE MG/DL
RBC # BLD AUTO: 4.63 MILLION/UL (ref 3.81–5.12)
RBC #/AREA URNS AUTO: ABNORMAL /HPF
SARS-COV-2 RNA RESP QL NAA+PROBE: NEGATIVE
SODIUM SERPL-SCNC: 140 MMOL/L (ref 136–145)
SP GR UR STRIP.AUTO: 1.02 (ref 1–1.03)
THC UR QL: NEGATIVE
TSH SERPL DL<=0.05 MIU/L-ACNC: 2.99 UIU/ML (ref 0.36–3.74)
UROBILINOGEN UR QL STRIP.AUTO: 0.2 E.U./DL
WBC # BLD AUTO: 9.73 THOUSAND/UL (ref 4.31–10.16)
WBC #/AREA URNS AUTO: ABNORMAL /HPF

## 2021-04-23 PROCEDURE — 85025 COMPLETE CBC W/AUTO DIFF WBC: CPT | Performed by: EMERGENCY MEDICINE

## 2021-04-23 PROCEDURE — 80307 DRUG TEST PRSMV CHEM ANLYZR: CPT | Performed by: EMERGENCY MEDICINE

## 2021-04-23 PROCEDURE — 82075 ASSAY OF BREATH ETHANOL: CPT | Performed by: EMERGENCY MEDICINE

## 2021-04-23 PROCEDURE — U0005 INFEC AGEN DETEC AMPLI PROBE: HCPCS | Performed by: EMERGENCY MEDICINE

## 2021-04-23 PROCEDURE — 93005 ELECTROCARDIOGRAM TRACING: CPT

## 2021-04-23 PROCEDURE — 80048 BASIC METABOLIC PNL TOTAL CA: CPT | Performed by: EMERGENCY MEDICINE

## 2021-04-23 PROCEDURE — U0003 INFECTIOUS AGENT DETECTION BY NUCLEIC ACID (DNA OR RNA); SEVERE ACUTE RESPIRATORY SYNDROME CORONAVIRUS 2 (SARS-COV-2) (CORONAVIRUS DISEASE [COVID-19]), AMPLIFIED PROBE TECHNIQUE, MAKING USE OF HIGH THROUGHPUT TECHNOLOGIES AS DESCRIBED BY CMS-2020-01-R: HCPCS | Performed by: EMERGENCY MEDICINE

## 2021-04-23 PROCEDURE — 99285 EMERGENCY DEPT VISIT HI MDM: CPT

## 2021-04-23 PROCEDURE — 81001 URINALYSIS AUTO W/SCOPE: CPT | Performed by: EMERGENCY MEDICINE

## 2021-04-23 PROCEDURE — 99285 EMERGENCY DEPT VISIT HI MDM: CPT | Performed by: EMERGENCY MEDICINE

## 2021-04-23 PROCEDURE — 36415 COLL VENOUS BLD VENIPUNCTURE: CPT | Performed by: EMERGENCY MEDICINE

## 2021-04-23 PROCEDURE — 84443 ASSAY THYROID STIM HORMONE: CPT | Performed by: EMERGENCY MEDICINE

## 2021-04-24 ENCOUNTER — HOSPITAL ENCOUNTER (INPATIENT)
Facility: HOSPITAL | Age: 56
LOS: 6 days | Discharge: HOME | DRG: 751 | End: 2021-04-30
Attending: PSYCHIATRY & NEUROLOGY | Admitting: PSYCHIATRY & NEUROLOGY
Payer: COMMERCIAL

## 2021-04-24 VITALS
TEMPERATURE: 97.3 F | OXYGEN SATURATION: 98 % | SYSTOLIC BLOOD PRESSURE: 132 MMHG | HEART RATE: 76 BPM | RESPIRATION RATE: 18 BRPM | DIASTOLIC BLOOD PRESSURE: 68 MMHG

## 2021-04-24 DIAGNOSIS — F33.2 MDD (MAJOR DEPRESSIVE DISORDER), RECURRENT SEVERE, WITHOUT PSYCHOSIS (HCC): Primary | ICD-10-CM

## 2021-04-24 DIAGNOSIS — G89.4 CHRONIC PAIN SYNDROME: ICD-10-CM

## 2021-04-24 DIAGNOSIS — F17.219 CIGARETTE NICOTINE DEPENDENCE WITH NICOTINE-INDUCED DISORDER: ICD-10-CM

## 2021-04-24 DIAGNOSIS — E53.8 VITAMIN B12 DEFICIENCY: ICD-10-CM

## 2021-04-24 DIAGNOSIS — E55.9 VITAMIN D DEFICIENCY: ICD-10-CM

## 2021-04-24 RX ORDER — OLANZAPINE 5 MG/1
5 TABLET ORAL
Status: CANCELLED | OUTPATIENT
Start: 2021-04-24

## 2021-04-24 RX ORDER — ACETAMINOPHEN 325 MG/1
650 TABLET ORAL EVERY 4 HOURS PRN
Status: DISCONTINUED | OUTPATIENT
Start: 2021-04-24 | End: 2021-04-25

## 2021-04-24 RX ORDER — FAMOTIDINE 20 MG/1
20 TABLET, FILM COATED ORAL DAILY
Status: DISCONTINUED | OUTPATIENT
Start: 2021-04-25 | End: 2021-04-25

## 2021-04-24 RX ORDER — LORAZEPAM 2 MG/ML
2 INJECTION INTRAMUSCULAR EVERY 6 HOURS PRN
Status: DISCONTINUED | OUTPATIENT
Start: 2021-04-24 | End: 2021-04-30 | Stop reason: HOSPADM

## 2021-04-24 RX ORDER — HYDROXYZINE HYDROCHLORIDE 25 MG/1
25 TABLET, FILM COATED ORAL
Status: CANCELLED | OUTPATIENT
Start: 2021-04-24

## 2021-04-24 RX ORDER — OLANZAPINE 10 MG/1
10 INJECTION, POWDER, LYOPHILIZED, FOR SOLUTION INTRAMUSCULAR
Status: DISCONTINUED | OUTPATIENT
Start: 2021-04-24 | End: 2021-04-30 | Stop reason: HOSPADM

## 2021-04-24 RX ORDER — HYDROXYZINE HYDROCHLORIDE 25 MG/1
100 TABLET, FILM COATED ORAL
Status: DISCONTINUED | OUTPATIENT
Start: 2021-04-24 | End: 2021-04-24

## 2021-04-24 RX ORDER — OLANZAPINE 2.5 MG/1
2.5 TABLET ORAL
Status: CANCELLED | OUTPATIENT
Start: 2021-04-24

## 2021-04-24 RX ORDER — OLANZAPINE 10 MG/1
10 TABLET ORAL
Status: CANCELLED | OUTPATIENT
Start: 2021-04-24

## 2021-04-24 RX ORDER — TRAZODONE HYDROCHLORIDE 50 MG/1
50 TABLET ORAL
Status: DISCONTINUED | OUTPATIENT
Start: 2021-04-24 | End: 2021-04-30 | Stop reason: HOSPADM

## 2021-04-24 RX ORDER — NICOTINE 21 MG/24HR
21 PATCH, TRANSDERMAL 24 HOURS TRANSDERMAL ONCE
Status: DISCONTINUED | OUTPATIENT
Start: 2021-04-24 | End: 2021-04-24 | Stop reason: HOSPADM

## 2021-04-24 RX ORDER — OLANZAPINE 10 MG/1
10 TABLET ORAL
Status: DISCONTINUED | OUTPATIENT
Start: 2021-04-24 | End: 2021-04-30 | Stop reason: HOSPADM

## 2021-04-24 RX ORDER — NICOTINE 21 MG/24HR
1 PATCH, TRANSDERMAL 24 HOURS TRANSDERMAL EVERY 24 HOURS
Status: DISCONTINUED | OUTPATIENT
Start: 2021-04-24 | End: 2021-04-30 | Stop reason: HOSPADM

## 2021-04-24 RX ORDER — OLANZAPINE 10 MG/1
5 INJECTION, POWDER, LYOPHILIZED, FOR SOLUTION INTRAMUSCULAR
Status: DISCONTINUED | OUTPATIENT
Start: 2021-04-24 | End: 2021-04-30 | Stop reason: HOSPADM

## 2021-04-24 RX ORDER — DIPHENHYDRAMINE HYDROCHLORIDE 50 MG/ML
50 INJECTION INTRAMUSCULAR; INTRAVENOUS EVERY 6 HOURS PRN
Status: CANCELLED | OUTPATIENT
Start: 2021-04-24

## 2021-04-24 RX ORDER — HYDROXYZINE HYDROCHLORIDE 25 MG/1
50 TABLET, FILM COATED ORAL
Status: DISCONTINUED | OUTPATIENT
Start: 2021-04-24 | End: 2021-04-30 | Stop reason: HOSPADM

## 2021-04-24 RX ORDER — OLANZAPINE 5 MG/1
5 TABLET ORAL
Status: DISCONTINUED | OUTPATIENT
Start: 2021-04-24 | End: 2021-04-30 | Stop reason: HOSPADM

## 2021-04-24 RX ORDER — ACETAMINOPHEN 325 MG/1
650 TABLET ORAL EVERY 6 HOURS PRN
Status: DISCONTINUED | OUTPATIENT
Start: 2021-04-24 | End: 2021-04-30 | Stop reason: HOSPADM

## 2021-04-24 RX ORDER — OLANZAPINE 10 MG/1
10 INJECTION, POWDER, LYOPHILIZED, FOR SOLUTION INTRAMUSCULAR
Status: CANCELLED | OUTPATIENT
Start: 2021-04-24

## 2021-04-24 RX ORDER — HYDROXYZINE HYDROCHLORIDE 25 MG/1
100 TABLET, FILM COATED ORAL
Status: CANCELLED | OUTPATIENT
Start: 2021-04-24

## 2021-04-24 RX ORDER — ACETAMINOPHEN 325 MG/1
650 TABLET ORAL EVERY 4 HOURS PRN
Status: CANCELLED | OUTPATIENT
Start: 2021-04-24

## 2021-04-24 RX ORDER — PANTOPRAZOLE SODIUM 40 MG/1
40 TABLET, DELAYED RELEASE ORAL DAILY
Status: DISCONTINUED | OUTPATIENT
Start: 2021-04-25 | End: 2021-04-30 | Stop reason: HOSPADM

## 2021-04-24 RX ORDER — ACETAMINOPHEN 325 MG/1
975 TABLET ORAL EVERY 6 HOURS PRN
Status: CANCELLED | OUTPATIENT
Start: 2021-04-24

## 2021-04-24 RX ORDER — ACETAMINOPHEN 325 MG/1
975 TABLET ORAL EVERY 6 HOURS PRN
Status: DISCONTINUED | OUTPATIENT
Start: 2021-04-24 | End: 2021-04-30 | Stop reason: HOSPADM

## 2021-04-24 RX ORDER — HYDROXYZINE HYDROCHLORIDE 25 MG/1
50 TABLET, FILM COATED ORAL
Status: CANCELLED | OUTPATIENT
Start: 2021-04-24

## 2021-04-24 RX ORDER — HYDROXYZINE HYDROCHLORIDE 25 MG/1
25 TABLET, FILM COATED ORAL
Status: DISCONTINUED | OUTPATIENT
Start: 2021-04-24 | End: 2021-04-30 | Stop reason: HOSPADM

## 2021-04-24 RX ORDER — OLANZAPINE 10 MG/1
5 INJECTION, POWDER, LYOPHILIZED, FOR SOLUTION INTRAMUSCULAR
Status: CANCELLED | OUTPATIENT
Start: 2021-04-24

## 2021-04-24 RX ORDER — GABAPENTIN 300 MG/1
300 CAPSULE ORAL 2 TIMES DAILY
Status: DISCONTINUED | OUTPATIENT
Start: 2021-04-24 | End: 2021-04-27

## 2021-04-24 RX ORDER — ACETAMINOPHEN 325 MG/1
650 TABLET ORAL EVERY 6 HOURS PRN
Status: CANCELLED | OUTPATIENT
Start: 2021-04-24

## 2021-04-24 RX ORDER — TRAZODONE HYDROCHLORIDE 50 MG/1
50 TABLET ORAL
Status: CANCELLED | OUTPATIENT
Start: 2021-04-24

## 2021-04-24 RX ORDER — LORAZEPAM 2 MG/ML
2 INJECTION INTRAMUSCULAR EVERY 6 HOURS PRN
Status: CANCELLED | OUTPATIENT
Start: 2021-04-24

## 2021-04-24 RX ORDER — DIPHENHYDRAMINE HYDROCHLORIDE 50 MG/ML
50 INJECTION INTRAMUSCULAR; INTRAVENOUS EVERY 6 HOURS PRN
Status: DISCONTINUED | OUTPATIENT
Start: 2021-04-24 | End: 2021-04-24

## 2021-04-24 RX ORDER — OLANZAPINE 2.5 MG/1
2.5 TABLET ORAL
Status: DISCONTINUED | OUTPATIENT
Start: 2021-04-24 | End: 2021-04-30 | Stop reason: HOSPADM

## 2021-04-24 RX ADMIN — TRAZODONE HYDROCHLORIDE 50 MG: 50 TABLET ORAL at 22:40

## 2021-04-24 RX ADMIN — GABAPENTIN 300 MG: 300 CAPSULE ORAL at 19:07

## 2021-04-24 RX ADMIN — NICOTINE 1 PATCH: 21 PATCH, EXTENDED RELEASE TRANSDERMAL at 19:07

## 2021-04-24 RX ADMIN — NICOTINE 21 MG: 21 PATCH, EXTENDED RELEASE TRANSDERMAL at 07:53

## 2021-04-24 RX ADMIN — ACETAMINOPHEN 975 MG: 325 TABLET ORAL at 16:08

## 2021-04-24 NOTE — ED NOTES
PT given pillow for comfort and reji crackers for snack  Has no other requests or complaints at this time  1:1 continues       Kenneth Stanford  04/23/21 2369

## 2021-04-24 NOTE — ED NOTES
Follow up phone call placed to [facility] for transport time  Spoke to Sera Chaudhry  They are still working on transport and will call back once time is set up

## 2021-04-24 NOTE — ED NOTES
Patient reported ongoing marital discord over the past few months  Her  has a diagnosed mental illness and was just hospitalized a week ago  Upon release he is not taking medications and accusing her of cheating  He told her yesterday "You're sleeping with the neighborhood, I hate you"  This put her over the edge and she had thoughts to overdose on her pills  Over the past few weeks she has been collecting extra pills and was fearful she would follow through with it  She overdosed a year ago and was inpatient for treatment  She reported no current SI but said when she is at home she is unable to say whether she will be safe or not due to the stress of her   She denies any HI, hallucinations, delusions, paranoia or appetite disturbances  She reported poor sleep as she struggles to fall asleep and stay asleep  She has outpatient with [facility] where she sees Dr Kelly Ferrer monthly and has therapy weekly with Anita  Patient signed a 12 as she is agreement for inpatient treatment
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
## 2021-04-24 NOTE — EMTALA/ACUTE CARE TRANSFER
[facility] [address]  Dept: [phone]      MOZXXU TRANSFER CONSENT    NAME Jin Zimmer                                         1965                              MRN [id number]    I have been informed of my rights regarding examination, treatment, and transfer   by Dr Jacinto Perez MD    Benefits:      Risks:        Transfer Request   I acknowledge that my medical condition has been evaluated and explained to me by the emergency department physician or other qualified medical person and/or my attending physician who has recommended and offered to me further medical examination and treatment  I understand the Hospital's obligation with respect to the treatment and stabilization of my emergency medical condition  I nevertheless request to be transferred  I release the Hospital, the doctor, and any other persons caring for me from all responsibility or liability for any injury or ill effects that may result from my transfer and agree to accept all responsibility for the consequences of my choice to transfer, rather than receive stabilizing treatment at the Hospital  I understand that because the transfer is my request, my insurance may not provide reimbursement for the services  The Hospital will assist and direct me and my family in how to make arrangements for transfer, but the hospital is not liable for any fees charged by the transport service  In spite of this understanding, I refuse to consent to further medical examination and treatment which has been offered to me, and request transfer to [address] [place] [address] Name, [facility] : Elda ENGEL  I authorize the performance of emergency medical procedures and treatments upon me in both transit and upon arrival at the receiving facility    Additionally, I authorize the release of any and all medical records to the receiving facility and request they be transported with me, if possible  I authorize the performance of emergency medical procedures and treatments upon me in both transit and upon arrival at the receiving facility  Additionally, I authorize the release of any and all medical records to the receiving facility and request they be transported with me, if possible  I understand that the safest mode of transportation during a medical emergency is an ambulance and that the Hospital advocates the use of this mode of transport  Risks of traveling to the receiving facility by car, including absence of medical control, life sustaining equipment, such as oxygen, and medical personnel has been explained to me and I fully understand them  (CHRIS CORRECT BOX BELOW)  [  ]  I consent to the stated transfer and to be transported by ambulance/helicopter  [  ]  I consent to the stated transfer, but refuse transportation by ambulance and accept full responsibility for my transportation by car  I understand the risks of non-ambulance transfers and I exonerate the Hospital and its staff from any deterioration in my condition that results from this refusal     X___________________________________________    DATE  21  TIME________  Signature of patient or legally responsible individual signing on patient behalf           RELATIONSHIP TO PATIENT_________________________          Provider Certification    NAME Jarrett Watson                                        [facility] 1965                              MRN [id number]    A medical screening exam was performed on the above named patient  Based on the examination:    Condition Necessitating Transfer The primary encounter diagnosis was Suicidal ideations  Diagnoses of Chronic pain syndrome and Depression, unspecified depression type were also pertinent to this visit      Patient Condition:      Reason for Transfer:      Transfer Requirements: [address] [place] [address]   · Space available and qualified personnel available for treatment as acknowledged by Sergei Bobby  · Agreed to accept transfer and to provide appropriate medical treatment as acknowledged by       Dr Blaise Gillespie  · Appropriate medical records of the examination and treatment of the patient are provided at the time of transfer   [address] [facility], [address] _______  · Transfer will be performed by qualified personnel from    and appropriate transfer equipment as required, including the use of necessary and appropriate life support measures  Provider Certification: I have examined the patient and explained the following risks and benefits of being transferred/refusing transfer to the patient/family:         Based on these reasonable risks and benefits to the patient and/or the unborn child(maria a), and based upon the information available at the time of the patients examination, I certify that the medical benefits reasonably to be expected from the provision of appropriate medical treatments at another medical facility outweigh the increasing risks, if any, to the individuals medical condition, and in the case of labor to the unborn child, from effecting the transfer      X____________________________________________ DATE 04/24/21        TIME_______      ORIGINAL - SEND TO MEDICAL RECORDS   COPY - SEND WITH PATIENT DURING TRANSFER

## 2021-04-24 NOTE — ED NOTES
Patient is accepted at  [facility]  Patient is accepted by Dr Lyla Yuan per Frank Landaverde  Transportation is arranged with [facility]  Transportation is scheduled for 1:00 pm  Patient may go to the floor at upon arrival        Nurse report is to be called to [phone] prior to patient transfer

## 2021-04-24 NOTE — ED NOTES
Insurance Authorization for admission:   Phone call placed to [employer] number: [phone]  Spoke to Adelita Amor  4 days approved  Level of care: IP  Review on 4/27/21  Authorization # **CALL UPON ARRIVAL  EVS (Eligibility Verification System) called - [phone]  Automated system indicates: eligible    Insurance Authorization for Transportation:  TBD  Phone call placed to **  Phone number **  Spoke to **     Authorization #: **
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
## 2021-04-24 NOTE — ED NOTES
Patient is accepted at [facility] [place] [facility]   Patient is accepted by Dr Itz Wise 124 is arranged with [facility] will call in am with transport time **  Transportation is scheduled for **  Patient may go to the floor after noon        Nurse report is to be called to [phone] prior to patient transfer

## 2021-04-24 NOTE — PLAN OF CARE
Problem: Ineffective Coping  Goal: Cooperates with admission process  Description: Interventions:   - Complete admission process  Outcome: Progressing

## 2021-04-24 NOTE — PROGRESS NOTES
Patient arrived to [facility] with belongings present  This T staff member inventoried patient's belongings with patient present  Patient's belongings listed on a personal belongings checklist  Checklist was signed by this T staff member and patient   Belongings listed below:      Belongings given to patient at bedside:    1x sweatpants  1x sweater  1x long sleeve pullover   1x eyeglasses      Belongings in storage:    1x pack of cigarettes (17 cigarettes in pack)  1x lighter  1x cell phone  1x perfume bottle
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
## 2021-04-25 LAB
ATRIAL RATE: 87 BPM
P AXIS: 70 DEGREES
PR INTERVAL: 132 MS
QRS AXIS: 94 DEGREES
QRSD INTERVAL: 86 MS
QT INTERVAL: 370 MS
QTC INTERVAL: 445 MS
T WAVE AXIS: 92 DEGREES
VENTRICULAR RATE: 87 BPM

## 2021-04-25 PROCEDURE — 93010 ELECTROCARDIOGRAM REPORT: CPT | Performed by: INTERNAL MEDICINE

## 2021-04-25 PROCEDURE — 99221 1ST HOSP IP/OBS SF/LOW 40: CPT | Performed by: PSYCHIATRY & NEUROLOGY

## 2021-04-25 RX ORDER — ARIPIPRAZOLE 10 MG/1
10 TABLET ORAL DAILY
Status: DISCONTINUED | OUTPATIENT
Start: 2021-04-25 | End: 2021-04-30 | Stop reason: HOSPADM

## 2021-04-25 RX ORDER — TRAZODONE HYDROCHLORIDE 150 MG/1
150 TABLET ORAL
Status: DISCONTINUED | OUTPATIENT
Start: 2021-04-25 | End: 2021-04-30 | Stop reason: HOSPADM

## 2021-04-25 RX ORDER — IBUPROFEN 400 MG/1
400 TABLET ORAL EVERY 6 HOURS PRN
Status: DISCONTINUED | OUTPATIENT
Start: 2021-04-25 | End: 2021-04-30 | Stop reason: HOSPADM

## 2021-04-25 RX ORDER — DULOXETIN HYDROCHLORIDE 60 MG/1
120 CAPSULE, DELAYED RELEASE ORAL DAILY
Status: DISCONTINUED | OUTPATIENT
Start: 2021-04-25 | End: 2021-04-30 | Stop reason: HOSPADM

## 2021-04-25 RX ORDER — NYSTATIN 100000 [USP'U]/G
POWDER TOPICAL 2 TIMES DAILY
Status: DISCONTINUED | OUTPATIENT
Start: 2021-04-25 | End: 2021-04-29

## 2021-04-25 RX ORDER — FAMOTIDINE 20 MG/1
20 TABLET, FILM COATED ORAL
Status: DISCONTINUED | OUTPATIENT
Start: 2021-04-25 | End: 2021-04-30 | Stop reason: HOSPADM

## 2021-04-25 RX ADMIN — NYSTATIN: 100000 POWDER TOPICAL at 14:22

## 2021-04-25 RX ADMIN — HYDROXYZINE HYDROCHLORIDE 50 MG: 25 TABLET, FILM COATED ORAL at 11:20

## 2021-04-25 RX ADMIN — GABAPENTIN 300 MG: 300 CAPSULE ORAL at 08:29

## 2021-04-25 RX ADMIN — FAMOTIDINE 20 MG: 20 TABLET ORAL at 08:29

## 2021-04-25 RX ADMIN — ACETAMINOPHEN 650 MG: 325 TABLET ORAL at 22:19

## 2021-04-25 RX ADMIN — DULOXETINE HYDROCHLORIDE 120 MG: 60 CAPSULE, DELAYED RELEASE ORAL at 16:59

## 2021-04-25 RX ADMIN — ARIPIPRAZOLE 10 MG: 10 TABLET ORAL at 16:59

## 2021-04-25 RX ADMIN — DICLOFENAC SODIUM 2 G: 10 GEL TOPICAL at 17:00

## 2021-04-25 RX ADMIN — DICLOFENAC SODIUM 2 G: 10 GEL TOPICAL at 14:22

## 2021-04-25 RX ADMIN — NICOTINE POLACRILEX 2 MG: 2 GUM, CHEWING BUCCAL at 15:00

## 2021-04-25 RX ADMIN — DICLOFENAC SODIUM 2 G: 10 GEL TOPICAL at 22:18

## 2021-04-25 RX ADMIN — TRAZODONE HYDROCHLORIDE 150 MG: 150 TABLET ORAL at 22:19

## 2021-04-25 RX ADMIN — NICOTINE 1 PATCH: 21 PATCH, EXTENDED RELEASE TRANSDERMAL at 17:01

## 2021-04-25 RX ADMIN — GABAPENTIN 300 MG: 300 CAPSULE ORAL at 17:00

## 2021-04-25 RX ADMIN — ACETAMINOPHEN 975 MG: 325 TABLET ORAL at 08:53

## 2021-04-25 RX ADMIN — PANTOPRAZOLE SODIUM 40 MG: 40 TABLET, DELAYED RELEASE ORAL at 08:29

## 2021-04-25 NOTE — PROGRESS NOTES
Pt pleasant, cooperative, social with peers  Pt has a good sense of humor and states that the only reason she's admitted here is because her  also has severe psychiatric issues and has recently been hospitalized, but is very demeaning and verbally aggressive and abusive  Pt reports that  believes she's cheating on him, to which pt laughed and stated, "I wish I had enough of a sex drive to be cheating on him " Pt stated she lives at home with her son and mother in law and she's afraid that nobody will be able to take care of her mother in law and  if she cannot  Pt states that her own mental health is severely strained and because of this she cries often and has considered suicide which is what brought her here  Pt stated that she is hoping for some sort of family therapy where this can be addressed as a whole because her  hallucinates and refuses to go to the doctor and minimizes his symptoms and hers  Pt says her  will not be compliant with medications even though he's been hospitalized for this and she hopes her son will step up and take a lead in the family to help her out  Pt requested PRN trazodone at 2240  Pt reported no  Hallucinations and depression and anxiety were both high  No night medications ordered yet  Continuous visual safety checks performed throughout the shift  Safety precautions maintained  Will continue to monitor

## 2021-04-25 NOTE — H&P
Psychiatric Evaluation - Behavioral Health       Assessment/Plan  Principal Problem:  F31 4 bipolar disorder depressed without psychotic features  Partner relational problem  PLAN:   1) Cymbalta 60 mg two q a m  For depression  2) Abilify 10 mg p o  Q a m  For mood patient was and 5 mg before  3) trazodone 150 mg at bedtime  Chief Complaint: " My  was paranoid and accusing me of having affair with the neighbor   "    History of Present Illness: This is a 48-year-old  female who was admitted after she had suicidal ideas  Patient says that she is living with her  for last 32 years before last when he is becoming increasingly paranoid and having hallucinations  He has accused her of cheating on him  She said that because of her 's behavior she became overwhelmed and his depression became suicidal   Currently her mood is pretty okay  She is sleeping okay with trazodone  Appetite is good  She denied any hopelessness  Denied any worthlessness  No hallucinations or delusions  She talked at length about her current situation with the   She says that her bleeding has been psychotic due to the alcohol use or being on Wellbutrin  PAST PSYCH HISTORY:      One previous inpatient psychiatric hospitalization in 2020 currently sees Dr Pritesh Raman at [facility]    Substance Abuse History:  Denied alcohol use she said in the past she was using drugs but not used drugs in years  Family Psychiatric History:     Son has suicide attempt in the past and has depression  Social History:  Education:  12 grade  Learning Disabilities:   Marital history:  Headache for 32 years  Living arrangement, social support:  Patient lives at home with son  and mother-in-law  Occupational History:  Patient reported that takes care of her mother-in-law  Functioning Relationships: support system  Other Pertinent History: None      Traumatic History:   Abuse: None      Other Traumatic Events: None  Med Hx; Review H& P  Past Medical History:   Diagnosis Date    Anxiety     Back pain     Depression     GERD (gastroesophageal reflux disease)     SOB (shortness of breath)          Medical Review Of Systems:    Denies any current medical problems    Scheduled Meds:Review meds     Scheduled Meds:  Current Facility-Administered Medications   Medication Dose Route Frequency Provider Last Rate    acetaminophen  650 mg Oral Q6H PRN Svetoslav SCAR King MD      acetaminophen  975 mg Oral Q6H PRN Svetoslav P MD Fernando      Diclofenac Sodium  2 g Topical 4x Daily Valri Double, PA-C      famotidine  20 mg Oral HS PRN Valri Double, PA-C      gabapentin  300 mg Oral BID Valri Double, PA-TARIQ      hydrOXYzine HCL  25 mg Oral Q6H PRN Max 4/day Svetoslav P MD Fernando      hydrOXYzine HCL  50 mg Oral Q6H PRN Max 4/day Svetoslav P MD Fernando      ibuprofen  400 mg Oral Q6H PRN Valri Double, PAKIRK      LORazepam  2 mg Intramuscular Q6H PRN Svetoslav Antonio Wells MD      nicotine  1 patch Transdermal Q24H Valri Double, PA-TARIQ      nicotine polacrilex  2 mg Oral Q2H PRN Svetoslav SCAR King MD      nystatin   Topical BID Valri Double, EMMA      OLANZapine  10 mg Oral Q3H PRN Max 3/day Svetoslav SCAR King MD      Or    OLANZapine  10 mg Intramuscular Q3H PRN Max 3/day Svetoslav P MD Fernando      OLANZapine  5 mg Oral Q3H PRN Max 6/day Svetoslav SCAR King MD      Or    OLANZapine  5 mg Intramuscular Q3H PRN Max 6/day Svetoslav P MD Fernando      OLANZapine  2 5 mg Oral Q3H PRN Max 8/day Svetoslav P MD Fernando      pantoprazole  40 mg Oral Daily Valri Double, EMMA      traZODone  50 mg Oral HS PRN Svetoslav SCAR King MD       Continuous Infusions:   PRN Meds:   acetaminophen    acetaminophen    famotidine    hydrOXYzine HCL    hydrOXYzine HCL **OR** [DISCONTINUED] diphenhydrAMINE    ibuprofen    [DISCONTINUED] hydrOXYzine HCL **OR** LORazepam    nicotine polacrilex    OLANZapine **OR** OLANZapine    OLANZapine **OR** OLANZapine    OLANZapine    traZODone     No Known Allergies     Vitals:    04/25/21 0725   BP: 116/69   Pulse: 81   Resp: 18   Temp: 97 5 °F (36 4 °C)   SpO2: 98%         Mental Status Evaluation:  Patient is alert oriented x3  Speech is normal goal directed and normal in amount  Thought process is logical and linear  Recent remote memory intact  Currently no homicidal or suicidal ideas  No hallucinations or delusions  Psychomotor activity normal   Gait is normal   Recent remote memory intact  Fair insight and judgment  Affect was bright and mood congruent  Lab Results: I have personally reviewed pertinent lab results  NOTE:  Total of 40 minutes were spent in talking to patient completing this medical record reviewing medical chart medical decision making    Merly Suarez MD

## 2021-04-25 NOTE — H&P
Ani,#  :1965 F  TIS:[id number]    [facility]:[id number]  Adm Date: 2021 1456  2:56 PM   ATT PHY: Rosalee Fletcher, [address] [facility]         Chief Complaint:  Worsening depression with suicidal ideation    History of Presenting Illness: Minoo Razo is a(n) 63 y o  female presenting under 12 voluntary commitment status for worsening depression with suicidal ideation  Patient examined at bedside  Patient reports chronic pains in her back, stating she had back surgery with lumbar fusion in 2021  She reports some discomfort and is agreeable to trying Voltaren gel for her pains  She also reports red rash in her abdominal folds for which she is requesting nystatin powder       No Known Allergies    Current Facility-Administered Medications on File Prior to Encounter   Medication Dose Route Frequency Provider Last Rate Last Admin    [DISCONTINUED] nicotine (NICODERM CQ) 21 mg/24 hr TD 24 hr patch 21 mg  21 mg Transdermal Once Amna Macdonald MD   21 mg at 21 2887     Current Outpatient Medications on File Prior to Encounter   Medication Sig Dispense Refill    acetaminophen (TYLENOL) 500 mg tablet Take 500 mg by mouth every 6 (six) hours as needed for mild pain      ARIPiprazole (ABILIFY) 2 mg tablet Take 5 mg by mouth daily       DULoxetine (CYMBALTA) 60 mg delayed release capsule Take 120 mg by mouth daily      gabapentin (NEURONTIN) 300 mg capsule Take 1 capsule (300 mg total) by mouth 2 (two) times a day 60 capsule 0    nicotine (NICODERM CQ) 21 mg/24 hr TD 24 hr patch Place 1 patch on the skin every 24 hours 28 patch 0    pantoprazole (PROTONIX) 40 mg tablet Take 1 tablet (40 mg total) by mouth daily 30 tablet 5    traZODone (DESYREL) 50 mg tablet Take 1 tablet (50 mg total) by mouth daily at bedtime as needed for sleep (Patient taking differently: Take 150 mg by mouth daily at bedtime as needed for sleep ) 30 tablet 2    docusate sodium (COLACE) 100 mg capsule Take 1 capsule (100 mg total) by mouth 2 (two) times a day (Patient not taking: Reported on 4/24/2021) 10 capsule 0    famotidine (PEPCID) 20 mg tablet Take 20 mg by mouth as needed for heartburn      methocarbamol (ROBAXIN) 500 mg tablet Take 1 tablet (500 mg total) by mouth 4 (four) times a day as needed for muscle spasms (Patient not taking: Reported on 4/24/2021) 30 tablet 0    naloxone (NARCAN) 4 mg/0 1 mL nasal spray Administer 1 spray into a nostril  If no response after 2-3 minutes, give another dose in the other nostril using a new spray   1 each 0    oxyCODONE (ROXICODONE) 5 mg immediate release tablet Take 1 tablet (5 mg total) by mouth every 12 (twelve) hours as needed for severe painMax Daily Amount: 10 mg (Patient not taking: Reported on 4/24/2021) 15 tablet 0       Active Ambulatory Problems     Diagnosis Date Noted    Lower back pain 06/25/2012    Greater trochanteric bursitis of left hip 07/23/2015    Labral tear of hip, degenerative 10/22/2015    Lumbar disc herniation 11/10/2014    Cigarette nicotine dependence with nicotine-induced disorder 11/08/2019    Depression 11/08/2019    Pain of both hip joints 11/08/2019    Gastroesophageal reflux disease 11/08/2019    Screening for breast cancer 11/08/2019    Medical clearance for psychiatric admission 11/08/2019    Family history of brain aneurysm 11/08/2019    MDD (major depressive disorder), recurrent severe, without psychosis ([facility] ) 07/18/2020    Anxiety 07/18/2020    Methamphetamine abuse, episodic ([facility] ) 07/18/2020    Smoking 07/19/2020    Radiculopathy, lumbar region 07/31/2020    Lumbar spondylosis 07/31/2020    Spinal stenosis of lumbar region 07/31/2020    Sacroiliitis ([facility] ) 07/31/2020    Chronic pain syndrome 11/12/2020    S/P lumbar fusion 03/18/2021     Resolved Ambulatory Problems     Diagnosis Date Noted    No Resolved Ambulatory Problems     Past Medical History: Diagnosis Date    Back pain     GERD (gastroesophageal reflux disease)     SOB (shortness of breath)        Past Surgical History:   Procedure Laterality Date    CERVICAL DISC SURGERY      CHOLECYSTECTOMY      FOOT SURGERY      LAPAROTOMY N/A 2/2/2021    Procedure: LAPAROTOMY FOR SPINAL SURGERY;  Surgeon: Lauren Jameson MD;  [facility];  Service: General    LUMBAR FUSION N/A 2/2/2021    Procedure: Anterior lumbar interbody fusion L5-S1; Posterior lumbar laminectomy, decompression, instrumented fusion L5-S1 with allograft and neuromonitoring;  Surgeon: Gus Ghosh MD;  Location[facility];  Service: Orthopedics    IL EGD TRANSORAL BIOPSY SINGLE/MULTIPLE N/A 4/20/2016    Procedure: ESOPHAGOGASTRODUODENOSCOPY (EGD); Surgeon: Savannah Stephenson MD;  Location: [facility]; Service: Gastroenterology    TONSILLECTOMY      TUBAL LIGATION      UPPER GASTROINTESTINAL ENDOSCOPY         Social History:   Social History     Socioeconomic History    Marital status: /Civil Union     Spouse name: None    Number of children: None    Years of education: None    Highest education level: None   Occupational History    Occupation: Caregiver   Social Needs    Financial resource strain: Not very hard    Food insecurity     Worry: Never true     Inability: Never true    Transportation needs     Medical: Yes     Non-medical: Yes   Tobacco Use    Smoking status: Current Every Day Smoker     Packs/day: 1 00     Types: Cigarettes    Smokeless tobacco: Never Used    Tobacco comment: started patch 1/2021, still using 3/4 ppd      Substance and Sexual Activity    Alcohol use: No     Frequency: Never     Drinks per session: Patient refused     Binge frequency: Never    Drug use: Yes     Types: Oxycodone, Methamphetamines     Comment: 7/18/2020 last use    Sexual activity: Not Currently   Lifestyle    Physical activity     Days per week: 7 days     Minutes per session: 30 min    Stress: Rather much Relationships    Social connections     Talks on phone: Never     Gets together: Never     Attends [employer] service: Never     Active member of club or organization: No     Attends meetings of clubs or organizations: Never     Relationship status:     Intimate partner violence     Fear of current or ex partner: No     Emotionally abused: No     Physically abused: No     Forced sexual activity: No   Other Topics Concern    None   Social History Narrative    None       Family History:   Family History   Problem Relation Age of Onset    Aneurysm Mother     Hypertension Mother     Heart attack Father     Aneurysm Sister     Aneurysm Brother     Mental illness Son        Review of Systems   Musculoskeletal: Positive for arthralgias, back pain and gait problem  All other systems reviewed and are negative  Physical Exam   Constitutional: Awake and Alert  Well-developed and well-nourished  No distress  HENT: PERR,EOMI, conjunctiva normal  Head: Normocephalic and atraumatic  Mouth/Throat: Oropharynx is clear and moist     Eyes: Conjunctivae and EOM are normal  Pupils are equal, round, and reactive to light  Right and left eye exhibits no discharge  Neck: Neck supple  No tracheal deviation present  No thyromegaly present  Cardiovascular: Normal rate, regular rhythm and normal heart sounds  Exam reveals no friction rub  No murmur heard  Pulmonary/Chest: Effort normal and breath sounds normal  No respiratory distress  No wheezes  Abdominal: Soft  Bowel sounds are normal  No distension  No tenderness  No rebound tenderness and no guarding  Neurological: Cranial Nerves grossly intact  No sensory deficit  Coordination normal    Musculoskeletal:   Nontender spine  Skin: Skin is warm and dry  Redness noted in abdominal fold  No diaphoresis  No edema  No cyanosis  Assessment     Jose Marsh is a(n) 63 y o  female with MDD  1  GERD    Continue Protonix 40 mg daily and Pepcid 20 mg at nighttime as needed  2  DJD/back pain  Patient received Tylenol and Motrin as needed  I will start Voltaren gel and place consults for PT and OT  3  Gait abnormality  Will consult PT and OT  4  Cutaneous candidiasis  I will start the patient on nystatin powder to be used 2 times daily  5  Tobacco use  Patient is on nicotine transdermal patch and Nicorette gum  6  MDD  Patient is being managed by psych  Prognosis: Fair  Discharge Plan: In progress  Advanced Directives: I have discussed in detail with the patient the advanced directives  The patient does not have a POA or living will  Emergency contact is her son, Raisa Wan ([phone])  When discussing cardiac and pulmonary resuscitation efforts with the patient, the patient wishes to be full code  I have spent more than 50 minutes gathering data, doing physical examination, and discussing the advanced directives, which was witnessed by caring staff  The patient was discussed with Dr Nicolle Gómez and he is in agreement with the above note

## 2021-04-25 NOTE — PLAN OF CARE
Problem: Ineffective Coping  Goal: Identifies ineffective coping skills  Outcome: Progressing  Goal: Identifies healthy coping skills  Outcome: Progressing  Goal: Demonstrates healthy coping skills  Outcome: Progressing  Goal: Participates in unit activities  Description: Interventions:  - Provide therapeutic environment   - Provide required programming   - Redirect inappropriate behaviors   Outcome: Progressing  Goal: Patient/Family participate in treatment and DC plans  Description: Interventions:  - Provide therapeutic environment  Outcome: Progressing  Goal: Patient/Family verbalizes awareness of resources  Outcome: Progressing  Goal: Understands least restrictive measures  Description: Interventions:  - Utilize least restrictive behavior  Outcome: Progressing  Goal: Free from restraint events  Description: - Utilize least restrictive measures   - Provide behavioral interventions   - Redirect inappropriate behaviors   Outcome: Progressing

## 2021-04-25 NOTE — PROGRESS NOTES
Patient compliant with medications and meals  Pleasant and cooperative  Patient stated her anxiety and depression and low this shift due to being able to talk to other peers and distract herself  Patient denies any SI/HI  Around lunch time patient came to nurses station and requested medication for anxiety  No behaviors noted this shift  Q 7 mins checks in place for safety  Will continue to monitor for behaviors and changes

## 2021-04-25 NOTE — PROGRESS NOTES
Pt slept through the night without awakening  Continuous visual checks performed throughtout the night  No sign of distress or labored breathing  Will continue to monitor

## 2021-04-26 LAB
25(OH)D3 SERPL-MCNC: 18.1 NG/ML (ref 30–100)
VIT B12 SERPL-MCNC: 343 PG/ML (ref 100–900)

## 2021-04-26 PROCEDURE — 82306 VITAMIN D 25 HYDROXY: CPT | Performed by: PHYSICIAN ASSISTANT

## 2021-04-26 PROCEDURE — 99232 SBSQ HOSP IP/OBS MODERATE 35: CPT | Performed by: PSYCHIATRY & NEUROLOGY

## 2021-04-26 PROCEDURE — 82607 VITAMIN B-12: CPT | Performed by: PHYSICIAN ASSISTANT

## 2021-04-26 RX ORDER — ERGOCALCIFEROL 1.25 MG/1
50000 CAPSULE ORAL WEEKLY
Status: DISCONTINUED | OUTPATIENT
Start: 2021-04-26 | End: 2021-04-30 | Stop reason: HOSPADM

## 2021-04-26 RX ORDER — MELATONIN
1000 DAILY
Status: DISCONTINUED | OUTPATIENT
Start: 2021-06-14 | End: 2021-04-30 | Stop reason: HOSPADM

## 2021-04-26 RX ADMIN — DULOXETINE HYDROCHLORIDE 120 MG: 60 CAPSULE, DELAYED RELEASE ORAL at 08:43

## 2021-04-26 RX ADMIN — ERGOCALCIFEROL 50000 UNITS: 1.25 CAPSULE, LIQUID FILLED ORAL at 12:17

## 2021-04-26 RX ADMIN — TRAZODONE HYDROCHLORIDE 150 MG: 150 TABLET ORAL at 21:49

## 2021-04-26 RX ADMIN — PANTOPRAZOLE SODIUM 40 MG: 40 TABLET, DELAYED RELEASE ORAL at 08:43

## 2021-04-26 RX ADMIN — ACETAMINOPHEN 650 MG: 325 TABLET ORAL at 21:57

## 2021-04-26 RX ADMIN — ARIPIPRAZOLE 10 MG: 10 TABLET ORAL at 08:43

## 2021-04-26 RX ADMIN — NICOTINE 1 PATCH: 21 PATCH, EXTENDED RELEASE TRANSDERMAL at 17:09

## 2021-04-26 RX ADMIN — GABAPENTIN 300 MG: 300 CAPSULE ORAL at 17:03

## 2021-04-26 RX ADMIN — DICLOFENAC SODIUM 2 G: 10 GEL TOPICAL at 08:43

## 2021-04-26 RX ADMIN — NYSTATIN: 100000 POWDER TOPICAL at 08:43

## 2021-04-26 RX ADMIN — GABAPENTIN 300 MG: 300 CAPSULE ORAL at 08:43

## 2021-04-26 NOTE — TREATMENT PLAN
TREATMENT PLAN REVIEW [facility] Jhonathan Parcel 63 y o  1965 female MRN: [id number]    [address] [facility]  Room / Bed: Nasreen Granados [facility] Encounter: [id number]          Admit Date/Time:  4/24/2021  2:56 PM    Treatment Team: Attending Provider: Hugo Torres MD; Patient Care Assistant: Andrew Castro; Recreational Therapist: Ashok Mena; Certified Nursing Assistant: Maritza Li; Patient Care Assistant: Kim Petersen; Patient Care Assistant: Obdulia Gray; Care Manager: Santana Nguyen, RN; Registered Nurse: Jewel Rosado RN; :  Farhan Zhao; Licensed Practical Nurse: Quinn Santos LPN    Diagnosis: Principal Problem:    MDD (major depressive disorder), recurrent severe, without psychosis ([facility] )  Active Problems:    Lumbar disc herniation    Cigarette nicotine dependence with nicotine-induced disorder    Gastroesophageal reflux disease    Anxiety    Chronic pain syndrome        Patient Strengths/Assets: family ties, negotiates basic needs, patient is on a voluntary commitment    Patient Barriers/Limitations: lack of social/family support, marital/family conflict, poor self-care    Short Term Goals: decrease in depressive symptoms, decrease in anxiety symptoms, decrease in suicidal thoughts, ability to stay safe on the unit, ability to stay free of restraints, improvement in reality testing, improvement in self care, sleep improvement, mood stabilization, increase in socialization with peers on the unit, acceptance of need for psychiatric treatment, acceptance of psychiatric medications    Long Term Goals: improvement in depression, improvement in anxiety, stabilization of mood, improvement in reality testing, improved insight, adequate self care, adequate sleep, adequate appetite, adequate oral intake    Progress Towards Goals: starting psychiatric medications as prescribed    Recommended Treatment: medication management, patient medication education, group therapy, milieu therapy, continued Behavioral Health psychiatric evaluation/assessment process, medical follow up with medical team    Treatment Frequency: daily medication monitoring, group and milieu therapy daily, monitoring through interdisciplinary rounds, monitoring through weekly patient care conferences    Expected Discharge Date: 10 midnights     Discharge Plan: will be determined    Treatment Plan Created/Updated By: Antonia Adams MD

## 2021-04-26 NOTE — PROGRESS NOTES
Progress Note - Gui Mahmood 63 y o  female MRN: [id number]    Unit/Bed#[facility] 203-02 Encounter: [id number]        Subjective:   Patient seen and examined at bedside after reviewing the chart and discussing the case with the caring staff  Patient examined at bedside  Patient has no acute issues  On review of patient's labs it was found that patient's vitamin-D levels were low 22 6  Patient's vitamin B12 levels are still pending  Physical Exam   Vitals: Blood pressure 122/64, pulse 85, temperature 99 1 °F (37 3 °C), temperature source Temporal, resp  rate 19, height 5' 6" (1 676 m), weight 107 kg (235 lb 12 8 oz), SpO2 97 %, not currently breastfeeding  ,Body mass index is 38 06 kg/m²  Constitutional: He appears well-developed  HEENT: PERR, EOMI, MMM  Cardiovascular: Normal rate and regular rhythm  Pulmonary/Chest: Effort normal and breath sounds normal    Abdomen: Soft, + BS, NT    Assessment/Plan:  Gui Mahmood is a(n) 63 y o  female with MDD      1  GERD  Continue Protonix 40 mg daily and Pepcid 20 mg at nighttime as needed  2  DJD/back pain  Patient received Tylenol and Motrin as needed  I will start Voltaren gel and place consults for PT and OT  3  Gait abnormality  Will consult PT and OT  4  Cutaneous candidiasis  I will start the patient on nystatin powder to be used 2 times daily  5  Tobacco use  Patient is on nicotine transdermal patch and Nicorette gum  6  New vitamin-D deficiency  I will put the patient on vitamin-D bolus doses for 8 weeks followed by vitamin D3 1000 units daily

## 2021-04-26 NOTE — PROGRESS NOTES
Progress Note - Suki [address] 63 y o  female MRN: [id number]  Unit/Bed#: Amanda Moncada 203-02 Encounter: [id number]    Assessment/Plan   Principal Problem:    MDD (major depressive disorder), recurrent severe, without psychosis ([facility] )  Active Problems:    Lumbar disc herniation    Cigarette nicotine dependence with nicotine-induced disorder    Gastroesophageal reflux disease    Anxiety    Chronic pain syndrome      Behavior over the last 24 hours:  unchanged  Sleep: slept better  Appetite: normal  Medication side effects: No  ROS: chronic pain, all other systems are negative for acute changes    Mental Status Evaluation:  Appearance:  age appropriate   Behavior:  cooperative   Speech:  normal volume   Mood:  depressed   Affect:  mood-congruent   Thought Process:  goal directed   Thought Content:  no overt delusions   Perceptual Disturbances: None   Risk Potential: Suicidal Ideations without plan  Homicidal Ideations none  Potential for Aggression No   Sensorium:  person, place and time/date   Memory:  recent and remote memory grossly intact   Consciousness:  alert and awake    Attention: attention span and concentration were age appropriate   Insight:  limited   Judgment: fair   Gait/Station: normal gait/station   Motor Activity: no abnormal movements     Progress Toward Goals: Patient reports minimal improvement with ongoing anxiety, hopelessness, depressed mood with intermittent fleeting passive suicidal ideation  Denies any active plan or intent to hurt herself and she is able contract safety  Calorie intake, medication compliant has been stable and denies any current side effects  Recommended Treatment: Continue with group therapy, milieu therapy and occupational therapy  Risks, benefits and possible side effects of Medications:   Risks, benefits, and possible side effects of medications explained to patient and patient verbalizes understanding        Medications: all current active meds have been reviewed  Labs: I have personally reviewed all pertinent laboratory/tests results  Most Recent Labs:   Lab Results   Component Value Date    WBC 9 73 04/23/2021    RBC 4 63 04/23/2021    HGB 13 9 04/23/2021    HCT 44 1 04/23/2021     04/23/2021    RDW 13 8 04/23/2021    NEUTROABS 5 20 04/23/2021    SODIUM 140 04/23/2021    K 4 1 04/23/2021     (H) 04/23/2021    CO2 25 04/23/2021    BUN 13 04/23/2021    CREATININE 0 93 04/23/2021    GLUC 100 04/23/2021    GLUF 101 (H) 01/18/2021    CALCIUM 9 0 04/23/2021    AST 17 01/18/2021    ALT 20 01/18/2021    ALKPHOS 66 01/18/2021    TP 7 0 01/18/2021    ALB 3 5 01/18/2021    TBILI 0 39 01/18/2021    CHOLESTEROL 162 01/18/2021    HDL 39 (L) 01/18/2021    TRIG 172 (H) 01/18/2021    LDLCALC 89 01/18/2021    XTC3DTNYLRFM 2 990 04/23/2021    HGBA1C 5 5 01/18/2021     01/18/2021       Counseling / Coordination of Care  Total floor / unit time spent today 20 minutes  Greater than 50% of total time was spent with the patient and / or family counseling and / or coordination of care

## 2021-04-26 NOTE — PLAN OF CARE
Problem: Ineffective Coping  Goal: Cooperates with admission process  Description: Interventions:   - Complete admission process  Outcome: Progressing  Goal: Identifies ineffective coping skills  Outcome: Progressing  Goal: Identifies healthy coping skills  Outcome: Progressing  Goal: Demonstrates healthy coping skills  Outcome: Progressing  Goal: Participates in unit activities  Description: Interventions:  - Provide therapeutic environment   - Provide required programming   - Redirect inappropriate behaviors   Outcome: Progressing  Goal: Patient/Family participate in treatment and DC plans  Description: Interventions:  - Provide therapeutic environment  Outcome: Progressing  Goal: Patient/Family verbalizes awareness of resources  Outcome: Progressing  Goal: Understands least restrictive measures  Description: Interventions:  - Utilize least restrictive behavior  Outcome: Progressing  Goal: Free from restraint events  Description: - Utilize least restrictive measures   - Provide behavioral interventions   - Redirect inappropriate behaviors   Outcome: Progressing     Problem: Risk for Self Injury/Neglect  Goal: Treatment Goal: Remain safe during length of stay, learn and adopt new coping skills, and be free of self-injurious ideation, impulses and acts at the time of discharge  Outcome: Progressing  Goal: Verbalize thoughts and feelings  Description: Interventions:  - Assess and re-assess patient's lethality and potential for self-injury  - Engage patient in 1:1 interactions, daily, for a minimum of 15 minutes  - Encourage patient to express feelings, fears, frustrations, hopes  - Establish rapport/trust with patient   Outcome: Progressing  Goal: Refrain from harming self  Description: Interventions:  - Monitor patient closely, per order  - Develop a trusting relationship  - Supervise medication ingestion, monitor effects and side effects   Outcome: Progressing  Goal: Attend and participate in unit activities, including therapeutic, recreational, and educational groups  Description: Interventions:  - Provide therapeutic and educational activities daily, encourage attendance and participation, and document same in the medical record  - Obtain collateral information, encourage visitation and family involvement in care   Outcome: Progressing  Goal: Recognize maladaptive responses and adopt new coping mechanisms  Outcome: Progressing  Goal: Complete daily ADLs, including personal hygiene independently, as able  Description: Interventions:  - Observe, teach, and assist patient with ADLS  - Monitor and promote a balance of rest/activity, with adequate nutrition and elimination  Outcome: Progressing     Problem: Depression  Goal: Treatment Goal: Demonstrate behavioral control of depressive symptoms, verbalize feelings of improved mood/affect, and adopt new coping skills prior to discharge  Outcome: Progressing  Goal: Verbalize thoughts and feelings  Description: Interventions:  - Assess and re-assess patient's level of risk   - Engage patient in 1:1 interactions, daily, for a minimum of 15 minutes   - Encourage patient to express feelings, fears, frustrations, hopes   Outcome: Progressing  Goal: Refrain from harming self  Description: Interventions:  - Monitor patient closely, per order   - Supervise medication ingestion, monitor effects and side effects   Outcome: Progressing  Goal: Refrain from isolation  Description: Interventions:  - Develop a trusting relationship   - Encourage socialization   Outcome: Progressing  Goal: Refrain from self-neglect  Outcome: Progressing  Goal: Attend and participate in unit activities, including therapeutic, recreational, and educational groups  Description: Interventions:  - Provide therapeutic and educational activities daily, encourage attendance and participation, and document same in the medical record   Outcome: Progressing  Goal: Complete daily ADLs, including personal hygiene independently, as able  Description: Interventions:  - Observe, teach, and assist patient with ADLS  -  Monitor and promote a balance of rest/activity, with adequate nutrition and elimination   Outcome: Progressing     Problem: Anxiety  Goal: Anxiety is at manageable level  Description: Interventions:  - Assess and monitor patient's anxiety level  - Monitor for signs and symptoms (heart palpitations, chest pain, shortness of breath, headaches, nausea, feeling jumpy, restlessness, irritable, apprehensive)  - Collaborate with interdisciplinary team and initiate plan and interventions as ordered    - [place] patient to unit/surroundings  - Explain treatment plan  - Encourage participation in care  - Encourage verbalization of concerns/fears  - Identify coping mechanisms  - Assist in developing anxiety-reducing skills  - Administer/offer alternative therapies  - Limit or eliminate stimulants  Outcome: Progressing

## 2021-04-26 NOTE — PROGRESS NOTES
04/26/21    Team Meeting   Meeting Type Daily Rounds   Team Members Present   Team Members Present Physician;Nurse;   Physician Team Member Dr Meryle Nares, MD; HERBIE Pittman   Nursing Team Member Randi Cristina RN   Care Management Team Member MS Hui, [facility]   OT Team Member [place], [place]   Patient/Family Present   Patient Present No   Patient's Family Present No   PT came over weekend  Family stressors  SI with plan to OD, denies SI here  Reports anxiety and depression are improved because she is away from stressful situation  No D/C date, Readmit score 19

## 2021-04-26 NOTE — PROGRESS NOTES
Patient compliant with medications and meals  Pleasant and cooperative  Patient stated her anxiety and depression are less here due to being away from her main stressor, which is her   Patient pleasant and social with peers  Denies any SI/HI  Refused Voltaren gel at lunch time due not having any pain  Reported good night sleep  Denies any other concerns or problems  Q 7 mins checks in place for safety  Will continue to monitor for behaviors and changes

## 2021-04-26 NOTE — PROGRESS NOTES
Pt tearful today about , didn't talk to him  Sister did see him and he's acting like nothing is going on   minimizes  Pt states she's not ready to talk to him anyway so it's okay  Pt states she talked to her children which went well, but as she thought the  she became sad and tearful but pt also stated her meds started to wear off so that played a role  Pt stated they upped her Abilify today which was helpful  Pt also took PRN atarax which helped her quite a bit earlier and knocked out some of the anxiety  Pt reported pain and received PRN tylenol  Pt was social, bright, pleasant with staff and peers  Pt is upbeat and overall feels alright  Pt expresses sense of humor and is cooperative and compliant with all medications  Continuous visual safety checks performed throughout the shift  Safety precautions maintained  Will continue to monitor

## 2021-04-27 ENCOUNTER — TELEPHONE (OUTPATIENT)
Dept: PAIN MEDICINE | Facility: MEDICAL CENTER | Age: 56
End: 2021-04-27

## 2021-04-27 ENCOUNTER — TRANSITIONAL CARE MANAGEMENT (OUTPATIENT)
Dept: FAMILY MEDICINE CLINIC | Facility: CLINIC | Age: 56
End: 2021-04-27

## 2021-04-27 PROCEDURE — 97163 PT EVAL HIGH COMPLEX 45 MIN: CPT

## 2021-04-27 PROCEDURE — 97166 OT EVAL MOD COMPLEX 45 MIN: CPT

## 2021-04-27 PROCEDURE — 99232 SBSQ HOSP IP/OBS MODERATE 35: CPT | Performed by: PSYCHIATRY & NEUROLOGY

## 2021-04-27 RX ORDER — GABAPENTIN 300 MG/1
300 CAPSULE ORAL 3 TIMES DAILY
Status: DISCONTINUED | OUTPATIENT
Start: 2021-04-27 | End: 2021-04-28

## 2021-04-27 RX ORDER — CYANOCOBALAMIN 1000 UG/ML
1000 INJECTION INTRAMUSCULAR; SUBCUTANEOUS
Status: DISCONTINUED | OUTPATIENT
Start: 2021-04-27 | End: 2021-04-30 | Stop reason: HOSPADM

## 2021-04-27 RX ADMIN — DULOXETINE HYDROCHLORIDE 120 MG: 60 CAPSULE, DELAYED RELEASE ORAL at 08:13

## 2021-04-27 RX ADMIN — PANTOPRAZOLE SODIUM 40 MG: 40 TABLET, DELAYED RELEASE ORAL at 08:13

## 2021-04-27 RX ADMIN — ACETAMINOPHEN 975 MG: 325 TABLET ORAL at 21:33

## 2021-04-27 RX ADMIN — GABAPENTIN 300 MG: 300 CAPSULE ORAL at 16:33

## 2021-04-27 RX ADMIN — CYANOCOBALAMIN 1000 MCG: 1000 INJECTION, SOLUTION INTRAMUSCULAR at 11:34

## 2021-04-27 RX ADMIN — NICOTINE 1 PATCH: 21 PATCH, EXTENDED RELEASE TRANSDERMAL at 17:51

## 2021-04-27 RX ADMIN — GABAPENTIN 300 MG: 300 CAPSULE ORAL at 08:13

## 2021-04-27 RX ADMIN — TRAZODONE HYDROCHLORIDE 150 MG: 150 TABLET ORAL at 21:14

## 2021-04-27 RX ADMIN — GABAPENTIN 300 MG: 300 CAPSULE ORAL at 21:14

## 2021-04-27 RX ADMIN — ARIPIPRAZOLE 10 MG: 10 TABLET ORAL at 08:13

## 2021-04-27 RX ADMIN — NICOTINE POLACRILEX 2 MG: 2 GUM, CHEWING BUCCAL at 11:55

## 2021-04-27 NOTE — DISCHARGE INSTR - OTHER ORDERS
You will discharge home to [address] [place] [address] , [place], [address] [place], [place] [address]ren[facility]; [phone]  Crisis Plan:    Triggers you identified during your hospital stay that led to suicidal thoughts included:  Ongoing stress experienced as a consequence of your 's false accusations of your alleged infidelity  Coping skills you identified during your hospital stay include:  Spending time with your granddaughter, music, gardening, coloring, art, and crafts      After discharge, if you find your coping skills are not effective and you continue to feel distressed, please talk with trustworthy family members and / or contact your future therapist     If that is not effective and you feel you are a danger to yourself or others, please contact resources that include the following:    Sandhya Bradshaw [address]: [phone]  Peer Hotline (M-F 6-10pm): [phone]   Alcohol Anonymous: Shaista Cheung

## 2021-04-27 NOTE — OCCUPATIONAL THERAPY NOTE
Occupational Therapy Evaluation      [facility]    4/27/2021    Patient Active Problem List   Diagnosis    Lower back pain    Greater trochanteric bursitis of left hip    Labral tear of hip, degenerative    Lumbar disc herniation    Cigarette nicotine dependence with nicotine-induced disorder    Depression    Pain of both hip joints    Gastroesophageal reflux disease    Screening for breast cancer    Medical clearance for psychiatric admission    Family history of brain aneurysm    MDD (major depressive disorder), recurrent severe, without psychosis ([facility] )    Anxiety    Methamphetamine abuse, episodic ([facility] )    Smoking    Radiculopathy, lumbar region    Lumbar spondylosis    Spinal stenosis of lumbar region    Sacroiliitis ([facility] )    Chronic pain syndrome    S/P lumbar fusion       Past Medical History:   Diagnosis Date    Anxiety     Back pain     Depression     GERD (gastroesophageal reflux disease)     SOB (shortness of breath)        Past Surgical History:   Procedure Laterality Date    CERVICAL DISC SURGERY      CHOLECYSTECTOMY      FOOT SURGERY      LAPAROTOMY N/A 2/2/2021    Procedure: LAPAROTOMY FOR SPINAL SURGERY;  Surgeon: Sandra Theodore MD;  [facility];  Service: General    LUMBAR FUSION N/A 2/2/2021    Procedure: Anterior lumbar interbody fusion L5-S1; Posterior lumbar laminectomy, decompression, instrumented fusion L5-S1 with allograft and neuromonitoring;  Surgeon: Vashti Rueda MD;  [facility];  Service: Orthopedics    OH EGD TRANSORAL BIOPSY SINGLE/MULTIPLE N/A 4/20/2016    Procedure: ESOPHAGOGASTRODUODENOSCOPY (EGD); Surgeon: Aziza Morse MD;  Location: [facility];   Service: Gastroenterology    TONSILLECTOMY      TUBAL LIGATION      UPPER GASTROINTESTINAL ENDOSCOPY          04/27/21 1350   OT Last Visit   OT Visit Date 04/27/21   Note Type   Note type Evaluation   Restrictions/Precautions   Weight Bearing Precautions Per Order Yes RLE Weight Bearing Per Order WBAT  (per chart review )   LLE Weight Bearing Per Order WBAT  (per chart review )   Braces or Orthoses LSO  (brace is at home per pt )   Other Precautions Pain;Spinal precautions   Pain Assessment   Pain Assessment Tool 0-10   Pain Score 2   Pain Location/Orientation Orientation: Lower; Location: Back   Pain Onset/Description Onset: Ongoing;Frequency: Constant/Continuous; Descriptor: [address] Pain Intervention(s) Ambulation/increased activity;Repositioned;Medication (See MAR)   Home Living   Type of [facility] Two level;Bed/bath upstairs;1/2 bath on main level;Stairs to enter with rails  (5 SHAYY, FOS to 2nd floor )   Bathroom Shower/Tub Tub/shower unit   Bathroom Toilet Standard   Bathroom Equipment Shower chair   [address] Walker;Cane;Wheelchair-manual;Reacher;Long-handled shoehorn  (pt utilizes no AD at baseline )   Prior Function   Level of [place] Independent with ADLs and functional mobility   Lives With Spouse; Family;Son  (mother-in-law )   Receives Help From Family   ADL Assistance Independent   IADLs Needs assistance  (son and spouse assisting with laundry and cooking recently )   Falls in the last 6 months 0   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 )   Comments Per patient- she has spinal precautions and ordered to wear LSO brace with ambulation  However brace is currently at home  Pt has appointment with ortho on 4/30/21 to see if restrictions can be lifted at that time      Lifestyle   Autonomy Patient reporting being independent with ADLs, ambulatory with no AD and lives in a two story house with 5 SHAYY   Reciprocal Relationships supportive son and spouse willing to assist as needed    Service to Others Caregiver    Intrinsic Gratification enjoys gardening    ADL   Eating Assistance 7  Independent   Grooming Assistance 7  Independent   UB [address] 6  Modified Independent   LB Bathing Assistance 5  Supervision/Setup   UB Dressing Assistance 6  Modified independent   LB Dressing Assistance 5  Supervision/Setup   Toileting Assistance  5  Supervision/Setup   Additional Comments Pt reports that she has been completing all ADLs independently in hospital    Bed Mobility   Additional Comments DNT bed mobility: pt seated OOB in dinning room upon arrival and at end of session    Transfers   Sit to Stand 6  Modified independent   Additional items Armrests   Stand to Sit 6  Modified independent   Additional items Armrests   Functional Mobility   Functional Mobility 7  Independent   Additional Comments Pt ambulated in hallway with no LOB or SOB  Additional items   (pt ambulated with no AD )   Balance   Static Sitting Normal   Dynamic Sitting Good   Static Standing Good   Dynamic Standing Fair +   Activity Tolerance   Activity Tolerance Patient limited by pain   Nurse Made Aware RN made aware of outcomes    RUE Assessment   RUE Assessment WFL  (full AROM, grossly 4+/5 MMT)   LUE Assessment   LUE Assessment WFL  (full AROM, grossly 4+/5 MMT)   Hand Function   Gross Motor Coordination Functional   Fine Motor Coordination Functional   Sensation   Light Touch Partial deficits in the RLE;Partial deficits in the LLE  (occasional numbness in B feet )   Vision-Basic Assessment   Current Vision Wears glasses only for reading   Vision - Complex Assessment   Acuity Able to read employee name badge without difficulty   Cognition   Overall Cognitive Status [facility]   Arousal/Participation Alert; Responsive; Cooperative   Attention Within functional limits   Orientation Level Oriented X4   Memory Within functional limits   Following Commands Follows all commands and directions without difficulty   Comments Pt agreeables to OT eval    Assessment   Prognosis Good   Assessment Pt is a 63 y o  female who was admitted to [address] [place] [address] on 4/24/2021 with MDD (major depressive disorder), recurrent severe, without psychosis ([address] )  At this time, patient is also affected by the comorbidities of: anxiety, chronic pain syndrome, nicotine dependence, GRED, and lumbar disc herniation  Additionally, patient is affected by the following personal factors:steps to enter environment and difficulty performing IADLS   Orders placed for OT evaluation/treatment with up and OOB as tolerated orders  Prior to admission, Patient reporting being independent with ADLs, ambulatory with no AD and lives in a two story house with 5 SHAYY  Upon OT evaluation, patient requires modified independent  for UB ADLs and supervision/set-up for LB ADLs  Patient presents with functional use of BUEs, with intact prehension and fine motor coordination, and symmetrical muscle strength  Patient verbalized good understanding of spinal precautions and verbalized compensatory strategies she implements of ADLs/IADLs  Patient appears to be functioning at baseline, no further Acute OT needs identified at this time to warrant OT services  D/C OT and from OT standpoint, recommendation at time of d/c would be No rehabilitation needs     Goals   Patient Goals to get back to gardening    Plan   OT Frequency Eval only   Recommendation   OT Discharge Recommendation No rehabilitation needs   OT - OK to Discharge Yes  (Once medically cleared )   AM-PAC Daily Activity Inpatient   Lower Body Dressing 3   Bathing 3   Toileting 3   Upper Body Dressing 4   Grooming 4   Eating 4   Daily Activity Raw Score 21   Daily Activity Standardized Score (Calc for Raw Score >=11) 44 27   AM-PAC Applied Cognition Inpatient   Following a Speech/Presentation 4   Understanding Ordinary Conversation 4   Taking Medications 4   Remembering Where Things Are Placed or Put Away 4   Remembering List of 4-5 Errands 4   Taking Care of Complicated Tasks 4   Applied Cognition Raw Score 24   Applied Cognition Standardized Score 62 21     Tiffanie Gorman, OT

## 2021-04-27 NOTE — PROGRESS NOTES
04/26/21 1300   Team Meeting   Meeting Type Tx Team Meeting   Initial Conference Date 04/26/21   Team Members Present   Team Members Present Physician;Nurse;   Physician Team Member Dr Luis Felipe Christina Team Member Latesha Gasca, GEORGE   Social Work Team Member Esperanza Grossman [place]   Patient/Family Present   Patient Present No   Patient's Family Present No     Team reviewed pt's [address] that SW explained to pt who agreed with goals and signed Plan

## 2021-04-27 NOTE — PHYSICAL THERAPY NOTE
Physical Therapy Evaluation     Patient's Name: Everett Gross    Admitting Diagnosis  Depression [F32 9]  Suicidal thoughts [R47 585]    Problem List  Patient Active Problem List   Diagnosis    Lower back pain    Greater trochanteric bursitis of left hip    Labral tear of hip, degenerative    Lumbar disc herniation    Cigarette nicotine dependence with nicotine-induced disorder    Depression    Pain of both hip joints    Gastroesophageal reflux disease    Screening for breast cancer    Medical clearance for psychiatric admission    Family history of brain aneurysm    MDD (major depressive disorder), recurrent severe, without psychosis ([facility] )    Anxiety    Methamphetamine abuse, episodic ([facility] )    Smoking    Radiculopathy, lumbar region    Lumbar spondylosis    Spinal stenosis of lumbar region    [facility]    Chronic pain syndrome    S/P lumbar fusion       Past Medical History  Past Medical History:   Diagnosis Date    Anxiety     Back pain     Depression     GERD (gastroesophageal reflux disease)     SOB (shortness of breath)        Past Surgical History  Past Surgical History:   Procedure Laterality Date    CERVICAL DISC SURGERY      CHOLECYSTECTOMY      FOOT SURGERY      LAPAROTOMY N/A 2/2/2021    Procedure: LAPAROTOMY FOR SPINAL SURGERY;  Surgeon: Albert Cervantes MD;  [facility];  Service: General    LUMBAR FUSION N/A 2/2/2021    Procedure: Anterior lumbar interbody fusion L5-S1; Posterior lumbar laminectomy, decompression, instrumented fusion L5-S1 with allograft and neuromonitoring;  Surgeon: Bianca Lu MD;  [facility];  Service: Orthopedics    NH EGD TRANSORAL BIOPSY SINGLE/MULTIPLE N/A 4/20/2016    Procedure: ESOPHAGOGASTRODUODENOSCOPY (EGD); Surgeon: Kelley Vance MD;  Location: [facility];   Service: Gastroenterology    TONSILLECTOMY      TUBAL LIGATION      UPPER GASTROINTESTINAL ENDOSCOPY              04/27/21 1352   PT Last Visit PT Visit Date 04/27/21   Note Type   Note type Evaluation   Pain Assessment   Pain Assessment Tool 0-10   Pain Score 2   Pain Location/Orientation Orientation: Lower; Location: Back   Pain Onset/Description Onset: Ongoing; Descriptor: Jitendra James; Other (Comment)  ("Feels Tight")   Effect of Pain on Daily Activities yes   Patient's Stated Pain Goal No pain   Hospital Pain Intervention(s) Ambulation/increased activity   Home Living   Type of [address] Two level;1/2 bath on main level;Bed/bath upstairs; Performs ADLs on one level; Able to live on main level with bedroom/bathroom;Stairs to enter with rails  (5 SHAYY, 1 FF to 2nd story)   Bathroom Shower/Tub Tub/shower unit   Bathroom Toilet Standard   Bathroom Equipment Shower chair   Bathroom Accessibility Accessible   Home Equipment Walker;Cane;Wheelchair-manual  (no use of AD at baseline)   Prior Function   Level of [place] Independent with ADLs and functional mobility   Lives With Spouse; Family;Son  (mother in law living with them)   [address] Help From Family  (currently gets help with lifting laundry)   ADL Assistance Independent   IADLs Needs assistance  (currently gets help with lifting and laundry)   Falls in the last 6 months 0   Vocational Retired   Comments Pt states she wears a LSO brace for the last 3 months, wears it daily when not sleeping or sitting in a chair, however pt does not currently have her LSO with her; pt states she has a follow up ortho appointment which had been scheduled for this Thursday   Restrictions/Precautions   Weight Bearing Precautions Per Order Yes   RLE Weight Bearing Per Order WBAT   LLE Weight Bearing Per Order WBAT   Braces or Orthoses LSO   Other Precautions Pain   General   Family/Caregiver Present No   Cognition   Overall Cognitive Status WFL   Arousal/Participation Alert   Attention Within functional limits   Orientation Level Oriented X4   Memory Within functional limits   Following Commands Follows all commands and directions without difficulty   Comments Pt agreeable to PT evaluation   RUE Assessment   RUE Assessment WFL   LUE Assessment   LUE Assessment WFL   RLE Assessment   RLE Assessment WFL  ((limited hip flexion, 4/5))   LLE Assessment   LLE Assessment WFL  (limited hip flexion rom, 4/5)   Coordination   Movements are Fluid and Coordinated 1   Light Touch   RLE Light Touch Grossly intact  (pt does report hx of numbness on the top of the toes)   LLE Light Touch Grossly intact   Bed Mobility   Additional Comments pt in chair upon arrival, returned to chair following evaluation   Transfers   Sit to Stand 6  Modified independent   Additional items Armrests   Stand to Sit 6  Modified independent   Additional items Armrests   Additional Comments pt denies any lightheaded or dizziness upon transfers and mobility   Ambulation/Elevation   Gait pattern WNL   Gait Assistance 6  Modified independent   Assistive Device None   Distance 100 ft   Balance   Static Sitting Normal   Dynamic Sitting Good   Static Standing Good   Dynamic Standing Fair +   Ambulatory Fair +   Endurance Deficit   Endurance Deficit No   Activity Tolerance   Activity Tolerance Patient limited by pain   Medical Staff Made Aware NIK Das present for evaluation   Nurse Made Aware RN verbalized pt appropriate to treat, made aware of outcomes   Assessment   Prognosis Good   Assessment Pt is 63 y o  female seen for PT evaluation s/p admit to [address] [place] [address] on 4/24/2021 w/ MDD (major depressive disorder), recurrent severe, without psychosis ([facility] )  PT consulted to assess pt's functional mobility and d/c needs  Order placed for PT eval and tx, w/ up and OOB as tolerated order  Comorbidities affecting pt's physical performance at time of assessment include: lower back pain, depression, pain of hip joints, GERD, anxiety, smoking, lumbar spinal stenosis, and MDD   PTA, pt was independent w/ all functional mobility w/ no AD, ambulates unrestricted distances and all terrain, has 5 SHAYY and lives w/ family in two level home  Personal factors affecting pt at time of IE include: lives in 2 story house, stairs to enter home, anxiety, tobacco use and inability to perform IADLs  Please find objective findings from PT assessment regarding body systems outlined above  The following objective measures performed on IE: AM-PAC 6-Clicks: 02/75  Pt's clinical presentation is currently unstable/unpredictable seen in pt's presentation of abnormal lab values, pain affecting mobility, current brace prescription, and WB/movment restrictions   From PT/mobility standpoint, pt appears to be functioning close to or at mobility baseline, therefore no further immediate skilled PT needs are warranted at this time  Pt currently performing all phases of functional mobility at independent level without need for AD  Recommend pt continue to mobilize ad gladis while here  Recommend pt return to previous living environment once medically cleared  Will sign off, D/C PT  Please reconsult if further immediate skilled PT needs are warranted  Barriers to Discharge None   Goals   Patient Goals to get back home and be able to garden   PT Treatment Day 0   Recommendation   PT Discharge Recommendation No rehabilitation needs   PT - OK to Discharge Yes  (when medically cleared)   Additional Comments Pt's raw score on the [address] inpatient short form is 24, standardized score is 57 68  Patients at this level are likely to benefit from DC to home with no services, however, please refer to therapist recommendation for safe DC planning     AM-PAC Basic Mobility Inpatient   Turning in Bed Without Bedrails 4   Lying on Back to Sitting on Edge of Flat Bed 4   Moving Bed to Chair 4   Standing Up From Chair 4   Walk in Room 4   Climb 3-5 Stairs 4   Basic Mobility Inpatient Raw Score 24   Basic Mobility Standardized Score 57 68       Christiano Camarillo, SPT

## 2021-04-27 NOTE — PROGRESS NOTES
Pt was present in the milieu, socializing and pleasant  Pt is much brighter, low anxiety and depression  Pt feels happier finding out her son came home and now there is someone reliable there to keep an eye on her  who is battling with his own mental health and minimizing, and her mother-in-law  Pt stated her stress is much lower now but she doesn't know how things will be when she returns home  Pt hopes her  will agree to get help and participate in family therapy or counseling and remain medication compliant  Pt denies si, hi, no passive SI or thoughts  Pt has no hallucinations and reported that the Voltaren gel has been so effective for pain that she isn't using it, will discuss with provider to get it changed to PRN  Pt had no additional concerns or complaints at this time  Compliant with all medications  Continuous visual safety checks performed throughout the shift  Safety precautions maintained  Will continue to monitor
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
## 2021-04-27 NOTE — PROGRESS NOTES
04/27/21 0756   Team Meeting   Meeting Type Daily Rounds   Team Members Present   Team Members Present Physician;Nurse;; Occupational Therapist   Physician Team Member Dr Vahe Caputo MD; HERBIE Barbosa   Nursing Team Member Nel Mario RN   Social Work Team Member Giovana Nicole [facility]   OT Team Member Brittnee [place], [place]   Patient/Family Present   Patient Present No   Patient's Family Present No     DC Friday - will return to home upon stabilization; smiling, social, positive with peers; denies dep/anx; med adjustments today

## 2021-04-27 NOTE — PROGRESS NOTES
Progress Note - Mandeep Colon 63 y o  female MRN: [id number]    Unit/Bed#Abundio Haq 203-02 Encounter: [id number]        Subjective:   Patient seen and examined at bedside after reviewing the chart and discussing the case with the caring staff  Patient examined at bedside  Patient has no acute issues  On review of patient's labs it was found that patient's vitamin B12 levels were found to be low 343  Physical Exam   Vitals: Blood pressure 126/59, pulse 72, temperature 98 2 °F (36 8 °C), temperature source Temporal, resp  rate 18, height 5' 6" (1 676 m), weight 107 kg (235 lb 12 8 oz), SpO2 96 %, not currently breastfeeding  ,Body mass index is 38 06 kg/m²  Constitutional: He appears well-developed  HEENT: PERR, EOMI, MMM  Cardiovascular: Normal rate and regular rhythm  Pulmonary/Chest: Effort normal and breath sounds normal    Abdomen: Soft, + BS, NT    Assessment/Plan:  Mandeep Colon is a(n) 63 y o  female with MDD      1  GERD  Continue Protonix 40 mg daily and Pepcid 20 mg at nighttime as needed  2  DJD/back pain  Patient received Tylenol and Motrin as needed  I will start Voltaren gel and place consults for PT and OT  3  Gait abnormality  Will consult PT and OT  4  Cutaneous candidiasis  I will start the patient on nystatin powder to be used 2 times daily  5  Tobacco use  Patient is on nicotine transdermal patch and Nicorette gum  6  New vitamin-D deficiency  I will put the patient on vitamin-D bolus doses for 8 weeks followed by vitamin D3 1000 units daily  7  New vitamin B12 deficiency  I will put the patient on monthly B12 injections

## 2021-04-27 NOTE — DISCHARGE INSTR - APPOINTMENTS
The treatment team recommends that you obtain ongoing psychiatric medication management and individual therapy  An office follow-up appointment has been made on your behalf on Wednesday, May 12, 2021 at 1:45 pm, with Dr Roosevelt Kang, your primary care physician, at [address] [facility], [address] [facility], [address] [facility]  Phone: [phone]  Please plan to arrive 15 minutes prior to your scheduled appointment, bring your insurance card(s) and photo ID, and wear a face mask for health safety precautions  Your discharge summary will be faxed to this provider for continuity of care  A telehealth phone appointment has been made on your behalf for psychiatric medication management, on Tuesday, May 4, 2021 at 8:30 am, with Dr Abi Jacobs, psychiatrist from [place] [address]; Phone: [phone]; Fax:  [phone]  Your discharge summary of care will be faxed to this provider for continuity of care  A telehealth phone appointment has been made on your behalf on Tuesday, May 11, 2021 at 10:00 am, with Brian Rutledge, therapist from [address]; Phone: [phone]; Fax:  [phone]  Your discharge summary of care will be faxed to this provider for continuity of care  An office appointment has been made on your behalf on Monday, May 3, 2021 at 10:45 am, with Dr Dulce Maria Woods, at [address] [facility], [address] [facility], [address] [facility], [address] [facility];  Phone: [phone]; Fax:  [phone]  Please plan to arrive 15 minutes prior to your scheduled appointment, bring your insurance card(s) and photo ID, and wear a face mask for health safety precautions  Your discharge summary of [place] will be faxed to this provider for continuity of care      Please note that Ar Stevens RN, our [address] [place] [address] Nurse Navigator, will be calling you after your discharge, on the phone number that you provided  She will be available as an additional support, if needed  If you wish to speak with her, you may contact Damian Chau at [phone]

## 2021-04-27 NOTE — PLAN OF CARE
Problem: Ineffective Coping  Goal: Cooperates with admission process  Description: Interventions:   - Complete admission process  Outcome: Progressing  Goal: Identifies ineffective coping skills  Outcome: Progressing  Goal: Identifies healthy coping skills  Outcome: Progressing  Goal: Demonstrates healthy coping skills  Outcome: Progressing  Goal: Participates in unit activities  Description: Interventions:  - Provide therapeutic environment   - Provide required programming   - Redirect inappropriate behaviors   Outcome: Progressing  Goal: Patient/Family participate in treatment and DC plans  Description: Interventions:  - Provide therapeutic environment  Outcome: Progressing  Goal: Patient/Family verbalizes awareness of resources  Outcome: Progressing  Goal: Understands least restrictive measures  Description: Interventions:  - Utilize least restrictive behavior  Outcome: Progressing  Goal: Free from restraint events  Description: - Utilize least restrictive measures   - Provide behavioral interventions   - Redirect inappropriate behaviors   Outcome: Progressing     Problem: Risk for Self Injury/Neglect  Goal: Treatment Goal: Remain safe during length of stay, learn and adopt new coping skills, and be free of self-injurious ideation, impulses and acts at the time of discharge  Outcome: Progressing  Goal: Verbalize thoughts and feelings  Description: Interventions:  - Assess and re-assess patient's lethality and potential for self-injury  - Engage patient in 1:1 interactions, daily, for a minimum of 15 minutes  - Encourage patient to express feelings, fears, frustrations, hopes  - Establish rapport/trust with patient   Outcome: Progressing  Goal: Refrain from harming self  Description: Interventions:  - Monitor patient closely, per order  - Develop a trusting relationship  - Supervise medication ingestion, monitor effects and side effects   Outcome: Progressing  Goal: Attend and participate in unit activities, including therapeutic, recreational, and educational groups  Description: Interventions:  - Provide therapeutic and educational activities daily, encourage attendance and participation, and document same in the medical record  - Obtain collateral information, encourage visitation and family involvement in care   Outcome: Progressing  Goal: Recognize maladaptive responses and adopt new coping mechanisms  Outcome: Progressing  Goal: Complete daily ADLs, including personal hygiene independently, as able  Description: Interventions:  - Observe, teach, and assist patient with ADLS  - Monitor and promote a balance of rest/activity, with adequate nutrition and elimination  Outcome: Progressing     Problem: Depression  Goal: Treatment Goal: Demonstrate behavioral control of depressive symptoms, verbalize feelings of improved mood/affect, and adopt new coping skills prior to discharge  Outcome: Progressing  Goal: Verbalize thoughts and feelings  Description: Interventions:  - Assess and re-assess patient's level of risk   - Engage patient in 1:1 interactions, daily, for a minimum of 15 minutes   - Encourage patient to express feelings, fears, frustrations, hopes   Outcome: Progressing  Goal: Refrain from harming self  Description: Interventions:  - Monitor patient closely, per order   - Supervise medication ingestion, monitor effects and side effects   Outcome: Progressing  Goal: Refrain from isolation  Description: Interventions:  - Develop a trusting relationship   - Encourage socialization   Outcome: Progressing  Goal: Refrain from self-neglect  Outcome: Progressing  Goal: Attend and participate in unit activities, including therapeutic, recreational, and educational groups  Description: Interventions:  - Provide therapeutic and educational activities daily, encourage attendance and participation, and document same in the medical record   Outcome: Progressing  Goal: Complete daily ADLs, including personal hygiene independently, as able  Description: Interventions:  - Observe, teach, and assist patient with ADLS  -  Monitor and promote a balance of rest/activity, with adequate nutrition and elimination   Outcome: Progressing     Problem: Anxiety  Goal: Anxiety is at manageable level  Description: Interventions:  - Assess and monitor patient's anxiety level  - Monitor for signs and symptoms (heart palpitations, chest pain, shortness of breath, headaches, nausea, feeling jumpy, restlessness, irritable, apprehensive)  - Collaborate with interdisciplinary team and initiate plan and interventions as ordered    - [place] patient to unit/surroundings  - Explain treatment plan  - Encourage participation in care  - Encourage verbalization of concerns/fears  - Identify coping mechanisms  - Assist in developing anxiety-reducing skills  - Administer/offer alternative therapies  - Limit or eliminate stimulants  Outcome: Progressing

## 2021-04-27 NOTE — NURSING NOTE
Patient observed in the community  Social, pleasant, bright  Appetite is good  Compliant with medications  Pt states she has been craving cigarettes- PRN Nicotine gum given at 1138  Pt rates her anxiety and depression as "low"  Denies SI/HI and hallucinations  Pt denies pain at this time- states the Voltaren gel was helpful and will utilize it when necessary  Pt voices no concerns or complaints at this time  Will CTM  Q7 minute safety checks in progress

## 2021-04-27 NOTE — PROGRESS NOTES
Progress Note - Suki 129 63 y o  female MRN: [id number]  Unit/Bed#: Anni Barber Encounter: [id number]    Assessment/Plan   Principal Problem:    MDD (major depressive disorder), recurrent severe, without psychosis ([facility] )  Active Problems:    Lumbar disc herniation    Cigarette nicotine dependence with nicotine-induced disorder    Gastroesophageal reflux disease    Anxiety    Chronic pain syndrome      Behavior over the last 24 hours:  unchanged  Sleep: slept better  Appetite: normal  Medication side effects: No  ROS: no complaints, all other systems are negative for acute change    Mental Status Evaluation:  Appearance:  age appropriate   Behavior:  cooperative   Speech:  normal volume   Mood:  anxious and depressed   Affect:  mood-congruent   Thought Process:  goal directed   Thought Content:  no overt delusions   Perceptual Disturbances: None   Risk Potential: Suicidal Ideations without plan  Homicidal Ideations none  Potential for Aggression No   Sensorium:  person, place and time/date   Memory:  recent and remote memory grossly intact   Consciousness:  alert and awake    Attention: attention span and concentration were age appropriate   Insight:  limited   Judgment: fair   Gait/Station: normal gait/station   Motor Activity: no abnormal movements     Progress Toward Goals: Patient reports reduced depressed mood and anxiety but still feels overwhelmed and ruminates about ongoing conflict with her   Patient has been participating well in group and milieu therapy  She has been compliant with medication and denies any current side effects  Recommended Treatment: Continue with group therapy, milieu therapy and occupational therapy  Risks, benefits and possible side effects of Medications:   Risks, benefits, and possible side effects of medications explained to patient and patient verbalizes understanding        Medications: all current active meds have been reviewed  Increase Neurontin to 300 mg po tid    Labs: I have personally reviewed all pertinent laboratory/tests results  Most Recent Labs:   Lab Results   Component Value Date    WBC 9 73 04/23/2021    RBC 4 63 04/23/2021    HGB 13 9 04/23/2021    HCT 44 1 04/23/2021     04/23/2021    RDW 13 8 04/23/2021    NEUTROABS 5 20 04/23/2021    SODIUM 140 04/23/2021    K 4 1 04/23/2021     (H) 04/23/2021    CO2 25 04/23/2021    BUN 13 04/23/2021    CREATININE 0 93 04/23/2021    GLUC 100 04/23/2021    GLUF 101 (H) 01/18/2021    CALCIUM 9 0 04/23/2021    AST 17 01/18/2021    ALT 20 01/18/2021    ALKPHOS 66 01/18/2021    TP 7 0 01/18/2021    ALB 3 5 01/18/2021    TBILI 0 39 01/18/2021    CHOLESTEROL 162 01/18/2021    HDL 39 (L) 01/18/2021    TRIG 172 (H) 01/18/2021    LDLCALC 89 01/18/2021    [id number] 2 990 04/23/2021    HGBA1C 5 5 01/18/2021     01/18/2021       Counseling / Coordination of Care  Total floor / unit time spent today 20 minutes  Greater than 50% of total time was spent with the patient and / or family counseling and / or coordination of care

## 2021-04-28 PROCEDURE — 99232 SBSQ HOSP IP/OBS MODERATE 35: CPT | Performed by: PSYCHIATRY & NEUROLOGY

## 2021-04-28 RX ORDER — GABAPENTIN 300 MG/1
300 CAPSULE ORAL 3 TIMES DAILY
Status: DISCONTINUED | OUTPATIENT
Start: 2021-04-28 | End: 2021-04-30 | Stop reason: HOSPADM

## 2021-04-28 RX ADMIN — GABAPENTIN 300 MG: 300 CAPSULE ORAL at 16:09

## 2021-04-28 RX ADMIN — NICOTINE 1 PATCH: 21 PATCH, EXTENDED RELEASE TRANSDERMAL at 17:51

## 2021-04-28 RX ADMIN — GABAPENTIN 300 MG: 300 CAPSULE ORAL at 08:06

## 2021-04-28 RX ADMIN — NICOTINE POLACRILEX 2 MG: 2 GUM, CHEWING BUCCAL at 10:37

## 2021-04-28 RX ADMIN — TRAZODONE HYDROCHLORIDE 150 MG: 150 TABLET ORAL at 21:32

## 2021-04-28 RX ADMIN — DULOXETINE HYDROCHLORIDE 120 MG: 60 CAPSULE, DELAYED RELEASE ORAL at 08:06

## 2021-04-28 RX ADMIN — GABAPENTIN 300 MG: 300 CAPSULE ORAL at 21:32

## 2021-04-28 RX ADMIN — ARIPIPRAZOLE 10 MG: 10 TABLET ORAL at 08:06

## 2021-04-28 RX ADMIN — PANTOPRAZOLE SODIUM 40 MG: 40 TABLET, DELAYED RELEASE ORAL at 08:06

## 2021-04-28 NOTE — NURSING NOTE
Patient observed in the community  Social with staff and peers  Smiling, pleasant, bright, positive attitude  When asked how she was doing she states "I am great, you guys deserve 4 stars you've helped me so much"  Pt denies anxiety and depression at this time  Denies SI/HI and hallucinations  Pt denies pain at this time although she does state her back was bothering her over night- no PRN's utilized at this time  Pt offers no further complaints  Will CTM  Q7 minute safety checks in progress

## 2021-04-28 NOTE — PROGRESS NOTES
Progress Note - Luis Alfredo Suarez 63 y o  female MRN: [id number]    Unit/Bed#: Shaun Tellez Encounter: [id number]        Subjective:   Patient seen and examined at bedside after reviewing the chart and discussing the case with the caring staff  Patient examined at bedside  Patient has no acute issues  On review of patient's labs it was found that patient's vitamin B12 levels were found to be low 343  Patient is a possible discharge for Friday 04/30/2021  Physical Exam   Vitals: Blood pressure 118/59, pulse 64, temperature 98 4 °F (36 9 °C), temperature source Temporal, resp  rate 18, height 5' 6" (1 676 m), weight 107 kg (235 lb 12 8 oz), SpO2 98 %, not currently breastfeeding  ,Body mass index is 38 06 kg/m²  Constitutional: He appears well-developed  HEENT: PERR, EOMI, MMM  Cardiovascular: Normal rate and regular rhythm  Pulmonary/Chest: Effort normal and breath sounds normal    Abdomen: Soft, + BS, NT    Assessment/Plan:  Luis Alfredo Suarez is a(n) 63 y o  female with MDD      1  GERD  Continue Protonix 40 mg daily and Pepcid 20 mg at nighttime as needed  2  DJD/back pain  Patient received Tylenol and Motrin as needed  I will start Voltaren gel and place consults for PT and OT  3  Gait abnormality  Will consult PT and OT  4  Cutaneous candidiasis  I will start the patient on nystatin powder to be used 2 times daily  5  Tobacco use  Patient is on nicotine transdermal patch and Nicorette gum  6  New vitamin-D deficiency  I will put the patient on vitamin-D bolus doses for 8 weeks followed by vitamin D3 1000 units daily  7  New vitamin B12 deficiency  I will put the patient on monthly B12 injections

## 2021-04-28 NOTE — PROGRESS NOTES
04/28/21    Team Meeting   Meeting Type Daily Rounds   Team Members Present   Team Members Present Physician;Nurse;;Occupational Therapist   Physician Team Member Dr William Lofton MD, [address]   Nursing Team Member Evangelina Campos, [place]   Care Management Team Member MS Hui, [facility]   OT Team Member Derek [place], [place]   Patient/Family Present   Patient Present No   Patient's Family Present No   D/C Friday, follow up with [facility] for psych and therapy, will return home  Pleasant and cooperative, appears bright  Denies all, slept well

## 2021-04-28 NOTE — NURSING NOTE
Patient was observed in the community this evening  She is pleasant and appropriate during staff interactions  She denies anxiety and depression, denies SI, HI and hallucinations  At time of assessment, she denied pain however later in the evening she c/o lower back with a rating of 7/10  At 2133, she received PRN Tylenol for severe pain which provided relief  No further complaints voiced  Medication compliant at HS  Attended group and snack time  No acute behaviors observed  Will CTM via q7 minute safety checks

## 2021-04-28 NOTE — PLAN OF CARE
Problem: Ineffective Coping  Goal: Cooperates with admission process  Description: Interventions:   - Complete admission process  Outcome: Progressing  Goal: Identifies ineffective coping skills  Outcome: Progressing  Goal: Identifies healthy coping skills  Outcome: Progressing  Goal: Demonstrates healthy coping skills  Outcome: Progressing  Goal: Participates in unit activities  Description: Interventions:  - Provide therapeutic environment   - Provide required programming   - Redirect inappropriate behaviors   Outcome: Progressing  Goal: Patient/Family participate in treatment and DC plans  Description: Interventions:  - Provide therapeutic environment  Outcome: Progressing  Goal: Patient/Family verbalizes awareness of resources  Outcome: Progressing  Goal: Understands least restrictive measures  Description: Interventions:  - Utilize least restrictive behavior  Outcome: Progressing  Goal: Free from restraint events  Description: - Utilize least restrictive measures   - Provide behavioral interventions   - Redirect inappropriate behaviors   Outcome: Progressing     Problem: Risk for Self Injury/Neglect  Goal: Treatment Goal: Remain safe during length of stay, learn and adopt new coping skills, and be free of self-injurious ideation, impulses and acts at the time of discharge  Outcome: Progressing  Goal: Verbalize thoughts and feelings  Description: Interventions:  - Assess and re-assess patient's lethality and potential for self-injury  - Engage patient in 1:1 interactions, daily, for a minimum of 15 minutes  - Encourage patient to express feelings, fears, frustrations, hopes  - Establish rapport/trust with patient   Outcome: Progressing  Goal: Refrain from harming self  Description: Interventions:  - Monitor patient closely, per order  - Develop a trusting relationship  - Supervise medication ingestion, monitor effects and side effects   Outcome: Progressing  Goal: Attend and participate in unit activities, including therapeutic, recreational, and educational groups  Description: Interventions:  - Provide therapeutic and educational activities daily, encourage attendance and participation, and document same in the medical record  - Obtain collateral information, encourage visitation and family involvement in care   Outcome: Progressing  Goal: Recognize maladaptive responses and adopt new coping mechanisms  Outcome: Progressing  Goal: Complete daily ADLs, including personal hygiene independently, as able  Description: Interventions:  - Observe, teach, and assist patient with ADLS  - Monitor and promote a balance of rest/activity, with adequate nutrition and elimination  Outcome: Progressing     Problem: Depression  Goal: Treatment Goal: Demonstrate behavioral control of depressive symptoms, verbalize feelings of improved mood/affect, and adopt new coping skills prior to discharge  Outcome: Progressing  Goal: Verbalize thoughts and feelings  Description: Interventions:  - Assess and re-assess patient's level of risk   - Engage patient in 1:1 interactions, daily, for a minimum of 15 minutes   - Encourage patient to express feelings, fears, frustrations, hopes   Outcome: Progressing  Goal: Refrain from harming self  Description: Interventions:  - Monitor patient closely, per order   - Supervise medication ingestion, monitor effects and side effects   Outcome: Progressing  Goal: Refrain from isolation  Description: Interventions:  - Develop a trusting relationship   - Encourage socialization   Outcome: Progressing  Goal: Refrain from self-neglect  Outcome: Progressing  Goal: Attend and participate in unit activities, including therapeutic, recreational, and educational groups  Description: Interventions:  - Provide therapeutic and educational activities daily, encourage attendance and participation, and document same in the medical record   Outcome: Progressing  Goal: Complete daily ADLs, including personal hygiene independently, as able  Description: Interventions:  - Observe, teach, and assist patient with ADLS  -  Monitor and promote a balance of rest/activity, with adequate nutrition and elimination   Outcome: Progressing     Problem: Anxiety  Goal: Anxiety is at manageable level  Description: Interventions:  - Assess and monitor patient's anxiety level  - Monitor for signs and symptoms (heart palpitations, chest pain, shortness of breath, headaches, nausea, feeling jumpy, restlessness, irritable, apprehensive)  - Collaborate with interdisciplinary team and initiate plan and interventions as ordered    - [place] patient to unit/surroundings  - Explain treatment plan  - Encourage participation in care  - Encourage verbalization of concerns/fears  - Identify coping mechanisms  - Assist in developing anxiety-reducing skills  - Administer/offer alternative therapies  - Limit or eliminate stimulants  Outcome: Progressing

## 2021-04-28 NOTE — PROGRESS NOTES
Progress Note - Suki 129 63 y o  female MRN: [id number]  Unit/Bed#: Marciano Daily Encounter: [id number]    Assessment/Plan   Principal Problem:    MDD (major depressive disorder), recurrent severe, without psychosis ([facility] )  Active Problems:    Lumbar disc herniation    Cigarette nicotine dependence with nicotine-induced disorder    Gastroesophageal reflux disease    Anxiety    Chronic pain syndrome      Behavior over the last 24 hours: improving  Sleep: slept better  Appetite: normal  Medication side effects: No  ROS: no complaints, all other systems are negative for acute changes    Mental Status Evaluation:  Appearance:  age appropriate   Behavior:  cooperative   Speech:  normal volume   Mood:  euthymic   Affect:  mood-congruent   Thought Process:  goal directed   Thought Content:  no overt delusions   Perceptual Disturbances: None   Risk Potential: Suicidal Ideations none  Homicidal Ideations none  Potential for Aggression No   Sensorium:  person, place and time/date   Memory:  recent and remote memory grossly intact   Consciousness:  alert and awake    Attention: attention span and concentration were age appropriate   Insight:  limited   Judgment: fair   Gait/Station: normal gait/station   Motor Activity: no abnormal movements     Progress Toward Goals: Patient reports reduced depressed mood, anxiety with fair participation with in group and milieu therapy  She has tolerated well gradual increase of her Neurontin and Abilify and denies any current side effects  Discharge plan was discussed for this week  Recommended Treatment: Continue with group therapy, milieu therapy and occupational therapy  Risks, benefits and possible side effects of Medications:   Risks, benefits, and possible side effects of medications explained to patient and patient verbalizes understanding  Medications: all current active meds have been reviewed  Labs:  I have personally reviewed all pertinent laboratory/tests results  Most Recent Labs:   Lab Results   Component Value Date    WBC 9 73 04/23/2021    RBC 4 63 04/23/2021    HGB 13 9 04/23/2021    HCT 44 1 04/23/2021     04/23/2021    RDW 13 8 04/23/2021    NEUTROABS 5 20 04/23/2021    SODIUM 140 04/23/2021    K 4 1 04/23/2021     (H) 04/23/2021    CO2 25 04/23/2021    BUN 13 04/23/2021    CREATININE 0 93 04/23/2021    GLUC 100 04/23/2021    GLUF 101 (H) 01/18/2021    CALCIUM 9 0 04/23/2021    AST 17 01/18/2021    ALT 20 01/18/2021    ALKPHOS 66 01/18/2021    TP 7 0 01/18/2021    ALB 3 5 01/18/2021    TBILI 0 39 01/18/2021    CHOLESTEROL 162 01/18/2021    HDL 39 (L) 01/18/2021    TRIG 172 (H) 01/18/2021    LDLCALC 89 01/18/2021    [id number] 2 990 04/23/2021    HGBA1C 5 5 01/18/2021     01/18/2021       Counseling / Coordination of Care  Total floor / unit time spent today 20 minutes  Greater than 50% of total time was spent with the patient and / or family counseling and / or coordination of care

## 2021-04-28 NOTE — NURSING NOTE
Patient remains in bed sleeping at this time  No acute behaviors during the overnight period  No complaints voiced  She appears to have slept through the night thus far however we will CTM  Q7 minute safety checks in progress

## 2021-04-29 PROCEDURE — 99232 SBSQ HOSP IP/OBS MODERATE 35: CPT | Performed by: PSYCHIATRY & NEUROLOGY

## 2021-04-29 RX ORDER — GABAPENTIN 300 MG/1
300 CAPSULE ORAL 3 TIMES DAILY
Qty: 90 CAPSULE | Refills: 0 | Status: SHIPPED | OUTPATIENT
Start: 2021-04-29

## 2021-04-29 RX ORDER — DULOXETIN HYDROCHLORIDE 60 MG/1
120 CAPSULE, DELAYED RELEASE ORAL DAILY
Qty: 60 CAPSULE | Refills: 0 | Status: SHIPPED | OUTPATIENT
Start: 2021-04-29

## 2021-04-29 RX ORDER — CYANOCOBALAMIN 1000 UG/ML
1000 INJECTION INTRAMUSCULAR; SUBCUTANEOUS
Qty: 1 ML | Refills: 0 | Status: SHIPPED | OUTPATIENT
Start: 2021-05-27 | End: 2021-07-28 | Stop reason: SDUPTHER

## 2021-04-29 RX ORDER — TRAZODONE HYDROCHLORIDE 150 MG/1
150 TABLET ORAL
Qty: 30 TABLET | Refills: 0 | Status: SHIPPED | OUTPATIENT
Start: 2021-04-29

## 2021-04-29 RX ORDER — ARIPIPRAZOLE 10 MG/1
10 TABLET ORAL DAILY
Qty: 30 TABLET | Refills: 0 | Status: SHIPPED | OUTPATIENT
Start: 2021-04-30

## 2021-04-29 RX ORDER — MELATONIN
1000 DAILY
Qty: 30 TABLET | Refills: 0 | Status: SHIPPED | OUTPATIENT
Start: 2021-06-14 | End: 2021-07-28

## 2021-04-29 RX ORDER — ERGOCALCIFEROL 1.25 MG/1
50000 CAPSULE ORAL WEEKLY
Qty: 4 CAPSULE | Refills: 0 | Status: SHIPPED | OUTPATIENT
Start: 2021-05-03 | End: 2021-07-28 | Stop reason: ALTCHOICE

## 2021-04-29 RX ADMIN — DULOXETINE HYDROCHLORIDE 120 MG: 60 CAPSULE, DELAYED RELEASE ORAL at 08:58

## 2021-04-29 RX ADMIN — ARIPIPRAZOLE 10 MG: 10 TABLET ORAL at 08:58

## 2021-04-29 RX ADMIN — FAMOTIDINE 20 MG: 20 TABLET ORAL at 22:07

## 2021-04-29 RX ADMIN — GABAPENTIN 300 MG: 300 CAPSULE ORAL at 08:58

## 2021-04-29 RX ADMIN — NICOTINE POLACRILEX 2 MG: 2 GUM, CHEWING BUCCAL at 15:32

## 2021-04-29 RX ADMIN — GABAPENTIN 300 MG: 300 CAPSULE ORAL at 22:07

## 2021-04-29 RX ADMIN — NICOTINE 1 PATCH: 21 PATCH, EXTENDED RELEASE TRANSDERMAL at 18:17

## 2021-04-29 RX ADMIN — TRAZODONE HYDROCHLORIDE 150 MG: 150 TABLET ORAL at 22:07

## 2021-04-29 RX ADMIN — GABAPENTIN 300 MG: 300 CAPSULE ORAL at 15:32

## 2021-04-29 RX ADMIN — PANTOPRAZOLE SODIUM 40 MG: 40 TABLET, DELAYED RELEASE ORAL at 08:58

## 2021-04-29 NOTE — PROGRESS NOTES
Progress Note - Suki 129 63 y o  female MRN: [id number]  Unit/Bed#: Justin Jewell Encounter: [id number]    Assessment/Plan   Principal Problem:    MDD (major depressive disorder), recurrent severe, without psychosis ([facility]  Active Problems:    Lumbar disc herniation    Cigarette nicotine dependence with nicotine-induced disorder    Gastroesophageal reflux disease    Anxiety    Chronic pain syndrome    Behavior over the last 24 hours: improving  Sleep: normal  Appetite: normal  Medication side effects: No  ROS: no complaints, all other systems are negative for acute changes    Mental Status Evaluation:  Appearance:  age appropriate   Behavior:  cooperative   Speech:  normal volume   Mood:  anxious mild   Affect:  mood-congruent   Thought Process:  goal directed   Thought Content:  no overt delusions   Perceptual Disturbances: None   Risk Potential: Suicidal Ideations none  Homicidal Ideations none  Potential for Aggression No   Sensorium:  person, place and time/date   Memory:  recent and remote memory grossly intact   Consciousness:  alert and awake    Attention: attention span and concentration were age appropriate   Insight:  fair   Judgment: fair   Gait/Station: normal gait/station   Motor Activity: no abnormal movements     Progress Toward Goals: Patient reports reduced depressed mood, anxiety with improved sleep and appetite  Denies any suicidal ideation, paranoia, hallucination consistently and states she learned coping skills with participation in group and milieu therapy  She has been compliant with medication and denies any current side  Discharge plan was discussed and patient agrees to follow her outpatient treatment regularly  Recommended Treatment: Continue with group therapy, milieu therapy and occupational therapy        Risks, benefits and possible side effects of Medications:   Risks, benefits, and possible side effects of medications explained to patient and patient verbalizes understanding  Medications: all current active meds have been reviewed  Labs: I have personally reviewed all pertinent laboratory/tests results  Most Recent Labs:   Lab Results   Component Value Date    WBC 9 73 04/23/2021    RBC 4 63 04/23/2021    HGB 13 9 04/23/2021    HCT 44 1 04/23/2021     04/23/2021    RDW 13 8 04/23/2021    NEUTROABS 5 20 04/23/2021    SODIUM 140 04/23/2021    K 4 1 04/23/2021     (H) 04/23/2021    CO2 25 04/23/2021    BUN 13 04/23/2021    CREATININE 0 93 04/23/2021    GLUC 100 04/23/2021    GLUF 101 (H) 01/18/2021    CALCIUM 9 0 04/23/2021    AST 17 01/18/2021    ALT 20 01/18/2021    ALKPHOS 66 01/18/2021    TP 7 0 01/18/2021    ALB 3 5 01/18/2021    TBILI 0 39 01/18/2021    CHOLESTEROL 162 01/18/2021    HDL 39 (L) 01/18/2021    TRIG 172 (H) 01/18/2021    LDLCALC 89 01/18/2021    [id number] 2 990 04/23/2021    HGBA1C 5 5 01/18/2021     01/18/2021       Counseling / Coordination of Care  Total floor / unit time spent today 20 minutes  Greater than 50% of total time was spent with the patient and / or family counseling and / or coordination of care

## 2021-04-29 NOTE — PROGRESS NOTES
Patient visible on the unit  Pleasant and social with staff and peers  Denies anxiety, depression, SI,HI, and hallucinations  Able to make needs known  Will continue to monitor and access  Q 7 minute checks maintained

## 2021-04-29 NOTE — NURSING NOTE
Patient appears to have slept through the overnight period without issue  No complaints voiced  No acute behaviors noted  She remains in bed sleeping at this time  Will CTM via q7 minute safety checks

## 2021-04-29 NOTE — PROGRESS NOTES
Progress Note - Shadia Barrera 63 y o  female MRN: [id number]    Unit/Bed#: Alexys Denominational Encounter: [id number]        Subjective:   Patient seen and examined at bedside after reviewing the chart and discussing the case with the caring staff  Patient examined at bedside  Patient has no acute concerns  Patient is a possible discharge for Friday, 04/30/2021  Patient is requesting her prescriptions  I have reviewed and reconciled the patient's problem list and medications  Physical Exam   Vitals: Blood pressure 138/66, pulse 83, temperature 98 6 °F (37 °C), temperature source Temporal, resp  rate 18, height 5' 6" (1 676 m), weight 111 kg (244 lb 14 9 oz), SpO2 95 %, not currently breastfeeding  ,Body mass index is 39 53 kg/m²  Constitutional: He appears well-developed  HEENT: PERR, EOMI, MMM  Cardiovascular: Normal rate and regular rhythm  Pulmonary/Chest: Effort normal and breath sounds normal    Abdomen: Soft, + BS, NT    Assessment/Plan:  Shadia Barrera is a(n) 63 y o  female with MDD  Medical clearance:  Patient is medically cleared for discharge  All scripts have been written      1  GERD  Continue Protonix 40 mg daily and Pepcid 20 mg at nighttime as needed  2  DJD/back pain  Patient received Tylenol and Motrin as needed  I will start Voltaren gel and place consults for PT and OT  3  Gait abnormality  Will consult PT and OT  4  Cutaneous candidiasis  I will start the patient on nystatin powder to be used 2 times daily  5  Tobacco use  Patient is on nicotine transdermal patch and Nicorette gum  6  New vitamin-D deficiency  I will put the patient on vitamin-D bolus doses for 8 weeks followed by vitamin D3 1000 units daily  7  New vitamin B12 deficiency  I will put the patient on monthly B12 injections  The patient was discussed with Dr Reyes Villafana and he is in agreement with the above note

## 2021-04-29 NOTE — NURSING NOTE
Assumed care of this patient from prior shift nurse at [phone]  Patient is currently in bed, appears to be sleeping  No acute behaviors observed  Will CTM via q7 minute safety checks

## 2021-04-29 NOTE — PROGRESS NOTES
Patient visible on the unit  Social with staff and peers  Bright  Ready for discharge tomorrow  Denied pain  Denied anxiety,depression,SI,HI, or hallucinations  Attended group  Q7 minute safety checks maintained

## 2021-04-29 NOTE — PROGRESS NOTES
04/29/21 0807   Team Meeting   Meeting Type Daily Rounds   Team Members Present   Team Members Present Physician;Nurse;Occupational Therapist;   Physician Team Member Dr Ashlee Soares MD; HERBIE Duong   Nursing Team Member Ruy Farley RN   Social Work Team Member MAYELIN Pablo   Patient/Family Present   Patient Present No   Patient's Family Present No     DC Friday to home; denies everything; pleasant, bright

## 2021-04-30 VITALS
SYSTOLIC BLOOD PRESSURE: 119 MMHG | TEMPERATURE: 97.2 F | WEIGHT: 244.93 LBS | OXYGEN SATURATION: 98 % | BODY MASS INDEX: 39.36 KG/M2 | RESPIRATION RATE: 16 BRPM | DIASTOLIC BLOOD PRESSURE: 55 MMHG | HEART RATE: 77 BPM | HEIGHT: 66 IN

## 2021-04-30 PROBLEM — F33.2 MDD (MAJOR DEPRESSIVE DISORDER), RECURRENT SEVERE, WITHOUT PSYCHOSIS (HCC): Status: RESOLVED | Noted: 2020-07-18 | Resolved: 2021-04-30

## 2021-04-30 PROBLEM — Z00.8 MEDICAL CLEARANCE FOR PSYCHIATRIC ADMISSION: Status: RESOLVED | Noted: 2019-11-08 | Resolved: 2021-04-30

## 2021-04-30 PROCEDURE — 99238 HOSP IP/OBS DSCHRG MGMT 30/<: CPT | Performed by: NURSE PRACTITIONER

## 2021-04-30 RX ADMIN — GABAPENTIN 300 MG: 300 CAPSULE ORAL at 08:05

## 2021-04-30 RX ADMIN — ARIPIPRAZOLE 10 MG: 10 TABLET ORAL at 08:05

## 2021-04-30 RX ADMIN — PANTOPRAZOLE SODIUM 40 MG: 40 TABLET, DELAYED RELEASE ORAL at 08:05

## 2021-04-30 RX ADMIN — DULOXETINE HYDROCHLORIDE 120 MG: 60 CAPSULE, DELAYED RELEASE ORAL at 08:05

## 2021-04-30 RX ADMIN — ACETAMINOPHEN 650 MG: 325 TABLET ORAL at 05:47

## 2021-04-30 NOTE — NURSING NOTE
Patient complained of B/L upper leg pain  Gave 2 Tylenol 650 mg as ordered for complaint of symptoms  Will continue to monitor safety and behaviors every 7 minutes

## 2021-04-30 NOTE — PROGRESS NOTES
04/30/21    Team Meeting   Meeting Type Daily Rounds   Team Members Present   Team Members Present Physician;Nurse;;Occupational Therapist   Physician Team Member Dr Miguel Angel Gonzalez MD; HERBIE Roblero   Nursing Team Member Malini Courtney RN   Care Management Team Member MS Hui, [facility]   OT Team Member Olya Brick, [place]   Patient/Family Present   Patient Present No   Patient's Family Present No   D/C home today at 11:30am, will follow up with outpatient services [facility]  PT stable

## 2021-04-30 NOTE — NURSING NOTE
Patient discharged off the unit  All belongings and discharge instructions sent with patient  Family transporting patient

## 2021-04-30 NOTE — NURSING NOTE
Patient appears to be sleeping without difficulty throughout the night  Will continue to monitor safety and behaviors every 7 minutes

## 2021-04-30 NOTE — PROGRESS NOTES
Patient visible on the unit  Pleasant and bright  Anxious  about discharge today  Denies pain currently  Denied depression,SI,HI, or hallucinations  Social with staff and peers  Q 7 minute safety checks maintained

## 2021-04-30 NOTE — PROGRESS NOTES
Progress Note - Luis Alfredo Suarez 63 y o  female MRN: [id number]    Unit/Bed#: [facility] Encounter: [id number]        Subjective:   Patient seen and examined at bedside after reviewing the chart and discussing the case with the caring staff  Patient examined at bedside  Patient has no acute concerns  Patient is being discharged today, 04/30/2021  Patient is requesting her prescriptions  I have reviewed and reconciled the patient's problem list and medications  Physical Exam   Vitals: Blood pressure 119/55, pulse 77, temperature (!) 97 2 °F (36 2 °C), temperature source Temporal, resp  rate 16, height 5' 6" (1 676 m), weight 111 kg (244 lb 14 9 oz), SpO2 98 %, not currently breastfeeding  ,Body mass index is 39 53 kg/m²  Constitutional: He appears well-developed  HEENT: PERR, EOMI, MMM  Cardiovascular: Normal rate and regular rhythm  Pulmonary/Chest: Effort normal and breath sounds normal    Abdomen: Soft, + BS, NT    Assessment/Plan:  Luis Alfredo Suarez is a(n) 63 y o  female with MDD  Medical clearance:  Patient is medically cleared for discharge  All scripts have been written      1  GERD  Continue Protonix 40 mg daily and Pepcid 20 mg at nighttime as needed  2  DJD/back pain  Patient received Tylenol and Motrin as needed  I will start Voltaren gel and place consults for PT and OT  3  Gait abnormality  Will consult PT and OT  4  Cutaneous candidiasis  I will start the patient on nystatin powder to be used 2 times daily  5  Tobacco use  Patient is on nicotine transdermal patch and Nicorette gum  6  New vitamin-D deficiency  I will put the patient on vitamin-D bolus doses for 8 weeks followed by vitamin D3 1000 units daily  7  New vitamin B12 deficiency  I will put the patient on monthly B12 injections  The patient was discussed with Dr Ning Navarro and he is in agreement with the above note

## 2021-04-30 NOTE — PLAN OF CARE
Problem: Ineffective Coping  Goal: Participates in unit activities  Description: Interventions:  - Provide therapeutic environment   - Provide required programming   - Redirect inappropriate behaviors   Outcome: Adequate for Discharge  Goal: Patient/Family participate in treatment and DC plans  Description: Interventions:  - Provide therapeutic environment  Outcome: Adequate for Discharge

## 2021-04-30 NOTE — DISCHARGE INSTRUCTIONS
Depression   WHAT YOU NEED TO KNOW:   Depression is a medical condition that causes feelings of sadness or hopelessness that do not go away  Depression may cause you to lose interest in things you used to enjoy  These feelings may interfere with your daily life  DISCHARGE INSTRUCTIONS:   Call your local emergency number (911 in the [address]) if:   · You think about harming yourself or someone else  · You have done something on purpose to hurt yourself  Call your therapist or doctor if:   · Your symptoms do not improve  · You cannot make it to your next appointment  · You have new symptoms  · You have questions or concerns about your condition or care  The following resources are available at any time to help you, if needed:   · [address] [place] [address]: [phone] ([phone])     · Suicide Hotline: 7-235.442.7173 (3-122-RLSHTPN)     · For a list of international numbers: https://save org/find-help/international-resources/    Medicines:   · Antidepressants  may be given to improve or balance your mood  You may need to take this medicine for several weeks before you begin to feel better  · Take your medicine as directed  Contact your healthcare provider if you think your medicine is not helping or if you have side effects  Tell him of her if you are allergic to any medicine  Keep a list of the medicines, vitamins, and herbs you take  Include the amounts, and when and why you take them  Bring the list or the pill bottles to follow-up visits  Carry your medicine list with you in case of an emergency  Therapy  is often used together with medicine to relieve depression  Therapy is a way for you to talk about your feelings and anything that may be causing depression  Therapy can be done alone or in a group  It may also be done with family members or a significant other  Self-care:   · Get regular physical activity    Try to be active for 30 minutes, 3 to 5 days a week  Physical activity can help relieve depression  Work with your healthcare provider to develop a plan that you enjoy  It may help to ask someone to be active with you  · Create a regular sleep schedule  A routine can help you relax before bed  Listen to music, read, or do yoga  Try to go to bed and wake up at the same time every day  Sleep is important for emotional health  · Eat a variety of healthy foods  Healthy foods include fruits, vegetables, whole-grain breads, low-fat dairy products, lean meats, fish, and cooked beans  A healthy meal plan is low in fat, salt, and added sugar  · Do not drink alcohol or use drugs  Alcohol and drugs can make depression worse  Talk to your therapist or doctor if you need help quitting  Follow up with your healthcare provider as directed: Your healthcare provider will monitor your progress at follow-up visits  He or she will also monitor your medicine if you take antidepressants  Your healthcare provider will ask if the medicine is helping  Tell him or her about any side effects or problems you may have with your medicine  The type or amount of medicine may need to be changed  Write down your questions so you remember to ask them during your visits  © Copyright [address] [place] [address] Information is for End User's use only and may not be sold, redistributed or otherwise used for commercial purposes  All illustrations and images included in CareNotes® are the copyrighted property of [employer]  or [facility] Ashlee Melgar   The above information is an  only  It is not intended as medical advice for individual conditions or treatments  Talk to your doctor, nurse or pharmacist before following any medical regimen to see if it is safe and effective for you        Cigarette Smoking and Your Health   WHAT YOU NEED TO KNOW:   What are the risks to my health if I smoke tobacco?  Nicotine and other chemicals found in tobacco and e-cigarettes can damage every cell in your body  Even if you are a light smoker, you have an increased risk for cancer, heart disease, and lung disease  If you are pregnant or have diabetes, smoking increases your risk for complications  Nicotine can affect an adolescent's developing brain  This can lead to trouble thinking, learning, or paying attention  What are the benefits to my health if I stop smoking? · You decrease respiratory symptoms such as coughing, wheezing, and shortness of breath  · You reduce your risk for cancers of the lung, mouth, throat, kidney, bladder, pancreas, stomach, and cervix  If you already have cancer, you increase the benefits of chemotherapy  You also reduce your risk for cancer returning or a second cancer from developing  · You reduce your risk for heart disease, blood clots, heart attack, and stroke  · You reduce your risk for lung infections, and diseases such as pneumonia, asthma, chronic bronchitis, and emphysema  · Your circulation improves  More oxygen can be delivered to your body  If you have diabetes, you lower your risk for complications, such as kidney, artery, and eye diseases  You also lower your risk for nerve damage  Nerve damage can lead to amputations, poor vision, and blindness  · You improve your body's ability to heal and to fight infections  · An adolescent can help his or her brain and body develop in a healthy way  Talk to your adolescent about all the health risks of nicotine  If you can, start talking about nicotine when your child is younger than 12 years  This may make it easier for him or her not to start using nicotine as a teenager or adult  Explain to him or her that it is best never to start  It can be hard to try to quit later  What are the health benefits to others if I stop smoking? Tobacco is harmful to nonsmokers who breathe in your secondhand smoke   The following are ways the health of others around you may improve when you stop smoking:  · You lower the risks for lung cancer and heart disease in nonsmoking adults  · If you are pregnant, you lower the risk for miscarriage, early delivery, low birth weight, and stillbirth  You also lower your baby's risk for SIDS, obesity, developmental delay, and neurobehavioral problems, such as ADHD  · If you have children, you lower their risk for ear infections, colds, pneumonia, bronchitis, and asthma  Where can I find more information and support to stop smoking? · [id number]  Phone: 7- 792 - 236-9702  Web Address: www Online-OR    CARE AGREEMENT:   You have the right to help plan your care  Learn about your health condition and how it may be treated  Discuss treatment options with your healthcare providers to decide what care you want to receive  You always have the right to refuse treatment  The above information is an  only  It is not intended as medical advice for individual conditions or treatments  Talk to your doctor, nurse or pharmacist before following any medical regimen to see if it is safe and effective for you  © Copyright [address] Information is for End User's use only and may not be sold, redistributed or otherwise used for commercial purposes  All illustrations and images included in CareNotes® are the copyrighted property of [employer] M , Inc  or [facility]       How to Stop Smoking   WHAT YOU NEED TO KNOW:   You will improve your health and the health of others around you if you stop smoking  Your risk for heart and lung disease, cancer, stroke, heart attack, and vision problems will also decrease  Your adolescent can help prevent or stop harm to his or her brain or body  This will help him or her become a healthy adult  You can benefit from quitting no matter how long you have smoked  DISCHARGE INSTRUCTIONS:   Prepare to stop smoking:  Nicotine is a highly addictive drug found in cigarettes   Withdrawal symptoms can happen when you stop smoking and make it hard to quit  These include anxiety, depression, irritability, trouble sleeping, and increased appetite  You increase your chances of success if you prepare to quit  · Set a quit date  Omi Francis a date that is within the next 2 weeks  Do not pick a day that you think may be stressful or busy  Write down the day or [place] it on your calender  · Tell friends and family that you plan to quit  Explain that you may have withdrawal symptoms when you try to quit  Ask them to support you  They may be able to encourage you and help reduce your stress to make it easier for you to quit  · Make a list of your reasons for quitting  Put the list somewhere you will see it every day, such as your refrigerator  You can look at the list when you have a craving  · Remove all tobacco and nicotine products from your home, car, and workplace  Also, remove anything else that will tempt you to smoke, such as lighters, matches, or ashtrays  Clean your car, home, and places at work that smell like smoke  The smell of smoke can trigger a craving  · Identify triggers that make you want to smoke  This may include activities, feelings, or people  Also write down 1 way you can deal with each of your triggers  For example, if you want to smoke as soon as you wake up, plan another activity during this time, such as exercise  · Make a plan for how you will quit  Learn about the tools that can help you quit, such as medicine, counseling, or nicotine replacement therapy  Choose at least 2 options to help you quit  · Help your adolescent make a plan to quit  The plan will be more successful if your adolescent makes his or her own decisions  Do not try to pressure him or her to quit immediately or in a certain way  Be supportive and offer help if needed  Tools to help you stop smoking:   · Counseling  from a trained healthcare provider can provide you with support and skills to quit smoking   The provider will also teach you to manage your withdrawal symptoms and cravings  You may receive counseling from one counselor, in group therapy, or through phone therapy called a quit line  · Nicotine replacement therapy (NRT)  such as nicotine patches, gum, or lozenges may help reduce your nicotine cravings  You may get these without a doctor's order  Do not use e-cigarettes or smokeless tobacco in place of cigarettes or to help you quit  They still contain nicotine  · Prescription medicines  such as nasal sprays or nicotine inhalers may help reduce your withdrawal symptoms  Other medicines may also be used to reduce your urge to smoke  Ask your healthcare provider about these medicines  You may need to start certain medicines 2 weeks before your quit date for them to work well  · Hypnosis  is a practice that helps guide you through thoughts and feelings  Hypnosis may help decrease your cravings and make you more willing to quit  · Acupuncture therapy  uses very thin needles to balance energy channels in the body  This is thought to help decrease cravings and symptoms of nicotine withdrawal     · Support groups  let you talk to others who are trying to quit or have already quit  It may be helpful to speak with others about how they quit  Manage your cravings:   · Avoid situations, people, and places that tempt you to smoke  Go to nonsmoking places, such as libraries or restaurants  Understand what tempts you and try to avoid these things  · Keep your hands busy  Hold things such as a stress ball or pen  · Put candy or toothpicks in your mouth  Keep lollipops, sugarless gum, or toothpicks with you at all times  · Do not have alcohol or caffeine  These drinks may tempt you to smoke  Drink healthy liquids such as water or juice instead  · Reward yourself when you resist your cravings  Rewards will motivate you and help you stay positive  · Do an activity that distracts you from your craving    Examples include cleaning, creating art, or gardening  Prevent weight gain after you quit:  You may gain a few pounds after you quit smoking  It is healthier for you to gain a few pounds than to continue to smoke  The following can help you prevent weight gain:  · Eat healthy foods  These include fruits, vegetables, whole-grain breads, low-fat dairy products, beans, lean meats, and fish  Eat healthy snacks, such as low-fat yogurt, if you get hungry between meals  · Drink water before, during, and between meals  This will make your stomach feel full and help prevent you from overeating  Ask your healthcare provider how much liquid to drink each day and which liquids are best for you  · Be physically active  Activity may help reduce your cravings and reduce stress  Take a walk or do some kind of physical activity every day  Ask your healthcare provider which activities are right for you  For support and more information:   · [employer]  Phone: 5- 115 - 732-0988  Web Address: [address] Information is for End User's use only and may not be sold, redistributed or otherwise used for commercial purposes  All illustrations and images included in CareNotes® are the copyrighted property of A D A M , Inc  or [address] [place] [address]  The above information is an  only  It is not intended as medical advice for individual conditions or treatments  Talk to your doctor, nurse or pharmacist before following any medical regimen to see if it is safe and effective for you  Aripiprazole (By mouth)   Aripiprazole (ar-i-PIP-ra-zole)  Treats schizophrenia, bipolar disorder, depression, and Tourette syndrome  Also treats irritability associated with autism  Brand Name(s): Anila High   There may be other brand names for this medicine  When This Medicine Should Not Be Used: This medicine is not right for everyone   Do not use it if you had an allergic reaction to aripiprazole  How to Use This Medicine:   Liquid, Tablet, Dissolving Tablet  · Take your medicine as directed  Your dose may need to be changed several times to find what works best for you  · Tablet: Swallow whole  Do not break, crush, or chew it  · Abilify Mycite® has a sensor in the tablet to track it inside the stomach or bowels  The Abilify Mycite® System comes with a patch (wearable sensor), a smartphone application (morales), and a web-based portal  Follow your doctor's instructions on how to use the medicine  · Disintegrating tablet: Make sure your hands are dry before you handle the disintegrating tablet  Peel back the foil from the blister pack, then remove the tablet  Do not push the tablet through the foil  Place the tablet in your mouth  After it has melted, swallow or take a drink of water  · This medicine should come with a Medication Guide  Ask your pharmacist for a copy if you do not have one  · Missed dose: Take a dose as soon as you remember  If it is almost time for your next dose, wait until then and take a regular dose  Do not take extra medicine to make up for a missed dose  If the Abilify Mycite® tablet is not detected on the morales or portal after you take it, do not repeat the dose  · Store the medicine in a closed container at room temperature, away from heat, moisture, and direct light  Drugs and Foods to Avoid:   Ask your doctor or pharmacist before using any other medicine, including over-the-counter medicines, vitamins, and herbal products  · Some medicines can affect how aripiprazole works  Tell your doctor if you are using any of the following:   ? Carbamazepine, clarithromycin, fluoxetine, itraconazole, ketoconazole, paroxetine, quinidine, or rifampin  ? Benzodiazepine or sedative medicine (including lorazepam)  ?  Blood pressure medicine  Warnings While Using This Medicine:   · Tell your doctor if you are pregnant or breastfeeding, or if you have heart or blood vessel disease, heart rhythm problems, diabetes, heart failure, high or low blood pressure, high cholesterol, or a history of seizures, heart attack, or stroke  · For some children, teenagers, and young adults, this medicine may increase mental or emotional problems  This may lead to thoughts of suicide and violence  Talk with your doctor right away if you have any thoughts or behavior changes that concern you  Tell your doctor if you or anyone in your family has a history of bipolar disorder or suicide attempts  · This medicine may cause the following problems:   ? Neuroleptic malignant syndrome (NMS), a neurologic disorder than can be life-threatening  ? Tardive dyskinesia (muscle movements you cannot control)  ? Changes in blood sugar levels  ? Unusual changes in behavior, including gambling urges, binge or compulsive eating, or compulsive shopping, or sexual urges  ? Seizures  · This medicine may make you dizzy or drowsy, or may cause trouble with thinking or controlling body movements, which may lead to falls, fractures, or other injuries  Do not drive or do anything else that could be dangerous until you know how this medicine affects you  Stand or sit up slowly if you feel lightheaded or dizzy  · You may get overheated more easily while you are using this medicine  Be careful when you exercise or you are outside in hot or humid weather  Drink plenty of water to stay hydrated  · Tell your doctor if you have phenylketonuria (PKU)  The disintegrating tablet contains phenylalanine  · This medicine may make you bleed, bruise, or get infections more easily  Take precautions to prevent illness and injury  Wash your hands often  · Do not stop using this medicine suddenly  Your doctor will need to slowly decrease your dose before you stop it completely  · Your doctor will do lab tests at regular visits to check on the effects of this medicine  Keep all appointments  · Keep all medicine out of the reach of children   Never share your medicine with anyone  Possible Side Effects While Using This Medicine:   Call your doctor right away if you notice any of these side effects:  · Allergic reaction: Itching or hives, swelling in your face or hands, swelling or tingling in your mouth or throat, chest tightness, trouble breathing  · Anxiety, irritability, nervousness, restlessness, trouble sleeping  · Compulsive behavior or intense urges you cannot control  · Confusion, unusual behavior, depressed mood, thoughts of hurting yourself or others  · Fever, chills, cough, sore throat, body aches  · Increased hunger or thirst, change in how much or how often you urinate  · Jerky muscle movements you cannot control (often in your face, tongue, or jaw)  · Lightheadedness, dizziness, fainting  · Seizures  · Sweating, uneven heartbeat, muscle stiffness  · Unusual tiredness or sleepiness  If you notice these less serious side effects, talk with your doctor:   · Headache  · Nausea, vomiting, drooling  · Unusual weight gain  If you notice other side effects that you think are caused by this medicine, tell your doctor  Call your doctor for medical advice about side effects  You may report side effects to FDA at 6-264-FDA-7889  © Copyright [address] 2021 Information is for End User's use only and may not be sold, redistributed or otherwise used for commercial purposes  The above information is an  only  It is not intended as medical advice for individual conditions or treatments  Talk to your doctor, nurse or pharmacist before following any medical regimen to see if it is safe and effective for you  Duloxetine (By mouth)   Duloxetine (doo-LOX-e-teen)  Treats depression, anxiety, diabetic peripheral neuropathy, fibromyalgia, and chronic muscle or bone pain  This medicine is an SSNRI  Brand Name(s): Cymbalta, Drizalma Paz, [place]   There may be other brand names for this medicine    When This Medicine Should Not Be Used: This medicine is not right for everyone  Do not use it if you had an allergic reaction to duloxetine  How to Use This Medicine:   Capsule, Delayed Release Capsule  · Take your medicine as directed  Your dose may need to be changed several times to find what works best for you  · Delayed-release capsule: Swallow the capsule whole  Do not crush, chew, break, or open it  Do not open the Cymbalta® delayed-release capsule and sprinkle the contents on food or in liquids  · If you have trouble swallowing the Ayoub Schimke delayed release capsule:   ? You may open the capsule and sprinkle the contents over one tablespoon (15 mL) of applesauce  Swallow the mixture right away and do not save any of the mixture to use later  ? You may open the capsule and pour the contents to an all plastic catheter tip syringe and add 50 mL of water  Do not use other liquids  Gently shake it for 10 seconds, and then use it through a nasogastric tube  Rinse with additional water (about 15 mL) if needed  · This medicine should come with a Medication Guide  Ask your pharmacist for a copy if you do not have one  · Missed dose: Take a dose as soon as you remember  If it is almost time for your next dose, wait until then and take a regular dose  Do not take extra medicine to make up for a missed dose  · Store the medicine in a closed container at room temperature, away from heat, moisture, and direct light  Drugs and Foods to Avoid:   Ask your doctor or pharmacist before using any other medicine, including over-the-counter medicines, vitamins, and herbal products  · Do not take duloxetine if you have used an MAO inhibitor (MAOI) within the past 14 days  Do not start taking an MAO inhibitor within 5 days of stopping duloxetine  · Some medicines can affect how duloxetine works  Tell your doctor if you are using any of the following:  ?  Buspirone, cimetidine, ciprofloxacin, enoxacin, fentanyl, lithium, Baron's wort, theophylline, tramadol, tryptophan, or warfarin  ? Amphetamines  ? Blood pressure medicine  ? Diuretic (water pill)  ? Medicine for heart rhythm problems (including flecainide, propafenone, quinidine)  ? Medicine to treat migraine headaches (including triptans)  ? NSAID pain or arthritis medicine (including aspirin, celecoxib, diclofenac, ibuprofen, naproxen)  ? Other medicine to treat depression or mood disorders (including amitriptyline, desipramine, fluoxetine, imipramine, nortriptyline, paroxetine)  ? Phenothiazine medicine (including thioridazine)  · Tell your doctor if you use anything else that makes you sleepy  Some examples are allergy medicine, narcotic pain medicine, and alcohol  · Do not drink alcohol while you are using this medicine  Warnings While Using This Medicine:   · Tell your doctor if you are pregnant or breastfeeding, or if you have kidney disease, liver disease, diabetes, digestion problems, glaucoma, heart disease, high or low blood pressure, or problems with urination  Tell your doctor if you smoke or you have a history of seizures, or drug or alcohol addiction  · This medicine may cause the following problems:   ? Serious liver problems  ? Serotonin syndrome (more likely when used with certain other medicines)  ? Increased risk of bleeding problems  ? Serious skin reactions  ? Low sodium levels in the blood  · This medicine can increase thoughts of suicide  Tell your doctor right away if you start to feel depressed and have thoughts about hurting yourself  · This medicine can cause changes in your blood pressure  This may make you dizzy or drowsy  Do not drive or do anything that could be dangerous until you know how this medicine affects you  Stand up slowly to avoid falls  · Do not stop using this medicine suddenly  Your doctor will need to slowly decrease your dose before you stop it completely  · Your doctor will check your progress and the effects of this medicine at regular visits   Keep all appointments  · Keep all medicine out of the reach of children  Never share your medicine with anyone  Possible Side Effects While Using This Medicine:   Call your doctor right away if you notice any of these side effects:  · Allergic reaction: Itching or hives, swelling in your face or hands, swelling or tingling in your mouth or throat, chest tightness, trouble breathing  · Anxiety, restlessness, fever, fast heartbeat, sweating, muscle spasms, diarrhea, seeing or hearing things that are not there  · Blistering, peeling, red skin rash  · Confusion, weakness, muscle twitching  · Dark urine or pale stools, nausea, vomiting, loss of appetite, stomach pain, yellow skin or eyes  · Decrease in how much or how often you urinate  · Eye pain, vision changes, seeing halos around lights  · Feeling more energetic than usual  · Lightheadedness, dizziness, fainting  · Unusual moods or behaviors, worsening depression, thoughts about hurting yourself, trouble sleeping  · Unusual bleeding or bruising  If you notice these less serious side effects, talk with your doctor:   · Decrease in appetite or weight  · Dry mouth, constipation, mild nausea  · Headache  · Unusual drowsiness, sleepiness, or tiredness  If you notice other side effects that you think are caused by this medicine, tell your doctor  Call your doctor for medical advice about side effects  You may report side effects to FDA at 9-583-FDA-7150  © Copyright [employer] 2021 Information is for End User's use only and may not be sold, redistributed or otherwise used for commercial purposes  The above information is an  only  It is not intended as medical advice for individual conditions or treatments  Talk to your doctor, nurse or pharmacist before following any medical regimen to see if it is safe and effective for you

## 2021-04-30 NOTE — SOCIAL WORK
IMM explained to pt who expressed verbal confirmation of understanding  Signed IMM in scan bin to be entered into pt's EHR, copy provided to pt  SW met with pt who feels ready for discharge, having denied SI / HI, and looking forward to being at home with her family  She expressed intension to keep appointments scheduled in her behalf with her PCP, psych / therapist, and orthopedic doctor  Pt expressed great appreciation for U services that she rated as 10

## 2021-04-30 NOTE — PROGRESS NOTES
Belongings sent home with patient:    1 pair of eyeglasses, 1 pair of sweat pants, 1 sweater, 1 long sleeve shirt, 1 pack of cigarettes, 1 lighter, 1 cell phone, 1 small perfume bottle

## 2021-04-30 NOTE — NURSING NOTE
Patient out in the milieu social with peers and staff  Ate HS snack and attended group  Upon approach patient's mood and affect is bright and pleasant  Calm and cooperative  Denies SI/HI/pain/depression/anxiety/AHVH  Patient complained of indigestion   Gave PRN Pepcid as ordered  Took HS medication without difficulty  Will continue to monitor safety and behaviors every 7 minutes

## 2021-04-30 NOTE — PLAN OF CARE
Problem: Ineffective Coping  Goal: Cooperates with admission process  Description: Interventions:   - Complete admission process  Outcome: Adequate for Discharge  Goal: Identifies ineffective coping skills  Outcome: Adequate for Discharge  Goal: Identifies healthy coping skills  Outcome: Adequate for Discharge  Goal: Demonstrates healthy coping skills  Outcome: Adequate for Discharge  Goal: Patient/Family verbalizes awareness of resources  Outcome: Adequate for Discharge  Goal: Understands least restrictive measures  Description: Interventions:  - Utilize least restrictive behavior  Outcome: Adequate for Discharge  Goal: Free from restraint events  Description: - Utilize least restrictive measures   - Provide behavioral interventions   - Redirect inappropriate behaviors   Outcome: Adequate for Discharge     Problem: Risk for Self Injury/Neglect  Goal: Treatment Goal: Remain safe during length of stay, learn and adopt new coping skills, and be free of self-injurious ideation, impulses and acts at the time of discharge  Outcome: Adequate for Discharge  Goal: Verbalize thoughts and feelings  Description: Interventions:  - Assess and re-assess patient's lethality and potential for self-injury  - Engage patient in 1:1 interactions, daily, for a minimum of 15 minutes  - Encourage patient to express feelings, fears, frustrations, hopes  - Establish rapport/trust with patient   Outcome: Adequate for Discharge  Goal: Refrain from harming self  Description: Interventions:  - Monitor patient closely, per order  - Develop a trusting relationship  - Supervise medication ingestion, monitor effects and side effects   Outcome: Adequate for Discharge  Goal: Attend and participate in unit activities, including therapeutic, recreational, and educational groups  Description: Interventions:  - Provide therapeutic and educational activities daily, encourage attendance and participation, and document same in the medical record  - Obtain collateral information, encourage visitation and family involvement in care   Outcome: Adequate for Discharge  Goal: Recognize maladaptive responses and adopt new coping mechanisms  Outcome: Adequate for Discharge  Goal: Complete daily ADLs, including personal hygiene independently, as able  Description: Interventions:  - Observe, teach, and assist patient with ADLS  - Monitor and promote a balance of rest/activity, with adequate nutrition and elimination  Outcome: Adequate for Discharge     Problem: Depression  Goal: Treatment Goal: Demonstrate behavioral control of depressive symptoms, verbalize feelings of improved mood/affect, and adopt new coping skills prior to discharge  Outcome: Adequate for Discharge  Goal: Verbalize thoughts and feelings  Description: Interventions:  - Assess and re-assess patient's level of risk   - Engage patient in 1:1 interactions, daily, for a minimum of 15 minutes   - Encourage patient to express feelings, fears, frustrations, hopes   Outcome: Adequate for Discharge  Goal: Refrain from harming self  Description: Interventions:  - Monitor patient closely, per order   - Supervise medication ingestion, monitor effects and side effects   Outcome: Adequate for Discharge  Goal: Refrain from isolation  Description: Interventions:  - Develop a trusting relationship   - Encourage socialization   Outcome: Adequate for Discharge  Goal: Refrain from self-neglect  Outcome: Adequate for Discharge  Goal: Attend and participate in unit activities, including therapeutic, recreational, and educational groups  Description: Interventions:  - Provide therapeutic and educational activities daily, encourage attendance and participation, and document same in the medical record   Outcome: Adequate for Discharge  Goal: Complete daily ADLs, including personal hygiene independently, as able  Description: Interventions:  - Observe, teach, and assist patient with ADLS  -  Monitor and promote a balance of rest/activity, with adequate nutrition and elimination   Outcome: Adequate for Discharge     Problem: Anxiety  Goal: Anxiety is at manageable level  Description: Interventions:  - Assess and monitor patient's anxiety level  - Monitor for signs and symptoms (heart palpitations, chest pain, shortness of breath, headaches, nausea, feeling jumpy, restlessness, irritable, apprehensive)  - Collaborate with interdisciplinary team and initiate plan and interventions as ordered    - [place] patient to unit/surroundings  - Explain treatment plan  - Encourage participation in care  - Encourage verbalization of concerns/fears  - Identify coping mechanisms  - Assist in developing anxiety-reducing skills  - Administer/offer alternative therapies  - Limit or eliminate stimulants  Outcome: Adequate for Discharge     Problem: DISCHARGE PLANNING - CARE MANAGEMENT  Goal: Discharge to post-acute care or home with appropriate resources  Description: INTERVENTIONS:  - Conduct assessment to determine patient/family and health care team treatment goals, and need for post-acute services based on payer coverage, community resources, and patient preferences, and barriers to discharge  - Address psychosocial, clinical, and financial barriers to discharge as identified in assessment in conjunction with the patient/family and health care team  - Arrange appropriate level of post-acute services according to patients   needs and preference and payer coverage in collaboration with the physician and health care team  - Communicate with and update the patient/family, physician, and health care team regarding progress on the discharge plan  - Arrange appropriate transportation to post-acute venues  Outcome: Adequate for Discharge

## 2021-04-30 NOTE — BH TRANSITION RECORD
Contact Information: If you have any questions, concerns, pended studies, tests and/or procedures, or emergencies regarding your inpatient behavioral health visit  Please contact [facility] [phone] and ask to speak to a , nurse or physician  A contact is available 24 hours/ 7 days a week at this number  Summary of Procedures Performed During your Stay:  Below is a list of major procedures performed during your hospital stay and a summary of results:  - No major procedures performed  Pending Studies (From admission, onward)    None        If studies are pending at discharge, follow up with your PCP and/or referring provider

## 2021-04-30 NOTE — DISCHARGE SUMMARY
Discharge Summary - Suki [address] 63 y o  female MRN: [id number]  Unit/Bed#: [facility] Encounter: [id number]     Admission Date: 4/24/2021         Discharge Date: 4/30/2021 11:25 AM    Attending Psychiatrist: Dr Leona Barrera    Reason for Admission/HPI: Depression [F32 9]  Suicidal thoughts [R45 851]       According to H&P by Dr Rosa Holder 4/25/21:    History of Present Illness: This is a 69-year-old  female who was admitted after she had suicidal ideas  Patient says that she is living with her  for last 32 years before last when he is becoming increasingly paranoid and having hallucinations  He has accused her of cheating on him  She said that because of her 's behavior she became overwhelmed and his depression became suicidal   Currently her mood is pretty okay  She is sleeping okay with trazodone  Appetite is good  She denied any hopelessness  Denied any worthlessness  No hallucinations or delusions  She talked at length about her current situation with the   She says that her bleeding has been psychotic due to the alcohol use or being on Wellbutrin      Hospital Course: The patient was admitted to the inpatient psychiatric unit and started on every 7 minutes precautions  During the hospitalization the patient was attending individual therapy, group therapy, milieu therapy and occupational therapy  Psychiatric medications were titrated over the hospital stay  To address depressive symptoms, mood instability and suicidal ideations the patient was started on antidepressant Cymbalta, mood stabilizer Neurontin, antipsychotic medication Abilify and hypnotic medication Trazodone  Medication doses were titrated during the hospital course   Prior to beginning of treatment medications risks and benefits and possible side effects including risk of parkinsonian symptoms, Tardive Dyskinesia and metabolic syndrome related to treatment with antipsychotic medications, risk of cardiovascular events in elderly related to treatment with antipsychotic medications, risk of suicidality and serotonin syndrome related to treatment with antidepressants and risk of impaired next-day mental alertness, complex sleep-related behavior and dependence related to treatment with hypnotic medications were reviewed with the patient  The patient verbalized understanding and agreement for treatment  Patient's symptoms improved gradually over the hospital course  At the end of treatment the patient was doing well  Mood was stable at the time of discharge  The patient denied suicidal ideation, intent or plan at the time of discharge and denied homicidal ideation, intent or plan at the time of discharge  There was no overt psychosis at the time of discharge  Sleep and appetite were improved  The patient was tolerating medications and was not reporting any significant side effects at the time of discharge  We felt that at the end of the hospital stay Ino Johnson was at baseline and was ready for discharge  Ino Johnson also felt stable and ready for discharge at the end of the hospital stay  The outpatient follow up with PCP (Dr King Crespo) and [facility] for medication managment with Dr Marie Farah (Psychiatrist) and therapist was arranged by the unit  upon discharge      Mental Status at time of Discharge:     Appearance:  age appropriate, casually dressed and overweight   Behavior:  Pleasant and cooperative   Speech:  normal pitch and normal volume   Mood:  euthymic   Affect:  mood-congruent   Thought Process:  goal directed and logical   Thought Content:  normal, no overt delusions   Perceptual Disturbances: None   Risk Potential: Suicidal Ideations none, Homicidal Ideations none and Potential for Aggression No   Sensorium:  person, place, time/date and situation   Cognition:  recent and remote memory grossly intact   Consciousness:  alert and awake    Attention: attention span and concentration were age appropriate   Insight:  fair   Judgment: fair   Gait/Station: normal gait/station and normal balance   Motor Activity: no abnormal movements     Admission Diagnosis:Depression [F32 9]  Suicidal thoughts [R45 241]    Discharge Diagnosis:   Principal Problem (Resolved):    MDD (major depressive disorder), recurrent severe, without psychosis ([facility] [address] )  Active Problems:    Lumbar disc herniation    Cigarette nicotine dependence with nicotine-induced disorder    Gastroesophageal reflux disease    Anxiety    Chronic pain syndrome        Lab results:  Admission on 04/24/2021, Discharged on 04/30/2021   Component Date Value    Vitamin B-12 04/26/2021 343     Vit D, 25-Hydroxy 04/26/2021 18 1*       Discharge Medications:  Discharge Medication List as of 4/30/2021 10:39 AM      START taking these medications    Details   cholecalciferol (VITAMIN D3) 1,000 units tablet Take 1 tablet (1,000 Units total) by mouth daily, Starting Mon 6/14/2021, Until Wed 7/14/2021, Normal      cyanocobalamin 1,000 mcg/mL Inject 1 mL (1,000 mcg total) into a muscle every 30 (thirty) days, Starting Thu 5/27/2021, Normal      Diclofenac Sodium (VOLTAREN) 1 % Apply 2 g topically 4 (four) times a day as needed (Pain over the affected area), Starting u 4/29/2021, Until Sat 5/29/2021, Normal      ergocalciferol (VITAMIN D2) 50,000 units Take 1 capsule (50,000 Units total) by mouth once a week for 7 doses, Starting Mon 5/3/2021, Until Tue 6/15/2021, Normal      nicotine polacrilex (NICORETTE) 2 mg gum Chew 1 each (2 mg total) as needed for smoking cessation, Starting u 4/29/2021, Normal            Discharge Medication List as of 4/30/2021 10:39 AM      STOP taking these medications       docusate sodium (COLACE) 100 mg capsule Comments:   Reason for Stopping:         methocarbamol (ROBAXIN) 500 mg tablet Comments:   Reason for Stopping:         naloxone (NARCAN) 4 mg/0 1 mL nasal spray Comments:   Reason for Stopping: oxyCODONE (ROXICODONE) 5 mg immediate release tablet Comments:   Reason for Stopping:              Discharge Medication List as of 4/30/2021 10:39 AM      CONTINUE these medications which have CHANGED    Details   ARIPiprazole (ABILIFY) 10 mg tablet Take 1 tablet (10 mg total) by mouth daily, Starting Fri 4/30/2021, Normal      DULoxetine (CYMBALTA) 60 mg delayed release capsule Take 2 capsules (120 mg total) by mouth daily, Starting Thu 4/29/2021, Normal      gabapentin (NEURONTIN) 300 mg capsule Take 1 capsule (300 mg total) by mouth 3 (three) times a day, Starting Thu 4/29/2021, Normal      traZODone (DESYREL) 150 mg tablet Take 1 tablet (150 mg total) by mouth daily at bedtime, Starting Thu 4/29/2021, Normal            Discharge Medication List as of 4/30/2021 10:39 AM      CONTINUE these medications which have NOT CHANGED    Details   acetaminophen (TYLENOL) 500 mg tablet Take 500 mg by mouth every 6 (six) hours as needed for mild pain, Historical Med      nicotine (NICODERM CQ) 21 mg/24 hr TD 24 hr patch Place 1 patch on the skin every 24 hours, Starting Wed 1/13/2021, Normal      pantoprazole (PROTONIX) 40 mg tablet Take 1 tablet (40 mg total) by mouth daily, Starting Mon 3/1/2021, Normal      famotidine (PEPCID) 20 mg tablet Take 20 mg by mouth as needed for heartburn, Historical Med              Discharge instructions/Information to patient and family:   See after visit summary for information provided to patient and family  Provisions for Follow-Up Care:  See after visit summary for information related to follow-up care and any pertinent home health orders  Discharge Statement:    I spent 25 minutes discharging the patient  This time was spent on the day of discharge  I had direct contact with the patient on the day of discharge       HERBIE Mukherjee 04/30/21

## 2021-05-03 ENCOUNTER — OFFICE VISIT (OUTPATIENT)
Dept: OBGYN CLINIC | Facility: HOSPITAL | Age: 56
End: 2021-05-03

## 2021-05-03 ENCOUNTER — HOSPITAL ENCOUNTER (OUTPATIENT)
Dept: RADIOLOGY | Facility: HOSPITAL | Age: 56
Discharge: HOME | End: 2021-05-03
Payer: COMMERCIAL

## 2021-05-03 VITALS
BODY MASS INDEX: 39.21 KG/M2 | HEIGHT: 66 IN | SYSTOLIC BLOOD PRESSURE: 124 MMHG | DIASTOLIC BLOOD PRESSURE: 86 MMHG | WEIGHT: 244 LBS | HEART RATE: 90 BPM

## 2021-05-03 DIAGNOSIS — Z98.1 S/P LUMBAR FUSION: Primary | ICD-10-CM

## 2021-05-03 DIAGNOSIS — Z98.1 S/P LUMBAR FUSION: ICD-10-CM

## 2021-05-03 PROCEDURE — 72100 X-RAY EXAM L-S SPINE 2/3 VWS: CPT

## 2021-05-03 PROCEDURE — 99024 POSTOP FOLLOW-UP VISIT: CPT | Performed by: PHYSICIAN ASSISTANT

## 2021-05-03 NOTE — PROGRESS NOTES
Assessment:    3 months s/p Anterior lumbar interbody fusion L5-S1 with indirect decompression bilateral foramina L5-S1  Lumbar laminectomy decompression L4-S1 with instrumented posterolateral fusion L5-S1 done 2/2/2021  Overall doing very well with resolved preoperative lower extremity radicular symptoms  She is happy with results of procedure  X-rays stable  Plan:    Weight bearing as tolerated bilateral lower extremities  OK to discontinue use of LSO brace  Lumbar spine precautions lifted however recommend maintaining proper body mechanics in regards to lifting twisting and bending  Due to transportation issues the patient is unable to report to outpatient physical therapy however if she is able to in the future she will let us know and we will place a referral     Follow-up 3 months for re-evaluation and new x-rays of the lumbar spine  Problem List Items Addressed This Visit        Other    S/P lumbar fusion - Primary    Relevant Orders    XR spine lumbar 2 or 3 views injury                   Subjective:     Patient ID:  Luis Alfredo Suarez is a 63 y o  female    HPI    3 months s/p Anterior lumbar interbody fusion L5-S1 with indirect decompression bilateral foramina L5-S1  Lumbar laminectomy decompression L4-S1 with instrumented posterolateral fusion L5-S1 done 2/2/2021  Today the patient states she is doing well and she continues to have resolved lower extremity pain  She gets occasional lower back soreness when she stands for long periods of time however it is mild  She states she did spend time in a behavioral unit where she did not wear her back brace  Otherwise she has been compliant  She offers no acute complaints at today's visit  She is unable to go to outpatient physical therapy due to transportation issues        The following portions of the patient's history were reviewed and updated as appropriate: allergies, current medications, past family history, past medical history, past social history, past surgical history and problem list     Review of Systems     Objective:    Imaging:  Stable 360 lumbar fusion L5-S1, hardware in unchanged alignment  Intact  Vitals:    05/03/21 1023   BP: 124/86   Pulse: 90           Physical Exam     No acute distress  Gait is without assistance  Lumbar incision is healed  Negative modified straight leg raise bilaterally  Strength is 5/5 L2-S1 bilaterally, sensation intact  No calf tenderness, no pitting edema

## 2021-05-12 ENCOUNTER — TELEMEDICINE (OUTPATIENT)
Dept: FAMILY MEDICINE CLINIC | Facility: CLINIC | Age: 56
End: 2021-05-12
Payer: COMMERCIAL

## 2021-05-12 DIAGNOSIS — Z71.85 IMMUNIZATION COUNSELING: ICD-10-CM

## 2021-05-12 DIAGNOSIS — Z09 HOSPITAL DISCHARGE FOLLOW-UP: Primary | ICD-10-CM

## 2021-05-12 DIAGNOSIS — F17.219 CIGARETTE NICOTINE DEPENDENCE WITH NICOTINE-INDUCED DISORDER: ICD-10-CM

## 2021-05-12 DIAGNOSIS — R45.851 SUICIDAL IDEATION: ICD-10-CM

## 2021-05-12 PROCEDURE — 1111F DSCHRG MED/CURRENT MED MERGE: CPT | Performed by: FAMILY MEDICINE

## 2021-05-12 PROCEDURE — 99495 TRANSJ CARE MGMT MOD F2F 14D: CPT | Performed by: FAMILY MEDICINE

## 2021-05-12 NOTE — PROGRESS NOTES
Assessment/Plan:        Problem List Items Addressed This Visit        Nervous and Auditory    Cigarette nicotine dependence with nicotine-induced disorder      Other Visit Diagnoses     Hospital discharge follow-up    -  Primary    Reviewed hospital records  Suicidal ideation        Resolved  Immunization counseling               Pt asked about Tdap because her son will have a baby soon  Pt is due for Tdap  Advised Pt to call her insurance to make sure it will be covered  Also, recommend pt to get Covid19 vaccine  Advised pt no other shots after 2 weeks of Covid19 vaccine  Pt states she will register through [employer]  RTO in 1 month for yearly physical        Reason for visit is TCM    Encounter provider Mordechai Nyhan, MD       Provider located at [address] [place] [id number]      Recent Visits  No visits were found meeting these conditions  Showing recent visits within past 7 days and meeting all other requirements     Today's Visits  Date Type Provider Dept   05/12/21 [address], [address] today's visits and meeting all other requirements     Future Appointments  No visits were found meeting these conditions  Showing future appointments within next 150 days and meeting all other requirements      It was my intent to perform this visit via video technology but the patient was not able to do a video connection so the visit was completed via audio telephone only  After connecting through televideo, the patient was identified by name and date of birth  Radha Mcintyre was informed that this is a telemedicine visit and that the visit is being conducted through telephone  My office door was closed  No one else was in the room  She acknowledged consent and understanding of privacy and security of the video platform   The patient has agreed to participate and understands they can discontinue the visit at any time  Patient is aware this is a billable service  Subjective:     Patient ID: Kaitlin Bradley is a 63 y o  female  HPI      Was in hospital 4/24-4/30/2021 for suicidal ideas  Pt is on cymbalta, neurontin, abilify and trazodone  Dose adjusted in hospital    Pt improved  Discharged home  Pt states she feels ok now  Denies HI/SI  Sleep ok  Will see therapist tomorrow  Pt still smokes  Pt states she has nicotine patch and gum  Will try to quit  Review of Systems   Constitutional: Negative for appetite change, chills and fever  HENT: Negative for congestion, ear pain, sinus pain and sore throat  Eyes: Negative for discharge and itching  Respiratory: Negative for apnea, cough, chest tightness, shortness of breath and wheezing  Cardiovascular: Negative for chest pain, palpitations and leg swelling  Gastrointestinal: Negative for abdominal pain, anal bleeding, constipation, diarrhea, nausea and vomiting  Endocrine: Negative for cold intolerance, heat intolerance and polyuria  Genitourinary: Negative for difficulty urinating and dysuria  Musculoskeletal: Negative for arthralgias, back pain and myalgias  Skin: Negative for rash  Neurological: Negative for dizziness and headaches  Psychiatric/Behavioral: Negative for agitation, self-injury, sleep disturbance and suicidal ideas  The patient is not nervous/anxious  Objective: There were no vitals filed for this visit  Physical Exam        Transitional Care Management Review:  Kaitlin Bradley is a 63 y o  female here for TCM follow up       During the TCM phone call patient stated:    TCM Call (since 4/11/2021)     Date and time call was made  4/30/2021  2:33 PM    Hospital care reviewed  Records reviewed    Date of Admission  04/24/21    Date of discharge  04/30/21    Diagnosis  MDD (major depressive disorder), recurrent severe, without psychosis ([facility] [address] )    Disposition  Home    Were the patients medications reviewed and updated  No    Current Symptoms  None      TCM Call (since 4/11/2021)     Post hospital issues  None    Should patient be enrolled in anticoag monitoring? No    Scheduled for follow up? Yes    Did you obtain your prescribed medications  Yes    Do you need help managing your prescriptions or medications  No    Is transportation to your appointment needed  No    I have advised the patient to call PCP with any new or worsening symptoms  ALLEN Perez    Living Arrangements  Spouse or Significiant other    Are you recieving any outpatient services  No    Are you recieving home care services  No    Are you using any community resources  No    Current waiver services  No    Have you fallen in the last 12 months  No    Interperter language line needed  No          I spent 15 minutes with the patient during this visit      Shiva Eugene MD

## 2021-05-19 ENCOUNTER — IMMUNIZATIONS (OUTPATIENT)
Dept: FAMILY MEDICINE CLINIC | Facility: HOSPITAL | Age: 56
End: 2021-05-19

## 2021-05-19 DIAGNOSIS — Z23 ENCOUNTER FOR IMMUNIZATION: Primary | ICD-10-CM

## 2021-05-19 PROCEDURE — 91300 SARS-COV-2 / COVID-19 MRNA VACCINE (PFIZER-BIONTECH) 30 MCG: CPT

## 2021-05-19 PROCEDURE — 0001A SARS-COV-2 / COVID-19 MRNA VACCINE (PFIZER-BIONTECH) 30 MCG: CPT

## 2021-06-08 ENCOUNTER — IMMUNIZATIONS (OUTPATIENT)
Dept: FAMILY MEDICINE CLINIC | Facility: HOSPITAL | Age: 56
End: 2021-06-08

## 2021-06-08 DIAGNOSIS — Z23 ENCOUNTER FOR IMMUNIZATION: Primary | ICD-10-CM

## 2021-06-08 PROCEDURE — 91300 SARS-COV-2 / COVID-19 MRNA VACCINE (PFIZER-BIONTECH) 30 MCG: CPT

## 2021-06-08 PROCEDURE — 0002A SARS-COV-2 / COVID-19 MRNA VACCINE (PFIZER-BIONTECH) 30 MCG: CPT

## 2021-07-28 ENCOUNTER — OFFICE VISIT (OUTPATIENT)
Dept: FAMILY MEDICINE CLINIC | Facility: CLINIC | Age: 56
End: 2021-07-28
Payer: COMMERCIAL

## 2021-07-28 VITALS
BODY MASS INDEX: 39.53 KG/M2 | TEMPERATURE: 98.6 F | DIASTOLIC BLOOD PRESSURE: 82 MMHG | HEART RATE: 100 BPM | RESPIRATION RATE: 16 BRPM | WEIGHT: 246 LBS | HEIGHT: 66 IN | SYSTOLIC BLOOD PRESSURE: 120 MMHG

## 2021-07-28 DIAGNOSIS — Z12.31 SCREENING MAMMOGRAM, ENCOUNTER FOR: ICD-10-CM

## 2021-07-28 DIAGNOSIS — F17.219 CIGARETTE NICOTINE DEPENDENCE WITH NICOTINE-INDUCED DISORDER: ICD-10-CM

## 2021-07-28 DIAGNOSIS — Z00.00 ANNUAL PHYSICAL EXAM: Primary | ICD-10-CM

## 2021-07-28 DIAGNOSIS — Z23 ENCOUNTER FOR IMMUNIZATION: ICD-10-CM

## 2021-07-28 DIAGNOSIS — Z12.4 SCREENING FOR CERVICAL CANCER: ICD-10-CM

## 2021-07-28 DIAGNOSIS — E53.8 VITAMIN B12 DEFICIENCY: ICD-10-CM

## 2021-07-28 PROCEDURE — 90471 IMMUNIZATION ADMIN: CPT

## 2021-07-28 PROCEDURE — 4004F PT TOBACCO SCREEN RCVD TLK: CPT | Performed by: FAMILY MEDICINE

## 2021-07-28 PROCEDURE — 90715 TDAP VACCINE 7 YRS/> IM: CPT

## 2021-07-28 PROCEDURE — 3008F BODY MASS INDEX DOCD: CPT | Performed by: FAMILY MEDICINE

## 2021-07-28 PROCEDURE — 99396 PREV VISIT EST AGE 40-64: CPT | Performed by: FAMILY MEDICINE

## 2021-07-28 PROCEDURE — 3725F SCREEN DEPRESSION PERFORMED: CPT | Performed by: FAMILY MEDICINE

## 2021-07-28 RX ORDER — CYANOCOBALAMIN 1000 UG/ML
1000 INJECTION INTRAMUSCULAR; SUBCUTANEOUS
Qty: 1 ML | Refills: 11 | Status: SHIPPED | OUTPATIENT
Start: 2021-07-28

## 2021-07-28 RX ORDER — VARENICLINE TARTRATE 25 MG
KIT ORAL
Qty: 53 TABLET | Refills: 0 | Status: SHIPPED | OUTPATIENT
Start: 2021-07-28

## 2021-07-28 RX ORDER — VILAZODONE HYDROCHLORIDE 10 MG/1
TABLET ORAL
COMMUNITY
Start: 2021-07-05

## 2021-07-28 NOTE — PROGRESS NOTES
[address] [place]    NAME: Fide Ring  AGE: 63 y o  SEX: female  : 1965     DATE: 2021     Assessment and Plan:     Problem List Items Addressed This Visit        Nervous and Auditory    Cigarette nicotine dependence with nicotine-induced disorder    Relevant Medications    Viibryd 10 MG tablet    varenicline (CHANTIX RAFAEL) 0 5 MG X 11 & 1 MG X 42 tablet    Other Relevant Orders    CT lung screening program      Other Visit Diagnoses     Annual physical exam    -  Primary    Vitamin B12 deficiency        Relevant Medications    cyanocobalamin 1,000 mcg/mL    Encounter for immunization        Relevant Orders    TDAP VACCINE GREATER THAN OR EQUAL TO 6YO IM (Completed)    Screening for cervical cancer        Relevant Orders    Ambulatory referral to Obstetrics / Gynecology    Screening mammogram, encounter for        Relevant Orders    Mammo screening bilateral w cad        Refused flu shot  Got Covid19 vaccine  Denies SE    Give Tdap today because she has a grandson recently  Will get pneumovax after 1 month  Will check insurance for coverage of shingrix  Give mammogram script  Refer to OBGYN for pap  Colonoscopy 10/2020, repeat in 5 years  RTO in 6 months  Immunizations and preventive care screenings were discussed with patient today  Appropriate education was printed on patient's after visit summary  Counseling:  Alcohol/drug use: discussed moderation in alcohol intake, the recommendations for healthy alcohol use, and avoidance of illicit drug use  Dental Health: discussed importance of regular tooth brushing, flossing, and dental visits  Injury prevention: discussed safety/seat belts, safety helmets, smoke detectors, carbon dioxide detectors, and smoking near bedding or upholstery    Sexual health: discussed sexually transmitted diseases, partner selection, use of condoms, avoidance of unintended pregnancy, and contraceptive alternatives  · Exercise: the importance of regular exercise/physical activity was discussed  Recommend exercise 3-5 times per week for at least 30 minutes  BMI Counseling: Body mass index is 39 71 kg/m²  The BMI is above normal  Nutrition recommendations include decreasing portion sizes, encouraging healthy choices of fruits and vegetables, decreasing fast food intake, consuming healthier snacks and limiting drinks that contain sugar  Exercise recommendations include moderate physical activity 150 minutes/week  No pharmacotherapy was ordered  Return in about 6 months (around 2022) for Recheck  Chief Complaint:     Chief Complaint   Patient presents with    Annual Exam     and follow up       History of Present Illness:     Adult Annual Physical   Patient here for a comprehensive physical exam  The patient reports see note  Depression---FU psychiatrist  Stable now  Vit B12 def---Got Vit B12 shot in 2021  Need refills  Smoke 1ppd for 40 years  Would like to try chantix again  Never do CT lung before  Diet and Physical Activity  · Diet/Nutrition: well balanced diet  · Exercise: no formal exercise  Depression Screening  PHQ-9 Depression Screening    PHQ-9:   Frequency of the following problems over the past two weeks:      Little interest or pleasure in doing things: 3 - nearly every day  Feeling down, depressed, or hopeless: 1 - several days  Trouble falling or staying asleep, or sleeping too much: 0 - not at all  Feeling tired or having little energy: 3 - nearly every day  Poor appetite or overeatin - not at all  Feeling bad about yourself - or that you are a failure or have let yourself or your family down: 0 - not at all  Trouble concentrating on things, such as reading the newspaper or watching television: 0 - not at all  Moving or speaking so slowly that other people could have noticed   Or the opposite - being so fidgety or restless that you have been moving around a lot more than usual: 0 - not at all  Thoughts that you would be better off dead, or of hurting yourself in some way: 0 - not at all  PHQ-2 Score: 4  PHQ-9 Score: 7       General Health  · Sleep: sleeps well  · Hearing: normal - bilateral   · Vision: no vision problems  · Dental: no dental visits for >1 year  /GYN Health  · Patient is: postmenopausal  · Due for mammogram and pap  Give referral today  Review of Systems:     Review of Systems   Constitutional: Negative for appetite change, chills and fever  HENT: Negative for congestion, ear pain, sinus pain and sore throat  Eyes: Negative for discharge and itching  Respiratory: Negative for apnea, cough, chest tightness, shortness of breath and wheezing  Cardiovascular: Negative for chest pain, palpitations and leg swelling  Gastrointestinal: Negative for abdominal pain, anal bleeding, constipation, diarrhea, nausea and vomiting  Endocrine: Negative for cold intolerance, heat intolerance and polyuria  Genitourinary: Negative for difficulty urinating and dysuria  Musculoskeletal: Negative for arthralgias, back pain and myalgias  Skin: Negative for rash  Neurological: Negative for dizziness and headaches  Psychiatric/Behavioral: Negative for agitation, self-injury, sleep disturbance and suicidal ideas  The patient is not nervous/anxious         Past Medical History:     Past Medical History:   Diagnosis Date    Anxiety     Back pain     Depression     GERD (gastroesophageal reflux disease)     SOB (shortness of breath)       Past Surgical History:     Past Surgical History:   Procedure Laterality Date    CERVICAL DISC SURGERY      CHOLECYSTECTOMY      FOOT SURGERY      LAPAROTOMY N/A 2/2/2021    Procedure: LAPAROTOMY FOR SPINAL SURGERY;  Surgeon: Osei Alcantara MD;  [facility];  Service: General    LUMBAR FUSION N/A 2/2/2021    Procedure: Anterior lumbar interbody fusion L5-S1; Posterior lumbar laminectomy, decompression, instrumented fusion L5-S1 with allograft and neuromonitoring;  Surgeon: Denise Marks MD;  [facility];  Service: Orthopedics    [place] EGD TRANSORAL BIOPSY SINGLE/MULTIPLE N/A 4/20/2016    Procedure: ESOPHAGOGASTRODUODENOSCOPY (EGD); Surgeon: Renea Lara MD;  [facility]; Service: Gastroenterology    TONSILLECTOMY      TUBAL LIGATION      UPPER GASTROINTESTINAL ENDOSCOPY        Social History:     Social History     Socioeconomic History    Marital status: /Civil Union     Spouse name: None    Number of children: None    Years of education: None    Highest education level: None   Occupational History    Occupation: Caregiver   Tobacco Use    Smoking status: Current Every Day Smoker     Packs/day: 1 00     Types: Cigarettes    Smokeless tobacco: Never Used    Tobacco comment: started patch 1/2021, still using 3/4 ppd  Vaping Use    Vaping Use: Never used   Substance and Sexual Activity    Alcohol use: No    Drug use: Yes     Types: Oxycodone, Methamphetamines     Comment: 7/18/2020 last use    Sexual activity: Not Currently   Other Topics Concern    None   Social History Narrative    None     Social Determinants of Health     Financial Resource Strain: Low Risk     Difficulty of Paying Living Expenses: Not very hard   Food Insecurity: No Food Insecurity    Worried About Running Out of Food in the Last Year: Never true    Sanya of Food in the Last Year: Never true   Transportation Needs: Unmet Transportation Needs    Lack of Transportation (Medical):  Yes    Lack of Transportation (Non-Medical): Yes   Physical Activity: Sufficiently Active    Days of Exercise per Week: 7 days    Minutes of Exercise per Session: 30 min   Stress: Stress Concern Present    Feeling of Stress : Rather much   Social Connections: Socially Isolated    Frequency of Communication with Friends and Family: Never    Frequency of Social Gatherings with Friends and Family: Never    Attends Religion Services: Never    Active Member of Clubs or Organizations: No    Attends Club or Organization Meetings: Never    Marital Status:    Intimate Partner Violence: Not At Risk    Fear of Current or Ex-Partner: No    Emotionally Abused: No    Physically Abused: No    Sexually Abused: No      Family History:     Family History   Problem Relation Age of Onset    Aneurysm Mother     Hypertension Mother     Heart attack Father     Aneurysm Sister     Aneurysm Brother     Mental illness Son       Current Medications:     Current Outpatient Medications   Medication Sig Dispense Refill    acetaminophen (TYLENOL) 500 mg tablet Take 500 mg by mouth every 6 (six) hours as needed for mild pain      ARIPiprazole (ABILIFY) 10 mg tablet Take 1 tablet (10 mg total) by mouth daily 30 tablet 0    cholecalciferol (VITAMIN D3) 1,000 units tablet Take 1 tablet (1,000 Units total) by mouth daily 30 tablet 0    cyanocobalamin 1,000 mcg/mL Inject 1 mL (1,000 mcg total) into a muscle every 30 (thirty) days 1 mL 11    Diclofenac Sodium (VOLTAREN) 1 % Apply 2 g topically 4 (four) times a day as needed (Pain over the affected area) 350 g 0    DULoxetine (CYMBALTA) 60 mg delayed release capsule Take 2 capsules (120 mg total) by mouth daily 60 capsule 0    famotidine (PEPCID) 20 mg tablet Take 20 mg by mouth as needed for heartburn      gabapentin (NEURONTIN) 300 mg capsule Take 1 capsule (300 mg total) by mouth 3 (three) times a day 90 capsule 0    nicotine (NICODERM CQ) 21 mg/24 hr TD 24 hr patch Place 1 patch on the skin every 24 hours 28 patch 0    nicotine polacrilex (NICORETTE) 2 mg gum Chew 1 each (2 mg total) as needed for smoking cessation 100 each 0    pantoprazole (PROTONIX) 40 mg tablet Take 1 tablet (40 mg total) by mouth daily 30 tablet 5    traZODone (DESYREL) 150 mg tablet Take 1 tablet (150 mg total) by mouth daily at bedtime 30 tablet 0  Viibryd 10 MG tablet       varenicline (CHANTIX RAFAEL) 0 5 MG X 11 & 1 MG X 42 tablet Take one 0 5 mg tablet by mouth once daily for 3 days, then one 0 5 mg tablet by mouth twice daily for 4 days, then one 1 mg tablet by mouth twice daily  53 tablet 0     No current facility-administered medications for this visit  Allergies:     No Known Allergies   Physical Exam:     /82   Pulse 100   Temp 98 6 °F (37 °C) (Tympanic)   Resp 16   Ht 5' 6" (1 676 m)   Wt 112 kg (246 lb)   BMI 39 71 kg/m²     Physical Exam  Vitals reviewed  Constitutional:       Appearance: Normal appearance  HENT:      Head: Normocephalic and atraumatic  Eyes:      General:         Right eye: No discharge  Left eye: No discharge  Conjunctiva/sclera: Conjunctivae normal    Neck:      Vascular: No carotid bruit  Cardiovascular:      Rate and Rhythm: Normal rate and regular rhythm  Heart sounds: Normal heart sounds  No murmur heard  No friction rub  No gallop  Pulmonary:      Effort: Pulmonary effort is normal  No respiratory distress  Breath sounds: Normal breath sounds  No wheezing or rales  Abdominal:      General: Bowel sounds are normal  There is no distension  Palpations: Abdomen is soft  Tenderness: There is no abdominal tenderness  Musculoskeletal:         General: No swelling, tenderness or deformity  Normal range of motion  Cervical back: Normal range of motion and neck supple  No muscular tenderness  Lymphadenopathy:      Cervical: No cervical adenopathy  Neurological:      Mental Status: She is alert     Psychiatric:         Mood and Affect: Mood normal           Kalyan Herman MD  [address] [place] [address]

## 2021-07-28 NOTE — PROGRESS NOTES
Chief Complaint   Patient presents with    Annual Exam     and follow up      Health Maintenance   Topic Date Due    Pneumococcal Vaccine: Pediatrics (0 to 5 Years) and At-Risk Patients (6 to 59 Years) (1 of 2 - PPSV23) Never done    HIV Screening  Never done    DTaP,Tdap,and Td Vaccines (1 - Tdap) Never done    Cervical Cancer Screening  Never done    Breast Cancer Screening: Mammogram  04/17/2016    BMI: Followup Plan  11/08/2020    Annual Physical  11/08/2020    Influenza Vaccine (1) 09/01/2021    BMI: Adult  05/03/2022    Depression Remission PHQ  07/28/2022    Colorectal Cancer Screening  10/05/2025    Hepatitis C Screening  Completed    COVID-19 Vaccine  Completed    HIB Vaccine  Aged Out    Hepatitis B Vaccine  Aged Out    IPV Vaccine  Aged Out    Hepatitis A Vaccine  Aged Out    Meningococcal ACWY Vaccine  Aged Out    HPV Vaccine  Aged Out       Assessment/Plan:    No problem-specific Assessment & Plan notes found for this encounter  {Assess/PlanSmartLinks:69007}      Subjective:      Patient ID: Alma De Luna is a 63 y o  female      HPI    {Common ambulatory SmartLinks:42070}    Review of Systems      Objective:      /82   Pulse 100   Temp 98 6 °F (37 °C) (Tympanic)   Resp 16   Ht 5' 6" (1 676 m)   Wt 112 kg (246 lb)   BMI 39 71 kg/m²          Physical Exam

## 2021-07-28 NOTE — PATIENT INSTRUCTIONS

## 2021-08-26 ENCOUNTER — TELEPHONE (OUTPATIENT)
Dept: FAMILY MEDICINE CLINIC | Facility: CLINIC | Age: 56
End: 2021-08-26

## 2021-08-26 NOTE — TELEPHONE ENCOUNTER
Patient called, stated that she was exposed to covid on Saturday 8/21/21  She has no symptoms, she is fully vaccinated she was with the person for a minute or 2 and about 8 inches away from the patient

## 2021-08-27 ENCOUNTER — CLINICAL SUPPORT (OUTPATIENT)
Dept: FAMILY MEDICINE CLINIC | Facility: CLINIC | Age: 56
End: 2021-08-27

## 2021-08-27 DIAGNOSIS — Z20.822 EXPOSURE TO COVID-19 VIRUS: Primary | ICD-10-CM

## 2021-08-27 PROCEDURE — U0005 INFEC AGEN DETEC AMPLI PROBE: HCPCS | Performed by: FAMILY MEDICINE

## 2021-08-27 PROCEDURE — U0003 INFECTIOUS AGENT DETECTION BY NUCLEIC ACID (DNA OR RNA); SEVERE ACUTE RESPIRATORY SYNDROME CORONAVIRUS 2 (SARS-COV-2) (CORONAVIRUS DISEASE [COVID-19]), AMPLIFIED PROBE TECHNIQUE, MAKING USE OF HIGH THROUGHPUT TECHNOLOGIES AS DESCRIBED BY CMS-2020-01-R: HCPCS | Performed by: FAMILY MEDICINE

## 2021-08-27 NOTE — PROGRESS NOTES
Patient came for a Covid Swab due to the fact that she was exposed at work, and needed this to return to work   Patient tolerated swabs

## 2021-08-28 LAB — SARS-COV-2 RNA RESP QL NAA+PROBE: NEGATIVE

## 2021-09-03 DIAGNOSIS — K21.9 GASTROESOPHAGEAL REFLUX DISEASE: ICD-10-CM

## 2021-09-03 RX ORDER — PANTOPRAZOLE SODIUM 40 MG/1
40 TABLET, DELAYED RELEASE ORAL DAILY
Qty: 30 TABLET | Refills: 5 | Status: SHIPPED | OUTPATIENT
Start: 2021-09-03 | End: 2022-03-28 | Stop reason: SDUPTHER

## 2021-09-27 ENCOUNTER — OFFICE VISIT (OUTPATIENT)
Dept: OBGYN CLINIC | Facility: HOSPITAL | Age: 56
End: 2021-09-27
Payer: COMMERCIAL

## 2021-09-27 ENCOUNTER — HOSPITAL ENCOUNTER (OUTPATIENT)
Dept: RADIOLOGY | Facility: HOSPITAL | Age: 56
Discharge: HOME | End: 2021-09-27
Attending: ORTHOPAEDIC SURGERY
Payer: COMMERCIAL

## 2021-09-27 VITALS
BODY MASS INDEX: 39.82 KG/M2 | SYSTOLIC BLOOD PRESSURE: 149 MMHG | HEART RATE: 114 BPM | HEIGHT: 66 IN | DIASTOLIC BLOOD PRESSURE: 94 MMHG | WEIGHT: 247.8 LBS

## 2021-09-27 DIAGNOSIS — Z98.1 S/P LUMBAR FUSION: Primary | ICD-10-CM

## 2021-09-27 DIAGNOSIS — Z98.1 S/P LUMBAR FUSION: ICD-10-CM

## 2021-09-27 PROCEDURE — 72100 X-RAY EXAM L-S SPINE 2/3 VWS: CPT

## 2021-09-27 PROCEDURE — 99213 OFFICE O/P EST LOW 20 MIN: CPT | Performed by: ORTHOPAEDIC SURGERY

## 2021-09-27 NOTE — PROGRESS NOTES
Assessment:    Almost 8 months s/p Anterior lumbar interbody fusion L5-S1 with indirect decompression bilateral foramina L5-S1  Lumbar laminectomy decompression L4-S1 with instrumented posterolateral fusion L5-S1 done 2/2/2021  Doing well with improved pre-op symptoms  X-rays stable  Plan:    Weight bearing as tolerated bilateral lower extremities  No further lumbar spine restrictions  Walk as tolerated for exercise  Maintain proper body mechanics with physical activity  She can now follow-up on a PRN basis  Problem List Items Addressed This Visit        Other    S/P lumbar fusion - Primary    Relevant Orders    XR spine lumbar 2 or 3 views injury                   Subjective:     Patient ID:  Lakisha Foley is a 63 y o  female    HPI    63year old female presenting for follow-up  Almost 8 months s/p Anterior lumbar interbody fusion L5-S1 with indirect decompression bilateral foramina L5-S1  Lumbar laminectomy decompression L4-S1 with instrumented posterolateral fusion L5-S1 done 2/2/2021  Today she states overall she is doing very well  She notes 2 days worth of right sided stabbing pain rizwan-incisional area  Other than that she has no significant complaints  Legs feel good  She did gain weight, and has not been walking much  The following portions of the patient's history were reviewed and updated as appropriate: allergies, current medications, past family history, past medical history, past social history, past surgical history and problem list     Review of Systems   Constitutional: Negative for chills, diaphoresis, fatigue and fever  Respiratory: Negative  Cardiovascular: Negative  Genitourinary: Negative for decreased urine volume and difficulty urinating  Musculoskeletal: Positive for arthralgias  Negative for back pain and gait problem  Skin: Negative  All other systems reviewed and are negative         Objective:    Imaging:  Lumbar spine x rays performed today demonstrate stable 360 fusion, L5-S1 with hardware intact, in good alignment  Vitals:    09/27/21 1350   BP: 149/94   Pulse: (!) 114           Physical Exam  Vitals and nursing note reviewed  Constitutional:       General: She is not in acute distress  Appearance: Normal appearance  She is not ill-appearing, toxic-appearing or diaphoretic  HENT:      Head: Normocephalic and atraumatic  Eyes:      General:         Right eye: No discharge  Left eye: No discharge  Pulmonary:      Effort: Pulmonary effort is normal  No respiratory distress  Musculoskeletal:         General: No tenderness  Skin:     General: Skin is warm and dry  Neurological:      General: No focal deficit present  Mental Status: She is alert and oriented to person, place, and time  Gait: Gait normal    Psychiatric:         Mood and Affect: Mood normal          Behavior: Behavior normal           NAD  Gait without assistance  Lumbar incision is healed    Lumbosacral region NTTP  Motor and sensory stable, L2-S1  No pitting edema

## 2022-03-21 ENCOUNTER — VBI (OUTPATIENT)
Dept: ADMINISTRATIVE | Facility: OTHER | Age: 57
End: 2022-03-21

## 2022-03-28 DIAGNOSIS — K21.9 GASTROESOPHAGEAL REFLUX DISEASE: ICD-10-CM

## 2022-03-28 RX ORDER — PANTOPRAZOLE SODIUM 40 MG/1
40 TABLET, DELAYED RELEASE ORAL DAILY
Qty: 30 TABLET | Refills: 5 | Status: SHIPPED | OUTPATIENT
Start: 2022-03-28

## 2022-04-20 ENCOUNTER — VBI (OUTPATIENT)
Dept: ADMINISTRATIVE | Facility: OTHER | Age: 57
End: 2022-04-20

## 2022-07-25 ENCOUNTER — TELEMEDICINE (OUTPATIENT)
Dept: FAMILY MEDICINE CLINIC | Facility: CLINIC | Age: 57
End: 2022-07-25
Payer: COMMERCIAL

## 2022-07-25 DIAGNOSIS — U07.1 COVID-19: Primary | ICD-10-CM

## 2022-07-25 PROCEDURE — G2012 BRIEF CHECK IN BY MD/QHP: HCPCS | Performed by: FAMILY MEDICINE

## 2022-07-25 RX ORDER — BENZONATATE 200 MG/1
200 CAPSULE ORAL 3 TIMES DAILY PRN
Qty: 20 CAPSULE | Refills: 0 | Status: SHIPPED | OUTPATIENT
Start: 2022-07-25

## 2022-07-25 NOTE — PROGRESS NOTES
COVID-19 Outpatient Progress Note    Assessment/Plan:    Problem List Items Addressed This Visit        Other    ETJLQ-11 - Primary     Day 7  Still cough  No more fever  Give tessalon  Educated pt about isolation guideline  Relevant Medications    benzonatate (TESSALON) 200 MG capsule         Disposition:     Patient has COVID-19 infection  Based off CDC guidelines, they were recommended to isolate for 5 days from the date of the positive test  If they remain asymptomatic, isolation may be ended followed by 5 days of wearing a mask when around othes to minimize risk of infecting others  If they have a fever, continue to stay home until fever resolves for at least 24 hours  Discussed symptom directed medication options with patient  Discussed vitamin D, vitamin C, and/or zinc supplementation with patient  I have spent 15 minutes directly with the patient  Greater than 50% of this time was spent in counseling/coordination of care regarding: diagnostic results, prognosis, risks and benefits of treatment options, instructions for management, patient and family education, risk factor reductions and impressions  Encounter provider Edilberto Navarro MD    Provider located at [address] [place] [id number]    Recent Visits  No visits were found meeting these conditions  Showing recent visits within past 7 days and meeting all other requirements  Today's Visits  Date Type Provider Dept   07/25/22 [facility] [address], [address] today's visits and meeting all other requirements  Future Appointments  No visits were found meeting these conditions  Showing future appointments within next 150 days and meeting all other requirements     This virtual check-in was done via telephone and she agrees to proceed      Patient agrees to participate in a virtual check in via telephone or video visit instead of presenting to the office to address urgent/immediate medical needs  Patient is aware this is a billable service  After connecting through Telephone, the patient was identified by name and date of birth  Radha East was informed that this was a telemedicine visit and that the exam was being conducted confidentially over secure lines  My office door was closed  No one else was in the room  Radha East acknowledged consent and understanding of privacy and security of the telemedicine visit  I informed the patient that I have reviewed her record in Epic and presented the opportunity for her to ask any questions regarding the visit today  The patient agreed to participate  It was my intent to perform this visit via video technology but the patient was not able to do a video connection so the visit was completed via audio telephone only  Verification of patient location:  Patient is located in the following state in which I hold an active license: [place]    Subjective:   Radha East is a 61 y o  female who has been screened for COVID-19  Symptom change since last report: improving  Patient's symptoms include fever, cough and diarrhea  Patient denies chills, fatigue, congestion, sore throat, anosmia, loss of taste, shortness of breath, chest tightness, abdominal pain, nausea, vomiting, myalgias and headaches  - Date of symptom onset: 7/19/2022  - Date of positive COVID-19 test: 7/19/2022  Type of test: Home antigen  Patient with typical symptoms of COVID-19 and they attest that they were positive on home rapid antigen testing  Image of positive result is not able to be uploaded into their chart  COVID-19 vaccination status: Fully vaccinated (primary series)    Samira Triplett has been staying home and has isolated themselves in her home   She is taking care to not share personal items and is cleaning all surfaces that are touched often, like counters, tabletops, and doorknobs using household cleaning sprays or wipes  She is wearing a mask when she leaves her room  Lab Results   Component Value Date    SARSCOV2 Negative 08/27/2021     Past Medical History:   Diagnosis Date    Anxiety     Back pain     Depression     GERD (gastroesophageal reflux disease)     SOB (shortness of breath)      Past Surgical History:   Procedure Laterality Date    CERVICAL DISC SURGERY      CHOLECYSTECTOMY      FOOT SURGERY      LAPAROTOMY N/A 2/2/2021    Procedure: LAPAROTOMY FOR SPINAL SURGERY;  Surgeon: Lana Trevino MD;  [facility];  Service: General    LUMBAR FUSION N/A 2/2/2021    Procedure: Anterior lumbar interbody fusion L5-S1; Posterior lumbar laminectomy, decompression, instrumented fusion L5-S1 with allograft and neuromonitoring;  Surgeon: Tk Toledo MD;  [facility];  Service: Orthopedics    [place] EGD TRANSORAL BIOPSY SINGLE/MULTIPLE N/A 4/20/2016    Procedure: ESOPHAGOGASTRODUODENOSCOPY (EGD); Surgeon: Ariela Peña MD;  [facility];   Service: Gastroenterology    TONSILLECTOMY      TUBAL LIGATION      UPPER GASTROINTESTINAL ENDOSCOPY       Current Outpatient Medications   Medication Sig Dispense Refill    benzonatate (TESSALON) 200 MG capsule Take 1 capsule (200 mg total) by mouth 3 (three) times a day as needed for cough 20 capsule 0    acetaminophen (TYLENOL) 500 mg tablet Take 500 mg by mouth every 6 (six) hours as needed for mild pain      ARIPiprazole (ABILIFY) 10 mg tablet Take 1 tablet (10 mg total) by mouth daily 30 tablet 0    cholecalciferol (VITAMIN D3) 1,000 units tablet Take 1 tablet (1,000 Units total) by mouth daily 30 tablet 0    cyanocobalamin 1,000 mcg/mL Inject 1 mL (1,000 mcg total) into a muscle every 30 (thirty) days 1 mL 11    Diclofenac Sodium (VOLTAREN) 1 % Apply 2 g topically 4 (four) times a day as needed (Pain over the affected area) 350 g 0    DULoxetine (CYMBALTA) 60 mg delayed release capsule Take 2 capsules (120 mg total) by mouth daily 60 capsule 0    famotidine (PEPCID) 20 mg tablet Take 20 mg by mouth as needed for heartburn      gabapentin (NEURONTIN) 300 mg capsule Take 1 capsule (300 mg total) by mouth 3 (three) times a day 90 capsule 0    nicotine (NICODERM CQ) 21 mg/24 hr TD 24 hr patch Place 1 patch on the skin every 24 hours 28 patch 0    nicotine polacrilex (NICORETTE) 2 mg gum Chew 1 each (2 mg total) as needed for smoking cessation 100 each 0    pantoprazole (PROTONIX) 40 mg tablet Take 1 tablet (40 mg total) by mouth daily 30 tablet 5    traZODone (DESYREL) 150 mg tablet Take 1 tablet (150 mg total) by mouth daily at bedtime 30 tablet 0    varenicline (CHANTIX RAFAEL) 0 5 MG X 11 & 1 MG X 42 tablet Take one 0 5 mg tablet by mouth once daily for 3 days, then one 0 5 mg tablet by mouth twice daily for 4 days, then one 1 mg tablet by mouth twice daily  53 tablet 0    Viibryd 10 MG tablet        No current facility-administered medications for this visit  No Known Allergies    Review of Systems   Constitutional: Positive for fever  Negative for appetite change, chills and fatigue  HENT: Negative for congestion, ear pain, sinus pain and sore throat  Eyes: Negative for discharge and itching  Respiratory: Positive for cough  Negative for apnea, chest tightness, shortness of breath and wheezing  Cardiovascular: Negative for chest pain, palpitations and leg swelling  Gastrointestinal: Positive for diarrhea  Negative for abdominal pain, anal bleeding, constipation, nausea and vomiting  Endocrine: Negative for cold intolerance, heat intolerance and polyuria  Genitourinary: Negative for difficulty urinating and dysuria  Musculoskeletal: Negative for arthralgias, back pain and myalgias  Skin: Negative for rash  Neurological: Negative for dizziness and headaches  Psychiatric/Behavioral: Negative for agitation  Objective: There were no vitals filed for this visit      Physical Exam    VIRTUAL VISIT Lamont Nicole verbally agrees to participate in [employer]  Pt is aware that [employer] could be limited without vital signs or the ability to perform a full hands-on physical exam  Sara Lambert understands she or the provider may request at any time to terminate the video visit and request the patient to seek care or treatment in person

## 2022-10-03 DIAGNOSIS — K21.9 GASTROESOPHAGEAL REFLUX DISEASE: ICD-10-CM

## 2022-10-03 RX ORDER — PANTOPRAZOLE SODIUM 40 MG/1
TABLET, DELAYED RELEASE ORAL
Qty: 30 TABLET | Refills: 0 | Status: SHIPPED | OUTPATIENT
Start: 2022-10-03

## 2022-11-01 DIAGNOSIS — K21.9 GASTROESOPHAGEAL REFLUX DISEASE: ICD-10-CM

## 2022-11-01 RX ORDER — PANTOPRAZOLE SODIUM 40 MG/1
40 TABLET, DELAYED RELEASE ORAL DAILY
Qty: 30 TABLET | Refills: 0 | Status: SHIPPED | OUTPATIENT
Start: 2022-11-01 | End: 2022-12-01

## 2022-12-07 DIAGNOSIS — K21.9 GASTROESOPHAGEAL REFLUX DISEASE: ICD-10-CM

## 2022-12-07 RX ORDER — PANTOPRAZOLE SODIUM 40 MG/1
40 TABLET, DELAYED RELEASE ORAL DAILY
Qty: 30 TABLET | Refills: 0 | Status: SHIPPED | OUTPATIENT
Start: 2022-12-07 | End: 2023-01-06

## 2023-01-19 DIAGNOSIS — K21.9 GASTROESOPHAGEAL REFLUX DISEASE: ICD-10-CM

## 2023-01-19 RX ORDER — PANTOPRAZOLE SODIUM 40 MG/1
40 TABLET, DELAYED RELEASE ORAL DAILY
Qty: 30 TABLET | Refills: 0 | Status: SHIPPED | OUTPATIENT
Start: 2023-01-19 | End: 2023-02-18

## 2023-02-16 ENCOUNTER — VBI (OUTPATIENT)
Dept: ADMINISTRATIVE | Facility: OTHER | Age: 58
End: 2023-02-16

## 2023-02-24 DIAGNOSIS — K21.9 GASTROESOPHAGEAL REFLUX DISEASE: ICD-10-CM

## 2023-02-24 RX ORDER — PANTOPRAZOLE SODIUM 40 MG/1
40 TABLET, DELAYED RELEASE ORAL DAILY
Qty: 30 TABLET | Refills: 0 | Status: SHIPPED | OUTPATIENT
Start: 2023-02-24 | End: 2023-03-26

## 2023-03-24 DIAGNOSIS — K21.9 GASTROESOPHAGEAL REFLUX DISEASE: ICD-10-CM

## 2023-03-24 RX ORDER — PANTOPRAZOLE SODIUM 40 MG/1
40 TABLET, DELAYED RELEASE ORAL DAILY
Qty: 30 TABLET | Refills: 0 | Status: SHIPPED | OUTPATIENT
Start: 2023-03-24 | End: 2023-04-23

## 2023-03-29 ENCOUNTER — HOSPITAL ENCOUNTER (OUTPATIENT)
Facility: HOSPITAL | Age: 58
Setting detail: OBSERVATION
Discharge: HOME | End: 2023-03-30
Attending: EMERGENCY MEDICINE | Admitting: FAMILY MEDICINE

## 2023-03-29 ENCOUNTER — APPOINTMENT (EMERGENCY)
Dept: RADIOLOGY | Facility: HOSPITAL | Age: 58
End: 2023-03-29

## 2023-03-29 DIAGNOSIS — R20.0 RIGHT ARM NUMBNESS: ICD-10-CM

## 2023-03-29 DIAGNOSIS — R42 VERTIGO: Primary | ICD-10-CM

## 2023-03-29 LAB
2HR DELTA HS TROPONIN: 1 NG/L
ANION GAP SERPL CALCULATED.3IONS-SCNC: 1 MMOL/L (ref 4–13)
APTT PPP: 30 SECONDS (ref 23–37)
ATRIAL RATE: 75 BPM
BUN SERPL-MCNC: 9 MG/DL (ref 5–25)
CALCIUM SERPL-MCNC: 9.5 MG/DL (ref 8.3–10.1)
CARDIAC TROPONIN I PNL SERPL HS: 3 NG/L
CARDIAC TROPONIN I PNL SERPL HS: 4 NG/L
CHLORIDE SERPL-SCNC: 108 MMOL/L (ref 96–108)
CO2 SERPL-SCNC: 27 MMOL/L (ref 21–32)
CREAT SERPL-MCNC: 0.9 MG/DL (ref 0.6–1.3)
ERYTHROCYTE [DISTWIDTH] IN BLOOD BY AUTOMATED COUNT: 13.2 % (ref 11.6–15.1)
FLUAV RNA RESP QL NAA+PROBE: NEGATIVE
FLUBV RNA RESP QL NAA+PROBE: NEGATIVE
GFR SERPL CREATININE-BSD FRML MDRD: 70 ML/MIN/1.73SQ M
GLUCOSE SERPL-MCNC: 80 MG/DL (ref 65–140)
GLUCOSE SERPL-MCNC: 95 MG/DL (ref 65–140)
HCT VFR BLD AUTO: 42.8 % (ref 34.8–46.1)
HGB BLD-MCNC: 14.2 G/DL (ref 11.5–15.4)
INR PPP: 0.96 (ref 0.84–1.19)
MCH RBC QN AUTO: 31 PG (ref 26.8–34.3)
MCHC RBC AUTO-ENTMCNC: 33.2 G/DL (ref 31.4–37.4)
MCV RBC AUTO: 93 FL (ref 82–98)
P AXIS: 61 DEGREES
PLATELET # BLD AUTO: 278 THOUSANDS/UL (ref 149–390)
PMV BLD AUTO: 9.8 FL (ref 8.9–12.7)
POTASSIUM SERPL-SCNC: 3.8 MMOL/L (ref 3.5–5.3)
PR INTERVAL: 152 MS
PROTHROMBIN TIME: 13 SECONDS (ref 11.6–14.5)
QRS AXIS: 67 DEGREES
QRSD INTERVAL: 92 MS
QT INTERVAL: 408 MS
QTC INTERVAL: 455 MS
RBC # BLD AUTO: 4.58 MILLION/UL (ref 3.81–5.12)
RSV RNA RESP QL NAA+PROBE: NEGATIVE
SARS-COV-2 RNA RESP QL NAA+PROBE: NEGATIVE
SODIUM SERPL-SCNC: 136 MMOL/L (ref 135–147)
T WAVE AXIS: 64 DEGREES
VENTRICULAR RATE: 75 BPM
WBC # BLD AUTO: 9 THOUSAND/UL (ref 4.31–10.16)

## 2023-03-29 RX ORDER — ACETAMINOPHEN 325 MG/1
650 TABLET ORAL EVERY 6 HOURS PRN
Status: DISCONTINUED | OUTPATIENT
Start: 2023-03-29 | End: 2023-03-30 | Stop reason: HOSPADM

## 2023-03-29 RX ORDER — ONDANSETRON 2 MG/ML
4 INJECTION INTRAMUSCULAR; INTRAVENOUS EVERY 6 HOURS PRN
Status: DISCONTINUED | OUTPATIENT
Start: 2023-03-29 | End: 2023-03-30 | Stop reason: HOSPADM

## 2023-03-29 RX ORDER — NICOTINE 21 MG/24HR
1 PATCH, TRANSDERMAL 24 HOURS TRANSDERMAL DAILY
Status: DISCONTINUED | OUTPATIENT
Start: 2023-03-30 | End: 2023-03-30 | Stop reason: HOSPADM

## 2023-03-29 RX ORDER — PANTOPRAZOLE SODIUM 40 MG/1
40 TABLET, DELAYED RELEASE ORAL DAILY
Status: DISCONTINUED | OUTPATIENT
Start: 2023-03-30 | End: 2023-03-30 | Stop reason: HOSPADM

## 2023-03-29 RX ORDER — BUSPIRONE HYDROCHLORIDE 10 MG/1
10 TABLET ORAL 3 TIMES DAILY
COMMUNITY

## 2023-03-29 RX ORDER — BUSPIRONE HYDROCHLORIDE 10 MG/1
10 TABLET ORAL 3 TIMES DAILY
Status: DISCONTINUED | OUTPATIENT
Start: 2023-03-29 | End: 2023-03-30 | Stop reason: HOSPADM

## 2023-03-29 RX ORDER — LURASIDONE HYDROCHLORIDE 20 MG/1
20 TABLET, FILM COATED ORAL
Status: DISCONTINUED | OUTPATIENT
Start: 2023-03-30 | End: 2023-03-30

## 2023-03-29 RX ORDER — HYDROXYZINE 50 MG/1
50 TABLET, FILM COATED ORAL
COMMUNITY

## 2023-03-29 RX ORDER — BUPROPION HYDROCHLORIDE 300 MG/1
300 TABLET ORAL DAILY
COMMUNITY

## 2023-03-29 RX ORDER — HYDROXYZINE 50 MG/1
50 TABLET, FILM COATED ORAL
Status: DISCONTINUED | OUTPATIENT
Start: 2023-03-29 | End: 2023-03-30 | Stop reason: HOSPADM

## 2023-03-29 RX ORDER — BUPROPION HYDROCHLORIDE 150 MG/1
300 TABLET ORAL DAILY
Status: DISCONTINUED | OUTPATIENT
Start: 2023-03-30 | End: 2023-03-30 | Stop reason: HOSPADM

## 2023-03-29 RX ORDER — MECLIZINE HYDROCHLORIDE 25 MG/1
25 TABLET ORAL EVERY 8 HOURS SCHEDULED
Status: DISCONTINUED | OUTPATIENT
Start: 2023-03-29 | End: 2023-03-30 | Stop reason: HOSPADM

## 2023-03-29 RX ORDER — LURASIDONE HYDROCHLORIDE 20 MG/1
20 TABLET, FILM COATED ORAL
COMMUNITY

## 2023-03-29 RX ADMIN — HYDROXYZINE HYDROCHLORIDE 50 MG: 25 TABLET ORAL at 22:20

## 2023-03-29 RX ADMIN — TRAZODONE HYDROCHLORIDE 150 MG: 50 TABLET ORAL at 22:20

## 2023-03-29 RX ADMIN — MECLIZINE HYDROCHLORIDE 25 MG: 25 TABLET ORAL at 22:21

## 2023-03-29 RX ADMIN — BUSPIRONE HYDROCHLORIDE 10 MG: 10 TABLET ORAL at 22:21

## 2023-03-29 RX ADMIN — IOHEXOL 85 ML: 350 INJECTION, SOLUTION INTRAVENOUS at 16:29

## 2023-03-29 NOTE — ED PROVIDER NOTES
History  Chief Complaint   Patient presents with   • Dizziness     Pt reports dizziness x1 month, pt states previously it was only at night but now has increased to daytime; pt states she also has had intermittent palpitations but states not accompanied by dizziness     HPI  Patient is a 41-year-old female presenting with vertigo for about a month  Patient with a history of anxiety, depression but otherwise no significant past medical history  Patient states that it for started when she was laying in bed and she returned to her her left when she noticed the vertigo  It was short lasting however she has been having recurring episodes of vertigo since then  Has not seen a doctor regarding the vertigo  The past few days she states that despite position she has been having frequent vertigo's  She also notes that she has been having right arm numbness that occurs simultaneously  However patient denies any weakness  Patient denies any other additional neurologic symptoms  Patient is an everyday smoker but otherwise denies any hypertension or hyperlipidemia  patient is currently asymptomatic  Most recent episode occurred at 9 AM       Prior to Admission Medications   Prescriptions Last Dose Informant Patient Reported? Taking?    Diclofenac Sodium (VOLTAREN) 1 %   No No   Sig: Apply 2 g topically 4 (four) times a day as needed (Pain over the affected area)   acetaminophen (TYLENOL) 500 mg tablet Past Month  Yes Yes   Sig: Take 500 mg by mouth every 6 (six) hours as needed for mild pain   buPROPion (WELLBUTRIN XL) 300 mg 24 hr tablet 3/29/2023  Yes Yes   Sig: Take 300 mg by mouth daily   busPIRone (BUSPAR) 10 mg tablet 3/29/2023  Yes Yes   Sig: Take 10 mg by mouth 3 (three) times a day   cholecalciferol (VITAMIN D3) 1,000 units tablet   No No   Sig: Take 1 tablet (1,000 Units total) by mouth daily   hydrOXYzine HCL (ATARAX) 50 mg tablet 3/29/2023  Yes Yes   Sig: Take 50 mg by mouth daily at bedtime   lurasidone (LATUDA) 20 mg tablet 3/29/2023  Yes Yes   Sig: Take 20 mg by mouth daily at bedtime   pantoprazole (PROTONIX) 40 mg tablet 3/29/2023  No Yes   Sig: Take 1 tablet (40 mg total) by mouth daily   traZODone (DESYREL) 150 mg tablet 3/29/2023  No Yes   Sig: Take 1 tablet (150 mg total) by mouth daily at bedtime      Facility-Administered Medications: None       Past Medical History:   Diagnosis Date   • Anxiety    • Back pain    • Depression    • GERD (gastroesophageal reflux disease)    • SOB (shortness of breath)        Past Surgical History:   Procedure Laterality Date   • CERVICAL DISC SURGERY     • CHOLECYSTECTOMY     • FOOT SURGERY     • LAPAROTOMY N/A 2/2/2021    Procedure: LAPAROTOMY FOR SPINAL SURGERY;  Surgeon: Ashley Bella MD;  Location: [facility];  Service: General   • LUMBAR FUSION N/A 2/2/2021    Procedure: Anterior lumbar interbody fusion L5-S1; Posterior lumbar laminectomy, decompression, instrumented fusion L5-S1 with allograft and neuromonitoring;  Surgeon: Landy Khan MD;  Location: [facility];  Service: Orthopedics   • [facility] EGD TRANSORAL BIOPSY SINGLE/MULTIPLE N/A 4/20/2016    Procedure: ESOPHAGOGASTRODUODENOSCOPY (EGD); Surgeon: Bethany Woodard MD;  Location: [facility]; Service: Gastroenterology   • TONSILLECTOMY     • TUBAL LIGATION     • UPPER GASTROINTESTINAL ENDOSCOPY         Family History   Problem Relation Age of Onset   • Aneurysm Mother    • Hypertension Mother    • Heart attack Father    • Aneurysm Sister    • Aneurysm Brother    • Mental illness Son      I have reviewed and agree with the history as documented      E-Cigarette/Vaping   • E-Cigarette Use Never User      E-Cigarette/Vaping Substances   • Nicotine No    • THC No    • CBD No    • Flavoring No    • Other No    • Unknown No      Social History     Tobacco Use   • Smoking status: Every Day     Packs/day: 1 00     Types: Cigarettes   • Smokeless tobacco: Never   • Tobacco comments:     started patch 1/2021, still using 3/4 ppd     Vaping Use   • Vaping Use: Never used   Substance Use Topics   • Alcohol use: Not Currently     Comment: 0   • Drug use: Yes     Types: Oxycodone, Methamphetamines     Comment: 7/18/2020 last use        Review of Systems    Physical Exam  ED Triage Vitals   Temperature Pulse Respirations Blood Pressure SpO2   03/29/23 1344 03/29/23 1344 03/29/23 1344 03/29/23 1344 03/29/23 1344   98 3 °F (36 8 °C) 74 20 133/85 96 %      Temp Source Heart Rate Source Patient Position - Orthostatic VS BP Location FiO2 (%)   03/29/23 1344 03/29/23 1344 03/29/23 1344 03/29/23 1344 --   Oral Monitor Lying Left arm       Pain Score       03/30/23 0330       No Pain             Orthostatic Vital Signs  Vitals:    03/30/23 0830 03/30/23 1030 03/30/23 1138 03/30/23 1141   BP: 110/70 112/72 109/65 104/72   Pulse: 62 78 74 88   Patient Position - Orthostatic VS:  Sitting         Physical Exam  Vitals and nursing note reviewed  Constitutional:       General: She is not in acute distress  Appearance: Normal appearance  She is not ill-appearing  HENT:      Head: Normocephalic and atraumatic  Right Ear: External ear normal       Left Ear: External ear normal       Nose: Nose normal       Mouth/Throat:      Mouth: Mucous membranes are moist       Pharynx: Oropharynx is clear  Eyes:      General: No scleral icterus  Right eye: No discharge  Left eye: No discharge  Extraocular Movements: Extraocular movements intact  Conjunctiva/sclera: Conjunctivae normal       Pupils: Pupils are equal, round, and reactive to light  Cardiovascular:      Rate and Rhythm: Normal rate and regular rhythm  Pulses: Normal pulses  Heart sounds: Normal heart sounds  Pulmonary:      Effort: Pulmonary effort is normal       Breath sounds: Normal breath sounds  Abdominal:      General: Abdomen is flat  Bowel sounds are normal  There is no distension  Palpations: Abdomen is soft  Tenderness:  There is no abdominal tenderness  There is no guarding or rebound  Musculoskeletal:         General: Normal range of motion  Cervical back: Normal range of motion and neck supple  Skin:     General: Skin is warm and dry  Capillary Refill: Capillary refill takes less than 2 seconds  Neurological:      General: No focal deficit present  Mental Status: She is alert and oriented to person, place, and time  Mental status is at baseline  Cranial Nerves: No cranial nerve deficit  Sensory: No sensory deficit  Motor: No weakness  Gait: Gait normal    Psychiatric:         Mood and Affect: Mood normal          Behavior: Behavior normal          Thought Content: Thought content normal          Judgment: Judgment normal          ED Medications  Medications   iohexol (OMNIPAQUE) 350 MG/ML injection (SINGLE-DOSE) 85 mL (85 mL Intravenous Given 3/29/23 1629)       Diagnostic Studies  Results Reviewed     Procedure Component Value Units Date/Time    Lipid Panel with Direct LDL reflex [[id number]]  (Abnormal) Collected: 03/30/23 0439    Lab Status: Final result Specimen: Blood from Arm, Left Updated: 03/30/23 2618     Cholesterol 153 mg/dL      Triglycerides 210 mg/dL      HDL, Direct 31 mg/dL      LDL Calculated 80 mg/dL     Hemoglobin A1c w/EAG Estimation [[id number]] Collected: 03/30/23 0439    Lab Status:  In process Specimen: Blood from Arm, Left Updated: 03/30/23 0554    HS Troponin I 2hr [[id number]]  (Normal) Collected: 03/29/23 1632    Lab Status: Final result Specimen: Blood from Arm, Right Updated: 03/29/23 1704     hs TnI 2hr 4 ng/L      Delta 2hr hsTnI 1 ng/L     FLU/RSV/COVID - if FLU/RSV clinically relevant [[id number]]  (Normal) Collected: 03/29/23 1420    Lab Status: Final result Specimen: Nares from Nose Updated: 03/29/23 1508     SARS-CoV-2 Negative     INFLUENZA A PCR Negative     INFLUENZA B PCR Negative     RSV PCR Negative    Narrative:      FOR PEDIATRIC PATIENTS - copy/paste COVID Guidelines URL to browser: [email or web address]    SARS-CoV-2 assay is a Nucleic Acid Amplification assay intended for the  qualitative detection of nucleic acid from SARS-CoV-2 in nasopharyngeal  swabs  Results are for the presumptive identification of SARS-CoV-2 RNA  Positive results are indicative of infection with SARS-CoV-2, the virus  causing COVID-19, but do not rule out bacterial infection or co-infection  with other viruses  Laboratories within the [place] and its  territories are required to report all positive results to the appropriate  public health authorities  Negative results do not preclude SARS-CoV-2  infection and should not be used as the sole basis for treatment or other  patient management decisions  Negative results must be combined with  clinical observations, patient history, and epidemiological information  This test has not been FDA cleared or approved  This test has been authorized by FDA under an Emergency Use Authorization  (EUA)  This test is only authorized for the duration of time the  declaration that circumstances exist justifying the authorization of the  emergency use of an in vitro diagnostic tests for detection of SARS-CoV-2  virus and/or diagnosis of COVID-19 infection under section 564(b)(1) of  the Act, 21 U  S C  734DSU-8(D)(9), unless the authorization is terminated  or revoked sooner  The test has been validated but independent review by FDA  and CLIA is pending  Test performed using [employer] GeneXpert: This RT-PCR assay targets N2,  a region unique to SARS-CoV-2  A conserved region in the E-gene was chosen  for pan-Sarbecovirus detection which includes SARS-CoV-2  According to CMS-2020-01-R, this platform meets the definition of high-throughput technology      HS Troponin 0hr (reflex protocol) [[id number]]  (Normal) Collected: 03/29/23 1420    Lab Status: Final result Specimen: Blood from Arm, Right Updated: 03/29/23 1458     hs TnI 0hr 3 ng/L     Protime-INR [[id number]]  (Normal) Collected: 03/29/23 1420    Lab Status: Final result Specimen: Blood from Arm, Right Updated: 03/29/23 1452     Protime 13 0 seconds      INR 0 96    APTT [[id number]]  (Normal) Collected: 03/29/23 1420    Lab Status: Final result Specimen: Blood from Arm, Right Updated: 03/29/23 1452     PTT 30 seconds     Basic metabolic panel [[id number]]  (Abnormal) Collected: 03/29/23 1420    Lab Status: Final result Specimen: Blood from Arm, Right Updated: 03/29/23 1447     Sodium 136 mmol/L      Potassium 3 8 mmol/L      Chloride 108 mmol/L      CO2 27 mmol/L      ANION GAP 1 mmol/L      BUN 9 mg/dL      Creatinine 0 90 mg/dL      Glucose 95 mg/dL      Calcium 9 5 mg/dL      eGFR 70 ml/min/1 73sq m     Narrative:      [place] guidelines for Chronic Kidney Disease (CKD):   •  Stage 1 with normal or high GFR (GFR > 90 mL/min/1 73 square meters)  •  Stage 2 Mild CKD (GFR = 60-89 mL/min/1 73 square meters)  •  Stage 3A Moderate CKD (GFR = 45-59 mL/min/1 73 square meters)  •  Stage 3B Moderate CKD (GFR = 30-44 mL/min/1 73 square meters)  •  Stage 4 Severe CKD (GFR = 15-29 mL/min/1 73 square meters)  •  Stage 5 End Stage CKD (GFR <15 mL/min/1 73 square meters)  Note: GFR calculation is accurate only with a steady state creatinine    Fingerstick Glucose (POCT) [[id number]]  (Normal) Collected: 03/29/23 1433    Lab Status: Final result Updated: 03/29/23 1440     POC Glucose 80 mg/dl     CBC and Platelet [[id number]]  (Normal) Collected: 03/29/23 1420    Lab Status: Final result Specimen: Blood from Arm, Right Updated: 03/29/23 1428     WBC 9 00 Thousand/uL      RBC 4 58 Million/uL      Hemoglobin 14 2 g/dL      Hematocrit 42 8 %      MCV 93 fL      MCH 31 0 pg      MCHC 33 2 g/dL      RDW 13 2 %      Platelets 158 Thousands/uL      MPV 9 8 fL                  CTA head and neck w wo contrast   Final Result by Karlos Moscoso MD (03/29 1938)      No significant stenosis of the cervical carotid or vertebral arteries  No significant intracranial stenosis, large vessel occlusion or aneurysm  Workstation performed: [id number]               Procedures  Procedures      ED Course                                       Medical Decision Making  61year old female presenting with recurrent vertigo   Patient currently asymptomatic   Patient states that last few days have not been positional   Concerning for TIA   Will obtain Stroke workup   NO notable findings on imaging or labs   Patient admitted to medicine for stroke pathway     Right arm numbness: acute illness or injury  Vertigo: acute illness or injury  Amount and/or Complexity of Data Reviewed  Labs: ordered  Radiology: ordered  Risk  Prescription drug management  Decision regarding hospitalization  Disposition  Final diagnoses:   Vertigo   Right arm numbness     Time reflects when diagnosis was documented in both MDM as applicable and the Disposition within this note     Time User Action Codes Description Comment    3/29/2023  6:47 PM Shani Figueredo Add [R42] Vertigo     3/29/2023  6:47 PM Candance Glimpse [R20 0] Right arm numbness       ED Disposition     ED Disposition   Admit    Condition   Stable    Date/Time   Wed Mar 29, 2023  6:47 PM    Comment   Case was discussed with Dr Zander Marcus and the patient's admission status was agreed to be Admission Status: observation status to the service of Dr Zander Marcus              Follow-up Information     Follow up With Specialties Details Why [facility] [address] , [address] [address] [facility]  [phone]            Discharge Medication List as of 3/30/2023  3:23 PM      START taking these medications    Details   meclizine (ANTIVERT) 25 mg tablet Take 1 tablet (25 mg total) by mouth every 8 (eight) hours, Starting Thu 3/30/2023, Normal         CONTINUE these medications which have NOT CHANGED Details   acetaminophen (TYLENOL) 500 mg tablet Take 500 mg by mouth every 6 (six) hours as needed for mild pain, Historical Med      buPROPion (WELLBUTRIN XL) 300 mg 24 hr tablet Take 300 mg by mouth daily, Historical Med      busPIRone (BUSPAR) 10 mg tablet Take 10 mg by mouth 3 (three) times a day, Historical Med      cholecalciferol (VITAMIN D3) 1,000 units tablet Take 1 tablet (1,000 Units total) by mouth daily, Starting Mon 6/14/2021, Until Wed 7/28/2021, Normal      Diclofenac Sodium (VOLTAREN) 1 % Apply 2 g topically 4 (four) times a day as needed (Pain over the affected area), Starting Thu 4/29/2021, Until Wed 7/28/2021 at 2359, Normal      hydrOXYzine HCL (ATARAX) 50 mg tablet Take 50 mg by mouth daily at bedtime, Historical Med      lurasidone (LATUDA) 20 mg tablet Take 20 mg by mouth daily at bedtime, Historical Med      pantoprazole (PROTONIX) 40 mg tablet Take 1 tablet (40 mg total) by mouth daily, Starting Fri 3/24/2023, Until Sun 4/23/2023, Normal      traZODone (DESYREL) 150 mg tablet Take 1 tablet (150 mg total) by mouth daily at bedtime, Starting Thu 4/29/2021, Normal               PDMP Review       Value Time User    PDMP Reviewed  Yes 3/18/2021  3:15 PM Rick Walden PA-C           ED Provider  Attending physically available and evaluated Tosha Harris  SONALI managed the patient along with the ED Attending      Electronically Signed by         Emily Ramires MD  03/31/23 9919

## 2023-03-29 NOTE — ASSESSMENT & PLAN NOTE
"• Patient presented to ED with increased vertigo over the past month  Patient states that the episodes of dizziness/vertigo are occurring about 3 times daily for 5 minutes  However, after the episodes resolve the patient \"still doesn't feel right\"  • dizziness worsens with turning her head  • Labs in ED were largely unremarkable  ? Hemoglobin 14 2  ? Troponin: 3//4  ?  Glucose:  80  • CTA head and neck w wo contrast 03/29:  negative  • Consider MRI if no improvement with meclizine  • BPPV vs TIA vs other cerebral etiology  • Frequent Neuro checks  • Consult PT/OT  • trial of meclizine  "

## 2023-03-29 NOTE — H&P
"[address] [facility]  H&P  Name: Yazmin Liu  MRN: [id number]  Unit/Bed#: ED 06 I Date of Admission: 3/29/2023   Date of Service: 3/29/2023 I Hospital Day: 0      Assessment/Plan   * Dizziness  Assessment & Plan  • Patient presented to ED with increased vertigo over the past month  Patient states that the episodes of dizziness/vertigo are occurring about 3 times daily for 5 minutes  However, after the episodes resolve the patient \"still doesn't feel right\"  • dizziness worsens with turning her head  • Labs in ED were largely unremarkable  ? Hemoglobin 14 2  ? Troponin: 3//4  ? Glucose:  80  • CTA head and neck w wo contrast 03/29:  negative  • Consider MRI if no improvement with meclizine  • BPPV vs TIA vs other cerebral etiology  • Frequent Neuro checks  • Consult PT/OT  • trial of meclizine    Anxiety  Assessment & Plan  • Continue Wellbutrin 300mg each morning  • Continue Buspar HCL 10mg TID  • Continue Hydroxyzine 50mg as needed at bedtime       Depression  Assessment & Plan  Continue home med latuda, wellbutrin, buspar, trazodone    Cigarette nicotine dependence with nicotine-induced disorder  Assessment & Plan  •  on importance of smoking cessation         VTE Prophylaxis: Pharmacologic VTE Prophylaxis contraindicated due to low risk, ambulate pateitn  / sequential compression device   Code Status: full code       Anticipated Length of Stay:  Patient will be admitted on an Observation basis with an anticipated length of stay of  < 2 midnights  Justification for Hospital Stay: dizziness    Total Time for Visit, including Counseling / Coordination of Care: 60 minutes  Greater than 50% of this total time spent on direct patient counseling and coordination of care  Chief Complaint:   dizziness    History of Present Illness:    Tosha Harris is a 61 y o  female presents with dizziness increasing in frequency for the past month    Patient states that the " "dizziness started with changing positions in bed, then transitioned to feeling as though the \"room is spinning\" during the day  Patient states that the dizziness has occurred 3 times daily lasting about 5 minutes each time and resolving spontaneously for the past 4 days  Patient reports increased fatigue as well  Patient has a strong family history of brain aneurysms- reports mom and 2 brothers  of aneurysms and sister is alive with one currently  Patient denies losing consciousness with any of the dizzy episodes, weakness, chest pain, N/V/D/C  Patient recently had Wellbutrin dose increased to 300mg  Patient has no recent changes in diet  Patient smokes 3/4-1ppd and denies drug or ETOH use  Review of Systems:    Review of Systems   Constitutional: Negative for chills and fever  HENT: Negative for ear pain and sore throat  Eyes: Negative for pain and visual disturbance  Respiratory: Negative for cough and shortness of breath  Cardiovascular: Negative for chest pain and palpitations  Gastrointestinal: Negative for abdominal pain and vomiting  Genitourinary: Negative for dysuria and hematuria  Musculoskeletal: Negative for arthralgias and back pain  Skin: Negative for color change and rash  Neurological: Positive for dizziness  Negative for seizures and syncope  All other systems reviewed and are negative  Past Medical and Surgical History:     Past Medical History:   Diagnosis Date   • Anxiety    • Back pain    • Depression    • GERD (gastroesophageal reflux disease)    • SOB (shortness of breath)        Past Surgical History:   Procedure Laterality Date   • CERVICAL DISC SURGERY     • CHOLECYSTECTOMY     • FOOT SURGERY     • LAPAROTOMY N/A 2021    Procedure: LAPAROTOMY FOR SPINAL SURGERY;  Surgeon: Rafaela Dale MD;  Location: [facility];  Service: General   • LUMBAR FUSION N/A 2021    Procedure: Anterior lumbar interbody fusion L5-S1;  Posterior lumbar " laminectomy, decompression, instrumented fusion L5-S1 with allograft and neuromonitoring;  Surgeon: Dakota Alanis MD;  Location: [facility];  Service: Orthopedics   • MN EGD TRANSORAL BIOPSY SINGLE/MULTIPLE N/A 4/20/2016    Procedure: ESOPHAGOGASTRODUODENOSCOPY (EGD); Surgeon: Meron Jeff MD;  Location:  GI LAB; Service: Gastroenterology   • TONSILLECTOMY     • TUBAL LIGATION     • UPPER GASTROINTESTINAL ENDOSCOPY         Meds/Allergies:    Prior to Admission medications    Medication Sig Start Date End Date Taking?  Authorizing Provider   buPROPion (WELLBUTRIN XL) 300 mg 24 hr tablet Take 300 mg by mouth daily   Yes Historical Provider, MD   busPIRone (BUSPAR) 10 mg tablet Take 10 mg by mouth 3 (three) times a day   Yes Historical Provider, MD   hydrOXYzine HCL (ATARAX) 50 mg tablet Take 50 mg by mouth daily at bedtime   Yes Historical Provider, MD   lurasidone (Latuda) 20 mg tablet Take 20 mg by mouth daily with breakfast   Yes Historical Provider, MD   acetaminophen (TYLENOL) 500 mg tablet Take 500 mg by mouth every 6 (six) hours as needed for mild pain    Historical Provider, MD   cholecalciferol (VITAMIN D3) 1,000 units tablet Take 1 tablet (1,000 Units total) by mouth daily 6/14/21 7/28/21  Chelita Kuo PA-C   Diclofenac Sodium (VOLTAREN) 1 % Apply 2 g topically 4 (four) times a day as needed (Pain over the affected area) 4/29/21 7/28/21  Chelita Kuo PA-C   pantoprazole (PROTONIX) 40 mg tablet Take 1 tablet (40 mg total) by mouth daily 3/24/23 4/23/23  Girard Kayser, MD   traZODone (DESYREL) 150 mg tablet Take 1 tablet (150 mg total) by mouth daily at bedtime 4/29/21   Rossy Mcgowan MD   ARIPiprazole (ABILIFY) 10 mg tablet Take 1 tablet (10 mg total) by mouth daily 4/30/21 3/29/23  Rossy Mcgowan MD   benzonatate (TESSALON) 200 MG capsule Take 1 capsule (200 mg total) by mouth 3 (three) times a day as needed for cough 7/25/22 3/29/23  Girard Kayser, MD   cyanocobalamin 1,000 mcg/mL Inject 1 mL (1,000 mcg total) into a muscle every 30 (thirty) days 7/28/21 3/29/23  Viktoria Baum MD   DULoxetine (CYMBALTA) 60 mg delayed release capsule Take 2 capsules (120 mg total) by mouth daily 4/29/21 3/29/23  Holli Ott MD   famotidine (PEPCID) 20 mg tablet Take 20 mg by mouth as needed for heartburn  3/29/23  Historical Provider, MD   gabapentin (NEURONTIN) 300 mg capsule Take 1 capsule (300 mg total) by mouth 3 (three) times a day 4/29/21 3/29/23  Holli Ott MD   nicotine (NICODERM CQ) 21 mg/24 hr TD 24 hr patch Place 1 patch on the skin every 24 hours 1/13/21 3/29/23  Viktoria Baum MD   nicotine polacrilex (NICORETTE) 2 mg gum Chew 1 each (2 mg total) as needed for smoking cessation 4/29/21 3/29/23  Glenny Gaona PA-C   varenicline (CHANTIX RAFAEL) 0 5 MG X 11 & 1 MG X 42 tablet Take one 0 5 mg tablet by mouth once daily for 3 days, then one 0 5 mg tablet by mouth twice daily for 4 days, then one 1 mg tablet by mouth twice daily  7/28/21 3/29/23  Viktoria Baum MD   Viibryd 10 MG tablet  7/5/21 3/29/23  Historical Provider, MD     I have reviewed home medications using allscripts  Allergies: No Known Allergies    Social History:     Marital Status: /Civil Union      Substance Use History:   Social History     Substance and Sexual Activity   Alcohol Use No     Social History     Tobacco Use   Smoking Status Every Day   • Packs/day: 1 00   • Types: Cigarettes   Smokeless Tobacco Never   Tobacco Comments    started patch 1/2021, still using 3/4 ppd        Social History     Substance and Sexual Activity   Drug Use Yes   • Types: Oxycodone, Methamphetamines    Comment: 7/18/2020 last use       Family History:    Family History   Problem Relation Age of Onset   • Aneurysm Mother    • Hypertension Mother    • Heart attack Father    • Aneurysm Sister    • Aneurysm Brother    • Mental illness Son        Physical Exam:     Vitals:   Blood Pressure: 125/65 (03/29/23 1900)  Pulse: 69 (03/29/23 1900)  Temperature: 98 3 °F (36 8 °C) (03/29/23 1344)  Temp Source: Oral (03/29/23 1344)  Respirations: 18 (03/29/23 1800)  SpO2: 97 % (03/29/23 1900)    Physical Exam  Vitals and nursing note reviewed  Constitutional:       General: She is not in acute distress  Appearance: She is well-developed  HENT:      Head: Normocephalic and atraumatic  Eyes:      Conjunctiva/sclera: Conjunctivae normal    Cardiovascular:      Rate and Rhythm: Normal rate and regular rhythm  Heart sounds: No murmur heard  Pulmonary:      Effort: Pulmonary effort is normal  No respiratory distress  Breath sounds: Normal breath sounds  Abdominal:      Palpations: Abdomen is soft  Tenderness: There is no abdominal tenderness  Musculoskeletal:         General: No swelling  Cervical back: Neck supple  Skin:     General: Skin is warm and dry  Capillary Refill: Capillary refill takes less than 2 seconds  Neurological:      General: No focal deficit present  Mental Status: She is alert  Psychiatric:         Mood and Affect: Mood normal             Additional Data:     Lab Results: I have personally reviewed pertinent reports  Results from last 7 days   Lab Units 03/29/23  1420   WBC Thousand/uL 9 00   HEMOGLOBIN g/dL 14 2   HEMATOCRIT % 42 8   PLATELETS Thousands/uL 278     Results from last 7 days   Lab Units 03/29/23  1420   SODIUM mmol/L 136   POTASSIUM mmol/L 3 8   CHLORIDE mmol/L 108   CO2 mmol/L 27   BUN mg/dL 9   CREATININE mg/dL 0 90   ANION GAP mmol/L 1*   CALCIUM mg/dL 9 5   GLUCOSE RANDOM mg/dL 95     Results from last 7 days   Lab Units 03/29/23  1420   INR  0 96     Results from last 7 days   Lab Units 03/29/23  1433   POC GLUCOSE mg/dl 80               Imaging: I have personally reviewed pertinent reports  CTA head and neck w wo contrast   Final Result by Patricia Hahn MD (03/29 1938)      No significant stenosis of the cervical carotid or vertebral arteries   No significant intracranial stenosis, large vessel occlusion or aneurysm  Workstation performed: [id number]             EKG, Pathology, and Other Studies Reviewed on Admission:   · EKG: sinus rhtym    Allscripts / Epic Records Reviewed: Yes     ** Please Note: This note has been constructed using a voice recognition system   **

## 2023-03-29 NOTE — ED ATTENDING ATTESTATION
Final Diagnoses:     1  Vertigo    2  Right arm numbness           IYoel MD, saw and evaluated the patient  All available labs and X-rays were ordered by me or the resident and have been reviewed by myself  I discussed the patient with the resident / non-physician and agree with the resident's / non-physician practitioner's findings and plan as documented in the resident's / non-physician practicitioner's note, except where noted  At this point, I agree with the current assessment done in the ED  I was present during key portions of all procedures performed unless otherwise stated  Nursing Triage:     Chief Complaint   Patient presents with   • Dizziness     Pt reports dizziness x1 month, pt states previously it was only at night but now has increased to daytime; pt states she also has had intermittent palpitations but states not accompanied by dizziness       HPI:   This is a 61 y o  female presenting for evaluation of dizziness  The patient states that for the past 1 month she been having these intermittent episodes of dizziness described kind of like a room spinning sensation but also like she will feel as if she were to fall over  In the last some time, minutes may be  In the last couple days, 2 to 3 days, she will notice that she also had the similar symptoms but is lasting longer and noted that she had a right arm numbness and tingling  She has not dropped anything with that arm but just felt off  That lasted quite some time  No speech abnormalities  No fevers chills nausea vomiting chest pain shortness of breath  No focal deficits other than described  No belly pain  No back pain  PMH: denies stroke, HTN, HLD, cholesterol, DM  +smoking 1ppd since age 11  No alcohol, drugs, cocaine  ASSESSMENT + PLAN:   TIA? Stroke like symptoms?  - Will get an EKG for evaluation of dysrrhythmia, AF  Troponin for strain     - Will get CBC for anemia; check electrolytes as alternative cause for AMS  - Will check urine for infection  - I think getting a CTH for r/o bleed would be reasonable  - I think getting CTA for r/o LVO would be reasonable as well  - The patient does not need initiation of IV thrombolytics:  NIHSS Score = 0; Last Known well was unknown  Physical:   General: VS reviewed  Appears in NAD  awake, alert  Well-nourished, well-developed  Appears stated age  Speaking normally in full sentences  Head: Normocephalic, atraumatic  Eyes: EOM-I  No diplopia  No hyphema  No subconjunctival hemorrhages  Symmetrical lids  ENT: Atraumatic external nose and ears  MMM  No malocclusion  No stridor  Normal phonation  No drooling  Normal swallowing  Neck: No JVD  CV: No pallor noted  Lungs:   No tachypnea  No respiratory distress  Abd: soft nt nd no rebound/guarding  MSK:   FROM spontaneously  Skin: Dry, intact  Neuro: Awake, alert, GCS15, CN II-XII grossly intact  Motor grossly intact  C-spine: NT, supple  No midline tenderness  Kernig's Brudzinski's negative  EHL/FHL/PF/DF/KF/KE/HF/HE 5/5  No saddle anesthesia  2+ patellar reflexes  SLR negative  M/U/R/A nerve sensation bilateral intact and equal    strength 5/5  Finger abduction/adduction/opposition intact  Suppination/Pronation intact without reproduction of pain  Good capillary refill  2+ radial pulses, bilaterally equal    WE/WF/WRD/WUD 5/5, intact, without pain  BICEPS/TRICEPS 5/5  Shoulder abduction/adduction 5/5  No pronator drift  No aphasia/dysarthria  CN 2-12 intact  Normal finger to nose  Normal rapid alternating movements of hands  No nystagmus  No facial droop  NIH Stroke Scale Score = 0  Psychiatric/Behavioral: interacting normally; appropriate mood/affect      Exam: deferred    Vitals:    03/29/23 1440 03/29/23 1500 03/29/23 1645 03/29/23 1800   BP: 113/66 114/61 123/57 127/58   BP Location:   Left arm    Pulse: 70 67 67 71   Resp: 15 19 20 18   Temp:       TempSrc: SpO2: 98% 98% 95% 95%     - There are no obvious limitations to social determinants of care  - Nursing note reviewed  - Vitals reviewed  - Orders placed by myself and/or advanced practitioner / resident     - Previous chart was reviewed  - No language barrier    - History obtained from patient  - There are no limitations to the history obtained:     Past Medical:    has a past medical history of Anxiety, Back pain, Depression, GERD (gastroesophageal reflux disease), and SOB (shortness of breath)  Past Surgical:    has a past surgical history that includes Tubal ligation; Cholecystectomy; Cervical disc surgery; Foot surgery; Tonsillectomy; pr egd transoral biopsy single/multiple (N/A, 2016); Upper gastrointestinal endoscopy; LAPAROTOMY (N/A, 2021); and Lumbar fusion (N/A, 2021)  Social:     Social History     Substance and Sexual Activity   Alcohol Use No     Social History     Tobacco Use   Smoking Status Every Day   • Packs/day: 1 00   • Types: Cigarettes   Smokeless Tobacco Never   Tobacco Comments    started patch 2021, still using 3/4 ppd        Social History     Substance and Sexual Activity   Drug Use Yes   • Types: Oxycodone, Methamphetamines    Comment: 2020 last use       Echo:   Results for orders placed during the hospital encounter of 10/05/20    Echo complete with contrast if indicated    Narrative  [address]  [address] [place] [address]  [phone]    Transthoracic Echocardiogram  2D, M-mode, Doppler, and Color Doppler    Study date:  05-Oct-2020    Patient: Maria De Jesus Pheonix  MR number: [id number]  Account number: [de-identified]  : 1965  Age: 53 years  Gender: Female  Status: Outpatient  Location: Echo lab  Height: 65 in  Weight: 210 lb  BP: 106/ 68 mmHg    Indications: Shortness of breath    Diagnoses: R06 02 - Shortness of breath    Sonographer:  Jaspal Grullon  Interpreting Physician:  Rishi Moyer MD  Primary Physician:  Viktoria Baum MD  Referring Physician:  HERBIE Gonzalez  Group:  [id number]  Cardiology Fellow:  Rahgu Jj MD    SUMMARY    LEFT VENTRICLE:  Systolic function was normal  Ejection fraction was estimated to be 60 %  Although no diagnostic regional wall motion abnormality was identified, this possibility cannot be completely excluded on the basis of this study  There was no evidence of concentric hypertrophy  MITRAL VALVE:  There was trace regurgitation  TRICUSPID VALVE:  There was mild regurgitation  Pulmonary artery systolic pressure was within the normal range  HISTORY: PRIOR HISTORY: GERD; Shortness of breath; smoker    PROCEDURE: The procedure was performed in the echo lab  This was a routine study  The transthoracic approach was used  The study included complete 2D imaging, M-mode, complete spectral Doppler, and color Doppler  The heart rate was 82 bpm,  at the start of the study  Images were obtained from the parasternal, apical, subcostal, and suprasternal notch acoustic windows  Echocardiographic views were limited due to lung interference  Image quality was adequate  LEFT VENTRICLE: Size was normal  Systolic function was normal  Ejection fraction was estimated to be 60 %  Although no diagnostic regional wall motion abnormality was identified, this possibility cannot be completely excluded on the basis  of this study  Wall thickness was normal  There was no evidence of concentric hypertrophy  DOPPLER: Left ventricular diastolic function parameters were normal     RIGHT VENTRICLE: The size was normal  Systolic function was normal  Wall thickness was normal     LEFT ATRIUM: Size was normal     RIGHT ATRIUM: Size was normal     MITRAL VALVE: Valve structure was normal  There was normal leaflet separation  DOPPLER: The transmitral velocity was within the normal range  There was no evidence for stenosis  There was trace regurgitation      AORTIC VALVE: The valve was trileaflet  Leaflets exhibited normal thickness and normal cuspal separation  DOPPLER: Transaortic velocity was within the normal range  There was no evidence for stenosis  There was no regurgitation  TRICUSPID VALVE: The valve structure was normal  There was normal leaflet separation  DOPPLER: The transtricuspid velocity was within the normal range  There was no evidence for stenosis  There was mild regurgitation  Pulmonary artery  systolic pressure was within the normal range  PULMONIC VALVE: Leaflets exhibited normal thickness, no calcification, and normal cuspal separation  DOPPLER: The transpulmonic velocity was within the normal range  There was no regurgitation  PERICARDIUM: There was no pericardial effusion  The pericardium was normal in appearance  AORTA: The root exhibited normal size  SYSTEMIC VEINS: IVC: The inferior vena cava was not well visualized  HEPATIC VEINS: The hepatic veins were not well visualized  SYSTEM MEASUREMENT TABLES    2D  %FS: 35 9 %  Ao Diam: 2 96 cm  EDV(Teich): 84 33 ml  EF(Teich): 65 75 %  ESV(Teich): 28 88 ml  IVSd: 0 85 cm  LA Area: 16 01 cm2  LA Diam: 3 16 cm  LVEDV MOD A4C: 59 76 ml  LVEF MOD A4C: 63 41 %  LVESV MOD A4C: 21 87 ml  LVIDd: 4 33 cm  LVIDs: 2 77 cm  LVLd A4C: 7 99 cm  LVLs A4C: 6 03 cm  LVPWd: 0 88 cm  RA Area: 13 1 cm2  RVIDd: 3 19 cm  SV MOD A4C: 37 9 ml  SV(Teich): 55 45 ml    CW  TR Vmax: 2 21 m/s  TR maxP 59 mmHg    MM  TAPSE: 1 91 cm    PW  E' Sept: 0 08 m/s  E/E' Sept: 6 47  MV A Eliel: 0 55 m/s  MV Dec [place]: 1 95 m/s2  MV DecT: 254 12 ms  MV E Eliel: 0 5 m/s  MV E/A Ratio: 0 9  MV PHT: 73 69 ms  MVA By PHT: 2 99 cm2    [employer] Accredited Echocardiography Laboratory    Prepared and electronically signed by    Phani Jalloh MD  Signed 05-Oct-2020 13:53:30    No results found for this or any previous visit  Cath:    No results found for this or any previous visit        Code Status: Prior  Advance Directive and Living Will:      Power of :    POLST:    Medications   iohexol (OMNIPAQUE) 350 MG/ML injection (SINGLE-DOSE) 85 mL (85 mL Intravenous Given 3/29/23 1629)     CTA head and neck w wo contrast    (Results Pending)     Orders Placed This Encounter   Procedures   • FLU/RSV/COVID - if FLU/RSV clinically relevant   • CTA head and neck w wo contrast   • Basic metabolic panel   • CBC and Platelet   • Protime-INR   • APTT   • HS Troponin 0hr (reflex protocol)   • HS Troponin I 2hr   • Fingerstick Glucose (POCT)   • Continuous cardiac monitoring   • Continuous pulse oximetry   • Contact and airborne isolation status   • ECG 12 lead   • ECG 12 lead   • Place in Observation     Labs Reviewed   BASIC METABOLIC PANEL - Abnormal       Result Value Ref Range Status    Sodium 136  135 - 147 mmol/L Final    Potassium 3 8  3 5 - 5 3 mmol/L Final    Chloride 108  96 - 108 mmol/L Final    CO2 27  21 - 32 mmol/L Final    ANION GAP 1 (*) 4 - 13 mmol/L Final    BUN 9  5 - 25 mg/dL Final    Creatinine 0 90  0 60 - 1 30 mg/dL Final    Comment: Standardized to IDMS reference method    Glucose 95  65 - 140 mg/dL Final    Comment: Specimen collection should occur prior to Sulfasalazine administration due to the potential for falsely depressed results  Specimen collection should occur prior to Sulfapyridine administration due to the potential for falsely elevated results  If the patient is fasting, the ADA then defines impaired fasting glucose as > 100 mg/dL and diabetes as > or equal to 123 mg/dL      Calcium 9 5  8 3 - 10 1 mg/dL Final    eGFR 70  ml/min/1 73sq m Final    Narrative:     [place] guidelines for Chronic Kidney Disease (CKD):   •  Stage 1 with normal or high GFR (GFR > 90 mL/min/1 73 square meters)  •  Stage 2 Mild CKD (GFR = 60-89 mL/min/1 73 square meters)  •  Stage 3A Moderate CKD (GFR = 45-59 mL/min/1 73 square meters)  •  Stage 3B Moderate CKD (GFR = 30-44 mL/min/1 73 square meters)  •  Stage 4 Severe CKD (GFR = 15-29 mL/min/1 73 square meters)  •  Stage 5 End Stage CKD (GFR <15 mL/min/1 73 square meters)  Note: GFR calculation is accurate only with a steady state creatinine   COVID19, INFLUENZA A/B, RSV PCR, [facility] - Normal    SARS-CoV-2 Negative  Negative Final    Comment:      INFLUENZA A PCR Negative  Negative Final    Comment:      INFLUENZA B PCR Negative  Negative Final    Comment:      RSV PCR Negative  Negative Final    Comment:      Narrative:     FOR PEDIATRIC PATIENTS - copy/paste COVID Guidelines URL to browser: [email or web address]  [email or web address]    SARS-CoV-2 assay is a Nucleic Acid Amplification assay intended for the  qualitative detection of nucleic acid from SARS-CoV-2 in nasopharyngeal  swabs  Results are for the presumptive identification of SARS-CoV-2 RNA  Positive results are indicative of infection with SARS-CoV-2, the virus  causing COVID-19, but do not rule out bacterial infection or co-infection  with other viruses  Laboratories within the [place] and its  territories are required to report all positive results to the appropriate  public health authorities  Negative results do not preclude SARS-CoV-2  infection and should not be used as the sole basis for treatment or other  patient management decisions  Negative results must be combined with  clinical observations, patient history, and epidemiological information  This test has not been FDA cleared or approved  This test has been authorized by FDA under an Emergency Use Authorization  (EUA)  This test is only authorized for the duration of time the  declaration that circumstances exist justifying the authorization of the  emergency use of an in vitro diagnostic tests for detection of SARS-CoV-2  virus and/or diagnosis of COVID-19 infection under section 564(b)(1) of  the Act, 21 U  S C  991BZO-5(P)(5), unless the authorization is terminated  or revoked sooner  "The test has been validated but independent review by FDA  and CLIA is pending  Test performed using [employer] GeneXpert: This RT-PCR assay targets N2,  a region unique to SARS-CoV-2  A conserved region in the E-gene was chosen  for pan-Sarbecovirus detection which includes SARS-CoV-2  According to CMS-2020-01-R, this platform meets the definition of high-throughput technology  CBC AND PLATELET - Normal    WBC 9 00  4 31 - 10 16 Thousand/uL Final    RBC 4 58  3 81 - 5 12 Million/uL Final    Hemoglobin 14 2  11 5 - 15 4 g/dL Final    Hematocrit 42 8  34 8 - 46 1 % Final    MCV 93  82 - 98 fL Final    MCH 31 0  26 8 - 34 3 pg Final    MCHC 33 2  31 4 - 37 4 g/dL Final    RDW 13 2  11 6 - 15 1 % Final    Platelets 307  349 - 390 Thousands/uL Final    MPV 9 8  8 9 - 12 7 fL Final   PROTIME-INR - Normal    Protime 13 0  11 6 - 14 5 seconds Final    INR 0 96  0 84 - 1 19 Final   APTT - Normal    PTT 30  23 - 37 seconds Final    Comment: Therapeutic Heparin Range =  60-90 seconds   HS TROPONIN I 0HR - Normal    hs TnI 0hr 3  \"Refer to ACS Flowchart\"- see link ng/L Final    Comment:                                              Initial (time 0) result  If >=50 ng/L, Myocardial injury suggested ;  Type of myocardial injury and treatment strategy  to be determined  If 5-49 ng/L, a delta result at 2 hours and or 4 hours will be needed to further evaluate  If <4 ng/L, and chest pain has been >3 hours since onset, patient may qualify for discharge based on the HEART score in the ED  If <5 ng/L and <3hours since onset of chest pain, a delta result at 2 hours will be needed to further evaluate  HS Troponin 99th Percentile URL of a Health Population=12 ng/L with a 95% Confidence Interval of 8-18 ng/L  Second Troponin (time 2 hours)  If calculated delta >= 20 ng/L,  Myocardial injury suggested ; Type of myocardial injury and treatment strategy to be determined    If 5-49 ng/L and the calculated delta is 5-19 ng/L, " "consult medical service for evaluation  Continue evaluation for ischemia on ecg and other possible etiology and repeat hs troponin at 4 hours  If delta is <5 ng/L at 2 hours, consider discharge based on risk stratification via the HEART score (if in ED), or DOMINGA risk score in IP/Observation  HS Troponin 99th Percentile URL of a Health Population=12 ng/L with a 95% Confidence Interval of 8-18 ng/L    HS TROPONIN I 2HR - Normal    hs TnI 2hr 4  \"Refer to ACS Flowchart\"- see link ng/L Final    Comment:                                              Initial (time 0) result  If >=50 ng/L, Myocardial injury suggested ;  Type of myocardial injury and treatment strategy  to be determined  If 5-49 ng/L, a delta result at 2 hours and or 4 hours will be needed to further evaluate  If <4 ng/L, and chest pain has been >3 hours since onset, patient may qualify for discharge based on the HEART score in the ED  If <5 ng/L and <3hours since onset of chest pain, a delta result at 2 hours will be needed to further evaluate  HS Troponin 99th Percentile URL of a Health Population=12 ng/L with a 95% Confidence Interval of 8-18 ng/L  Second Troponin (time 2 hours)  If calculated delta >= 20 ng/L,  Myocardial injury suggested ; Type of myocardial injury and treatment strategy to be determined  If 5-49 ng/L and the calculated delta is 5-19 ng/L, consult medical service for evaluation  Continue evaluation for ischemia on ecg and other possible etiology and repeat hs troponin at 4 hours  If delta is <5 ng/L at 2 hours, consider discharge based on risk stratification via the HEART score (if in ED), or DOMINGA risk score in IP/Observation  HS Troponin 99th Percentile URL of a Health Population=12 ng/L with a 95% Confidence Interval of 8-18 ng/L      Delta 2hr hsTnI 1  <20 ng/L Final   POCT GLUCOSE - Normal    POC Glucose 80  65 - 140 mg/dl Final     Time reflects when diagnosis was documented in both MDM as applicable and the " "Disposition within this note     Time User Action Codes Description Comment    3/29/2023  6:47 PM Shani Figueredo Add [R42] Vertigo     3/29/2023  6:47 PM Shani Figueredo Add [R20 0] Right arm numbness       ED Disposition     ED Disposition   Admit    Condition   Stable    Date/Time   Wed Mar 29, 2023  6:47 PM    Comment   Case was discussed with Dr Zander Marcus and the patient's admission status was agreed to be Admission Status: observation status to the service of Dr Zander Marcus   Follow-up Information    None       Patient's Medications   Discharge Prescriptions    No medications on file     No discharge procedures on file  Prior to Admission Medications   Prescriptions Last Dose Informant Patient Reported? Taking? Diclofenac Sodium (VOLTAREN) 1 %   No No   Sig: Apply 2 g topically 4 (four) times a day as needed (Pain over the affected area)   acetaminophen (TYLENOL) 500 mg tablet   Yes No   Sig: Take 500 mg by mouth every 6 (six) hours as needed for mild pain   buPROPion (WELLBUTRIN XL) 300 mg 24 hr tablet   Yes Yes   Sig: Take 300 mg by mouth daily   busPIRone (BUSPAR) 10 mg tablet   Yes Yes   Sig: Take 10 mg by mouth 3 (three) times a day   cholecalciferol (VITAMIN D3) 1,000 units tablet   No No   Sig: Take 1 tablet (1,000 Units total) by mouth daily   hydrOXYzine HCL (ATARAX) 50 mg tablet   Yes Yes   Sig: Take 50 mg by mouth daily at bedtime   lurasidone (Latuda) 20 mg tablet   Yes Yes   Sig: Take 20 mg by mouth daily with breakfast   pantoprazole (PROTONIX) 40 mg tablet   No No   Sig: Take 1 tablet (40 mg total) by mouth daily   traZODone (DESYREL) 150 mg tablet   No No   Sig: Take 1 tablet (150 mg total) by mouth daily at bedtime      Facility-Administered Medications: None                        Portions of the record may have been created with voice recognition software  Occasional wrong word or \"sound a like\" substitutions may have occurred due to the inherent limitations of voice recognition software   Read " the chart carefully and recognize, using context, where substitutions have occurred      Electronically signed by:  Macie Cottrell

## 2023-03-29 NOTE — ASSESSMENT & PLAN NOTE
• Continue Wellbutrin 300mg each morning  • Continue Buspar HCL 10mg TID  • Continue Hydroxyzine 50mg as needed at bedtime    clear

## 2023-03-30 VITALS
HEIGHT: 66 IN | BODY MASS INDEX: 39.37 KG/M2 | HEART RATE: 88 BPM | OXYGEN SATURATION: 95 % | WEIGHT: 245 LBS | DIASTOLIC BLOOD PRESSURE: 72 MMHG | SYSTOLIC BLOOD PRESSURE: 104 MMHG | TEMPERATURE: 97.8 F | RESPIRATION RATE: 18 BRPM

## 2023-03-30 LAB
CHOLEST SERPL-MCNC: 153 MG/DL
HDLC SERPL-MCNC: 31 MG/DL
LDLC SERPL CALC-MCNC: 80 MG/DL (ref 0–100)
TRIGL SERPL-MCNC: 210 MG/DL

## 2023-03-30 RX ORDER — MECLIZINE HYDROCHLORIDE 25 MG/1
25 TABLET ORAL EVERY 8 HOURS SCHEDULED
Qty: 20 TABLET | Refills: 0 | Status: SHIPPED | OUTPATIENT
Start: 2023-03-30 | End: 2023-04-07 | Stop reason: SDUPTHER

## 2023-03-30 RX ORDER — LURASIDONE HYDROCHLORIDE 20 MG/1
20 TABLET, FILM COATED ORAL
Status: DISCONTINUED | OUTPATIENT
Start: 2023-03-30 | End: 2023-03-30 | Stop reason: HOSPADM

## 2023-03-30 RX ADMIN — MECLIZINE HYDROCHLORIDE 25 MG: 25 TABLET ORAL at 06:32

## 2023-03-30 RX ADMIN — PANTOPRAZOLE SODIUM 40 MG: 40 TABLET, DELAYED RELEASE ORAL at 06:32

## 2023-03-30 RX ADMIN — BUPROPION HYDROCHLORIDE 300 MG: 150 TABLET, FILM COATED, EXTENDED RELEASE ORAL at 08:47

## 2023-03-30 RX ADMIN — NICOTINE 1 PATCH: 21 PATCH, EXTENDED RELEASE TRANSDERMAL at 08:47

## 2023-03-30 RX ADMIN — BUSPIRONE HYDROCHLORIDE 10 MG: 10 TABLET ORAL at 08:47

## 2023-03-30 RX ADMIN — MECLIZINE HYDROCHLORIDE 25 MG: 25 TABLET ORAL at 13:42

## 2023-03-30 NOTE — OCCUPATIONAL THERAPY NOTE
Occupational Therapy Evaluation     Patient Name: James NUNEZ Date: 3/30/2023  Problem List  Principal Problem:    Dizziness  Active Problems:    Cigarette nicotine dependence with nicotine-induced disorder    Depression    Anxiety    Past Medical History  Past Medical History:   Diagnosis Date    Anxiety     Back pain     Depression     GERD (gastroesophageal reflux disease)     SOB (shortness of breath)      Past Surgical History  Past Surgical History:   Procedure Laterality Date    CERVICAL DISC SURGERY      CHOLECYSTECTOMY      FOOT SURGERY      LAPAROTOMY N/A 2/2/2021    Procedure: LAPAROTOMY FOR SPINAL SURGERY;  Surgeon: Brenda Francis MD;  Location: [facility];  Service: General    LUMBAR FUSION N/A 2/2/2021    Procedure: Anterior lumbar interbody fusion L5-S1; Posterior lumbar laminectomy, decompression, instrumented fusion L5-S1 with allograft and neuromonitoring;  Surgeon: Ivonne Warner MD;  Location: [facility];  Service: Orthopedics    UT EGD TRANSORAL BIOPSY SINGLE/MULTIPLE N/A 4/20/2016    Procedure: ESOPHAGOGASTRODUODENOSCOPY (EGD); Surgeon: Yobani Sandhu MD;  Location: [facility]; Service: Gastroenterology    TONSILLECTOMY      TUBAL LIGATION      UPPER GASTROINTESTINAL ENDOSCOPY             03/30/23 1123   OT Last Visit   OT Visit Date 03/30/23   Note Type   Note type Evaluation   Pain Assessment   Pain Assessment Tool 0-10   Pain Score No Pain   Restrictions/Precautions   Other Precautions Chair Alarm; Bed Alarm;Multiple lines; Fall Risk;Telemetry  (dizziness)   Home Living   Type of [address] [place] [address] Multi-level;Stairs to enter with rails;Bed/bath upstairs;1/2 bath on main level  (2 SH w/ 5 SHAYY  Bed and full bathroom upstairs   1/2 bath on 1st floor)   Bathroom Shower/Tub Tub/shower unit   [address] [place] [address]  chair   Bathroom Accessibility Accessible   Home Equipment Walker;Cane;Wheelchair-manual;Long-handled shoehorn;Reacher Additional Comments no AD/DME use PTA   Prior Function   Level of Ray Independent with ADLs; Independent with functional mobility; Independent with IADLS   Lives With Spouse   Receives Help From Family  ( is retired/ home all the time and able to help PRN  Pt reports neither her  or her drive, but their daughter provides transportation)   IADLs Family/Friend/Other provides transportation; Independent with meal prep; Independent with medication management   Falls in the last 6 months 0   Vocational Retired   Comments Independent PTA, pt and spouse do not drive  Daughter provides transportation   Lifestyle   Autonomy Independent w/ ADLs, IADLs, functional mobility, and -drives   Reciprocal Relationships Lives w/    Service to Others Retired   Intrinsic Gratification Enjoys spending time w/    ADL   Where Assessed Edge of bed   Eating Assistance 6  Modified independent   Grooming Assistance 6  [address] 5  Supervision/Setup   LB [address] 5  [facility] [address]  5  Supervision/Setup   LB Dressing Assistance 5  Supervision/Setup   LB Dressing Deficit Don/doff R sock; Don/doff L sock  (Pt experienced dizziness while looking down to don/doff socks)   Toileting Assistance  5  Supervision/Setup   Functional Assistance 5  Supervision/Setup   Bed Mobility   Supine to Sit 5  Supervision   Additional items HOB elevated; Increased time required   Sit to Supine 5  Supervision   Additional items HOB elevated; Increased time required   Additional Comments Pt greeted supine in bed w/ all needs within reach   Pt left supine in bed w/ all needs within reach, alarm active   Transfers   Sit to Stand 5  Supervision   Additional items Increased time required   Stand to Sit 5  Supervision   Additional items Increased time required   Additional Comments No AD/DME   Functional Mobility   Functional Mobility 5  Supervision   Additional Comments Pt performed functional mobility within household distance with chair follow due to dizziness  No DME/AD   Additional items   (None)   Balance   Static Sitting Good   Dynamic Sitting Good   Static Standing Fair +   Dynamic Standing Fair   Ambulatory Fair   Activity Tolerance   Activity Tolerance Patient tolerated treatment well   Medical Staff Made Aware Seen w/ PT [facility] due to pt medical complexity   Nurse Made Aware RN ok w/ eval   RUE Assessment   RUE Assessment WFL  (Pt reports numbness has gone away)   LUE Assessment   LUE Assessment WFL   Hand Function   Gross Motor Coordination Functional   Fine Motor Coordination Functional   Sensation   Light Touch No apparent deficits   Vision-Basic Assessment   Current Vision Wears glasses only for reading   Vision - Complex Assessment   Ocular Range of Motion Intact   Tracking Intact   Cognition   Overall Cognitive Status [facility]   Arousal/Participation Alert; Responsive; Cooperative   Attention Within functional limits   Orientation Level Oriented X4   Memory Within functional limits   Following Commands Follows all commands and directions without difficulty   Comments Pt pleasant and cooperative to work with  Presents A&Ox4, conversational, good memory, good attention, able to follow directions, typical affect  Presents with dizziness and demonstrated good self-advocacy when she needed a break due to dizziness as well as good awareness of self and insight into deficits   Assessment   Assessment Pt is a 61 y o  female admitted [facility]/29/2023 w/ increased vertigo, anxiety, depression, and cigarette nicotine dependence  Pt  has a past medical history of Anxiety, Back pain, Depression, GERD (gastroesophageal reflux disease), and SOB (shortness of breath)  Pt with active OT orders and up and OOB as tolerated orders  Pt precautions include chair/bed alarm, fall risk, multiple lines, telemetry  Pt currently lives with her  in a 2  with 5 SHAYY   Pt was independent w/ ADLS, IADLS, and functional mobility, and -drives  Patient participated in OT evaluation and answered all the questions while A&Ox4  Patient participated in bed mobility, LB dressing, functional transfers sit<>stand, and functional mobility with supervision and no AD/DME  Pt is limited at this time 2*: dizziness  From OT standpoint, anticipate d/c home with family support  No OT/equipment needs at this time, d/c from caseload  The patient's raw score on the AM-PAC Daily Activity Inpatient Short Form is 24  A raw score of greater than or equal to 19 suggests the patient may benefit from discharge to home  Please refer to the recommendation of the Occupational Therapist for safe discharge planning  Goals   Patient Goals To not have dizziness   Recommendation   OT Discharge Recommendation No rehabilitation needs   AM-PAC Daily Activity Inpatient   Lower Body Dressing 4   Bathing 4   Toileting 4   Upper Body Dressing 4   Grooming 4   Eating 4   Daily Activity Raw Score 24   Daily Activity Standardized Score (Calc for Raw Score >=11) 57 54   AM-PAC Applied Cognition Inpatient   Following a Speech/Presentation 4   Understanding Ordinary Conversation 4   Taking Medications 4   Remembering Where Things Are Placed or Put Away 4   Remembering List of 4-5 Errands 4   Taking Care of Complicated Tasks 4   Applied Cognition Raw Score 24   Applied Cognition Standardized Score 62 21   End of Consult   Patient Position at End of Consult Supine; All needs within reach;Bed/Chair alarm activated   Nurse Communication Nurse aware of consult      ROD Sood

## 2023-03-30 NOTE — PLAN OF CARE
Problem: Potential for Falls  Goal: Patient will remain free of falls  Description: INTERVENTIONS:  - Educate patient/family on patient safety including physical limitations  - Instruct patient to call for assistance with activity   - Consult OT/PT to assist with strengthening/mobility   - Keep Call bell within reach  - Keep bed low and locked with side rails adjusted as appropriate  - Keep care items and personal belongings within reach  - Initiate and maintain comfort rounds  - Make Fall Risk Sign visible to staff  - Offer Toileting every    Hours, in advance of need  - Initiate/Maintain   alarm  - Obtain necessary fall risk management equipment:     - Apply yellow socks and bracelet for high fall risk patients  - Consider moving patient to room near nurses station  Outcome: Progressing     Problem: MOBILITY - ADULT  Goal: Maintain or return to baseline ADL function  Description: INTERVENTIONS:  -  Assess patient's ability to carry out ADLs; assess patient's baseline for ADL function and identify physical deficits which impact ability to perform ADLs (bathing, care of mouth/teeth, toileting, grooming, dressing, etc )  - Assess/evaluate cause of self-care deficits   - Assess range of motion  - Assess patient's mobility; develop plan if impaired  - Assess patient's need for assistive devices and provide as appropriate  - Encourage maximum independence but intervene and supervise when necessary  - Involve family in performance of ADLs  - Assess for home care needs following discharge   - Consider OT consult to assist with ADL evaluation and planning for discharge  - Provide patient education as appropriate  Outcome: Progressing  Goal: Maintains/Returns to pre admission functional level  Description: INTERVENTIONS:  - Perform BMAT or MOVE assessment daily    - Set and communicate daily mobility goal to care team and patient/family/caregiver     - Collaborate with rehabilitation services on mobility goals if consulted  - Perform Range of Motion      times a day  - Reposition patient every  hours  - Dangle patient      times a day  - Stand patient    times a day  - Ambulate patient    times a day  - Out of bed to chair    times a day   - Out of bed for meals    times a day  - Out of bed for toileting  - Record patient progress and toleration of activity level   Outcome: Progressing     Problem: Neurological Deficit  Goal: Neurological status is stable or improving  Description: Interventions:  - Monitor and assess patient's level of consciousness, motor function, sensory function, and level of assistance needed for ADLs  - Monitor and report changes from baseline  Collaborate with interdisciplinary team to initiate plan and implement interventions as ordered  - Provide and maintain a safe environment  - Consider seizure precautions  - Consider fall precautions  - Consider aspiration precautions  - Consider bleeding precautions  Outcome: Progressing     Problem: Activity Intolerance/Impaired Mobility  Goal: Mobility/activity is maintained at optimum level for patient  Description: Interventions:  - Assess and monitor patient  barriers to mobility and need for assistive/adaptive devices  - Assess patient's emotional response to limitations  - Collaborate with interdisciplinary team and initiate plans and interventions as ordered  - Encourage independent activity per ability   - Maintain proper body alignment  - Perform active/passive rom as tolerated/ordered    - Plan activities to conserve energy   - Turn patient as appropriate  Outcome: Progressing     Problem: PAIN - ADULT  Goal: Verbalizes/displays adequate comfort level or baseline comfort level  Description: Interventions:  - Encourage patient to monitor pain and request assistance  - Assess pain using appropriate pain scale  - Administer analgesics based on type and severity of pain and evaluate response  - Implement non-pharmacological measures as appropriate and evaluate response  - Consider cultural and social influences on pain and pain management  - Notify physician/advanced practitioner if interventions unsuccessful or patient reports new pain  Outcome: Progressing     Problem: INFECTION - ADULT  Goal: Absence or prevention of progression during hospitalization  Description: INTERVENTIONS:  - Assess and monitor for signs and symptoms of infection  - Monitor lab/diagnostic results  - Monitor all insertion sites, i e  indwelling lines, tubes, and drains  - Monitor endotracheal if appropriate and nasal secretions for changes in amount and color  - [place] appropriate cooling/warming therapies per order  - Administer medications as ordered  - Instruct and encourage patient and family to use good hand hygiene technique  - Identify and instruct in appropriate isolation precautions for identified infection/condition  Outcome: Progressing  Goal: Absence of fever/infection during neutropenic period  Description: INTERVENTIONS:  - Monitor WBC    Outcome: Progressing     Problem: SAFETY ADULT  Goal: Patient will remain free of falls  Description: INTERVENTIONS:  - Educate patient/family on patient safety including physical limitations  - Instruct patient to call for assistance with activity   - Consult OT/PT to assist with strengthening/mobility   - Keep Call bell within reach  - Keep bed low and locked with side rails adjusted as appropriate  - Keep care items and personal belongings within reach  - Initiate and maintain comfort rounds  - Make Fall Risk Sign visible to staff  - Offer Toileting every    Hours, in advance of need  - Initiate/Maintain   alarm  - Obtain necessary fall risk management equipment:     - Apply yellow socks and bracelet for high fall risk patients  - Consider moving patient to room near nurses station  Outcome: Progressing  Goal: Maintain or return to baseline ADL function  Description: INTERVENTIONS:  -  Assess patient's ability to carry out ADLs; assess patient's baseline for ADL function and identify physical deficits which impact ability to perform ADLs (bathing, care of mouth/teeth, toileting, grooming, dressing, etc )  - Assess/evaluate cause of self-care deficits   - Assess range of motion  - Assess patient's mobility; develop plan if impaired  - Assess patient's need for assistive devices and provide as appropriate  - Encourage maximum independence but intervene and supervise when necessary  - Involve family in performance of ADLs  - Assess for home care needs following discharge   - Consider OT consult to assist with ADL evaluation and planning for discharge  - Provide patient education as appropriate  Outcome: Progressing  Goal: Maintains/Returns to pre admission functional level  Description: INTERVENTIONS:  - Perform BMAT or MOVE assessment daily    - Set and communicate daily mobility goal to care team and patient/family/caregiver  - Collaborate with rehabilitation services on mobility goals if consulted  - Perform Range of Motion      times a day  - Reposition patient every    hours    - Dangle patient    times a day  - Stand patient    times a day  - Ambulate patient    times a day  - Out of bed to chair    times a day   - Out of bed for meals      times a day  - Out of bed for toileting  - Record patient progress and toleration of activity level   Outcome: Progressing     Problem: DISCHARGE PLANNING  Goal: Discharge to home or other facility with appropriate resources  Description: INTERVENTIONS:  - Identify barriers to discharge w/patient and caregiver  - Arrange for needed discharge resources and transportation as appropriate  - Identify discharge learning needs (meds, wound care, etc )  - Arrange for interpretive services to assist at discharge as needed  - Refer to Case Management Department for coordinating discharge planning if the patient needs post-hospital services based on physician/advanced practitioner order or complex needs related to functional status, cognitive ability, or social support system  Outcome: Progressing     Problem: Knowledge Deficit  Goal: Patient/family/caregiver demonstrates understanding of disease process, treatment plan, medications, and discharge instructions  Description: Complete learning assessment and assess knowledge base    Interventions:  - Provide teaching at level of understanding  - Provide teaching via preferred learning methods  Outcome: Progressing

## 2023-03-30 NOTE — RESTORATIVE TECHNICIAN NOTE
Restorative Technician Note      Patient Name: James Holland     Note Type: Mobility  Patient Position Upon Consult: Supine  Activity Performed: Ambulated; Dangled; Stood  Assistive Device: Other (Comment) (none)  Education Provided: Yes  Patient Position at End of Consult: Supine;  All needs within reach; Bed/Chair alarm activated    [facility] RUTHANN FERNANDO, Restorative Technician, [employer]

## 2023-03-30 NOTE — PLAN OF CARE
Problem: Neurological Deficit  Goal: Neurological status is stable or improving  Description: Interventions:  - Monitor and assess patient's level of consciousness, motor function, sensory function, and level of assistance needed for ADLs  - Monitor and report changes from baseline  Collaborate with interdisciplinary team to initiate plan and implement interventions as ordered  - Provide and maintain a safe environment  - Consider seizure precautions  - Consider fall precautions  - Consider aspiration precautions  - Consider bleeding precautions  Outcome: Progressing     Problem: Activity Intolerance/Impaired Mobility  Goal: Mobility/activity is maintained at optimum level for patient  Description: Interventions:  - Assess and monitor patient  barriers to mobility and need for assistive/adaptive devices  - Assess patient's emotional response to limitations  - Collaborate with interdisciplinary team and initiate plans and interventions as ordered  - Encourage independent activity per ability   - Maintain proper body alignment  - Perform active/passive rom as tolerated/ordered    - Plan activities to conserve energy   - Turn patient as appropriate  Outcome: Progressing     Problem: Potential for Falls  Goal: Patient will remain free of falls  Description: INTERVENTIONS:  - Educate patient/family on patient safety including physical limitations  - Instruct patient to call for assistance with activity   - Consult OT/PT to assist with strengthening/mobility   - Keep Call bell within reach  - Keep bed low and locked with side rails adjusted as appropriate  - Keep care items and personal belongings within reach  - Initiate and maintain comfort rounds  - Make Fall Risk Sign visible to staff  - Offer Toileting every 2 Hours, in advance of need  - Initiate/Maintain bed alarm  - Obtain necessary fall risk management equipment:   - Apply yellow socks and bracelet for high fall risk patients  - Consider moving patient to room near nurses station  Outcome: Progressing

## 2023-03-30 NOTE — ASSESSMENT & PLAN NOTE
"• Patient presented to ED with increased vertigo over the past month  Patient states that the episodes of dizziness/vertigo are occurring about 3 times daily for 5 minutes  However, after the episodes resolve the patient \"still doesn't feel right\"  • dizziness worsens with turning her head  • Labs in ED were largely unremarkable  ? Hemoglobin 14 2  ? Troponin: 3//4  ? Glucose:  80  • CTA head and neck w wo contrast 03/29:  negative  • Patient reports symptoms worsening when she moves her head, her symptoms started in the morning when she rolls over in bed  Her presentation is typical for [facility]    Will discharge with outpatient vestibular therapy  "

## 2023-03-30 NOTE — ASSESSMENT & PLAN NOTE
• Continue Wellbutrin 300mg each morning  • Continue Buspar HCL 10mg TID  • Continue Hydroxyzine 50mg as needed at bedtime

## 2023-03-30 NOTE — PHYSICAL THERAPY NOTE
Physical Therapy Evaluation    Patient Name: Tanmay Pulido    DKHNZ'D Date: 3/30/2023     Problem List  Principal Problem:    Dizziness  Active Problems:    Cigarette nicotine dependence with nicotine-induced disorder    Depression    Anxiety       Past Medical History  Past Medical History:   Diagnosis Date    Anxiety     Back pain     Depression     GERD (gastroesophageal reflux disease)     SOB (shortness of breath)         Past Surgical History  Past Surgical History:   Procedure Laterality Date    CERVICAL DISC SURGERY      CHOLECYSTECTOMY      FOOT SURGERY      LAPAROTOMY N/A 2/2/2021    Procedure: LAPAROTOMY FOR SPINAL SURGERY;  Surgeon: Kris Louie MD;  Location: [facility];  Service: General    LUMBAR FUSION N/A 2/2/2021    Procedure: Anterior lumbar interbody fusion L5-S1; Posterior lumbar laminectomy, decompression, instrumented fusion L5-S1 with allograft and neuromonitoring;  Surgeon: Rey Herring MD;  Location: [facility];  Service: Orthopedics    LA EGD TRANSORAL BIOPSY SINGLE/MULTIPLE N/A 4/20/2016    Procedure: ESOPHAGOGASTRODUODENOSCOPY (EGD); Surgeon: Yesenia Graham MD;  Location: [facility]; Service: Gastroenterology    TONSILLECTOMY      TUBAL LIGATION      UPPER GASTROINTESTINAL ENDOSCOPY             03/30/23 1148   PT Last Visit   PT Visit Date 03/30/23   Note Type   Note type Evaluation   Pain Assessment   Pain Assessment Tool 0-10   Pain Score No Pain   Restrictions/Precautions   Weight Bearing Precautions Per Order No   Other Precautions Chair Alarm; Bed Alarm; Fall Risk;Multiple lines;Telemetry  (dizziness)   Home Living   Type of [address] [place] [address] Multi-level;Stairs to enter with rails  (5 SHAYY, full flight to 2nd floor full bath/bed)   Bathroom Shower/Tub Tub/shower unit   Bathroom Toilet Standard   Bathroom Equipment Grab bars in shower; Shower chair   Home Equipment Walker;Cane   Additional Comments no AD use PTA Prior Function   Level of [place] Independent with ADLs; Independent with functional mobility; Independent with IADLS   Lives With Spouse   Receives Help From Family   IADLs Family/Friend/Other provides transportation; Independent with medication management; Independent with meal prep   Falls in the last 6 months 0   Comments pt and spouse do not drive  family drives to appointments   General   Family/Caregiver Present No   Cognition   Overall Cognitive Status WFL   Arousal/Participation Cooperative   Orientation Level Oriented X4   Memory Within functional limits   Following Commands Follows all commands and directions without difficulty   RLE Assessment   RLE Assessment WFL   LLE Assessment   LLE Assessment WFL   Vestibular   Spontaneous Nystagmus (-) no evidence of nystagmus at rest in room light   Gaze Holding Nystagmus (-) no evidence of nystagmus   Vestibular Comments (S)  MD cleared pt for lester hallpike, vertebral arteries WNL per CTA of head/neck  pt descibes room spinning dizziness when rolling over in bed or laying back on couch  lasts <15 sec  (-) lester hallpke b/l  roll testing also without nystagmus or symptoms but poor smooth pursuit of eyes during roll testing to R  +meclizine during hospital stay which may be supressing symptoms  pt also with dizzines with turning in hallways as well as looking down and up when putting socks on  smooth pursuits and saccades within normal limits and no difficulty with VOR testing reported  Light Touch   RLE Light Touch Grossly intact   LLE Light Touch Grossly intact   Bed Mobility   Supine to Sit 5  Supervision   Additional items Increased time required   Sit to Supine 5  Supervision   Additional items Increased time required   Transfers   Sit to Stand 5  Supervision   Stand to Sit 5  Supervision   Stand pivot 5  Supervision   Additional Comments no AD   Ambulation/Elevation   Gait pattern Improper Weight shift;Decreased foot clearance; Excessively slow   Gait Assistance 5  Supervision   Additional items Verbal cues   Assistive Device None   Distance 150'   Stair Management Assistance 5  Supervision   Additional items Verbal cues   Stair Management Technique One rail R;Step to pattern; Foreward; Sideways;Nonreciprocal  (sideways descending)   Number of Stairs 7   Ambulation/Elevation Additional Comments no overt LOB  +dizziness reported with turns, head turns while ambulating  resolved within <15sec   Balance   Static Sitting Good   Dynamic Sitting Fair +   Static Standing Fair   Dynamic Standing Fair -   Ambulatory Fair -   Endurance Deficit   Endurance Deficit Yes   Endurance Deficit Description dizziness   Activity Tolerance   Activity Tolerance Patient tolerated treatment well   Medical Staff Made Aware OT ASHLEIGH and SOT; MD Gila Croft cleared PT to perform lester hallpike  Nurse Made Aware yes-cleared to mobilize   Assessment   Prognosis Good   Problem List Decreased endurance; Impaired balance   Assessment Pt is a 61 y o  female admitted to [facility] on 3/29/2023 with primary dx of dizziness  C/o short lasting room spinning dizziness which occurs when rolling over in bed and turning head  MD suspect BPPV vs TIA  Pt has the following comorbidities which affect their treatment: h/o anxiety, back pain, depression, SOB as well as personal factors including stairs to access home  Pt has a moderate complexity clinical presentation due to Ongoing medical management for primary dx, Increased reliance on more restrictive AD compared to baseline, Decreased activity tolerance compared to baseline, Fall risk, Increased assistance needed from caregiver at current time, Ongoing telemetry monitoring, Trending lab values, Diagnostic imaging pending, Continuous pulse oximetry monitoring  , and PMH  PT was consulted to evaluate pt's functional mobility and discharge needs  Upon evaluation, patient required S for all mobility as outlined above   Negative lester hallpike testing  +meclizine during hospital stay which may be suppressing symptoms  Pt's functional impairments include: impaired balance and endurance due to vertigo  At conclusion of eval, pt remained seated in bed with phone, call bell, and all other personal needs within reach  The patient's AM-PAC Basic Mobility Inpatient Short Form Raw Score is 18  A Raw score of greater than 16 suggests the patient may benefit from discharge to home  Please also refer to the recommendation of the Physical Therapist for safe discharge planning  At this time, pt has no further acute care PT needs 2* being S level and having a supportive spouse who can assist as needed  Pt denies any concerns regarding their functional mobility or ability to return home safely at this time  Recommend pt continues to mobilize with nursing and restorative staff during hospital stay  Please consult with any changes in mobility status  D/C recommendations are home with vestibular OPPT     Barriers to Discharge None   Goals   Patient Goals to not be dizzy   PT Treatment Day 0   Recommendation   PT Discharge Recommendation Home with outpatient rehabilitation  (vestibular OPPT)   Equipment Recommended   (pt has RW at home-educated on use if increased unsteadiness upon return home-pt agreeable)   AM-PAC Basic Mobility Inpatient   Turning in Flat Bed Without Bedrails 3   Lying on Back to Sitting on Edge of Flat Bed Without Bedrails 3   Moving Bed to Chair 3   Standing Up From Chair Using Arms 3   Walk in Room 3   Climb 3-5 Stairs With Railing 3   Basic Mobility Inpatient Raw Score 18   Basic Mobility Standardized Score 41 05   Highest Level Of Mobility   JH-HLM Goal 6: Walk 10 steps or more   JH-HLM Achieved 7: Walk 25 feet or more   Modified Seamus Scale   Modified Rhea Scale 3   Barthel Index   Feeding 10   Bathing 5   Grooming Score 5   Dressing Score 10   Bladder Score 10   Bowels Score 10   Toilet Use Score 10   Transfers (Bed/Chair) Score 15   Mobility (Level Surface) Score 10   Stairs Score 5   Barthel Index Score 90   Avtar Auguste, PT, DPT

## 2023-03-31 ENCOUNTER — TRANSITIONAL CARE MANAGEMENT (OUTPATIENT)
Dept: FAMILY MEDICINE CLINIC | Facility: CLINIC | Age: 58
End: 2023-03-31

## 2023-04-01 LAB
EST. AVERAGE GLUCOSE BLD GHB EST-MCNC: 114 MG/DL
HBA1C MFR BLD: 5.6 %

## 2023-04-07 ENCOUNTER — OFFICE VISIT (OUTPATIENT)
Dept: FAMILY MEDICINE CLINIC | Facility: CLINIC | Age: 58
End: 2023-04-07

## 2023-04-07 VITALS
BODY MASS INDEX: 39.7 KG/M2 | DIASTOLIC BLOOD PRESSURE: 62 MMHG | WEIGHT: 247 LBS | TEMPERATURE: 97.7 F | HEIGHT: 66 IN | OXYGEN SATURATION: 98 % | SYSTOLIC BLOOD PRESSURE: 118 MMHG | RESPIRATION RATE: 16 BRPM | HEART RATE: 75 BPM

## 2023-04-07 DIAGNOSIS — F33.41 RECURRENT MAJOR DEPRESSIVE DISORDER, IN PARTIAL REMISSION (HCC): ICD-10-CM

## 2023-04-07 DIAGNOSIS — E66.9 OBESITY (BMI 30-39.9): ICD-10-CM

## 2023-04-07 DIAGNOSIS — Z12.4 SCREENING FOR CERVICAL CANCER: ICD-10-CM

## 2023-04-07 DIAGNOSIS — Z12.31 SCREENING MAMMOGRAM FOR BREAST CANCER: ICD-10-CM

## 2023-04-07 DIAGNOSIS — R42 VERTIGO: ICD-10-CM

## 2023-04-07 DIAGNOSIS — Z09 HOSPITAL DISCHARGE FOLLOW-UP: Primary | ICD-10-CM

## 2023-04-07 DIAGNOSIS — F17.219 CIGARETTE NICOTINE DEPENDENCE WITH NICOTINE-INDUCED DISORDER: ICD-10-CM

## 2023-04-07 DIAGNOSIS — Z23 ENCOUNTER FOR IMMUNIZATION: ICD-10-CM

## 2023-04-07 PROBLEM — U07.1 COVID-19: Status: RESOLVED | Noted: 2022-07-25 | Resolved: 2023-04-07

## 2023-04-07 RX ORDER — MECLIZINE HYDROCHLORIDE 25 MG/1
25 TABLET ORAL EVERY 8 HOURS SCHEDULED
Qty: 30 TABLET | Refills: 0 | Status: SHIPPED | OUTPATIENT
Start: 2023-04-07

## 2023-04-07 NOTE — PROGRESS NOTES
Name: Gissell Fernandez      : 1965      MRN: [id number]  Encounter Provider: Taylor Sellers MD  Encounter Date: 2023   Encounter department: [address] [place] [address]     1  Hospital discharge follow-up  Comments:  Reviewed hospital records  2  Vertigo  -     meclizine (ANTIVERT) 25 mg tablet; Take 1 tablet (25 mg total) by mouth every 8 (eight) hours    3  Recurrent major depressive disorder, in partial remission [facility])  Assessment & Plan:   psychiatrist  Stable per pt  4  Cigarette nicotine dependence with nicotine-induced disorder  Assessment & Plan:  Strongly advised pt to quit! Pt refused  Give lung CT screen script again  Orders:  -     CT lung screening program; Future; Expected date: 2023    5  Screening mammogram for breast cancer  -     Mammo screening bilateral w 3d & cad; Future; Expected date: 2023    6  Screening for cervical cancer  -     Ambulatory Referral to Obstetrics / Gynecology; Future    7  Encounter for immunization  -     Pneumococcal Conjugate Vaccine 20-valent (Pcv20)    8  Obesity (BMI 30-39  9)    BMI Counseling: Body mass index is 39 87 kg/m²  The BMI is above normal  Nutrition recommendations include decreasing portion sizes, encouraging healthy choices of fruits and vegetables, decreasing fast food intake, consuming healthier snacks and limiting drinks that contain sugar  Exercise recommendations include moderate physical activity 150 minutes/week  No pharmacotherapy was ordered  Rationale for BMI follow-up plan is due to patient being overweight or obese  Refused flu shot  Got Covid19 vaccine  Denies SE  Got Tdap in   Give PCV20 today  Advised pt to get shingrix at pharmacy  Give mammogram script  Refer to OBGYN for pap  Colonoscopy 10/2020, repeat in 5 years  RTO in 6 months  Subjective      HPI     Pt is here by herself  Was in hospital 3/29-3/30/2023 for dizziness/room spinning     CTA head negative  Labs were normal    Possible vertigo  Discharged home with meclizine  Pt did not see PT yet  Depression---Stable  FU psychiatrist      GERD---She is on pantoprazole 40mg QD  Miss dose make symptoms coming back per pt       Smoke 1ppd for 40 years  Refused to quit now  Ok to do CT lung screen  Give order today  No alcohol  No drugs  Review of Systems   Constitutional: Negative for appetite change, chills and fever  HENT: Negative for congestion, ear pain, sinus pain and sore throat  Eyes: Negative for discharge and itching  Respiratory: Negative for apnea, cough, chest tightness, shortness of breath and wheezing  Cardiovascular: Negative for chest pain, palpitations and leg swelling  Gastrointestinal: Negative for abdominal pain, anal bleeding, constipation, diarrhea, nausea and vomiting  Endocrine: Negative for cold intolerance, heat intolerance and polyuria  Genitourinary: Negative for difficulty urinating and dysuria  Musculoskeletal: Negative for arthralgias, back pain and myalgias  Skin: Negative for rash  Neurological: Negative for dizziness and headaches  Psychiatric/Behavioral: Negative for agitation         Current Outpatient Medications on File Prior to Visit   Medication Sig   • buPROPion (WELLBUTRIN XL) 300 mg 24 hr tablet Take 300 mg by mouth daily   • busPIRone (BUSPAR) 10 mg tablet Take 10 mg by mouth 3 (three) times a day   • hydrOXYzine HCL (ATARAX) 50 mg tablet Take 50 mg by mouth daily at bedtime   • lurasidone (LATUDA) 20 mg tablet Take 20 mg by mouth daily at bedtime   • pantoprazole (PROTONIX) 40 mg tablet Take 1 tablet (40 mg total) by mouth daily   • traZODone (DESYREL) 150 mg tablet Take 1 tablet (150 mg total) by mouth daily at bedtime   • [DISCONTINUED] hydrOXYzine pamoate (VISTARIL) 50 mg capsule    • [DISCONTINUED] meclizine (ANTIVERT) 25 mg tablet Take 1 tablet (25 mg total) by mouth every 8 (eight) hours   • [DISCONTINUED] "acetaminophen (TYLENOL) 500 mg tablet Take 500 mg by mouth every 6 (six) hours as needed for mild pain (Patient not taking: Reported on 4/7/2023)   • [DISCONTINUED] buPROPion (WELLBUTRIN SR) 150 mg 12 hr tablet  (Patient not taking: Reported on 4/7/2023)   • [DISCONTINUED] buPROPion (WELLBUTRIN) 75 mg tablet  (Patient not taking: Reported on 4/7/2023)   • [DISCONTINUED] cholecalciferol (VITAMIN D3) 1,000 units tablet Take 1 tablet (1,000 Units total) by mouth daily (Patient not taking: Reported on 4/7/2023)   • [DISCONTINUED] Diclofenac Sodium (VOLTAREN) 1 % Apply 2 g topically 4 (four) times a day as needed (Pain over the affected area) (Patient not taking: Reported on 4/7/2023)       Objective     /62 (BP Location: Left arm, Patient Position: Sitting, Cuff Size: Large)   Pulse 75   Temp 97 7 °F (36 5 °C)   Resp 16   Ht 5' 6\" (1 676 m)   Wt 112 kg (247 lb)   SpO2 98%   BMI 39 87 kg/m²     Physical Exam  Constitutional:       General: She is not in acute distress  Appearance: She is well-developed  HENT:      Head: Normocephalic  Eyes:      General:         Right eye: No discharge  Left eye: No discharge  Conjunctiva/sclera: Conjunctivae normal    Neck:      Thyroid: No thyromegaly  Cardiovascular:      Rate and Rhythm: Normal rate and regular rhythm  Heart sounds: Normal heart sounds  No murmur heard  No friction rub  No gallop  Pulmonary:      Effort: Pulmonary effort is normal  No respiratory distress  Breath sounds: Normal breath sounds  No wheezing or rales  Chest:      Chest wall: No tenderness  Abdominal:      General: Bowel sounds are normal  There is no distension  Palpations: Abdomen is soft  There is no mass  Tenderness: There is no abdominal tenderness  There is no guarding or rebound  Musculoskeletal:         General: No tenderness or deformity  Normal range of motion  Cervical back: Normal range of motion     Lymphadenopathy:      " Cervical: No cervical adenopathy  Neurological:      Mental Status: She is alert         Viktoria Baum MD

## 2023-04-25 ENCOUNTER — HOSPITAL ENCOUNTER (OUTPATIENT)
Dept: RADIOLOGY | Facility: HOSPITAL | Age: 58
Discharge: HOME | End: 2023-04-25

## 2023-04-25 DIAGNOSIS — F17.219 CIGARETTE NICOTINE DEPENDENCE WITH NICOTINE-INDUCED DISORDER: ICD-10-CM

## 2023-04-26 DIAGNOSIS — K21.9 GASTROESOPHAGEAL REFLUX DISEASE: ICD-10-CM

## 2023-04-26 RX ORDER — PANTOPRAZOLE SODIUM 40 MG/1
40 TABLET, DELAYED RELEASE ORAL DAILY
Qty: 30 TABLET | Refills: 0 | Status: SHIPPED | OUTPATIENT
Start: 2023-04-26 | End: 2023-05-26

## 2023-04-27 ENCOUNTER — TELEPHONE (OUTPATIENT)
Dept: FAMILY MEDICINE CLINIC | Facility: CLINIC | Age: 58
End: 2023-04-27

## 2023-04-27 DIAGNOSIS — E04.1 THYROID NODULE: Primary | ICD-10-CM

## 2023-04-27 NOTE — TELEPHONE ENCOUNTER
----- Message from Donald Ervin MD sent at 4/27/2023  2:24 PM EDT -----  CT lung did not show any nodules  Incidentally right thyroid nodule  Need US thyroid  Order in EHR  Please let pt know

## 2023-04-27 NOTE — TELEPHONE ENCOUNTER
Voicemail:  [phone] phoned to state there are significant findings on patient's CT Scan - the results are in Epic

## 2023-05-05 ENCOUNTER — HOSPITAL ENCOUNTER (OUTPATIENT)
Dept: RADIOLOGY | Facility: HOSPITAL | Age: 58
Discharge: HOME | End: 2023-05-05

## 2023-05-05 DIAGNOSIS — E04.1 THYROID NODULE: ICD-10-CM

## 2023-05-09 ENCOUNTER — TELEPHONE (OUTPATIENT)
Dept: FAMILY MEDICINE CLINIC | Facility: CLINIC | Age: 58
End: 2023-05-09

## 2023-05-09 DIAGNOSIS — E04.1 THYROID NODULE: Primary | ICD-10-CM

## 2023-05-09 NOTE — TELEPHONE ENCOUNTER
[facility]ology called, stated that they found significant findings on patient's thyroid ultrasound

## 2023-05-26 DIAGNOSIS — K21.9 GASTROESOPHAGEAL REFLUX DISEASE: ICD-10-CM

## 2023-05-26 RX ORDER — PANTOPRAZOLE SODIUM 40 MG/1
40 TABLET, DELAYED RELEASE ORAL DAILY
Qty: 30 TABLET | Refills: 0 | Status: SHIPPED | OUTPATIENT
Start: 2023-05-26 | End: 2023-06-25

## 2023-05-30 ENCOUNTER — HOSPITAL ENCOUNTER (OUTPATIENT)
Dept: RADIOLOGY | Facility: HOSPITAL | Age: 58
Discharge: HOME | End: 2023-05-30

## 2023-05-30 DIAGNOSIS — E04.1 THYROID NODULE: ICD-10-CM

## 2023-05-30 RX ORDER — LIDOCAINE HYDROCHLORIDE 10 MG/ML
2 INJECTION, SOLUTION EPIDURAL; INFILTRATION; INTRACAUDAL; PERINEURAL ONCE
Status: COMPLETED | OUTPATIENT
Start: 2023-05-30 | End: 2023-05-30

## 2023-05-30 RX ADMIN — LIDOCAINE HYDROCHLORIDE 2 ML: 10 INJECTION, SOLUTION EPIDURAL; INFILTRATION; INTRACAUDAL; PERINEURAL at 11:24

## 2023-06-02 ENCOUNTER — TELEPHONE (OUTPATIENT)
Dept: FAMILY MEDICINE CLINIC | Facility: CLINIC | Age: 58
End: 2023-06-02

## 2023-06-02 DIAGNOSIS — C73 THYROID CANCER (HCC): Primary | ICD-10-CM

## 2023-06-02 NOTE — TELEPHONE ENCOUNTER
Patient ([phone]) called in regards to lab results  Received in [place] and has questions  Requests call back once provider has reviewed

## 2023-06-02 NOTE — TELEPHONE ENCOUNTER
I called pt back  Her appt with endocrinology is 6/27/2023  Can we call endo to see if anyone can see her sooner?

## 2023-06-06 NOTE — TELEPHONE ENCOUNTER
Called Pt back to let her know Endocrinology is waiting on Afirma results then they will call her if they have any earlier appts to be seen

## 2023-06-06 NOTE — TELEPHONE ENCOUNTER
Patient ([phone]) called in regards to appointment with Endocrinology on 6/27/23  Asked if there was any update on rescheduling for a sooner date?

## 2023-06-08 ENCOUNTER — TELEPHONE (OUTPATIENT)
Dept: OTHER | Facility: OTHER | Age: 58
End: 2023-06-08

## 2023-06-08 NOTE — TELEPHONE ENCOUNTER
Patient called regarding her appt on 06/27/23  Would like to know if there's a sooner appt available

## 2023-06-13 ENCOUNTER — LAB (OUTPATIENT)
Dept: LAB | Facility: CLINIC | Age: 58
End: 2023-06-13
Payer: COMMERCIAL

## 2023-06-13 ENCOUNTER — CONSULT (OUTPATIENT)
Dept: ENDOCRINOLOGY | Facility: CLINIC | Age: 58
End: 2023-06-13
Payer: COMMERCIAL

## 2023-06-13 VITALS
DIASTOLIC BLOOD PRESSURE: 90 MMHG | WEIGHT: 248 LBS | HEIGHT: 66 IN | SYSTOLIC BLOOD PRESSURE: 122 MMHG | HEART RATE: 74 BPM | BODY MASS INDEX: 39.86 KG/M2

## 2023-06-13 DIAGNOSIS — E66.9 OBESITY (BMI 30-39.9): ICD-10-CM

## 2023-06-13 DIAGNOSIS — C73 THYROID CANCER (HCC): ICD-10-CM

## 2023-06-13 DIAGNOSIS — C73 THYROID CANCER (HCC): Primary | ICD-10-CM

## 2023-06-13 LAB
T4 FREE SERPL-MCNC: 0.96 NG/DL (ref 0.61–1.12)
TSH SERPL DL<=0.05 MIU/L-ACNC: 2.08 UIU/ML (ref 0.45–4.5)

## 2023-06-13 PROCEDURE — 36415 COLL VENOUS BLD VENIPUNCTURE: CPT

## 2023-06-13 PROCEDURE — 99244 OFF/OP CNSLTJ NEW/EST MOD 40: CPT | Performed by: INTERNAL MEDICINE

## 2023-06-13 PROCEDURE — 84439 ASSAY OF FREE THYROXINE: CPT

## 2023-06-13 PROCEDURE — 84443 ASSAY THYROID STIM HORMONE: CPT

## 2023-06-13 NOTE — PROGRESS NOTES
Beverly Mcallister 61 y o  female MRN: [id number]    Encounter: [id number]      Assessment/Plan     Assessment: This is a 61y o -year-old female with thyroid nodule with possible thyroid cancer and obesity  Plan:  1  Thyroid nodule with possible thyroid cancer-check TSH and free T4  I have referred her to surgical oncology for possible hemithyroidectomy  If she chooses, she can have a total thyroidectomy done at the time of initial surgery  2   Obesity appears to be stable  CC:   Thyroid nodule    History of Present Illness     HPI:  59-year-old woman presents for consultation related to thyroid nodule  This was incidentally discovered on CT of the chest   Subsequently, she underwent an ultrasound of the thyroid followed by a fine-needle aspirate  The fine-needle aspirate results showed suspicion for follicular neoplasm  She had genetic testing done which showed 50% chance of thyroid cancer  She denies any family history of thyroid cancer or personal history radiation exposure  Review of Systems   Constitutional: Negative for chills and fever  Respiratory: Negative for shortness of breath  Cardiovascular: Negative for chest pain  Gastrointestinal: Negative for constipation, diarrhea, nausea and vomiting  Endocrine: Negative for cold intolerance and heat intolerance  All other systems reviewed and are negative  Historical Information   Past Medical History:   Diagnosis Date   • Anxiety    • Back pain    • Depression    • GERD (gastroesophageal reflux disease)    • SOB (shortness of breath)    • Vertigo      Past Surgical History:   Procedure Laterality Date   • CERVICAL DISC SURGERY     • CHOLECYSTECTOMY     • FOOT SURGERY     • LAPAROTOMY N/A 2/2/2021    Procedure: LAPAROTOMY FOR SPINAL SURGERY;  Surgeon: Lars Moran MD;  [facility];  Service: General   • LUMBAR FUSION N/A 2/2/2021    Procedure: Anterior lumbar interbody fusion L5-S1;  Posterior lumbar laminectomy, decompression, instrumented fusion L5-S1 with allograft and neuromonitoring;  Surgeon: Danyelle Thompson MD;  [facility];  Service: Orthopedics   • SC EGD TRANSORAL BIOPSY SINGLE/MULTIPLE N/A 4/20/2016    Procedure: ESOPHAGOGASTRODUODENOSCOPY (EGD); Surgeon: Dani Galloway MD;  Location[facility]; Service: Gastroenterology   • TONSILLECTOMY     • TUBAL LIGATION     • UPPER GASTROINTESTINAL ENDOSCOPY     • US GUIDED THYROID BIOPSY  5/30/2023     Social History   Social History     Substance and Sexual Activity   Alcohol Use Not Currently    Comment: 0     Social History     Substance and Sexual Activity   Drug Use Not Currently   • Types: Oxycodone, Methamphetamines    Comment: 7/18/2020 last use     Social History     Tobacco Use   Smoking Status Every Day   • Packs/day: 1 00   • Types: Cigarettes   Smokeless Tobacco Never   Tobacco Comments    started patch 1/2021, still using 3/4 ppd  Family History:   Family History   Problem Relation Age of Onset   • Aneurysm Mother    • Hypertension Mother    • Heart attack Father    • Aneurysm Sister    • Aneurysm Brother    • Mental illness Son        Meds/Allergies   Current Outpatient Medications   Medication Sig Dispense Refill   • buPROPion (WELLBUTRIN XL) 300 mg 24 hr tablet Take 300 mg by mouth daily     • busPIRone (BUSPAR) 10 mg tablet Take 10 mg by mouth 3 (three) times a day     • hydrOXYzine HCL (ATARAX) 50 mg tablet Take 50 mg by mouth daily at bedtime     • lurasidone (LATUDA) 20 mg tablet Take 20 mg by mouth daily at bedtime     • meclizine (ANTIVERT) 25 mg tablet Take 1 tablet (25 mg total) by mouth every 8 (eight) hours 30 tablet 0   • pantoprazole (PROTONIX) 40 mg tablet Take 1 tablet (40 mg total) by mouth daily 30 tablet 0   • traZODone (DESYREL) 150 mg tablet Take 1 tablet (150 mg total) by mouth daily at bedtime 30 tablet 0     No current facility-administered medications for this visit       No Known Allergies    Objective   Vitals: Blood "pressure 122/90, pulse 74, height 5' 6\" (1 676 m), weight 112 kg (248 lb), not currently breastfeeding  Physical Exam  Vitals reviewed  Constitutional:       General: She is not in acute distress  Appearance: She is well-developed  She is obese  She is not diaphoretic  HENT:      Head: Normocephalic and atraumatic  Mouth/Throat:      Pharynx: No oropharyngeal exudate  Eyes:      General: Lids are normal  No scleral icterus  Right eye: No discharge  Left eye: No discharge  Conjunctiva/sclera: Conjunctivae normal    Neck:      Thyroid: No thyromegaly  Cardiovascular:      Rate and Rhythm: Normal rate and regular rhythm  Heart sounds: Normal heart sounds  No murmur heard  No friction rub  No gallop  Pulmonary:      Effort: Pulmonary effort is normal  No respiratory distress  Breath sounds: Normal breath sounds  No wheezing  Abdominal:      General: Bowel sounds are normal  There is no distension  Palpations: Abdomen is soft  Tenderness: There is no abdominal tenderness  Musculoskeletal:         General: No tenderness or deformity  Normal range of motion  Cervical back: Neck supple  Lymphadenopathy:      Head:      Right side of head: No occipital adenopathy  Left side of head: No occipital adenopathy  Upper Body:      Right upper body: No supraclavicular adenopathy  Left upper body: No supraclavicular adenopathy  Skin:     General: Skin is warm  Findings: No erythema or rash  Neurological:      Mental Status: She is alert and oriented to person, place, and time  Cranial Nerves: No cranial nerve deficit  The history was obtained from the review of the chart, patient      Lab Results:       Case Report   Non-gynecologic Cytology                          Case: [id number]                                   Authorizing Provider: Jonathan Alvarez MD              Collected:           05/30/2023 1125             " Ordering Location:     [address] [place] [address]      Received:            05/30/2023 [address] [place] [address] Ultrasound                                                           Pathologist:           Susanne Wang DO                                                      Specimens:   A) - Thyroid, Left, Upper Pole                                                                       B) - Thyroid, Left, Upper Pole                                                             Final Diagnosis   A  and B  Thyroid, Left Upper Pole, FNA (ThinPrep and Smears):  - 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)  See Note  - Numerous follicular cells in microfollicular patterns, few intranuclear inclusions, and grooves  - Background Hurthle cells, lymphocytes, and scant colloid  Electronically signed by Susanne Wang DO on 6/1/2023 at  9:36 AM   Note    As reported in the [facility] for Reporting Thyroid Cytopathology* this diagnostic category has demonstrated anywhere from 25-40% risk of malignancy being found in subsequent resections and/or FNA  This risk of malignancy is expected to change due to the usage of the surgical pathology diagnosis of “non-invasive follicular thyroid neoplasm with papillary-like nuclear features (NIFTP)  ”  The anticipated risk of malignancy secondary to NIFTP is 10-40%  The histologic follow-up of cases diagnosed as follicular neoplasm/suspicious for follicular neoplasm includes follicular adenoma, follicular carcinoma, and follicular variant of papillary thyroid carcinoma including the recently described indolent counterpart, NIFTP  The manual reports that the usual management following this diagnosis is genetic testing or lobectomy   Ultimately, clinical/imaging correlation for this patient is needed in arriving at the actual management plan      *The [place] System for Reporting Thyroid Cytopathology, Murlean Hahn , Mickiel Leventhal  "(Eds ), 2018 (2nd ed )    Intraoperative Consultation    Thyroid, Left Upper Pole FNA On-Site Evaluation:      Adequate       Dr Andrea Calvillo  Interpretation performed at [facility], [address] [place] [id number]         Imaging Studies:   Results for orders placed during the hospital encounter of 05/05/23    US thyroid    Impression  The following meet current ACR criteria for recommending ultrasound guided biopsy:    Left upper pole 1 7 cm nodule  Follow-up ultrasound is also recommended in 1 year to reevaluate right-sided findings  Reference: ACR Thyroid Imaging, Reporting and Data System (TI-RADS): White Paper of the [place]  J AM Tana Radiol 6868;09:521-278  (additional recommendations based on American Thyroid Association 2015 guidelines )    The study was marked in EPIC for significant notification  Workstation performed: [id number]      I have personally reviewed pertinent reports  Portions of the record may have been created with voice recognition software  Occasional wrong word or \"sound a like\" substitutions may have occurred due to the inherent limitations of voice recognition software  Read the chart carefully and recognize, using context, where substitutions have occurred    "

## 2023-06-14 ENCOUNTER — DOCUMENTATION (OUTPATIENT)
Dept: HEMATOLOGY ONCOLOGY | Facility: CLINIC | Age: 58
End: 2023-06-14

## 2023-06-14 NOTE — PROGRESS NOTES
Intake received/ Chart reviewed for services completed outside of [facility]    Pathology completed: 5/30/21    Imaging completed: [address] thyroid 5/05/23, CT lung screening 4/25/23    All records needed are in patients chart  No records retrieval needed at this time

## 2023-06-21 PROBLEM — E04.1 THYROID NODULE: Status: ACTIVE | Noted: 2023-06-21

## 2023-06-26 ENCOUNTER — CONSULT (OUTPATIENT)
Dept: SURGICAL ONCOLOGY | Facility: CLINIC | Age: 58
End: 2023-06-26
Payer: COMMERCIAL

## 2023-06-26 VITALS
WEIGHT: 245 LBS | TEMPERATURE: 98.8 F | BODY MASS INDEX: 39.37 KG/M2 | OXYGEN SATURATION: 97 % | HEART RATE: 84 BPM | HEIGHT: 66 IN | DIASTOLIC BLOOD PRESSURE: 82 MMHG | SYSTOLIC BLOOD PRESSURE: 140 MMHG

## 2023-06-26 DIAGNOSIS — K21.9 GASTROESOPHAGEAL REFLUX DISEASE: ICD-10-CM

## 2023-06-26 DIAGNOSIS — E04.1 THYROID NODULE: Primary | ICD-10-CM

## 2023-06-26 DIAGNOSIS — C73 THYROID CANCER (HCC): ICD-10-CM

## 2023-06-26 PROCEDURE — 99244 OFF/OP CNSLTJ NEW/EST MOD 40: CPT | Performed by: STUDENT IN AN ORGANIZED HEALTH CARE EDUCATION/TRAINING PROGRAM

## 2023-06-26 RX ORDER — CEFAZOLIN SODIUM 2 G/50ML
2000 SOLUTION INTRAVENOUS ONCE
Status: CANCELLED | OUTPATIENT
Start: 2023-06-26 | End: 2023-06-26

## 2023-06-26 RX ORDER — PANTOPRAZOLE SODIUM 40 MG/1
40 TABLET, DELAYED RELEASE ORAL DAILY
Qty: 30 TABLET | Refills: 0 | Status: SHIPPED | OUTPATIENT
Start: 2023-06-26 | End: 2023-07-26

## 2023-06-26 RX ORDER — TRAMADOL HYDROCHLORIDE 50 MG/1
50 TABLET ORAL EVERY 6 HOURS PRN
Qty: 10 TABLET | Refills: 0 | Status: SHIPPED | OUTPATIENT
Start: 2023-06-26

## 2023-06-26 NOTE — H&P (VIEW-ONLY)
Surgical Oncology Consultation    [address] [facility] Dilshad Hyman  [address] [facility] [address] [phone]    Patient:  Adriana Nunn  1965  [id number]    Primary Care provider:  Laura Hua MD  [facility] [address]    Referring provider:  Maddie Stanley MD  [address] [place] [address] [address]    Diagnoses and all orders for this visit:    Thyroid nodule    Thyroid cancer [facility])  -     Ambulatory referral to Surgical Oncology        Chief Complaint   Patient presents with   • Consult       No follow-ups on file. Oncology History    No history exists. History of Present Illness  : 60 yo female with incidentally detected thyroid nodules. Was undergoing screening lung scan for hx of smoking when thyroid nodules were noted. Thyroid function labs obtained and were WNL; thyroid US showed two nodules of the right side and one on the left - the left nodule was 1.7 cm cm and TIRADS 4 and met criteria for bx. Bx was bethesda IV and afirma 50%. The pt is asymptomatic with no hoarseness, voice changes, throat pain, trouble swallowing. No global sx of hyper or hypothyroidism, weight loss, bone pain, HA. Review of Systems  Complete ROS Surg Onc:   Constitutional: The patient denies new or recent history of general fatigue, no recent weight loss, no change in appetite. Eyes: No complaints of visual problems, no scleral icterus. ENT: No complaints of ear pain, no hoarseness, no difficulty swallowing,  no tinnitus and no new masses in head, oral cavity, or neck. Cardiovascular: No complaints of chest pain, no palpitations, no ankle edema. Respiratory: No complaints of shortness of breath, no cough. Gastrointestinal: No complaints of jaundice, no bloody stools, no pale stools. Genitourinary: No complaints of dysuria, no hematuria, no nocturia, no frequent urination, no urethral discharge.    Musculoskeletal: No complaints of weakness, paralysis, joint stiffness or arthralgias. Integumentary: No complaints of rash, no new lesions. Neurological: No complaints of convulsions, no seizures, no dizziness. Hematologic/Lymphatic: No complaints of easy bruising. Endocrine:  No hot or cold intolerance. No polydipsia, polyphagia, or polyuria. Allergy/immunology:  No environmental allergies. No food allergies. Not immunocompromised. Patient Active Problem List   Diagnosis   • Lower back pain   • Greater trochanteric bursitis of left hip   • Labral tear of hip, degenerative   • Lumbar disc herniation   • Cigarette nicotine dependence with nicotine-induced disorder   • Depression   • Pain of both hip joints   • Gastroesophageal reflux disease   • Screening for breast cancer   • Family history of brain aneurysm   • Anxiety   • Methamphetamine abuse, episodic (HCC)   • Radiculopathy, lumbar region   • Lumbar spondylosis   • Spinal stenosis of lumbar region   • Sacroiliitis (HCC)   • Chronic pain syndrome   • S/P lumbar fusion   • Dizziness   • Obesity (BMI 30-39. 9)   • Thyroid nodule     Past Medical History:   Diagnosis Date   • Anxiety    • Back pain    • Depression    • GERD (gastroesophageal reflux disease)    • SOB (shortness of breath)    • Vertigo      Past Surgical History:   Procedure Laterality Date   • CERVICAL DISC SURGERY     • CHOLECYSTECTOMY     • FOOT SURGERY     • LAPAROTOMY N/A 2/2/2021    Procedure: LAPAROTOMY FOR SPINAL SURGERY;  Surgeon: Yamileth iRvero MD;  [facility];  Service: General   • LUMBAR FUSION N/A 2/2/2021    Procedure: Anterior lumbar interbody fusion L5-S1; Posterior lumbar laminectomy, decompression, instrumented fusion L5-S1 with allograft and neuromonitoring;  Surgeon: Inez Hodge MD;  [facility];  Service: Orthopedics   • AR EGD TRANSORAL BIOPSY SINGLE/MULTIPLE N/A 4/20/2016    Procedure: ESOPHAGOGASTRODUODENOSCOPY (EGD);   Surgeon: Pearl Toledo MD; [facility]; Service: Gastroenterology   • TONSILLECTOMY     • TUBAL LIGATION     • UPPER GASTROINTESTINAL ENDOSCOPY     • US GUIDED THYROID BIOPSY  5/30/2023     Family History   Problem Relation Age of Onset   • Aneurysm Mother    • Hypertension Mother    • Heart attack Father    • Aneurysm Sister    • Aneurysm Brother    • Mental illness Son      Social History     Socioeconomic History   • Marital status: /Civil Union     Spouse name: Not on file   • Number of children: Not on file   • Years of education: Not on file   • Highest education level: Not on file   Occupational History   • Occupation: Caregiver   Tobacco Use   • Smoking status: Every Day     Packs/day: 1.00     Types: Cigarettes   • Smokeless tobacco: Never   • Tobacco comments:     started patch 1/2021, still using 3/4 ppd. Vaping Use   • Vaping Use: Never used   Substance and Sexual Activity   • Alcohol use: Not Currently     Comment: 0   • Drug use: Not Currently     Types: Oxycodone, Methamphetamines     Comment: 7/18/2020 last use   • Sexual activity: Not Currently   Other Topics Concern   • Not on file   Social History Narrative   • Not on file     Social Determinants of Health     Financial Resource Strain: Low Risk  (5/12/2021)    Overall Financial Resource Strain (CARDIA)    • Difficulty of Paying Living Expenses: Not very hard   Food Insecurity: No Food Insecurity (5/12/2021)    Hunger Vital Sign    • Worried About Running Out of Food in the Last Year: Never true    • Ran Out of Food in the Last Year: Never true   Transportation Needs: Unmet Transportation Needs (5/12/2021)    PRAPARE - Transportation    • Lack of Transportation (Medical):  Yes    • Lack of Transportation (Non-Medical): Yes   Physical Activity: Sufficiently Active (5/12/2021)    Exercise Vital Sign    • Days of Exercise per Week: 7 days    • Minutes of Exercise per Session: 30 min   Stress: Stress Concern Present (5/12/2021)    [employer] of Occupational Health - Occupational Stress Questionnaire    • Feeling of Stress : Rather much   Social Connections: Socially Isolated (5/12/2021)    Social Connection and Isolation Panel [NHANES]    • Frequency of Communication with Friends and Family: Never    • Frequency of Social Gatherings with Friends and Family: Never    • Attends Restorationism Services: Never    • Active Member of Clubs or Organizations: No    • Attends Club or Organization Meetings: Never    • Marital Status:    Intimate Partner Violence: Not At Risk (5/12/2021)    Humiliation, Afraid, Rape, and Kick questionnaire    • Fear of Current or Ex-Partner: No    • Emotionally Abused: No    • Physically Abused: No    • Sexually Abused: No   Housing Stability: Not on file       Current Outpatient Medications:   •  buPROPion (WELLBUTRIN XL) 300 mg 24 hr tablet, Take 300 mg by mouth daily, Disp: , Rfl:   •  busPIRone (BUSPAR) 10 mg tablet, Take 10 mg by mouth 3 (three) times a day, Disp: , Rfl:   •  hydrOXYzine HCL (ATARAX) 50 mg tablet, Take 50 mg by mouth daily at bedtime, Disp: , Rfl:   •  lurasidone (LATUDA) 20 mg tablet, Take 20 mg by mouth daily at bedtime, Disp: , Rfl:   •  meclizine (ANTIVERT) 25 mg tablet, Take 1 tablet (25 mg total) by mouth every 8 (eight) hours (Patient taking differently: Take 25 mg by mouth every 8 (eight) hours PRN), Disp: 30 tablet, Rfl: 0  •  pantoprazole (PROTONIX) 40 mg tablet, Take 1 tablet (40 mg total) by mouth daily, Disp: 30 tablet, Rfl: 0  •  traZODone (DESYREL) 150 mg tablet, Take 1 tablet (150 mg total) by mouth daily at bedtime, Disp: 30 tablet, Rfl: 0  No Known Allergies    Vitals:    06/26/23 0850   BP: 140/82   Pulse: 84   Temp: 98.8 °F (37.1 °C)   SpO2: 97%       Physical Exam   General: Appears well, appears stated age  Skin: Warm, anicteric  HEENT: Normocephalic, atraumatic; sclera aniceteric, mucous membranes moist; cervical nodes without adenopathy  Cardiopulmonary: RRR, Easy WOB, no BLE edema  Abd: Flat and soft, nontender, no masses appreciated, no hepatosplenomegaly  MSK: Symmetric, no cyanosis, no overt weakness  Lymphatic: No cervical, axillary or inguinal lymphadenopathy  Neuro: Affect appropriate, no gross motor abnormalities      Pathology:  Final Diagnosis   A. and B. Thyroid, Left Upper Pole, FNA (ThinPrep and Smears):  - Suspicious for follicular neoplasm ([place] Category IV). See Note. - Numerous follicular cells in microfollicular patterns, few intranuclear inclusions, and grooves. - Background Hurthle cells, lymphocytes, and scant colloid. AFIRMA 50%    Labs: [facility]    Imaging  US guided thyroid biopsy with molecular testing    Result Date: 5/30/2023  Narrative: ULTRASOUND-GUIDED THYROID BIOPSY WITH MOLECULAR TESTING SAMPLES HISTORY: 69-year-old female with history of thyroid nodules. Patient presents with prescription for ultrasound-guided biopsy of left upper pole thyroid nodule to include molecular testing samples. COMPARISON: Ultrasound of thyroid dated 5/5/2023 FINDINGS: Prior ultrasound images were reviewed. The previously described left upper pole nodule measuring 1.7 x 1.0 x 1.3 cm was targeted for today's biopsy The procedure was explained to the patient, including risks of hemorrhage, infection and local injury. Informed consent was freely obtained. The patient verbalized expressed understanding of the above risks and wished to proceed with the procedure. Final standard "time-out" procedure performed. PROCEDURE: The neck was prepped and draped in normal sterile fashion. Under real-time ultrasound guidance and local anesthesia 5 passes with a 25 gauge needle were made through the left upper pole thyroid nodule. 2 of these passes were reserved for molecular testing if warranted. Cytopathology was present and deemed the specimens adequate for evaluation. The patient tolerated the procedure well. Postprocedure instructions were provided for the patient.  I asked the patient to call us with any questions, concerns, or acute problems. The patient expressed understanding of the above. Impression: Status post technically successful ultrasound-guided thyroid biopsy including molecular testing samples. Procedure was performed by [facility] under the direct supervision of Dr. Grazyna Teran performed: [id number]       I independently reviewed and interpreted the above laboratory and imaging data, incl CT images, thyroid US, path      Discussion/Summary:   We discussed today that this nodule has a relatively high risk of representing the most common type of thyroid cancer, a well-differentiated papillary or follicular cancer. I discussed that the typical treatment for this type of cancer would be a partial vs total thyroidectomy. We discussed that based on her ultrasound and biopsy results, I recommend a left thyroidectomy. We also discussed that the final pathology will determine if additional treatment, with surgery or radioactive iodine, might be necessary. We discussed the natural history of well differentiated thyroid cancer, as well as the proposed procedure and expected postop course. We discussed the risks of left to include wound healing complications, infection, bleeding, damage to nearby structures, voice hoarseness, voice pitch change, temporary or permanent calcium regulation disruption, surgical airway emergency. We also discussed the risks of surgery with general anesthesia to include MI, PE, stroke, DVT, respiratory failure, PE, death, other medical complication. The patient is at average risk for complications related to surgery due to history of smoking. The patient expresses understanding and would like to proceed.

## 2023-06-26 NOTE — PROGRESS NOTES
Surgical Oncology Consultation    [address] [facility] CANCER CARE SURGICAL ONCOLOGY [place]  [facility] [address] [phone]    Patient:  Joe Zhu  1965  [id number]    Primary Care provider:  [facility] [address] [place] [id number]    Referring provider:  Celestina Miranda MD  [address] [id number],  [address] [place] [address]    Diagnoses and all orders for this visit:    Thyroid nodule    Thyroid cancer [facility])  -     Ambulatory referral to Surgical Oncology        Chief Complaint   Patient presents with   • Consult       No follow-ups on file  Oncology History    No history exists  History of Present Illness  : 60 yo female with incidentally detected thyroid nodules  Was undergoing screening lung scan for hx of smoking when thyroid nodules were noted  Thyroid function labs obtained and were WNL; thyroid US showed two nodules of the right side and one on the left - the left nodule was 1 7 cm cm and TIRADS 4 and met criteria for bx  Bx was bethesda IV and afirma 50%  The pt is asymptomatic with no hoarseness, voice changes, throat pain, trouble swallowing  No global sx of hyper or hypothyroidism, weight loss, bone pain, HA  Review of Systems  Complete ROS Surg Onc:   Constitutional: The patient denies new or recent history of general fatigue, no recent weight loss, no change in appetite  Eyes: No complaints of visual problems, no scleral icterus  ENT: No complaints of ear pain, no hoarseness, no difficulty swallowing,  no tinnitus and no new masses in head, oral cavity, or neck  Cardiovascular: No complaints of chest pain, no palpitations, no ankle edema  Respiratory: No complaints of shortness of breath, no cough  Gastrointestinal: No complaints of jaundice, no bloody stools, no pale stools  Genitourinary: No complaints of dysuria, no hematuria, no nocturia, no frequent urination, no urethral discharge     Musculoskeletal: No complaints of weakness, paralysis, joint stiffness or arthralgias  Integumentary: No complaints of rash, no new lesions  Neurological: No complaints of convulsions, no seizures, no dizziness  Hematologic/Lymphatic: No complaints of easy bruising  Endocrine:  No hot or cold intolerance  No polydipsia, polyphagia, or polyuria  Allergy/immunology:  No environmental allergies  No food allergies  Not immunocompromised  Patient Active Problem List   Diagnosis   • Lower back pain   • Greater trochanteric bursitis of left hip   • Labral tear of hip, degenerative   • Lumbar disc herniation   • Cigarette nicotine dependence with nicotine-induced disorder   • Depression   • Pain of both hip joints   • Gastroesophageal reflux disease   • Screening for breast cancer   • Family history of brain aneurysm   • Anxiety   • Methamphetamine abuse, episodic (HCC)   • Radiculopathy, lumbar region   • Lumbar spondylosis   • Spinal stenosis of lumbar region   • Sacroiliitis (HCC)   • Chronic pain syndrome   • S/P lumbar fusion   • Dizziness   • Obesity (BMI 30-39  9)   • Thyroid nodule     Past Medical History:   Diagnosis Date   • Anxiety    • Back pain    • Depression    • GERD (gastroesophageal reflux disease)    • SOB (shortness of breath)    • Vertigo      Past Surgical History:   Procedure Laterality Date   • CERVICAL DISC SURGERY     • CHOLECYSTECTOMY     • FOOT SURGERY     • LAPAROTOMY N/A 2/2/2021    Procedure: LAPAROTOMY FOR SPINAL SURGERY;  Surgeon: Juan Holland MD;  Location: [facility];  Service: General   • LUMBAR FUSION N/A 2/2/2021    Procedure: Anterior lumbar interbody fusion L5-S1; Posterior lumbar laminectomy, decompression, instrumented fusion L5-S1 with allograft and neuromonitoring;  Surgeon: Juli Soares MD;  Location: [facility];  Service: Orthopedics   • PA EGD TRANSORAL BIOPSY SINGLE/MULTIPLE N/A 4/20/2016    Procedure: ESOPHAGOGASTRODUODENOSCOPY (EGD);   Surgeon: Katlin Lombardi MD; Location: [facility]; Service: Gastroenterology   • TONSILLECTOMY     • TUBAL LIGATION     • UPPER GASTROINTESTINAL ENDOSCOPY     • US GUIDED THYROID BIOPSY  5/30/2023     Family History   Problem Relation Age of Onset   • Aneurysm Mother    • Hypertension Mother    • Heart attack Father    • Aneurysm Sister    • Aneurysm Brother    • Mental illness Son      Social History     Socioeconomic History   • Marital status: /Civil Union     Spouse name: Not on file   • Number of children: Not on file   • Years of education: Not on file   • Highest education level: Not on file   Occupational History   • Occupation: Caregiver   Tobacco Use   • Smoking status: Every Day     Packs/day: 1 00     Types: Cigarettes   • Smokeless tobacco: Never   • Tobacco comments:     started patch 1/2021, still using 3/4 ppd  Vaping Use   • Vaping Use: Never used   Substance and Sexual Activity   • Alcohol use: Not Currently     Comment: 0   • Drug use: Not Currently     Types: Oxycodone, Methamphetamines     Comment: 7/18/2020 last use   • Sexual activity: Not Currently   Other Topics Concern   • Not on file   Social History Narrative   • Not on file     Social Determinants of Health     Financial Resource Strain: Low Risk  (5/12/2021)    Overall Financial Resource Strain (CARDIA)    • Difficulty of Paying Living Expenses: Not very hard   Food Insecurity: No Food Insecurity (5/12/2021)    Hunger Vital Sign    • Worried About Running Out of Food in the Last Year: Never true    • Ran Out of Food in the Last Year: Never true   Transportation Needs: Unmet Transportation Needs (5/12/2021)    PRAPARE - Transportation    • Lack of Transportation (Medical):  Yes    • Lack of Transportation (Non-Medical): Yes   Physical Activity: Sufficiently Active (5/12/2021)    Exercise Vital Sign    • Days of Exercise per Week: 7 days    • Minutes of Exercise per Session: 30 min   Stress: Stress Concern Present (5/12/2021)    [employer] of Occupational Health - Occupational Stress Questionnaire    • Feeling of Stress : Rather much   Social Connections: Socially Isolated (5/12/2021)    Social Connection and Isolation Panel [NHANES]    • Frequency of Communication with Friends and Family: Never    • Frequency of Social Gatherings with Friends and Family: Never    • Attends [employer] Services: Never    • Active Member of Clubs or Organizations: No    • Attends Club or Organization Meetings: Never    • Marital Status:    Intimate Partner Violence: Not At Risk (5/12/2021)    Humiliation, Afraid, Rape, and Kick questionnaire    • Fear of Current or Ex-Partner: No    • Emotionally Abused: No    • Physically Abused: No    • Sexually Abused: No   Housing Stability: Not on file       Current Outpatient Medications:   •  buPROPion (WELLBUTRIN XL) 300 mg 24 hr tablet, Take 300 mg by mouth daily, Disp: , Rfl:   •  busPIRone (BUSPAR) 10 mg tablet, Take 10 mg by mouth 3 (three) times a day, Disp: , Rfl:   •  hydrOXYzine HCL (ATARAX) 50 mg tablet, Take 50 mg by mouth daily at bedtime, Disp: , Rfl:   •  lurasidone (LATUDA) 20 mg tablet, Take 20 mg by mouth daily at bedtime, Disp: , Rfl:   •  meclizine (ANTIVERT) 25 mg tablet, Take 1 tablet (25 mg total) by mouth every 8 (eight) hours (Patient taking differently: Take 25 mg by mouth every 8 (eight) hours PRN), Disp: 30 tablet, Rfl: 0  •  pantoprazole (PROTONIX) 40 mg tablet, Take 1 tablet (40 mg total) by mouth daily, Disp: 30 tablet, Rfl: 0  •  traZODone (DESYREL) 150 mg tablet, Take 1 tablet (150 mg total) by mouth daily at bedtime, Disp: 30 tablet, Rfl: 0  No Known Allergies    Vitals:    06/26/23 0850   BP: 140/82   Pulse: 84   Temp: 98 8 °F (37 1 °C)   SpO2: 97%       Physical Exam   General: Appears well, appears stated age  Skin: Warm, anicteric  HEENT: Normocephalic, atraumatic; sclera aniceteric, mucous membranes moist; cervical nodes without adenopathy  Cardiopulmonary: RRR, Easy WOB, no BLE "edema  Abd: Flat and soft, nontender, no masses appreciated, no hepatosplenomegaly  MSK: Symmetric, no cyanosis, no overt weakness  Lymphatic: No cervical, axillary or inguinal lymphadenopathy  Neuro: Affect appropriate, no gross motor abnormalities      Pathology:  Final Diagnosis   A  and B  Thyroid, Left Upper Pole, FNA (ThinPrep and Smears):  - Suspicious for follicular neoplasm ([place] Category IV)  See Note  - Numerous follicular cells in microfollicular patterns, few intranuclear inclusions, and grooves  - Background Hurthle cells, lymphocytes, and scant colloid  AFIRMA 50%    Labs: Reviewed in [facility]    Imaging  US guided thyroid biopsy with molecular testing    Result Date: 5/30/2023  Narrative: ULTRASOUND-GUIDED THYROID BIOPSY WITH MOLECULAR TESTING SAMPLES HISTORY: 63year-old female with history of thyroid nodules  Patient presents with prescription for ultrasound-guided biopsy of left upper pole thyroid nodule to include molecular testing samples  COMPARISON: Ultrasound of thyroid dated 5/5/2023 FINDINGS: Prior ultrasound images were reviewed  The previously described left upper pole nodule measuring 1 7 x 1 0 x 1 3 cm was targeted for today's biopsy The procedure was explained to the patient, including risks of hemorrhage, infection and local injury  Informed consent was freely obtained  The patient verbalized expressed understanding of the above risks and wished to proceed with the procedure  Final standard \"time-out\" procedure performed  PROCEDURE: The neck was prepped and draped in normal sterile fashion  Under real-time ultrasound guidance and local anesthesia 5 passes with a 25 gauge needle were made through the left upper pole thyroid nodule  2 of these passes were reserved for molecular testing if warranted  Cytopathology was present and deemed the specimens adequate for evaluation  The patient tolerated the procedure well  Postprocedure instructions were provided for the patient   I " asked the patient to call us with any questions, concerns, or acute problems  The patient expressed understanding of the above  Impression: Status post technically successful ultrasound-guided thyroid biopsy including molecular testing samples  Procedure was performed by Tom NICOLE under the direct supervision of Dr Iban Wu performed: [id number]       I independently reviewed and interpreted the above laboratory and imaging data, incl CT images, thyroid US, path      Discussion/Summary:   We discussed today that this nodule has a relatively high risk of representing the most common type of thyroid cancer, a well-differentiated papillary or follicular cancer  I discussed that the typical treatment for this type of cancer would be a partial vs total thyroidectomy  We discussed that based on her ultrasound and biopsy results, I recommend a left thyroidectomy  We also discussed that the final pathology will determine if additional treatment, with surgery or radioactive iodine, might be necessary  We discussed the natural history of well differentiated thyroid cancer, as well as the proposed procedure and expected postop course  We discussed the risks of left to include wound healing complications, infection, bleeding, damage to nearby structures, voice hoarseness, voice pitch change, temporary or permanent calcium regulation disruption, surgical airway emergency  We also discussed the risks of surgery with general anesthesia to include MI, PE, stroke, DVT, respiratory failure, PE, death, other medical complication  The patient is at average risk for complications related to surgery due to history of smoking  The patient expresses understanding and would like to proceed

## 2023-06-27 NOTE — PRE-PROCEDURE INSTRUCTIONS
Pre-Surgery Instructions:   Medication Instructions   • buPROPion (WELLBUTRIN XL) 300 mg 24 hr tablet Take day of surgery. • busPIRone (BUSPAR) 10 mg tablet Take day of surgery. • hydrOXYzine HCL (ATARAX) 50 mg tablet Uses PRN- OK to take day of surgery   • lurasidone (LATUDA) 20 mg tablet Take night before surgery   • meclizine (ANTIVERT) 25 mg tablet Uses PRN- OK to take day of surgery   • pantoprazole (PROTONIX) 40 mg tablet Take day of surgery. • traZODone (DESYREL) 150 mg tablet Take night before surgery    Pt instructed to stop nsaids and supplements one week prior to surgery. Pt verbalized understanding of shower and med instructions.

## 2023-06-28 ENCOUNTER — APPOINTMENT (OUTPATIENT)
Dept: LAB | Facility: CLINIC | Age: 58
End: 2023-06-28
Payer: COMMERCIAL

## 2023-06-28 DIAGNOSIS — E04.1 THYROID NODULE: ICD-10-CM

## 2023-06-28 LAB
ALBUMIN SERPL BCP-MCNC: 3.1 G/DL (ref 3.5–5)
ALP SERPL-CCNC: 71 U/L (ref 46–116)
ALT SERPL W P-5'-P-CCNC: 21 U/L (ref 12–78)
ANION GAP SERPL CALCULATED.3IONS-SCNC: 2 MMOL/L
AST SERPL W P-5'-P-CCNC: 30 U/L (ref 5–45)
BASOPHILS # BLD AUTO: 0.03 THOUSANDS/ÂΜL (ref 0–0.1)
BASOPHILS NFR BLD AUTO: 0 % (ref 0–1)
BILIRUB SERPL-MCNC: 0.37 MG/DL (ref 0.2–1)
BUN SERPL-MCNC: 11 MG/DL (ref 5–25)
CALCIUM ALBUM COR SERPL-MCNC: 10.1 MG/DL (ref 8.3–10.1)
CALCIUM SERPL-MCNC: 9.4 MG/DL (ref 8.3–10.1)
CHLORIDE SERPL-SCNC: 109 MMOL/L (ref 96–108)
CO2 SERPL-SCNC: 27 MMOL/L (ref 21–32)
CREAT SERPL-MCNC: 0.96 MG/DL (ref 0.6–1.3)
EOSINOPHIL # BLD AUTO: 0.14 THOUSAND/ÂΜL (ref 0–0.61)
EOSINOPHIL NFR BLD AUTO: 2 % (ref 0–6)
ERYTHROCYTE [DISTWIDTH] IN BLOOD BY AUTOMATED COUNT: 13.2 % (ref 11.6–15.1)
GFR SERPL CREATININE-BSD FRML MDRD: 65 ML/MIN/1.73SQ M
GLUCOSE SERPL-MCNC: 105 MG/DL (ref 65–140)
HCT VFR BLD AUTO: 42.5 % (ref 34.8–46.1)
HGB BLD-MCNC: 13.5 G/DL (ref 11.5–15.4)
IMM GRANULOCYTES # BLD AUTO: 0.02 THOUSAND/UL (ref 0–0.2)
IMM GRANULOCYTES NFR BLD AUTO: 0 % (ref 0–2)
LYMPHOCYTES # BLD AUTO: 3.4 THOUSANDS/ÂΜL (ref 0.6–4.47)
LYMPHOCYTES NFR BLD AUTO: 37 % (ref 14–44)
MCH RBC QN AUTO: 30.8 PG (ref 26.8–34.3)
MCHC RBC AUTO-ENTMCNC: 31.8 G/DL (ref 31.4–37.4)
MCV RBC AUTO: 97 FL (ref 82–98)
MONOCYTES # BLD AUTO: 0.55 THOUSAND/ÂΜL (ref 0.17–1.22)
MONOCYTES NFR BLD AUTO: 6 % (ref 4–12)
NEUTROPHILS # BLD AUTO: 5.15 THOUSANDS/ÂΜL (ref 1.85–7.62)
NEUTS SEG NFR BLD AUTO: 55 % (ref 43–75)
NRBC BLD AUTO-RTO: 0 /100 WBCS
PLATELET # BLD AUTO: 317 THOUSANDS/UL (ref 149–390)
PMV BLD AUTO: 10.2 FL (ref 8.9–12.7)
POTASSIUM SERPL-SCNC: 4.4 MMOL/L (ref 3.5–5.3)
PROT SERPL-MCNC: 7.1 G/DL (ref 6.4–8.4)
RBC # BLD AUTO: 4.39 MILLION/UL (ref 3.81–5.12)
SODIUM SERPL-SCNC: 138 MMOL/L (ref 135–147)
WBC # BLD AUTO: 9.29 THOUSAND/UL (ref 4.31–10.16)

## 2023-06-28 PROCEDURE — 85025 COMPLETE CBC W/AUTO DIFF WBC: CPT

## 2023-06-28 PROCEDURE — 36415 COLL VENOUS BLD VENIPUNCTURE: CPT

## 2023-06-28 PROCEDURE — 80053 COMPREHEN METABOLIC PANEL: CPT

## 2023-07-03 ENCOUNTER — HOSPITAL ENCOUNTER (OUTPATIENT)
Facility: HOSPITAL | Age: 58
Setting detail: OUTPATIENT SURGERY
Discharge: HOME | End: 2023-07-03
Attending: STUDENT IN AN ORGANIZED HEALTH CARE EDUCATION/TRAINING PROGRAM | Admitting: STUDENT IN AN ORGANIZED HEALTH CARE EDUCATION/TRAINING PROGRAM
Payer: COMMERCIAL

## 2023-07-03 ENCOUNTER — ANESTHESIA EVENT (OUTPATIENT)
Dept: PERIOP | Facility: HOSPITAL | Age: 58
End: 2023-07-03
Payer: COMMERCIAL

## 2023-07-03 ENCOUNTER — ANESTHESIA (OUTPATIENT)
Dept: PERIOP | Facility: HOSPITAL | Age: 58
End: 2023-07-03
Payer: COMMERCIAL

## 2023-07-03 VITALS
HEIGHT: 66 IN | RESPIRATION RATE: 13 BRPM | WEIGHT: 245 LBS | SYSTOLIC BLOOD PRESSURE: 147 MMHG | HEART RATE: 72 BPM | TEMPERATURE: 98.2 F | OXYGEN SATURATION: 95 % | DIASTOLIC BLOOD PRESSURE: 80 MMHG | BODY MASS INDEX: 39.37 KG/M2

## 2023-07-03 DIAGNOSIS — E04.1 THYROID NODULE: ICD-10-CM

## 2023-07-03 PROCEDURE — 60220 PARTIAL REMOVAL OF THYROID: CPT | Performed by: STUDENT IN AN ORGANIZED HEALTH CARE EDUCATION/TRAINING PROGRAM

## 2023-07-03 PROCEDURE — 88342 IMHCHEM/IMCYTCHM 1ST ANTB: CPT | Performed by: STUDENT IN AN ORGANIZED HEALTH CARE EDUCATION/TRAINING PROGRAM

## 2023-07-03 PROCEDURE — 88307 TISSUE EXAM BY PATHOLOGIST: CPT | Performed by: STUDENT IN AN ORGANIZED HEALTH CARE EDUCATION/TRAINING PROGRAM

## 2023-07-03 RX ORDER — OXYCODONE HYDROCHLORIDE 5 MG/1
5 TABLET ORAL EVERY 6 HOURS PRN
Qty: 8 TABLET | Refills: 0 | Status: SHIPPED | OUTPATIENT
Start: 2023-07-03 | End: 2023-07-13

## 2023-07-03 RX ORDER — GLYCOPYRROLATE 0.2 MG/ML
INJECTION INTRAMUSCULAR; INTRAVENOUS AS NEEDED
Status: DISCONTINUED | OUTPATIENT
Start: 2023-07-03 | End: 2023-07-03

## 2023-07-03 RX ORDER — ROCURONIUM BROMIDE 10 MG/ML
INJECTION, SOLUTION INTRAVENOUS AS NEEDED
Status: DISCONTINUED | OUTPATIENT
Start: 2023-07-03 | End: 2023-07-03

## 2023-07-03 RX ORDER — MIDAZOLAM HYDROCHLORIDE 2 MG/2ML
INJECTION, SOLUTION INTRAMUSCULAR; INTRAVENOUS AS NEEDED
Status: DISCONTINUED | OUTPATIENT
Start: 2023-07-03 | End: 2023-07-03

## 2023-07-03 RX ORDER — SODIUM CHLORIDE, SODIUM LACTATE, POTASSIUM CHLORIDE, CALCIUM CHLORIDE 600; 310; 30; 20 MG/100ML; MG/100ML; MG/100ML; MG/100ML
125 INJECTION, SOLUTION INTRAVENOUS CONTINUOUS
Status: DISCONTINUED | OUTPATIENT
Start: 2023-07-03 | End: 2023-07-04 | Stop reason: HOSPADM

## 2023-07-03 RX ORDER — PROPOFOL 10 MG/ML
INJECTION, EMULSION INTRAVENOUS AS NEEDED
Status: DISCONTINUED | OUTPATIENT
Start: 2023-07-03 | End: 2023-07-03

## 2023-07-03 RX ORDER — MAGNESIUM HYDROXIDE 1200 MG/15ML
LIQUID ORAL AS NEEDED
Status: DISCONTINUED | OUTPATIENT
Start: 2023-07-03 | End: 2023-07-03 | Stop reason: HOSPADM

## 2023-07-03 RX ORDER — ONDANSETRON 2 MG/ML
INJECTION INTRAMUSCULAR; INTRAVENOUS AS NEEDED
Status: DISCONTINUED | OUTPATIENT
Start: 2023-07-03 | End: 2023-07-03

## 2023-07-03 RX ORDER — METOCLOPRAMIDE HYDROCHLORIDE 5 MG/ML
10 INJECTION INTRAMUSCULAR; INTRAVENOUS ONCE AS NEEDED
Status: DISCONTINUED | OUTPATIENT
Start: 2023-07-03 | End: 2023-07-03 | Stop reason: HOSPADM

## 2023-07-03 RX ORDER — CEFAZOLIN SODIUM 2 G/50ML
2000 SOLUTION INTRAVENOUS ONCE
Status: DISCONTINUED | OUTPATIENT
Start: 2023-07-03 | End: 2023-07-03 | Stop reason: HOSPADM

## 2023-07-03 RX ORDER — SODIUM CHLORIDE, SODIUM LACTATE, POTASSIUM CHLORIDE, CALCIUM CHLORIDE 600; 310; 30; 20 MG/100ML; MG/100ML; MG/100ML; MG/100ML
INJECTION, SOLUTION INTRAVENOUS CONTINUOUS PRN
Status: DISCONTINUED | OUTPATIENT
Start: 2023-07-03 | End: 2023-07-03

## 2023-07-03 RX ORDER — DEXAMETHASONE SODIUM PHOSPHATE 10 MG/ML
INJECTION, SOLUTION INTRAMUSCULAR; INTRAVENOUS AS NEEDED
Status: DISCONTINUED | OUTPATIENT
Start: 2023-07-03 | End: 2023-07-03

## 2023-07-03 RX ORDER — FENTANYL CITRATE 50 UG/ML
INJECTION, SOLUTION INTRAMUSCULAR; INTRAVENOUS AS NEEDED
Status: DISCONTINUED | OUTPATIENT
Start: 2023-07-03 | End: 2023-07-03

## 2023-07-03 RX ORDER — HYDROMORPHONE HCL/PF 1 MG/ML
SYRINGE (ML) INJECTION AS NEEDED
Status: DISCONTINUED | OUTPATIENT
Start: 2023-07-03 | End: 2023-07-03

## 2023-07-03 RX ORDER — KETAMINE HCL IN NACL, ISO-OSM 100MG/10ML
SYRINGE (ML) INJECTION AS NEEDED
Status: DISCONTINUED | OUTPATIENT
Start: 2023-07-03 | End: 2023-07-03

## 2023-07-03 RX ORDER — HYDROMORPHONE HCL/PF 1 MG/ML
0.5 SYRINGE (ML) INJECTION
Status: DISCONTINUED | OUTPATIENT
Start: 2023-07-03 | End: 2023-07-03 | Stop reason: HOSPADM

## 2023-07-03 RX ORDER — LIDOCAINE HYDROCHLORIDE 10 MG/ML
INJECTION, SOLUTION EPIDURAL; INFILTRATION; INTRACAUDAL; PERINEURAL AS NEEDED
Status: DISCONTINUED | OUTPATIENT
Start: 2023-07-03 | End: 2023-07-03

## 2023-07-03 RX ORDER — FENTANYL CITRATE/PF 50 MCG/ML
50 SYRINGE (ML) INJECTION
Status: DISCONTINUED | OUTPATIENT
Start: 2023-07-03 | End: 2023-07-03 | Stop reason: HOSPADM

## 2023-07-03 RX ORDER — CEFAZOLIN SODIUM 1 G/3ML
INJECTION, POWDER, FOR SOLUTION INTRAMUSCULAR; INTRAVENOUS AS NEEDED
Status: DISCONTINUED | OUTPATIENT
Start: 2023-07-03 | End: 2023-07-03

## 2023-07-03 RX ADMIN — FENTANYL CITRATE 50 MCG: 50 INJECTION, SOLUTION INTRAMUSCULAR; INTRAVENOUS at 15:16

## 2023-07-03 RX ADMIN — Medication 20 MG: at 15:49

## 2023-07-03 RX ADMIN — ROCURONIUM BROMIDE 20 MG: 10 INJECTION, SOLUTION INTRAVENOUS at 16:00

## 2023-07-03 RX ADMIN — CEFAZOLIN 2000 MG: 1 INJECTION, POWDER, FOR SOLUTION INTRAMUSCULAR; INTRAVENOUS at 15:27

## 2023-07-03 RX ADMIN — MIDAZOLAM 2 MG: 1 INJECTION INTRAMUSCULAR; INTRAVENOUS at 15:11

## 2023-07-03 RX ADMIN — SODIUM CHLORIDE, SODIUM LACTATE, POTASSIUM CHLORIDE, AND CALCIUM CHLORIDE: .6; .31; .03; .02 INJECTION, SOLUTION INTRAVENOUS at 14:44

## 2023-07-03 RX ADMIN — SODIUM CHLORIDE, SODIUM LACTATE, POTASSIUM CHLORIDE, AND CALCIUM CHLORIDE 125 ML/HR: .6; .31; .03; .02 INJECTION, SOLUTION INTRAVENOUS at 13:25

## 2023-07-03 RX ADMIN — PHENYLEPHRINE HYDROCHLORIDE 50 MCG/MIN: 10 INJECTION INTRAVENOUS at 15:50

## 2023-07-03 RX ADMIN — FENTANYL CITRATE 50 MCG: 50 INJECTION, SOLUTION INTRAMUSCULAR; INTRAVENOUS at 16:44

## 2023-07-03 RX ADMIN — Medication 30 MG: at 15:36

## 2023-07-03 RX ADMIN — DEXAMETHASONE SODIUM PHOSPHATE 10 MG: 10 INJECTION, SOLUTION INTRAMUSCULAR; INTRAVENOUS at 15:42

## 2023-07-03 RX ADMIN — PROPOFOL 150 MG: 10 INJECTION, EMULSION INTRAVENOUS at 15:20

## 2023-07-03 RX ADMIN — LIDOCAINE HYDROCHLORIDE 50 MG: 10 INJECTION, SOLUTION EPIDURAL; INFILTRATION; INTRACAUDAL; PERINEURAL at 15:20

## 2023-07-03 RX ADMIN — FENTANYL CITRATE 50 MCG: 50 INJECTION, SOLUTION INTRAMUSCULAR; INTRAVENOUS at 15:20

## 2023-07-03 RX ADMIN — SUGAMMADEX 300 MG: 100 INJECTION, SOLUTION INTRAVENOUS at 16:30

## 2023-07-03 RX ADMIN — ONDANSETRON 4 MG: 2 INJECTION INTRAMUSCULAR; INTRAVENOUS at 15:11

## 2023-07-03 RX ADMIN — HYDROMORPHONE HYDROCHLORIDE 0.5 MG: 1 INJECTION, SOLUTION INTRAMUSCULAR; INTRAVENOUS; SUBCUTANEOUS at 16:24

## 2023-07-03 RX ADMIN — ROCURONIUM BROMIDE 50 MG: 10 INJECTION, SOLUTION INTRAVENOUS at 15:20

## 2023-07-03 RX ADMIN — GLYCOPYRROLATE 0.1 MG: 0.2 INJECTION, SOLUTION INTRAMUSCULAR; INTRAVENOUS at 15:11

## 2023-07-03 NOTE — ANESTHESIA POSTPROCEDURE EVALUATION
Post-Op Assessment Note    CV Status:  Stable  Pain Score: 0    Pain management: adequate     Mental Status:  Alert and awake   Hydration Status:  Euvolemic   PONV Controlled:  Controlled   Airway Patency:  Patent      Post Op Vitals Reviewed: Yes      Staff: CRNA, Anesthesiologist         There were no known notable events for this encounter.     BP   117/80   Temp  98.2   Pulse  94   Resp   14   SpO2   95

## 2023-07-03 NOTE — INTERVAL H&P NOTE
H&P reviewed. After examining the patient I find no changes in the patients condition since the H&P had been written.     Vitals:    07/03/23 1304   BP: 123/94   Pulse: 85   Resp: 18   Temp: (!) 97 °F (36.1 °C)   SpO2: 97%

## 2023-07-03 NOTE — ANESTHESIA PREPROCEDURE EVALUATION
Procedure:  LEFT THYROID LOBECTOMY (Neck)    Relevant Problems   GI/HEPATIC   (+) Gastroesophageal reflux disease      MUSCULOSKELETAL   (+) Lower back pain   (+) Lumbar spondylosis   (+) Sacroiliitis (HCC)      NEURO/PSYCH   (+) Anxiety   (+) Chronic pain syndrome   (+) Depression        Physical Exam    Airway    Mallampati score: I  TM Distance: >3 FB  Neck ROM: full     Dental   upper dentures and lower dentures,     Cardiovascular      Pulmonary      Other Findings        Anesthesia Plan  ASA Score- 3     Anesthesia Type- general with ASA Monitors. Additional Monitors:   Airway Plan: ETT. Plan Factors-Exercise tolerance (METS): >4 METS. Chart reviewed. EKG reviewed. Existing labs reviewed. Patient summary reviewed. Patient is a current smoker. Patient instructed to abstain from smoking on day of procedure. Patient smoked on day of surgery. There is medical exclusion for perioperative obstructive sleep apnea risk education. Induction- intravenous. Postoperative Plan- Plan for postoperative opioid use. Informed Consent- Anesthetic plan and risks discussed with patient. I personally reviewed this patient with the CRNA. Discussed and agreed on the Anesthesia Plan with the CRNA. Artie Barry

## 2023-07-03 NOTE — DISCHARGE INSTR - AVS FIRST PAGE
[facility] Surgical Discharge Instructions  Procedure: Left thyroid lobectomy  Surgeon: Dr. Judd Baxter MD    Please follow-up as scheduled. If you do not already have a follow-up appointment, please call our office when you leave to schedule follow-up appointment within 1-2 weeks. Activity:  - No lifting, pushing, or pulling anything over 15 pounds for 4-6 weeks or until cleared by your physician  - Regular activity (working around the house, walking up/down stairs, etc.) is ok and encouraged  - No driving until you are no longer using narcotic pain medications    Diet:    - You may resume your normal diet    Wound Care:  - Keep your white "steri-strips" in place until your follow-up appointment  - You may shower in 24 hours  - When you shower, allow soapy water to run over your incisions and then pat dry. Do NOT rub or scrub your incisions  - Do not submerge your incisions in tubs, baths, pools, etc until cleared by your surgeon  - Do not apply any lotions, creams, or ointments to your incision until cleared by your surgeon    Medications:    - Continue your pre-hospital medication regimen after discharge unless your were instructed otherwise. Please refer to your discharge medication list for further details. - For the first few days following your procedure, take Tylenol to provide a solid baseline pain control and use the stronger pain medications (if provided) for more severe pain  - Pain medications can cause constipation.  If you are unable to have a bowel movement for more than 1-2 days, take an over the counter stool softener or laxative such as Milk of Magnesia, Colace, or Senna    Additional Instructions:  - If you have any questions or concerns after discharge please do not hesitate to contact our office, we would be happy to provide clarification      Call our office or return to the Emergency Room if you develop a fever greater than 101F, chills, persistent nausea/vomiting, worsening/uncontrollable pain, and/or increasing redness or purulent/foul smelling drainage from your incision(s)

## 2023-07-03 NOTE — OP NOTE
OPERATIVE REPORT  PATIENT NAME: Savanah Bowles    :  1965  MRN: [id number]  Pt Location: [facility] OR ROOM 07    SURGERY DATE: 7/3/2023    Surgeon(s) and Role:     * Tam Vides MD - Primary     * Ruchi Escobedo MD - Assisting    Preop Diagnosis:  Thyroid nodule [E04.1]    Post-Op Diagnosis Codes: * Thyroid nodule [E04.1]    Procedure(s):  LEFT THYROID LOBECTOMY    Specimen(s):  ID Type Source Tests Collected by Time Destination   1 :  Tissue Thyroid, Left TISSUE EXAM Tam Vides MD 7/3/2023 1543        Estimated Blood Loss:   Minimal    Drains:  * No LDAs found *    Anesthesia Type:   General    Operative Indications:  Thyroid nodule [E04.1]    Operative Findings:  Benign appearing LFT thyroid lobe    Complications:   None    Procedure and Technique:  The patient was brought into the operating room and identified by a proper timeout. Following this she was intubated by the anesthesia team. She was then positioned with a shoulder roll behind the scapula and the head in the sniffing position. The neck was then prepped and draped in the usual sterile fashion. A timeout was performed. Following this, the skin at and around the planned cervical incision site was anesthetized with local anesthetic. Next, a 4 cm incision was made 2 fingerbreadths above the sternal notch. Cautery was used to dissect through the dermis and subcutaneous fat. The platysma was transected with cautery. We then created flaps superiorly and inferiorly deep to the platysma. Lonestar retractors were then placed for exposure. The strap muscles were divided at the midline with electrocautery to expose the thyroid. The strap muscles were mobilized off the anterolateral aspect of the left thyroid lobe. Using traction on the superior pole, the superior pole vessels were divided with the Harmonic Scalpel. We rotated the gland anteromedially. The parathyroid glands were visualized and preserved by means of close capsular dissection. We then visualized recurrent laryngeal nerve as we medialized the lobe. This was confirmed with nerve stimulation and contraction of the larynx. The ligament of berry was then clamped and tied off with a 3-0 vicryl suture. The lobe was then freed from anterior surface of the trachea with cautery. Valsalva was applied and the operative field inspected for bleeding. None was seen. Surgicel was placed in the operative field. The strap muscle were then reapproximated with 3-0 vicryl and the platysma was closed with 3-0 vicryl as well. The skin was closed with 4-0 absorbable stratafix followed by skin glue. I was present for the entire procedure.     Patient Disposition:  PACU         SIGNATURE: Sherry Tolentino MD  DATE: July 3, 2023  TIME: 5:08 PM

## 2023-07-04 NOTE — QUICK NOTE
Surgery  Quick note    Pt seen and examined. Vss. Afebrile. Neck incision c/d/i. No hematoma or ecchymosis. steristrips in place. Plan:   Discharge home  Regular diet  Follow up in the office in two weeks.      Rodo Dobbins MD  7/3/23  9 PM

## 2023-07-14 PROCEDURE — 88307 TISSUE EXAM BY PATHOLOGIST: CPT | Performed by: STUDENT IN AN ORGANIZED HEALTH CARE EDUCATION/TRAINING PROGRAM

## 2023-07-14 PROCEDURE — 88342 IMHCHEM/IMCYTCHM 1ST ANTB: CPT | Performed by: STUDENT IN AN ORGANIZED HEALTH CARE EDUCATION/TRAINING PROGRAM

## 2023-07-17 PROBLEM — D49.7 NONINVASIVE FOLLICULAR NEOPLASM OF THYROID WITH PAPILLARY-LIKE NUCLEAR FEATURES: Status: ACTIVE | Noted: 2023-07-17

## 2023-07-17 PROBLEM — D44.0 NONINVASIVE FOLLICULAR NEOPLASM OF THYROID WITH PAPILLARY-LIKE NUCLEAR FEATURES: Status: ACTIVE | Noted: 2023-07-17

## 2023-07-18 ENCOUNTER — TELEPHONE (OUTPATIENT)
Dept: HEMATOLOGY ONCOLOGY | Facility: CLINIC | Age: 58
End: 2023-07-18

## 2023-07-18 NOTE — TELEPHONE ENCOUNTER
Appointment Change  Cancel, Reschedule, Change to Virtual      Who are you speaking with? self   If it is not the patient, are they listed on an active communication consent form? self   Which provider is the appointment scheduled with? Eduardo Mcconnell   When is the appointment scheduled? Please list date and time 7/18   At which location is the appointment scheduled to take place? [facility]   Was the appointment rescheduled or changed from an in person visit to a virtual visit? If so, please list the details of the change. Yes, 7/24 8:15am [facility]   What is the reason for the appointment change? transportation   Was [facility] transport scheduled for this visit? no   Does [facility] transport need to be scheduled for the new visit (if applicable) no   Does the patient need an infusion appointment rescheduled? no   Does the patient have an infusion appointment scheduled? If so, when? no   Is the patient undergoing chemotherapy? no   Was the no-show policy reviewed for appointments being changed with less then 24 hours of notice?  yes

## 2023-07-24 ENCOUNTER — OFFICE VISIT (OUTPATIENT)
Dept: SURGICAL ONCOLOGY | Facility: CLINIC | Age: 58
End: 2023-07-24

## 2023-07-24 VITALS
HEIGHT: 66 IN | DIASTOLIC BLOOD PRESSURE: 82 MMHG | SYSTOLIC BLOOD PRESSURE: 128 MMHG | WEIGHT: 249.8 LBS | OXYGEN SATURATION: 98 % | HEART RATE: 77 BPM | TEMPERATURE: 98.2 F | BODY MASS INDEX: 40.15 KG/M2

## 2023-07-24 DIAGNOSIS — D49.7 NONINVASIVE FOLLICULAR NEOPLASM OF THYROID WITH PAPILLARY-LIKE NUCLEAR FEATURES: Primary | ICD-10-CM

## 2023-07-24 PROCEDURE — 99024 POSTOP FOLLOW-UP VISIT: CPT | Performed by: STUDENT IN AN ORGANIZED HEALTH CARE EDUCATION/TRAINING PROGRAM

## 2023-07-24 NOTE — PROGRESS NOTES
Surgical Oncology Follow Up    [id number] 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 Oncology History    No history exists. Staging: LFT thyroid nodule; lft thyroidectomy with NIFTP; RT nodules remain    History of Present Illness:   60 yo female s/p lft thyroid for concerning nodule. Final path with NIFTP. Doing well postop with no voice changes, ttrouble swallowing. Mininal swelling; no s/s infection    Review of Systems  Complete ROS Surg Onc:   Constitutional: The patient denies new or recent history of general fatigue, no recent weight loss, no change in appetite. Eyes: No complaints of visual problems, no scleral icterus. ENT: no complaints of ear pain, no hoarseness, no difficulty swallowing,  no tinnitus and no new masses in head, oral cavity, or neck. Cardiovascular: No complaints of chest pain, no palpitations, no ankle edema. Respiratory: No complaints of shortness of breath, no cough. Gastrointestinal: No complaints of jaundice, no bloody stools, no pale stools. Genitourinary: No complaints of dysuria, no hematuria, no nocturia, no frequent urination, no urethral discharge. Musculoskeletal: No complaints of weakness, paralysis, joint stiffness or arthralgias. Integumentary: No complaints of rash, no new lesions. Neurological: No complaints of convulsions, no seizures, no dizziness. Hematologic/Lymphatic: No complaints of easy bruising. Endocrine:  No hot or cold intolerance. No polydipsia, polyphagia, or polyuria. Allergy/immunology:  No environmental allergies. No food allergies. Not immunocompromised. Skin:  No pallor or rash. No wound. Patient Active Problem List   Diagnosis   • Lower back pain   • Greater trochanteric bursitis of left hip   • Labral tear of hip, degenerative   • Lumbar disc herniation   • Cigarette nicotine dependence with nicotine-induced disorder   • Depression   • Pain of both hip joints   • Gastroesophageal reflux disease   • Screening for breast cancer   • Family history of brain aneurysm   • Anxiety   • Methamphetamine abuse, episodic (HCC)   • Radiculopathy, lumbar region   • Lumbar spondylosis   • Spinal stenosis of lumbar region   • Sacroiliitis (HCC)   • Chronic pain syndrome   • S/P lumbar fusion   • Dizziness   • Obesity (BMI 30-39. 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     Past Medical History:   Diagnosis Date   • Anxiety    • Back pain    • Depression    • GERD (gastroesophageal reflux disease)    • SOB (shortness of breath)    • Vertigo      Past Surgical History:   Procedure Laterality Date   • CERVICAL DISC SURGERY     • CHOLECYSTECTOMY     • FOOT SURGERY     • LAPAROTOMY N/A 2/2/2021    Procedure: LAPAROTOMY FOR SPINAL SURGERY;  Surgeon: Rajendra Tapia MD;  [facility];  Service: General   • LUMBAR FUSION N/A 2/2/2021    Procedure: Anterior lumbar interbody fusion L5-S1; Posterior lumbar laminectomy, decompression, instrumented fusion L5-S1 with allograft and neuromonitoring;  Surgeon: Porsche Dowd MD;  [facility];  Service: Orthopedics   • WI EGD TRANSORAL BIOPSY SINGLE/MULTIPLE N/A 4/20/2016    Procedure: ESOPHAGOGASTRODUODENOSCOPY (EGD); Surgeon: Mary Santos MD;  Location[facility];   Service: Gastroenterology   • THYROID LOBECTOMY N/A 7/3/2023    Procedure: LEFT THYROID LOBECTOMY;  Surgeon: Zakiya Jackson MD;  [facility];  Service: Surgical Oncology   • TONSILLECTOMY     • TUBAL LIGATION     • UPPER GASTROINTESTINAL ENDOSCOPY     • [address] THYROID BIOPSY  5/30/2023     Family History   Problem Relation Age of Onset   • Aneurysm Mother    • Hypertension Mother    • Heart attack Father    • Aneurysm Sister    • Aneurysm Brother    • Mental illness Son      Social History     Socioeconomic History   • Marital status: /Civil Union     Spouse name: Not on file   • Number of children: Not on file   • Years of education: Not on file   • Highest education level: Not on file   Occupational History   • Occupation: Caregiver   Tobacco Use   • Smoking status: Every Day     Packs/day: 1.00     Types: Cigarettes   • Smokeless tobacco: Never   • Tobacco comments:     started patch 1/2021, still using 3/4 ppd. Vaping Use   • Vaping Use: Never used   Substance and Sexual Activity   • Alcohol use: Not Currently     Comment: 0   • Drug use: Not Currently     Types: Oxycodone, Methamphetamines     Comment: 7/18/2020 last use   • Sexual activity: Not Currently   Other Topics Concern   • Not on file   Social History Narrative   • Not on file     Social Determinants of Health     Financial Resource Strain: Low Risk  (5/12/2021)    Overall Financial Resource Strain (CARDIA)    • Difficulty of Paying Living Expenses: Not very hard   Food Insecurity: No Food Insecurity (5/12/2021)    Hunger Vital Sign    • Worried About Running Out of Food in the Last Year: Never true    • Ran Out of Food in the Last Year: Never true   Transportation Needs: Unmet Transportation Needs (5/12/2021)    PRAPARE - Transportation    • Lack of Transportation (Medical):  Yes    • Lack of Transportation (Non-Medical): Yes   Physical Activity: Sufficiently Active (5/12/2021)    Exercise Vital Sign    • Days of Exercise per Week: 7 days    • Minutes of Exercise per Session: 30 min   Stress: Stress Concern Present (5/12/2021)    [facility]    • Feeling of Stress : Rather much   Social Connections: Socially Isolated (5/12/2021)    Social Connection and Isolation Panel [NHANES]    • Frequency of Communication with Friends and Family: Never    • Frequency of Social Gatherings with Friends and Family: Never    • Attends Sabianist Services: Never    • Active Member of Clubs or Organizations: No    • Attends Club or Organization Meetings: Never    • Marital Status:    Intimate Partner Violence: Not At Risk (5/12/2021)    Humiliation, Afraid, Rape, and Kick questionnaire    • Fear of Current or Ex-Partner: No    • Emotionally Abused: No    • Physically Abused: No    • Sexually Abused: No   Housing Stability: Not on file       Current Outpatient Medications:   •  buPROPion (WELLBUTRIN XL) 300 mg 24 hr tablet, Take 300 mg by mouth daily, Disp: , Rfl:   •  busPIRone (BUSPAR) 10 mg tablet, Take 10 mg by mouth 3 (three) times a day, Disp: , Rfl:   •  hydrOXYzine HCL (ATARAX) 50 mg tablet, Take 50 mg by mouth daily at bedtime, Disp: , Rfl:   •  lurasidone (LATUDA) 20 mg tablet, Take 20 mg by mouth daily at bedtime, Disp: , Rfl:   •  meclizine (ANTIVERT) 25 mg tablet, Take 1 tablet (25 mg total) by mouth every 8 (eight) hours (Patient taking differently: Take 25 mg by mouth every 8 (eight) hours PRN), Disp: 30 tablet, Rfl: 0  •  pantoprazole (PROTONIX) 40 mg tablet, Take 1 tablet (40 mg total) by mouth daily, Disp: 30 tablet, Rfl: 0  •  traZODone (DESYREL) 150 mg tablet, Take 1 tablet (150 mg total) by mouth daily at bedtime, Disp: 30 tablet, Rfl: 0  •  traMADol (Ultram) 50 mg tablet, Take 1 tablet (50 mg total) by mouth every 6 (six) hours as needed for moderate pain (Patient not taking: Reported on 7/24/2023), Disp: 10 tablet, Rfl: 0  No Known Allergies  Vitals:    07/24/23 0808   BP: 128/82   Pulse: 77   Temp: 98.2 °F (36.8 °C)   SpO2: 98%       Physical Exam  Constitutional: Patient is well-developed and well-nourished who appears the stated age in no acute distress. Patient is pleasant and talkative. HEENT:  Normocephalic. Sclerae are anicteric.  Mucous membranes are moist. Neck is supple without adenopathy. No JVD. Incision healing well  Chest: Equal chest rise, easy WOB  Cardiac: Heart is regular rate. Abdomen: Abdomen is non-tender, non-distended and without masses. Extremities: There is no clubbing or cyanosis. There is no edema. Symmetric. Neuro: Grossly nonfocal. Gait is normal.     Lymphatic: No evidence of cervical adenopathy bilaterally. No evidence of axillary adenopathy bilaterally. No evidence of inguinal adenopathy bilaterally. Skin: Warm, anicteric. Psych:  Patient is pleasant and talkative. Pathology:  Final Diagnosis   A. 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 I reviewed the above laboratory and imaging data. Discussion/Summary:   62 yo female s/p left thyroidectomy, final path NIFTP/. This finding was explained and requires no additional surveillance. Will see her in 6 mo to re-denise; RT nodules and demonstrate stability there.

## 2023-07-25 DIAGNOSIS — K21.9 GASTROESOPHAGEAL REFLUX DISEASE: ICD-10-CM

## 2023-07-25 RX ORDER — PANTOPRAZOLE SODIUM 40 MG/1
40 TABLET, DELAYED RELEASE ORAL DAILY
Qty: 30 TABLET | Refills: 0 | Status: SHIPPED | OUTPATIENT
Start: 2023-07-25 | End: 2023-08-24

## 2023-08-24 DIAGNOSIS — K21.9 GASTROESOPHAGEAL REFLUX DISEASE: ICD-10-CM

## 2023-08-24 RX ORDER — PANTOPRAZOLE SODIUM 40 MG/1
40 TABLET, DELAYED RELEASE ORAL DAILY
Qty: 30 TABLET | Refills: 0 | Status: SHIPPED | OUTPATIENT
Start: 2023-08-24 | End: 2023-09-23

## 2023-09-22 DIAGNOSIS — K21.9 GASTROESOPHAGEAL REFLUX DISEASE: ICD-10-CM

## 2023-09-22 RX ORDER — PANTOPRAZOLE SODIUM 40 MG/1
40 TABLET, DELAYED RELEASE ORAL DAILY
Qty: 30 TABLET | Refills: 0 | Status: SHIPPED | OUTPATIENT
Start: 2023-09-22 | End: 2023-10-22

## 2023-11-03 ENCOUNTER — OFFICE VISIT (OUTPATIENT)
Dept: FAMILY MEDICINE CLINIC | Facility: CLINIC | Age: 58
End: 2023-11-03
Payer: COMMERCIAL

## 2023-11-03 VITALS
RESPIRATION RATE: 18 BRPM | DIASTOLIC BLOOD PRESSURE: 72 MMHG | HEART RATE: 100 BPM | SYSTOLIC BLOOD PRESSURE: 118 MMHG | WEIGHT: 246.8 LBS | BODY MASS INDEX: 39.66 KG/M2 | HEIGHT: 66 IN | TEMPERATURE: 99.2 F | OXYGEN SATURATION: 95 %

## 2023-11-03 DIAGNOSIS — M47.816 LUMBAR SPONDYLOSIS: ICD-10-CM

## 2023-11-03 DIAGNOSIS — E66.9 OBESITY (BMI 30-39.9): ICD-10-CM

## 2023-11-03 DIAGNOSIS — D49.7 NONINVASIVE FOLLICULAR NEOPLASM OF THYROID WITH PAPILLARY-LIKE NUCLEAR FEATURES: Primary | ICD-10-CM

## 2023-11-03 DIAGNOSIS — F33.41 RECURRENT MAJOR DEPRESSIVE DISORDER, IN PARTIAL REMISSION (HCC): ICD-10-CM

## 2023-11-03 DIAGNOSIS — F17.219 CIGARETTE NICOTINE DEPENDENCE WITH NICOTINE-INDUCED DISORDER: ICD-10-CM

## 2023-11-03 DIAGNOSIS — Z00.00 ANNUAL PHYSICAL EXAM: ICD-10-CM

## 2023-11-03 DIAGNOSIS — K21.9 GASTROESOPHAGEAL REFLUX DISEASE, UNSPECIFIED WHETHER ESOPHAGITIS PRESENT: ICD-10-CM

## 2023-11-03 DIAGNOSIS — K21.9 GASTROESOPHAGEAL REFLUX DISEASE: ICD-10-CM

## 2023-11-03 PROCEDURE — 99396 PREV VISIT EST AGE 40-64: CPT | Performed by: FAMILY MEDICINE

## 2023-11-03 PROCEDURE — 99214 OFFICE O/P EST MOD 30 MIN: CPT | Performed by: FAMILY MEDICINE

## 2023-11-03 RX ORDER — PANTOPRAZOLE SODIUM 40 MG/1
40 TABLET, DELAYED RELEASE ORAL DAILY
Qty: 90 TABLET | Refills: 3 | Status: SHIPPED | OUTPATIENT
Start: 2023-11-03 | End: 2023-12-03

## 2023-11-03 NOTE — PROGRESS NOTES
[address]    NAME: Gael Saul  AGE: 61 y.o. SEX: female  : 1965     DATE: 11/3/2023    Chief Complaint   Patient presents with    Physical Exam     Annual check up      Health Maintenance   Topic Date Due    HIV Screening  Never done    Hepatitis A Vaccine (1 of 2 - Risk 2-dose series) Never done    Cervical Cancer Screening  Never done    Breast Cancer Screening: Mammogram  2016    COVID-19 Vaccine (3 - Pfizer series) 2021    Annual Physical  2022    Influenza Vaccine (1) Never done    BMI: Followup Plan  2024    BMI: Adult  2024    Hepatitis B Vaccine (1 of 3 - Risk 3-dose series) 2024    Colorectal Cancer Screening  10/05/2025    DTaP,Tdap,and Td Vaccines (2 - Td or Tdap) 2031    Hepatitis C Screening  Completed    Pneumococcal Vaccine: Pediatrics (0 to 5 Years) and At-Risk Patients (6 to 59 Years)  Completed    HIB Vaccine  Aged Out    IPV Vaccine  Aged Out    Meningococcal ACWY Vaccine  Aged Out    HPV Vaccine  Aged Out        Assessment and Plan:     Problem List Items Addressed This Visit          Digestive    Gastroesophageal reflux disease     Continue pantoprazole 40mg Qd. 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 Pt refused. Musculoskeletal and Integument    Lumbar spondylosis     Check xray and refer to pain specialist.          Relevant Orders    Ambulatory referral to Spine & Pain Management    XR spine lumbar minimum 4 views non injury       Other    Depression     Stable.  Continue current meds per psychiatrist.          Obesity (BMI 30-39. 9)     Other Visit Diagnoses       Annual physical exam              Refused flu shot. Got Covid19 vaccine. Denies SE. Got Tdap in 2021. Got PCV20 in 4/2023. Advised pt to get shingrix at pharmacy. Got mammogram script. Advised pt to make an appt. Refer to OBGYN for pap. Will make an appt per pt. Colonoscopy 10/2020, repeat in 5 years. RTO in 6 months. Immunizations and preventive care screenings were discussed with patient today. Appropriate education was printed on patient's after visit summary. Counseling:  Alcohol/drug use: discussed moderation in alcohol intake, the recommendations for healthy alcohol use, and avoidance of illicit drug use. Dental Health: discussed importance of regular tooth brushing, flossing, and dental visits. Injury prevention: discussed safety/seat belts, safety helmets, smoke detectors, carbon dioxide detectors, and smoking near bedding or upholstery. Exercise: the importance of regular exercise/physical activity was discussed. Recommend exercise 3-5 times per week for at least 30 minutes. Return in about 6 months (around 5/3/2024) for Recheck. Chief Complaint:     Chief Complaint   Patient presents with    Physical Exam     Annual check up       History of Present Illness:     Adult Annual Physical   Patient here for a comprehensive physical exam. The patient reports problems - lower back pain . Lower back pain flare up recently. Right side lower back pain, come and go. 8/10. No radiating to legs. Denies fever. Denies urine /BM incontinence. Denies injury/fall. Hx of back surgery. Saw Dr. Domitila Fernández before. Refused PT today. Thyroid nodule---Had left thyroid lobectomy in 7/2023. Final path NIFTP. No additional surveillance. FU surg/onc. Depression---Stable. FU psychiatrist Dr. Dayna Lopez from [facility]. FU therapist weekly. GERD---She is on pantoprazole 40mg QD. Miss dose make symptoms coming back per pt. Smoke 1ppd for 40 years. Refused to quit now. 4/2023 CT lung screen negative for nodules. No alcohol. No drugs. Diet and Physical Activity  Diet/Nutrition: well balanced diet. Exercise: no formal exercise. Depression Screening  PHQ-2/9 Depression Screening           General Health  Sleep: sleeps well. Hearing: normal - bilateral.  Vision: wears glasses. Dental:  has denture . /GYN Health  Patient is: postmenopausal  Due for pap, will FU GYN Dr. Travis Allen per pt. Review of Systems:     Review of Systems   Constitutional:  Negative for appetite change, chills and fever. HENT:  Negative for congestion, ear pain, sinus pain and sore throat. Eyes:  Negative for discharge and itching. Respiratory:  Negative for apnea, cough, chest tightness, shortness of breath and wheezing. Cardiovascular:  Negative for chest pain, palpitations and leg swelling. Gastrointestinal:  Negative for abdominal pain, anal bleeding, constipation, diarrhea, nausea and vomiting. Endocrine: Negative for cold intolerance, heat intolerance and polyuria. Genitourinary:  Negative for difficulty urinating and dysuria. Musculoskeletal:  Positive for back pain. Negative for arthralgias and myalgias. Skin:  Negative for rash. Neurological:  Negative for dizziness and headaches. Psychiatric/Behavioral:  Negative for agitation. Past Medical History:     Past Medical History:   Diagnosis Date    Anxiety     Back pain     Depression     GERD (gastroesophageal reflux disease)     SOB (shortness of breath)     Vertigo       Past Surgical History:     Past Surgical History:   Procedure Laterality Date    CERVICAL DISC SURGERY      CHOLECYSTECTOMY      FOOT SURGERY      LAPAROTOMY N/A 2/2/2021    Procedure: LAPAROTOMY FOR SPINAL SURGERY;  Surgeon: Chad Najera MD;  [facility];  Service: General    LUMBAR FUSION N/A 2/2/2021    Procedure: Anterior lumbar interbody fusion L5-S1;  Posterior lumbar laminectomy, decompression, instrumented fusion L5-S1 with allograft and neuromonitoring;  Surgeon: Yee Benavides MD;  [facility];  Service: Orthopedics    WA EGD TRANSORAL BIOPSY SINGLE/MULTIPLE N/A 4/20/2016    Procedure: ESOPHAGOGASTRODUODENOSCOPY (EGD); Surgeon: Billy Wilson MD;  [facility]; Service: Gastroenterology    THYROID LOBECTOMY N/A 7/3/2023    Procedure: LEFT THYROID LOBECTOMY;  Surgeon: Rodri Osman MD;  [facility];  Service: Surgical Oncology    TONSILLECTOMY      TUBAL LIGATION      UPPER GASTROINTESTINAL ENDOSCOPY      US GUIDED THYROID BIOPSY  5/30/2023      Social History:     Social History     Socioeconomic History    Marital status: /Civil Union     Spouse name: None    Number of children: None    Years of education: None    Highest education level: None   Occupational History    Occupation: Caregiver   Tobacco Use    Smoking status: Every Day     Packs/day: 1.00     Types: Cigarettes     Passive exposure: Current    Smokeless tobacco: Never    Tobacco comments:     started patch 1/2021, still using 3/4 ppd.     Vaping Use    Vaping Use: Never used   Substance and Sexual Activity    Alcohol use: Not Currently     Comment: 0    Drug use: Not Currently     Types: Oxycodone, Methamphetamines     Comment: 7/18/2020 last use    Sexual activity: Not Currently   Other Topics Concern    None   Social History Narrative    None     Social Determinants of Health     Financial Resource Strain: Low Risk  (5/12/2021)    Overall Financial Resource Strain (CARDIA)     Difficulty of Paying Living Expenses: Not very hard   Food Insecurity: No Food Insecurity (5/12/2021)    Hunger Vital Sign     Worried About Running Out of Food in the Last Year: Never true     Ran Out of Food in the Last Year: Never true   Transportation Needs: Unmet Transportation Needs (5/12/2021)    PRAPARE - Transportation     Lack of Transportation (Medical): Yes     Lack of Transportation (Non-Medical): Yes   Physical Activity: Sufficiently Active (5/12/2021)    Exercise Vital Sign     Days of Exercise per Week: 7 days     Minutes of Exercise per Session: 30 min   Stress: Stress Concern Present (5/12/2021)    [facility]     Feeling of Stress : Rather much   Social Connections: Socially Isolated (5/12/2021)    Social Connection and Isolation Panel [NHANES]     Frequency of Communication with Friends and Family: Never     Frequency of Social Gatherings with Friends and Family: Never     Attends Denominational Services: Never     Active Member of Clubs or Organizations: No     Attends Club or Organization Meetings: Never     Marital Status:    Intimate Partner Violence: Not At Risk (5/12/2021)    Humiliation, Afraid, Rape, and Kick questionnaire     Fear of Current or Ex-Partner: No     Emotionally Abused: No     Physically Abused: No     Sexually Abused: No   Housing Stability: Not on file      Family History:     Family History   Problem Relation Age of Onset    Aneurysm Mother     Hypertension Mother     Heart attack Father     Aneurysm Sister     Aneurysm Brother     Mental illness Son       Current Medications:     Current Outpatient Medications   Medication Sig Dispense Refill    buPROPion (WELLBUTRIN XL) 300 mg 24 hr tablet Take 300 mg by mouth daily      busPIRone (BUSPAR) 10 mg tablet Take 10 mg by mouth 3 (three) times a day      hydrOXYzine HCL (ATARAX) 50 mg tablet Take 50 mg by mouth daily at bedtime      lurasidone (LATUDA) 20 mg tablet Take 20 mg by mouth daily at bedtime      pantoprazole (PROTONIX) 40 mg tablet Take 1 tablet (40 mg total) by mouth daily 90 tablet 3    traZODone (DESYREL) 150 mg tablet Take 1 tablet (150 mg total) by mouth daily at bedtime 30 tablet 0     No current facility-administered medications for this visit.       Allergies:     No Known Allergies   Physical Exam:     /72 Pulse 100   Temp 99.2 °F (37.3 °C) (Tympanic)   Resp 18   Ht 5' 6" (1.676 m)   Wt 112 kg (246 lb 12.8 oz)   SpO2 95%   BMI 39.83 kg/m²     Physical Exam  Vitals reviewed. Constitutional:       Appearance: Normal appearance. HENT:      Head: Normocephalic and atraumatic. Eyes:      General:         Right eye: No discharge. Left eye: No discharge. Conjunctiva/sclera: Conjunctivae normal.   Neck:      Vascular: No carotid bruit. Cardiovascular:      Rate and Rhythm: Normal rate and regular rhythm. Heart sounds: Normal heart sounds. No murmur heard. No friction rub. No gallop. Pulmonary:      Effort: Pulmonary effort is normal. No respiratory distress. Breath sounds: Normal breath sounds. No wheezing or rales. Abdominal:      General: Bowel sounds are normal. There is no distension. Palpations: Abdomen is soft. Tenderness: There is no abdominal tenderness. Musculoskeletal:         General: Tenderness present. No swelling or deformity. Normal range of motion. Cervical back: Normal range of motion and neck supple. No muscular tenderness. Lymphadenopathy:      Cervical: No cervical adenopathy. Neurological:      Mental Status: She is alert.    Psychiatric:         Mood and Affect: Mood normal.          Alison Christianson MD  [address] [place] [address]

## 2023-11-17 ENCOUNTER — APPOINTMENT (OUTPATIENT)
Dept: LAB | Facility: CLINIC | Age: 58
End: 2023-11-17
Payer: COMMERCIAL

## 2023-11-17 DIAGNOSIS — D49.7 NONINVASIVE FOLLICULAR NEOPLASM OF THYROID WITH PAPILLARY-LIKE NUCLEAR FEATURES: ICD-10-CM

## 2023-11-17 LAB — TSH SERPL DL<=0.05 MIU/L-ACNC: 3.26 UIU/ML (ref 0.45–4.5)

## 2023-11-17 PROCEDURE — 36415 COLL VENOUS BLD VENIPUNCTURE: CPT

## 2023-11-17 PROCEDURE — 84443 ASSAY THYROID STIM HORMONE: CPT

## 2023-11-20 ENCOUNTER — TELEPHONE (OUTPATIENT)
Dept: FAMILY MEDICINE CLINIC | Facility: CLINIC | Age: 58
End: 2023-11-20

## 2023-12-20 DIAGNOSIS — K21.9 GASTROESOPHAGEAL REFLUX DISEASE: ICD-10-CM

## 2023-12-20 RX ORDER — PANTOPRAZOLE SODIUM 40 MG/1
40 TABLET, DELAYED RELEASE ORAL DAILY
Qty: 30 TABLET | Refills: 0 | Status: SHIPPED | OUTPATIENT
Start: 2023-12-20 | End: 2024-01-19

## 2024-01-08 ENCOUNTER — TELEPHONE (OUTPATIENT)
Dept: HEMATOLOGY ONCOLOGY | Facility: CLINIC | Age: 59
End: 2024-01-08

## 2024-01-08 NOTE — TELEPHONE ENCOUNTER
Appointment Change  Cancel, Reschedule, Change to Virtual      Who are you speaking with? Patient   If it is not the patient, is the caller listed on the communication consent form? N/A   Which provider is the appointment scheduled with? Dr. Field   When was the original appointment scheduled?    Please list date and time 1/22 8am    At which location is the appointment scheduled to take place? [place]   Was the appointment rescheduled?     Was the appointment changed from an in person visit to a virtual visit?    If so, please list the details of the change. 1/25 10am    What is the reason for the appointment change? Provider schedule change        Was STAR transport scheduled? No   Does STAR transport need to be scheduled for the new visit (if applicable) No   Does the patient need an infusion appointment rescheduled? No   Does the patient have an upcoming infusion appointment scheduled? If so, when? No   Is the patient undergoing chemotherapy? No   For appointments cancelled with less than 24 hours:  Was the no-show policy reviewed? N/A

## 2024-01-10 NOTE — APP STUDENT NOTE
"  STEFANIE STUDENT  Inpatient H&P Exam for TRAINING ONLY  Not Part of Legal Medical Record       H&P Exam - Mikey Brizuela 61 y o  female MRN: [id number]  Unit/Bed#: ED 06 Encounter: [id number]    Vertigo  · Patient presented to ED with increased vertigo over the past month  Patient states that the episodes of dizziness/vertigo are occurring about 3 times daily for 5 minutes  However, after the episodes resolve the patient \"still doesn't feel right\"  · Labs in ED were largely unremarkable  · Hemoglobin 14 2  · Troponin: 3//4  · Glucose:  80  · CTA head and neck w wo contrast 03/29:  Results pending  · Consider MRI depending on CTA results  · Nurology consult  · BPPV vs TIA vs other cerebral etiology  · Frequent Neuro checks  · Consult PT/OT  · Consider trial of meclizine    Anxiety  · Continue Wellbutrin 300mg each morning  · Continue Buspar HCL 10mg TID  · Continue Hydroxyzine 50mg as needed at bedtime    Cigarette nicotine dependence with nicotine-induced disorder  ·  on importance of smoking cessation    VTE Prophylaxis: Hold until CTA results return  / sequential compression device   Code Status: Level 1- Full code  POLST: There is no POLST form on file for this patient (pre-hospital)  Discussion with family: Discussed plan of care with patient and she was agreeable to treatment    Anticipated Length of Stay:  Patient will be admitted on an Emergency basis with an anticipated length of stay of  < 2 midnights  Justification for Hospital Stay: Further imaging and neurology workup    Total Time for Visit, including Counseling / Coordination of Care: 30 minutes  Greater than 50% of this total time spent on direct patient counseling and coordination of care  Chief Complaint:   Dizziness    History of Present Illness:  Mikey Brizuela is a 61 y o  female who presents with dizziness increasing in frequency for the past month    Patient states that the dizziness started with changing positions in bed, " "then transitioned to feeling as though the \"room is spinning\" during the day  Patient states that the dizziness has occurred 3 times daily lasting about 5 minutes each time and resolving spontaneously for the past 4 days  Patient also reports intermittent episodes of \"heart flutter\" which take her breath away throughout the past month  Patient reports increased fatigue as well  Patient has a strong family history of brain aneurysms- reports mom and 2 brothers  of aneurysms and sister is alive with one currently  Patient denies losing consciousness with any of the dizzy episodes, weakness, chest pain, N/V/D/C  Patient recently had Wellbutrin dose increased to 300mg  Patient has no recent changes in diet  Patient smokes 3/4-1ppd and denies drug or ETOH use  Review of Systems:  Review of Systems   Constitutional: Positive for fatigue  Negative for appetite change, chills and fever  HENT: Negative for ear pain and sore throat  Eyes: Negative for pain and visual disturbance  Respiratory: Positive for shortness of breath (SOB with \"heart flutter\")  Negative for cough  Cardiovascular: Positive for palpitations (Intermittent \"heart flutter\" over the past month)  Negative for chest pain  Gastrointestinal: Negative for abdominal pain, constipation, diarrhea, nausea and vomiting  Genitourinary: Negative for dysuria and hematuria  Musculoskeletal: Negative for arthralgias and back pain  Skin: Negative for color change and rash  Neurological: Positive for dizziness, light-headedness and numbness (episode of right arm numbness and episode of b/l upper extremity and trunk numbness)  Negative for seizures, syncope, weakness and headaches  All other systems reviewed and are negative        Past Medical and Surgical History:     Past Medical History:   Diagnosis Date   • Anxiety    • Back pain    • Depression    • GERD (gastroesophageal reflux disease)    • SOB (shortness of breath)        Past " Surgical History:   Procedure Laterality Date   • CERVICAL DISC SURGERY     • CHOLECYSTECTOMY     • FOOT SURGERY     • LAPAROTOMY N/A 2/2/2021    Procedure: LAPAROTOMY FOR SPINAL SURGERY;  Surgeon: Mckinley Mcdonald MD;  [facility];  Service: General   • LUMBAR FUSION N/A 2/2/2021    Procedure: Anterior lumbar interbody fusion L5-S1; Posterior lumbar laminectomy, decompression, instrumented fusion L5-S1 with allograft and neuromonitoring;  Surgeon: Tahmina Lieberman MD;  [facility];  Service: Orthopedics   • TX EGD TRANSORAL BIOPSY SINGLE/MULTIPLE N/A 4/20/2016    Procedure: ESOPHAGOGASTRODUODENOSCOPY (EGD); Surgeon: Carlos Michael MD;  Location: [facility]; Service: Gastroenterology   • TONSILLECTOMY     • TUBAL LIGATION     • UPPER GASTROINTESTINAL ENDOSCOPY         Meds/Allergies:    Prior to Admission medications    Medication Sig Start Date End Date Taking?  Authorizing Provider   acetaminophen (TYLENOL) 500 mg tablet Take 500 mg by mouth every 6 (six) hours as needed for mild pain    Historical Provider, MD   ARIPiprazole (ABILIFY) 10 mg tablet Take 1 tablet (10 mg total) by mouth daily 4/30/21   Ezra Oglesby MD   benzonatate (TESSALON) 200 MG capsule Take 1 capsule (200 mg total) by mouth 3 (three) times a day as needed for cough 7/25/22   Mariela Smith MD   cholecalciferol (VITAMIN D3) 1,000 units tablet Take 1 tablet (1,000 Units total) by mouth daily 6/14/21 7/28/21  Obdulia Samayoa PA-C   cyanocobalamin 1,000 mcg/mL Inject 1 mL (1,000 mcg total) into a muscle every 30 (thirty) days 7/28/21   Mariela Smith MD   Diclofenac Sodium (VOLTAREN) 1 % Apply 2 g topically 4 (four) times a day as needed (Pain over the affected area) 4/29/21 7/28/21  Obdulia Samayoa PA-C   DULoxetine (CYMBALTA) 60 mg delayed release capsule Take 2 capsules (120 mg total) by mouth daily 4/29/21   Ezra Oglesby MD   famotidine (PEPCID) 20 mg tablet Take 20 mg by mouth as needed for heartburn    Historical Provider, MD   gabapentin (NEURONTIN) 300 mg capsule Take 1 capsule (300 mg total) by mouth 3 (three) times a day 4/29/21   Jerod Mcgraw MD   nicotine (NICODERM CQ) 21 mg/24 hr TD 24 hr patch Place 1 patch on the skin every 24 hours 1/13/21   Kalpana Villafana MD   nicotine polacrilex (NICORETTE) 2 mg gum Chew 1 each (2 mg total) as needed for smoking cessation 4/29/21   Alan Carter, PA-C   pantoprazole (PROTONIX) 40 mg tablet Take 1 tablet (40 mg total) by mouth daily 3/24/23 4/23/23  Kalpana Villafana MD   traZODone (DESYREL) 150 mg tablet Take 1 tablet (150 mg total) by mouth daily at bedtime 4/29/21   Jerod Mcgraw MD   varenicline (CHANTIX RAFAEL) 0 5 MG X 11 & 1 MG X 42 tablet Take one 0 5 mg tablet by mouth once daily for 3 days, then one 0 5 mg tablet by mouth twice daily for 4 days, then one 1 mg tablet by mouth twice daily  7/28/21   Kalpana Villafana MD   Viibryd 10 MG tablet  7/5/21   Historical Provider, MD LYON have reviewed home medications with patient personally  Allergies: No Known Allergies    Social History:     Marital Status: /Civil Union   Occupation: Retired  Patient Pre-hospital Living Situation: Home with   Patient Pre-hospital Level of Mobility: Independent  Patient Pre-hospital Diet Restrictions: None  Substance Use History:   Social History     Substance and Sexual Activity   Alcohol Use No     Social History     Tobacco Use   Smoking Status Every Day   • Packs/day: 1 00   • Types: Cigarettes   Smokeless Tobacco Never   Tobacco Comments    started patch 1/2021, still using 3/4 ppd        Social History     Substance and Sexual Activity   Drug Use Yes   • Types: Oxycodone, Methamphetamines    Comment: 7/18/2020 last use       Family History:    Family History   Problem Relation Age of Onset   • Aneurysm Mother    • Hypertension Mother    • Heart attack Father    • Aneurysm Sister    • Aneurysm Brother    • Mental illness Son        Physical Exam:     Vitals:   Blood Pressure: 123/57 (03/29/23 1645)  Pulse: 67 (03/29/23 1645)  Temperature: 98 3 °F (36 8 °C) (03/29/23 1344)  Temp Source: Oral (03/29/23 1344)  Respirations: 20 (03/29/23 1645)  SpO2: 95 % (03/29/23 1645)    Physical Exam  Vitals and nursing note reviewed  Constitutional:       General: She is not in acute distress  Appearance: She is well-developed  HENT:      Head: Normocephalic and atraumatic  Eyes:      Conjunctiva/sclera: Conjunctivae normal    Cardiovascular:      Rate and Rhythm: Normal rate and regular rhythm  Heart sounds: No murmur heard  Pulmonary:      Effort: Pulmonary effort is normal  No respiratory distress  Breath sounds: Normal breath sounds  Abdominal:      Palpations: Abdomen is soft  Tenderness: There is no abdominal tenderness  Musculoskeletal:         General: No swelling  Cervical back: Neck supple  Skin:     General: Skin is warm and dry  Capillary Refill: Capillary refill takes less than 2 seconds  Neurological:      Mental Status: She is alert  Psychiatric:         Mood and Affect: Mood normal      Additional Data:     Lab Results: I have personally reviewed pertinent reports  Results from last 7 days   Lab Units 03/29/23  1420   WBC Thousand/uL 9 00   HEMOGLOBIN g/dL 14 2   HEMATOCRIT % 42 8   PLATELETS Thousands/uL 278     Results from last 7 days   Lab Units 03/29/23  1420   SODIUM mmol/L 136   POTASSIUM mmol/L 3 8   CHLORIDE mmol/L 108   CO2 mmol/L 27   BUN mg/dL 9   CREATININE mg/dL 0 90   ANION GAP mmol/L 1*   CALCIUM mg/dL 9 5   GLUCOSE RANDOM mg/dL 95     Results from last 7 days   Lab Units 03/29/23  1420   INR  0 96     Results from last 7 days   Lab Units 03/29/23  1433   POC GLUCOSE mg/dl 80               Imaging: I have personally reviewed pertinent reports        CTA head and neck w wo contrast    (Results Pending)       EKG, Pathology, and Other Studies Reviewed on Admission:   · EKG:     Allscripts / Epic Records Reviewed: Yes     ** Please Note: This note has been constructed using a voice recognition system   ** 17 y/o M presenting with dizziness since last PM. History of dizziness in October. Patients states hew as making a sandwich and then suddenly felt wobbly and dizzy. Denies headache, nausea, vomiting, no fever, no history of head injury. Denies tinnitus, no other significant past medical history. No recent viral infection.  Normal physical exam. No current c/o dizziness.   Will obtain EKG, labs, head CT and reevaluate. 15 y/o M presenting with dizziness since last PM. History of dizziness in October. Patients states hew as making a sandwich and then suddenly felt wobbly and dizzy. Denies headache, nausea, vomiting, no fever, no history of head injury. Denies tinnitus, no other significant past medical history. No recent viral infection.  Normal physical exam. No current c/o dizziness.   Will obtain EKG, labs, head CT and reevaluate.

## 2024-01-22 DIAGNOSIS — K21.9 GASTROESOPHAGEAL REFLUX DISEASE: ICD-10-CM

## 2024-01-22 RX ORDER — PANTOPRAZOLE SODIUM 40 MG/1
40 TABLET, DELAYED RELEASE ORAL DAILY
Qty: 30 TABLET | Refills: 0 | Status: SHIPPED | OUTPATIENT
Start: 2024-01-22 | End: 2024-02-21

## 2024-01-24 ENCOUNTER — HOSPITAL ENCOUNTER (OUTPATIENT)
Dept: RADIOLOGY | Facility: HOSPITAL | Age: 59
Discharge: HOME | End: 2024-01-24
Attending: STUDENT IN AN ORGANIZED HEALTH CARE EDUCATION/TRAINING PROGRAM
Payer: COMMERCIAL

## 2024-01-24 DIAGNOSIS — D49.7 NONINVASIVE FOLLICULAR NEOPLASM OF THYROID WITH PAPILLARY-LIKE NUCLEAR FEATURES: ICD-10-CM

## 2024-01-24 PROCEDURE — 76536 US EXAM OF HEAD AND NECK: CPT

## 2024-01-25 ENCOUNTER — TELEPHONE (OUTPATIENT)
Dept: HEMATOLOGY ONCOLOGY | Facility: CLINIC | Age: 59
End: 2024-01-25

## 2024-01-25 ENCOUNTER — TELEPHONE (OUTPATIENT)
Dept: SURGICAL ONCOLOGY | Facility: CLINIC | Age: 59
End: 2024-01-25

## 2024-01-25 NOTE — TELEPHONE ENCOUNTER
Appointment Change  Cancel, Reschedule, Change to Virtual      Who are you speaking with? Patient   If it is not the patient, is the caller listed on the communication consent form? Yes   Which provider is the appointment scheduled with? Dr. Field   When was the original appointment scheduled?    Please list date and time 1/25 10am   At which location is the appointment scheduled to take place? [place]   Was the appointment rescheduled?     Was the appointment changed from an in person visit to a virtual visit?    If so, please list the details of the change. 2/6 1:45pm   What is the reason for the appointment change? Exposed to covid       Was STAR transport scheduled? No   Does STAR transport need to be scheduled for the new visit (if applicable) No   Does the patient need an infusion appointment rescheduled? No   Does the patient have an upcoming infusion appointment scheduled? If so, when? No   Is the patient undergoing chemotherapy? No   For appointments cancelled with less than 24 hours:  Was the no-show policy reviewed? Yes

## 2024-01-25 NOTE — TELEPHONE ENCOUNTER
Patient Call    Who are you speaking with? Patient    If it is not the patient, are they listed on an active communication consent form? N/A   What is the reason for this call? Patient calling to inform office she was around her grandson yesterday and he tested positive for Covid.  She wants to know if she should keep her appt today @10:00am with Dr. Field   Does this require a call back? yes   If a call back is required, please list best call back number [phone]   If a call back is required, advise that a message will be forwarded to their care team and someone will return their call as soon as possible.   Did you relay this information to the patient? Yes

## 2024-02-06 ENCOUNTER — OFFICE VISIT (OUTPATIENT)
Dept: SURGICAL ONCOLOGY | Facility: CLINIC | Age: 59
End: 2024-02-06
Payer: COMMERCIAL

## 2024-02-06 VITALS
SYSTOLIC BLOOD PRESSURE: 130 MMHG | WEIGHT: 250 LBS | TEMPERATURE: 98.4 F | OXYGEN SATURATION: 97 % | BODY MASS INDEX: 40.18 KG/M2 | HEIGHT: 66 IN | HEART RATE: 113 BPM | DIASTOLIC BLOOD PRESSURE: 70 MMHG

## 2024-02-06 DIAGNOSIS — D49.7 NONINVASIVE FOLLICULAR NEOPLASM OF THYROID WITH PAPILLARY-LIKE NUCLEAR FEATURES: Primary | ICD-10-CM

## 2024-02-06 PROCEDURE — 99213 OFFICE O/P EST LOW 20 MIN: CPT | Performed by: STUDENT IN AN ORGANIZED HEALTH CARE EDUCATION/TRAINING PROGRAM

## 2024-02-06 RX ORDER — HYDROXYZINE PAMOATE 50 MG/1
CAPSULE ORAL
COMMUNITY
Start: 2023-11-25

## 2024-02-06 NOTE — LETTER
February 6, 2024     Cipriano White MD  [address] [facility] [address]    Patient: Sara Law   YOB: 1965   Date of Visit: 2/6/2024       Dear Dr. White:    Thank you for referring Sara Law to me for evaluation. Below are my notes for this consultation.    If you have questions, please do not hesitate to call me. I look forward to following your patient along with you.         Sincerely,        Aishwarya Field MD        CC: No Recipients    Aishwarya Field MD  2/6/2024  2:17 PM  Sign when Signing Visit  Surgical Oncology Follow Up    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      Oncology History    No history exists.       Staging: LFT thyroid nodule; lft thyroidectomy with NIFTP; RT nodules remain    History of Present Illness:   56 yo female s/p lft thyroid for concerning nodule. Final path with NIFTP. Doing well with no voice changes, trouble swallowing. F/u US with stable nodule on right.    Review of Systems  Complete ROS Surg Onc:   Constitutional: The patient denies new or recent history of general fatigue, no recent weight loss, no change in appetite.   Eyes: No complaints of visual problems, no scleral icterus.   ENT: no complaints of ear pain, no hoarseness, no difficulty swallowing,  no tinnitus and no new masses in head, oral cavity, or neck.   Cardiovascular: No complaints of chest pain, no palpitations, no ankle edema.   Respiratory: No complaints of shortness of breath, no cough.   Gastrointestinal: No complaints of jaundice, no bloody stools, no  pale stools.   Genitourinary: No complaints of dysuria, no hematuria, no nocturia, no frequent urination, no urethral discharge.   Musculoskeletal: No complaints of weakness, paralysis, joint stiffness or arthralgias.  Integumentary: No complaints of rash, no new lesions.   Neurological: No complaints of convulsions, no seizures, no dizziness.   Hematologic/Lymphatic: No complaints of easy bruising.   Endocrine:  No hot or cold intolerance.  No polydipsia, polyphagia, or polyuria.  Allergy/immunology:  No environmental allergies.  No food allergies.  Not immunocompromised.  Skin:  No pallor or rash.  No wound.        Patient Active Problem List   Diagnosis   • Lower back pain   • Greater trochanteric bursitis of left hip   • Labral tear of hip, degenerative   • Lumbar disc herniation   • Cigarette nicotine dependence with nicotine-induced disorder   • Depression   • Pain of both hip joints   • Gastroesophageal reflux disease   • Screening for breast cancer   • Family history of brain aneurysm   • Anxiety   • Methamphetamine abuse, episodic (HCC)   • Radiculopathy, lumbar region   • Lumbar spondylosis   • Spinal stenosis of lumbar region   • Sacroiliitis (HCC)   • Chronic pain syndrome   • S/P lumbar fusion   • Dizziness   • Obesity (BMI 30-39.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     Past Medical History:   Diagnosis Date   • Anxiety    • Back pain    • Depression    • GERD (gastroesophageal reflux disease)    • SOB (shortness of breath)    • Vertigo      Past Surgical History:   Procedure Laterality Date   • CERVICAL DISC SURGERY     • CHOLECYSTECTOMY     • FOOT SURGERY     • LAPAROTOMY N/A 2/2/2021    Procedure: LAPAROTOMY FOR SPINAL SURGERY;  Surgeon: Edison Glynn MD;  [facility];  Service: General   • LUMBAR FUSION N/A 2/2/2021    Procedure: Anterior lumbar interbody fusion L5-S1; Posterior lumbar laminectomy, decompression,  instrumented fusion L5-S1 with allograft and neuromonitoring;  Surgeon: Aries Rivera MD;  [facility];  Service: Orthopedics   • DC EGD TRANSORAL BIOPSY SINGLE/MULTIPLE N/A 4/20/2016    Procedure: ESOPHAGOGASTRODUODENOSCOPY (EGD);  Surgeon: Darius Barr MD;  [facility];  Service: Gastroenterology   • THYROID LOBECTOMY N/A 7/3/2023    Procedure: LEFT THYROID LOBECTOMY;  Surgeon: Aishwarya Field MD;  [facility];  Service: Surgical Oncology   • TONSILLECTOMY     • TUBAL LIGATION     • UPPER GASTROINTESTINAL ENDOSCOPY     • US GUIDED THYROID BIOPSY  5/30/2023     Family History   Problem Relation Age of Onset   • Aneurysm Mother    • Hypertension Mother    • Heart attack Father    • Aneurysm Sister    • Aneurysm Brother    • Mental illness Son      Social History     Socioeconomic History   • Marital status: /Civil Union     Spouse name: Not on file   • Number of children: Not on file   • Years of education: Not on file   • Highest education level: Not on file   Occupational History   • Occupation: Caregiver   Tobacco Use   • Smoking status: Every Day     Current packs/day: 1.00     Types: Cigarettes     Passive exposure: Current   • Smokeless tobacco: Never   • Tobacco comments:     started patch 1/2021, still using 3/4 ppd.    Vaping Use   • Vaping status: Never Used   Substance and Sexual Activity   • Alcohol use: Not Currently     Comment: 0   • Drug use: Not Currently     Types: Oxycodone, Methamphetamines     Comment: 7/18/2020 last use   • Sexual activity: Not Currently   Other Topics Concern   • Not on file   Social History Narrative   • Not on file     Social Determinants of Health     Financial Resource Strain: Low Risk  (5/12/2021)    Overall Financial Resource Strain (CARDIA)    • Difficulty of Paying Living Expenses: Not very hard   Food Insecurity: No Food Insecurity (5/12/2021)    Hunger Vital Sign    • Worried About Running Out of Food in the Last Year: Never  true    • Ran Out of Food in the Last Year: Never true   Transportation Needs: Unmet Transportation Needs (5/12/2021)    PRAPARE - Transportation    • Lack of Transportation (Medical): Yes    • Lack of Transportation (Non-Medical): Yes   Physical Activity: Sufficiently Active (5/12/2021)    Exercise Vital Sign    • Days of Exercise per Week: 7 days    • Minutes of Exercise per Session: 30 min   Stress: Stress Concern Present (5/12/2021)    [employer] - Occupational Stress Questionnaire    • Feeling of Stress : Rather much   Social Connections: Socially Isolated (5/12/2021)    Social Connection and Isolation Panel [NHANES]    • Frequency of Communication with Friends and Family: Never    • Frequency of Social Gatherings with Friends and Family: Never    • Attends Hoahaoism Services: Never    • Active Member of Clubs or Organizations: No    • Attends Club or Organization Meetings: Never    • Marital Status:    Intimate Partner Violence: Not At Risk (5/12/2021)    Humiliation, Afraid, Rape, and Kick questionnaire    • Fear of Current or Ex-Partner: No    • Emotionally Abused: No    • Physically Abused: No    • Sexually Abused: No   Housing Stability: Not on file       Current Outpatient Medications:   •  buPROPion (WELLBUTRIN XL) 300 mg 24 hr tablet, Take 300 mg by mouth daily, Disp: , Rfl:   •  busPIRone (BUSPAR) 10 mg tablet, Take 10 mg by mouth 3 (three) times a day, Disp: , Rfl:   •  hydrOXYzine HCL (ATARAX) 50 mg tablet, Take 50 mg by mouth daily at bedtime, Disp: , Rfl:   •  hydrOXYzine pamoate (VISTARIL) 50 mg capsule, , Disp: , Rfl:   •  lurasidone (LATUDA) 20 mg tablet, Take 20 mg by mouth daily at bedtime, Disp: , Rfl:   •  pantoprazole (PROTONIX) 40 mg tablet, Take 1 tablet (40 mg total) by mouth daily, Disp: 30 tablet, Rfl: 0  •  traZODone (DESYREL) 150 mg tablet, Take 1 tablet (150 mg total) by mouth daily at bedtime, Disp: 30 tablet, Rfl: 0  No Known Allergies  Vitals:  "   02/06/24 1329   BP: 130/70   Pulse: (!) 113   Temp: 98.4 °F (36.9 °C)   SpO2: 97%       Physical Exam  Constitutional: Patient is well-developed and well-nourished who appears the stated age in no acute distress. Patient is pleasant and talkative.     HEENT:  Normocephalic.  Sclerae are anicteric. Mucous membranes are moist. Neck is supple without adenopathy. No JVD.   Incision healing well  Chest: Equal chest rise, easy WOB  Cardiac: Heart is regular rate.     Abdomen: Abdomen is non-tender, non-distended and without masses.     Extremities: There is no clubbing or cyanosis. There is no edema.  Symmetric.  Neuro: Grossly nonfocal. Gait is normal.     Lymphatic: No evidence of cervical adenopathy bilaterally. No evidence of axillary adenopathy bilaterally. No evidence of inguinal adenopathy bilaterally.     Skin: Warm, anicteric.    Psych:  Patient is pleasant and talkative.    Pathology:  Final Diagnosis   A. Thyroid, \"left\"; lobectomy:       - Noninvasive follicular thyroid neoplasm with papillary-like nuclear features (NIFTP), 1.5 cm, see note      - Background thyroid with previous biopsy changes       - Resection margin free of NIFTP      Labs:  Reviewed in Epic personally    Imaging    Thyroid US     IMPRESSION:     A 0.7 cm echogenic lesion in the left hemithyroidectomy bed, which may represent residual thyroid tissue.     Stable 1.2 cm RIGHT mid gland thyroid nodule (TR4 lesion) for which follow-up ultrasound is recommended in 1 year.       Discussion/Summary:   58 yo female s/p left thyroidectomy, final path NIFTP. This finding was explained and requires no additional surveillance. RT nodule will require repeat imaging in one year - she can return to PCP for this. Can fu prn.           "

## 2024-02-06 NOTE — PROGRESS NOTES
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      Oncology History    No history exists.       Staging: LFT thyroid nodule; lft thyroidectomy with NIFTP; RT nodules remain    History of Present Illness:   56 yo female s/p lft thyroid for concerning nodule. Final path with NIFTP. Doing well with no voice changes, trouble swallowing. F/u US with stable nodule on right.    Review of Systems  Complete ROS Surg Onc:   Constitutional: The patient denies new or recent history of general fatigue, no recent weight loss, no change in appetite.   Eyes: No complaints of visual problems, no scleral icterus.   ENT: no complaints of ear pain, no hoarseness, no difficulty swallowing,  no tinnitus and no new masses in head, oral cavity, or neck.   Cardiovascular: No complaints of chest pain, no palpitations, no ankle edema.   Respiratory: No complaints of shortness of breath, no cough.   Gastrointestinal: No complaints of jaundice, no bloody stools, no pale stools.   Genitourinary: No complaints of dysuria, no hematuria, no nocturia, no frequent urination, no urethral discharge.   Musculoskeletal: No complaints of weakness, paralysis, joint stiffness or arthralgias.  Integumentary: No complaints of rash, no new lesions.   Neurological: No complaints of convulsions, no seizures, no dizziness.   Hematologic/Lymphatic: No complaints of easy bruising.   Endocrine:  No hot or cold intolerance.  No polydipsia, polyphagia, or polyuria.  Allergy/immunology:  No environmental allergies.  No food  allergies.  Not immunocompromised.  Skin:  No pallor or rash.  No wound.        Patient Active Problem List   Diagnosis    Lower back pain    Greater trochanteric bursitis of left hip    Labral tear of hip, degenerative    Lumbar disc herniation    Cigarette nicotine dependence with nicotine-induced disorder    Depression    Pain of both hip joints    Gastroesophageal reflux disease    Screening for breast cancer    Family history of brain aneurysm    Anxiety    Methamphetamine abuse, episodic (HCC)    Radiculopathy, lumbar region    Lumbar spondylosis    Spinal stenosis of lumbar region    Sacroiliitis (HCC)    Chronic pain syndrome    S/P lumbar fusion    Dizziness    Obesity (BMI 30-39.9)    Thyroid nodule    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     Past Medical History:   Diagnosis Date    Anxiety     Back pain     Depression     GERD (gastroesophageal reflux disease)     SOB (shortness of breath)     Vertigo      Past Surgical History:   Procedure Laterality Date    CERVICAL DISC SURGERY      CHOLECYSTECTOMY      FOOT SURGERY      LAPAROTOMY N/A 2/2/2021    Procedure: LAPAROTOMY FOR SPINAL SURGERY;  Surgeon: Edison Glynn MD;  Location: [facility];  Service: General    LUMBAR FUSION N/A 2/2/2021    Procedure: Anterior lumbar interbody fusion L5-S1; Posterior lumbar laminectomy, decompression, instrumented fusion L5-S1 with allograft and neuromonitoring;  Surgeon: Aries Rivera MD;  Location: [facility];  Service: Orthopedics    MS EGD TRANSORAL BIOPSY SINGLE/MULTIPLE N/A 4/20/2016    Procedure: ESOPHAGOGASTRODUODENOSCOPY (EGD);  Surgeon: Darius Barr MD;  Location: [facility];  Service: Gastroenterology    THYROID LOBECTOMY N/A 7/3/2023    Procedure: LEFT THYROID LOBECTOMY;  Surgeon: Aishwarya Field MD;  Location: [facility];  Service: Surgical Oncology    TONSILLECTOMY      TUBAL LIGATION      UPPER GASTROINTESTINAL ENDOSCOPY      US GUIDED THYROID BIOPSY   5/30/2023     Family History   Problem Relation Age of Onset    Aneurysm Mother     Hypertension Mother     Heart attack Father     Aneurysm Sister     Aneurysm Brother     Mental illness Son      Social History     Socioeconomic History    Marital status: /Civil Union     Spouse name: Not on file    Number of children: Not on file    Years of education: Not on file    Highest education level: Not on file   Occupational History    Occupation: Caregiver   Tobacco Use    Smoking status: Every Day     Current packs/day: 1.00     Types: Cigarettes     Passive exposure: Current    Smokeless tobacco: Never    Tobacco comments:     started patch 1/2021, still using 3/4 ppd.    Vaping Use    Vaping status: Never Used   Substance and Sexual Activity    Alcohol use: Not Currently     Comment: 0    Drug use: Not Currently     Types: Oxycodone, Methamphetamines     Comment: 7/18/2020 last use    Sexual activity: Not Currently   Other Topics Concern    Not on file   Social History Narrative    Not on file     Social Determinants of Health     Financial Resource Strain: Low Risk  (5/12/2021)    Overall Financial Resource Strain (CARDIA)     Difficulty of Paying Living Expenses: Not very hard   Food Insecurity: No Food Insecurity (5/12/2021)    Hunger Vital Sign     Worried About Running Out of Food in the Last Year: Never true     Ran Out of Food in the Last Year: Never true   Transportation Needs: Unmet Transportation Needs (5/12/2021)    PRAPARE - Transportation     Lack of Transportation (Medical): Yes     Lack of Transportation (Non-Medical): Yes   Physical Activity: Sufficiently Active (5/12/2021)    Exercise Vital Sign     Days of Exercise per Week: 7 days     Minutes of Exercise per Session: 30 min   Stress: Stress Concern Present (5/12/2021)    [employer] - Occupational Stress Questionnaire     Feeling of Stress : Rather much   Social Connections: Socially Isolated (5/12/2021)     Social Connection and Isolation Panel [NHANES]     Frequency of Communication with Friends and Family: Never     Frequency of Social Gatherings with Friends and Family: Never     Attends Latter day Services: Never     Active Member of Clubs or Organizations: No     Attends Club or Organization Meetings: Never     Marital Status:    Intimate Partner Violence: Not At Risk (5/12/2021)    Humiliation, Afraid, Rape, and Kick questionnaire     Fear of Current or Ex-Partner: No     Emotionally Abused: No     Physically Abused: No     Sexually Abused: No   Housing Stability: Not on file       Current Outpatient Medications:     buPROPion (WELLBUTRIN XL) 300 mg 24 hr tablet, Take 300 mg by mouth daily, Disp: , Rfl:     busPIRone (BUSPAR) 10 mg tablet, Take 10 mg by mouth 3 (three) times a day, Disp: , Rfl:     hydrOXYzine HCL (ATARAX) 50 mg tablet, Take 50 mg by mouth daily at bedtime, Disp: , Rfl:     hydrOXYzine pamoate (VISTARIL) 50 mg capsule, , Disp: , Rfl:     lurasidone (LATUDA) 20 mg tablet, Take 20 mg by mouth daily at bedtime, Disp: , Rfl:     pantoprazole (PROTONIX) 40 mg tablet, Take 1 tablet (40 mg total) by mouth daily, Disp: 30 tablet, Rfl: 0    traZODone (DESYREL) 150 mg tablet, Take 1 tablet (150 mg total) by mouth daily at bedtime, Disp: 30 tablet, Rfl: 0  No Known Allergies  Vitals:    02/06/24 1329   BP: 130/70   Pulse: (!) 113   Temp: 98.4 °F (36.9 °C)   SpO2: 97%       Physical Exam  Constitutional: Patient is well-developed and well-nourished who appears the stated age in no acute distress. Patient is pleasant and talkative.     HEENT:  Normocephalic.  Sclerae are anicteric. Mucous membranes are moist. Neck is supple without adenopathy. No JVD.   Incision healing well  Chest: Equal chest rise, easy WOB  Cardiac: Heart is regular rate.     Abdomen: Abdomen is non-tender, non-distended and without masses.     Extremities: There is no clubbing or cyanosis. There is no edema.  Symmetric.  Neuro:  "Grossly nonfocal. Gait is normal.     Lymphatic: No evidence of cervical adenopathy bilaterally. No evidence of axillary adenopathy bilaterally. No evidence of inguinal adenopathy bilaterally.     Skin: Warm, anicteric.    Psych:  Patient is pleasant and talkative.    Pathology:  Final Diagnosis   A. Thyroid, \"left\"; lobectomy:       - Noninvasive follicular thyroid neoplasm with papillary-like nuclear features (NIFTP), 1.5 cm, see note      - Background thyroid with previous biopsy changes       - Resection margin free of NIFTP      Labs:  Reviewed in Epic personally    Imaging    Thyroid US     IMPRESSION:     A 0.7 cm echogenic lesion in the left hemithyroidectomy bed, which may represent residual thyroid tissue.     Stable 1.2 cm RIGHT mid gland thyroid nodule (TR4 lesion) for which follow-up ultrasound is recommended in 1 year.       Discussion/Summary:   56 yo female s/p left thyroidectomy, final path NIFTP. This finding was explained and requires no additional surveillance. RT nodule will require repeat imaging in one year - she can return to PCP for this. Can fu prn.           "

## 2024-02-20 DIAGNOSIS — K21.9 GASTROESOPHAGEAL REFLUX DISEASE: ICD-10-CM

## 2024-02-20 RX ORDER — PANTOPRAZOLE SODIUM 40 MG/1
40 TABLET, DELAYED RELEASE ORAL DAILY
Qty: 30 TABLET | Refills: 0 | Status: SHIPPED | OUTPATIENT
Start: 2024-02-20 | End: 2024-03-21

## 2024-02-21 PROBLEM — Z12.39 SCREENING FOR BREAST CANCER: Status: RESOLVED | Noted: 2019-11-08 | Resolved: 2024-02-21

## 2024-03-20 DIAGNOSIS — K21.9 GASTROESOPHAGEAL REFLUX DISEASE: ICD-10-CM

## 2024-03-20 RX ORDER — PANTOPRAZOLE SODIUM 40 MG/1
40 TABLET, DELAYED RELEASE ORAL DAILY
Qty: 30 TABLET | Refills: 0 | Status: SHIPPED | OUTPATIENT
Start: 2024-03-20 | End: 2024-04-19

## 2024-04-23 DIAGNOSIS — K21.9 GASTROESOPHAGEAL REFLUX DISEASE: ICD-10-CM

## 2024-04-23 RX ORDER — PANTOPRAZOLE SODIUM 40 MG/1
40 TABLET, DELAYED RELEASE ORAL DAILY
Qty: 30 TABLET | Refills: 5 | Status: SHIPPED | OUTPATIENT
Start: 2024-04-23 | End: 2024-10-20

## 2024-05-20 DIAGNOSIS — K21.9 GASTROESOPHAGEAL REFLUX DISEASE: ICD-10-CM

## 2024-05-21 RX ORDER — PANTOPRAZOLE SODIUM 40 MG/1
40 TABLET, DELAYED RELEASE ORAL DAILY
Qty: 30 TABLET | Refills: 0 | Status: SHIPPED | OUTPATIENT
Start: 2024-05-21 | End: 2024-11-17

## 2024-06-19 ENCOUNTER — APPOINTMENT (EMERGENCY)
Dept: CT IMAGING | Facility: HOSPITAL | Age: 59
DRG: 282 | End: 2024-06-19
Payer: COMMERCIAL

## 2024-06-19 ENCOUNTER — APPOINTMENT (EMERGENCY)
Dept: RADIOLOGY | Facility: HOSPITAL | Age: 59
DRG: 282 | End: 2024-06-19
Payer: COMMERCIAL

## 2024-06-19 ENCOUNTER — HOSPITAL ENCOUNTER (INPATIENT)
Facility: HOSPITAL | Age: 59
LOS: 1 days | Discharge: HOME | DRG: 282 | End: 2024-06-22
Attending: EMERGENCY MEDICINE | Admitting: INTERNAL MEDICINE
Payer: COMMERCIAL

## 2024-06-19 DIAGNOSIS — R10.13 EPIGASTRIC PAIN: ICD-10-CM

## 2024-06-19 DIAGNOSIS — R11.2 NAUSEA AND VOMITING, UNSPECIFIED VOMITING TYPE: ICD-10-CM

## 2024-06-19 DIAGNOSIS — F17.219 CIGARETTE NICOTINE DEPENDENCE WITH NICOTINE-INDUCED DISORDER: ICD-10-CM

## 2024-06-19 DIAGNOSIS — K85.90 ACUTE PANCREATITIS, UNSPECIFIED COMPLICATION STATUS, UNSPECIFIED PANCREATITIS TYPE: ICD-10-CM

## 2024-06-19 DIAGNOSIS — K86.1 INTERSTITIAL PANCREATITIS (HCC): Primary | ICD-10-CM

## 2024-06-19 LAB
ALBUMIN SERPL BCG-MCNC: 4.2 G/DL (ref 3.5–5)
ALP SERPL-CCNC: 68 U/L (ref 34–104)
ALT SERPL W P-5'-P-CCNC: 14 U/L (ref 7–52)
ANION GAP SERPL CALCULATED.3IONS-SCNC: 8 MMOL/L (ref 4–13)
AST SERPL W P-5'-P-CCNC: 12 U/L (ref 13–39)
BACTERIA UR QL AUTO: ABNORMAL /HPF
BASOPHILS # BLD AUTO: 0.05 THOUSANDS/ÂΜL (ref 0–0.1)
BASOPHILS NFR BLD AUTO: 0 % (ref 0–1)
BILIRUB SERPL-MCNC: 0.38 MG/DL (ref 0.2–1)
BILIRUB UR QL STRIP: NEGATIVE
BUN SERPL-MCNC: 13 MG/DL (ref 5–25)
CALCIUM SERPL-MCNC: 9.4 MG/DL (ref 8.4–10.2)
CARDIAC TROPONIN I PNL SERPL HS: 3 NG/L
CHLORIDE SERPL-SCNC: 101 MMOL/L (ref 96–108)
CLARITY UR: CLEAR
CO2 SERPL-SCNC: 25 MMOL/L (ref 21–32)
COLOR UR: ABNORMAL
CREAT SERPL-MCNC: 1.09 MG/DL (ref 0.6–1.3)
EOSINOPHIL # BLD AUTO: 0.07 THOUSAND/ÂΜL (ref 0–0.61)
EOSINOPHIL NFR BLD AUTO: 1 % (ref 0–6)
ERYTHROCYTE [DISTWIDTH] IN BLOOD BY AUTOMATED COUNT: 13.9 % (ref 11.6–15.1)
GFR SERPL CREATININE-BSD FRML MDRD: 55 ML/MIN/1.73SQ M
GLUCOSE SERPL-MCNC: 132 MG/DL (ref 65–140)
GLUCOSE UR STRIP-MCNC: NEGATIVE MG/DL
HCT VFR BLD AUTO: 45.2 % (ref 34.8–46.1)
HGB BLD-MCNC: 14.8 G/DL (ref 11.5–15.4)
HGB UR QL STRIP.AUTO: ABNORMAL
HOLD SPECIMEN: NORMAL
IMM GRANULOCYTES # BLD AUTO: 0.06 THOUSAND/UL (ref 0–0.2)
IMM GRANULOCYTES NFR BLD AUTO: 0 % (ref 0–2)
KETONES UR STRIP-MCNC: NEGATIVE MG/DL
LEUKOCYTE ESTERASE UR QL STRIP: ABNORMAL
LIPASE SERPL-CCNC: 42 U/L (ref 11–82)
LYMPHOCYTES # BLD AUTO: 2.58 THOUSANDS/ÂΜL (ref 0.6–4.47)
LYMPHOCYTES NFR BLD AUTO: 19 % (ref 14–44)
MCH RBC QN AUTO: 30.4 PG (ref 26.8–34.3)
MCHC RBC AUTO-ENTMCNC: 32.7 G/DL (ref 31.4–37.4)
MCV RBC AUTO: 93 FL (ref 82–98)
MONOCYTES # BLD AUTO: 0.65 THOUSAND/ÂΜL (ref 0.17–1.22)
MONOCYTES NFR BLD AUTO: 5 % (ref 4–12)
NEUTROPHILS # BLD AUTO: 10.5 THOUSANDS/ÂΜL (ref 1.85–7.62)
NEUTS SEG NFR BLD AUTO: 75 % (ref 43–75)
NITRITE UR QL STRIP: NEGATIVE
NON-SQ EPI CELLS URNS QL MICRO: ABNORMAL /HPF
NRBC BLD AUTO-RTO: 0 /100 WBCS
PH UR STRIP.AUTO: 6.5 [PH]
PLATELET # BLD AUTO: 278 THOUSANDS/UL (ref 149–390)
PMV BLD AUTO: 9.8 FL (ref 8.9–12.7)
POTASSIUM SERPL-SCNC: 4.3 MMOL/L (ref 3.5–5.3)
PROT SERPL-MCNC: 7.5 G/DL (ref 6.4–8.4)
PROT UR STRIP-MCNC: NEGATIVE MG/DL
RBC # BLD AUTO: 4.87 MILLION/UL (ref 3.81–5.12)
RBC #/AREA URNS AUTO: ABNORMAL /HPF
SODIUM SERPL-SCNC: 134 MMOL/L (ref 135–147)
SP GR UR STRIP.AUTO: 1.02 (ref 1–1.03)
UROBILINOGEN UR STRIP-ACNC: <2 MG/DL
WBC # BLD AUTO: 13.91 THOUSAND/UL (ref 4.31–10.16)
WBC #/AREA URNS AUTO: ABNORMAL /HPF

## 2024-06-19 PROCEDURE — 36415 COLL VENOUS BLD VENIPUNCTURE: CPT

## 2024-06-19 PROCEDURE — 99285 EMERGENCY DEPT VISIT HI MDM: CPT

## 2024-06-19 PROCEDURE — 71045 X-RAY EXAM CHEST 1 VIEW: CPT

## 2024-06-19 PROCEDURE — 96376 TX/PRO/DX INJ SAME DRUG ADON: CPT

## 2024-06-19 PROCEDURE — 96375 TX/PRO/DX INJ NEW DRUG ADDON: CPT

## 2024-06-19 PROCEDURE — 83690 ASSAY OF LIPASE: CPT | Performed by: EMERGENCY MEDICINE

## 2024-06-19 PROCEDURE — 80053 COMPREHEN METABOLIC PANEL: CPT | Performed by: EMERGENCY MEDICINE

## 2024-06-19 PROCEDURE — 93005 ELECTROCARDIOGRAM TRACING: CPT

## 2024-06-19 PROCEDURE — 74177 CT ABD & PELVIS W/CONTRAST: CPT

## 2024-06-19 PROCEDURE — 99285 EMERGENCY DEPT VISIT HI MDM: CPT | Performed by: EMERGENCY MEDICINE

## 2024-06-19 PROCEDURE — 96374 THER/PROPH/DIAG INJ IV PUSH: CPT

## 2024-06-19 PROCEDURE — 84484 ASSAY OF TROPONIN QUANT: CPT | Performed by: EMERGENCY MEDICINE

## 2024-06-19 PROCEDURE — 82150 ASSAY OF AMYLASE: CPT

## 2024-06-19 PROCEDURE — 80061 LIPID PANEL: CPT

## 2024-06-19 PROCEDURE — 84443 ASSAY THYROID STIM HORMONE: CPT

## 2024-06-19 PROCEDURE — 85025 COMPLETE CBC W/AUTO DIFF WBC: CPT | Performed by: EMERGENCY MEDICINE

## 2024-06-19 PROCEDURE — 81001 URINALYSIS AUTO W/SCOPE: CPT | Performed by: EMERGENCY MEDICINE

## 2024-06-19 PROCEDURE — 96361 HYDRATE IV INFUSION ADD-ON: CPT

## 2024-06-19 PROCEDURE — 99223 1ST HOSP IP/OBS HIGH 75: CPT

## 2024-06-19 RX ORDER — ONDANSETRON 2 MG/ML
4 INJECTION INTRAMUSCULAR; INTRAVENOUS ONCE
Status: COMPLETED | OUTPATIENT
Start: 2024-06-19 | End: 2024-06-19

## 2024-06-19 RX ORDER — NICOTINE 21 MG/24HR
1 PATCH, TRANSDERMAL 24 HOURS TRANSDERMAL DAILY
Status: DISCONTINUED | OUTPATIENT
Start: 2024-06-20 | End: 2024-06-22 | Stop reason: HOSPADM

## 2024-06-19 RX ORDER — HYDROMORPHONE HCL/PF 1 MG/ML
0.5 SYRINGE (ML) INJECTION ONCE
Status: COMPLETED | OUTPATIENT
Start: 2024-06-19 | End: 2024-06-19

## 2024-06-19 RX ORDER — BUPROPION HYDROCHLORIDE 150 MG/1
300 TABLET ORAL DAILY
Status: DISCONTINUED | OUTPATIENT
Start: 2024-06-20 | End: 2024-06-22 | Stop reason: HOSPADM

## 2024-06-19 RX ORDER — HYDROXYZINE 50 MG/1
50 TABLET, FILM COATED ORAL
Status: DISCONTINUED | OUTPATIENT
Start: 2024-06-20 | End: 2024-06-22 | Stop reason: HOSPADM

## 2024-06-19 RX ORDER — SODIUM CHLORIDE, SODIUM GLUCONATE, SODIUM ACETATE, POTASSIUM CHLORIDE, MAGNESIUM CHLORIDE, SODIUM PHOSPHATE, DIBASIC, AND POTASSIUM PHOSPHATE .53; .5; .37; .037; .03; .012; .00082 G/100ML; G/100ML; G/100ML; G/100ML; G/100ML; G/100ML; G/100ML
125 INJECTION, SOLUTION INTRAVENOUS CONTINUOUS
Status: DISCONTINUED | OUTPATIENT
Start: 2024-06-20 | End: 2024-06-19

## 2024-06-19 RX ORDER — PANTOPRAZOLE SODIUM 40 MG/10ML
40 INJECTION, POWDER, LYOPHILIZED, FOR SOLUTION INTRAVENOUS EVERY 12 HOURS SCHEDULED
Status: DISCONTINUED | OUTPATIENT
Start: 2024-06-20 | End: 2024-06-20

## 2024-06-19 RX ORDER — BUSPIRONE HYDROCHLORIDE 5 MG/1
10 TABLET ORAL 3 TIMES DAILY
Status: DISCONTINUED | OUTPATIENT
Start: 2024-06-20 | End: 2024-06-22 | Stop reason: HOSPADM

## 2024-06-19 RX ORDER — HYDROMORPHONE HCL IN WATER/PF 6 MG/30 ML
0.2 PATIENT CONTROLLED ANALGESIA SYRINGE INTRAVENOUS EVERY 2 HOUR PRN
Status: DISCONTINUED | OUTPATIENT
Start: 2024-06-19 | End: 2024-06-20 | Stop reason: SDUPTHER

## 2024-06-19 RX ORDER — ENOXAPARIN SODIUM 100 MG/ML
40 INJECTION SUBCUTANEOUS DAILY
Status: DISCONTINUED | OUTPATIENT
Start: 2024-06-20 | End: 2024-06-22 | Stop reason: HOSPADM

## 2024-06-19 RX ORDER — PANTOPRAZOLE SODIUM 40 MG/1
40 TABLET, DELAYED RELEASE ORAL DAILY
Status: DISCONTINUED | OUTPATIENT
Start: 2024-06-20 | End: 2024-06-19

## 2024-06-19 RX ORDER — ONDANSETRON 2 MG/ML
4 INJECTION INTRAMUSCULAR; INTRAVENOUS EVERY 6 HOURS PRN
Status: DISCONTINUED | OUTPATIENT
Start: 2024-06-19 | End: 2024-06-22 | Stop reason: HOSPADM

## 2024-06-19 RX ORDER — LURASIDONE HYDROCHLORIDE 20 MG/1
20 TABLET, FILM COATED ORAL
Status: DISCONTINUED | OUTPATIENT
Start: 2024-06-20 | End: 2024-06-20

## 2024-06-19 RX ORDER — SODIUM CHLORIDE, SODIUM LACTATE, POTASSIUM CHLORIDE, CALCIUM CHLORIDE 600; 310; 30; 20 MG/100ML; MG/100ML; MG/100ML; MG/100ML
100 INJECTION, SOLUTION INTRAVENOUS CONTINUOUS
Status: DISCONTINUED | OUTPATIENT
Start: 2024-06-19 | End: 2024-06-22

## 2024-06-19 RX ORDER — MAGNESIUM HYDROXIDE/ALUMINUM HYDROXICE/SIMETHICONE 120; 1200; 1200 MG/30ML; MG/30ML; MG/30ML
30 SUSPENSION ORAL EVERY 6 HOURS PRN
Status: DISCONTINUED | OUTPATIENT
Start: 2024-06-19 | End: 2024-06-22 | Stop reason: HOSPADM

## 2024-06-19 RX ADMIN — ONDANSETRON 4 MG: 2 INJECTION INTRAMUSCULAR; INTRAVENOUS at 20:45

## 2024-06-19 RX ADMIN — SODIUM CHLORIDE, SODIUM LACTATE, POTASSIUM CHLORIDE, AND CALCIUM CHLORIDE 200 ML/HR: .6; .31; .03; .02 INJECTION, SOLUTION INTRAVENOUS at 23:25

## 2024-06-19 RX ADMIN — IOHEXOL 100 ML: 350 INJECTION, SOLUTION INTRAVENOUS at 22:14

## 2024-06-19 RX ADMIN — HYDROMORPHONE HYDROCHLORIDE 0.5 MG: 1 INJECTION, SOLUTION INTRAMUSCULAR; INTRAVENOUS; SUBCUTANEOUS at 23:21

## 2024-06-19 RX ADMIN — SODIUM CHLORIDE 1000 ML: 0.9 INJECTION, SOLUTION INTRAVENOUS at 20:51

## 2024-06-19 RX ADMIN — HYDROMORPHONE HYDROCHLORIDE 0.5 MG: 1 INJECTION, SOLUTION INTRAMUSCULAR; INTRAVENOUS; SUBCUTANEOUS at 20:45

## 2024-06-19 NOTE — Clinical Note
Case was discussed with HERBIE Harding and the patient's admission status was agreed to be Admission Status: observation status to the service of Dr. Martinez .

## 2024-06-20 PROBLEM — R82.90 ABNORMAL URINALYSIS: Status: ACTIVE | Noted: 2024-06-20

## 2024-06-20 PROBLEM — K85.90 ACUTE PANCREATITIS: Status: ACTIVE | Noted: 2024-06-20

## 2024-06-20 PROBLEM — D72.829 LEUKOCYTOSIS: Status: ACTIVE | Noted: 2024-06-20

## 2024-06-20 PROBLEM — F41.9 ANXIETY AND DEPRESSION: Status: ACTIVE | Noted: 2019-11-08

## 2024-06-20 PROBLEM — E87.1 HYPONATREMIA: Status: ACTIVE | Noted: 2024-06-20

## 2024-06-20 LAB
ALBUMIN SERPL BCG-MCNC: 3.4 G/DL (ref 3.5–5)
ALP SERPL-CCNC: 72 U/L (ref 34–104)
ALT SERPL W P-5'-P-CCNC: 48 U/L (ref 7–52)
AMYLASE SERPL-CCNC: 57 IU/L (ref 29–103)
ANION GAP SERPL CALCULATED.3IONS-SCNC: 4 MMOL/L (ref 4–13)
AST SERPL W P-5'-P-CCNC: 63 U/L (ref 13–39)
ATRIAL RATE: 71 BPM
BASOPHILS # BLD AUTO: 0.02 THOUSANDS/ÂΜL (ref 0–0.1)
BASOPHILS NFR BLD AUTO: 0 % (ref 0–1)
BILIRUB SERPL-MCNC: 0.59 MG/DL (ref 0.2–1)
BUN SERPL-MCNC: 9 MG/DL (ref 5–25)
CALCIUM ALBUM COR SERPL-MCNC: 9 MG/DL (ref 8.3–10.1)
CALCIUM SERPL-MCNC: 8.5 MG/DL (ref 8.4–10.2)
CHLORIDE SERPL-SCNC: 105 MMOL/L (ref 96–108)
CHOLEST SERPL-MCNC: 166 MG/DL
CO2 SERPL-SCNC: 28 MMOL/L (ref 21–32)
CREAT SERPL-MCNC: 0.9 MG/DL (ref 0.6–1.3)
EOSINOPHIL # BLD AUTO: 0.07 THOUSAND/ÂΜL (ref 0–0.61)
EOSINOPHIL NFR BLD AUTO: 1 % (ref 0–6)
ERYTHROCYTE [DISTWIDTH] IN BLOOD BY AUTOMATED COUNT: 14 % (ref 11.6–15.1)
GFR SERPL CREATININE-BSD FRML MDRD: 70 ML/MIN/1.73SQ M
GLUCOSE SERPL-MCNC: 117 MG/DL (ref 65–140)
HCT VFR BLD AUTO: 39.8 % (ref 34.8–46.1)
HDLC SERPL-MCNC: 38 MG/DL
HGB BLD-MCNC: 13.2 G/DL (ref 11.5–15.4)
IMM GRANULOCYTES # BLD AUTO: 0.06 THOUSAND/UL (ref 0–0.2)
IMM GRANULOCYTES NFR BLD AUTO: 1 % (ref 0–2)
LDLC SERPL CALC-MCNC: 88 MG/DL (ref 0–100)
LYMPHOCYTES # BLD AUTO: 2.99 THOUSANDS/ÂΜL (ref 0.6–4.47)
LYMPHOCYTES NFR BLD AUTO: 26 % (ref 14–44)
MAGNESIUM SERPL-MCNC: 1.9 MG/DL (ref 1.9–2.7)
MCH RBC QN AUTO: 31.1 PG (ref 26.8–34.3)
MCHC RBC AUTO-ENTMCNC: 33.2 G/DL (ref 31.4–37.4)
MCV RBC AUTO: 94 FL (ref 82–98)
MONOCYTES # BLD AUTO: 0.87 THOUSAND/ÂΜL (ref 0.17–1.22)
MONOCYTES NFR BLD AUTO: 8 % (ref 4–12)
NEUTROPHILS # BLD AUTO: 7.61 THOUSANDS/ÂΜL (ref 1.85–7.62)
NEUTS SEG NFR BLD AUTO: 64 % (ref 43–75)
NRBC BLD AUTO-RTO: 0 /100 WBCS
P AXIS: 63 DEGREES
PHOSPHATE SERPL-MCNC: 3.4 MG/DL (ref 2.7–4.5)
PLATELET # BLD AUTO: 235 THOUSANDS/UL (ref 149–390)
PMV BLD AUTO: 9.7 FL (ref 8.9–12.7)
POTASSIUM SERPL-SCNC: 4.2 MMOL/L (ref 3.5–5.3)
PR INTERVAL: 156 MS
PROT SERPL-MCNC: 6.1 G/DL (ref 6.4–8.4)
QRS AXIS: 83 DEGREES
QRSD INTERVAL: 96 MS
QT INTERVAL: 390 MS
QTC INTERVAL: 423 MS
RBC # BLD AUTO: 4.24 MILLION/UL (ref 3.81–5.12)
SODIUM SERPL-SCNC: 137 MMOL/L (ref 135–147)
T WAVE AXIS: 68 DEGREES
TRIGL SERPL-MCNC: 199 MG/DL
TSH SERPL DL<=0.05 MIU/L-ACNC: 2.91 UIU/ML (ref 0.45–4.5)
VENTRICULAR RATE: 71 BPM
WBC # BLD AUTO: 11.62 THOUSAND/UL (ref 4.31–10.16)

## 2024-06-20 PROCEDURE — 85025 COMPLETE CBC W/AUTO DIFF WBC: CPT

## 2024-06-20 PROCEDURE — 80053 COMPREHEN METABOLIC PANEL: CPT

## 2024-06-20 PROCEDURE — C9113 INJ PANTOPRAZOLE SODIUM, VIA: HCPCS

## 2024-06-20 PROCEDURE — 99232 SBSQ HOSP IP/OBS MODERATE 35: CPT | Performed by: INTERNAL MEDICINE

## 2024-06-20 PROCEDURE — 84100 ASSAY OF PHOSPHORUS: CPT

## 2024-06-20 PROCEDURE — 93010 ELECTROCARDIOGRAM REPORT: CPT | Performed by: INTERNAL MEDICINE

## 2024-06-20 PROCEDURE — 83735 ASSAY OF MAGNESIUM: CPT

## 2024-06-20 PROCEDURE — 99214 OFFICE O/P EST MOD 30 MIN: CPT | Performed by: INTERNAL MEDICINE

## 2024-06-20 RX ORDER — TRAZODONE HYDROCHLORIDE 300 MG/1
300 TABLET ORAL
COMMUNITY
Start: 2024-05-26

## 2024-06-20 RX ORDER — PANTOPRAZOLE SODIUM 40 MG/10ML
40 INJECTION, POWDER, LYOPHILIZED, FOR SOLUTION INTRAVENOUS
Status: DISCONTINUED | OUTPATIENT
Start: 2024-06-21 | End: 2024-06-22 | Stop reason: HOSPADM

## 2024-06-20 RX ORDER — OXYCODONE HYDROCHLORIDE 5 MG/1
5 TABLET ORAL EVERY 4 HOURS PRN
Status: DISCONTINUED | OUTPATIENT
Start: 2024-06-20 | End: 2024-06-22 | Stop reason: HOSPADM

## 2024-06-20 RX ORDER — ACETAMINOPHEN 325 MG/1
650 TABLET ORAL EVERY 4 HOURS PRN
Status: DISCONTINUED | OUTPATIENT
Start: 2024-06-20 | End: 2024-06-22 | Stop reason: HOSPADM

## 2024-06-20 RX ORDER — HYDROMORPHONE HCL IN WATER/PF 6 MG/30 ML
0.2 PATIENT CONTROLLED ANALGESIA SYRINGE INTRAVENOUS EVERY 2 HOUR PRN
Status: DISCONTINUED | OUTPATIENT
Start: 2024-06-20 | End: 2024-06-22 | Stop reason: HOSPADM

## 2024-06-20 RX ORDER — LURASIDONE HYDROCHLORIDE 40 MG/1
40 TABLET, FILM COATED ORAL
COMMUNITY
Start: 2024-05-26

## 2024-06-20 RX ORDER — LURASIDONE HYDROCHLORIDE 40 MG/1
40 TABLET, FILM COATED ORAL
Status: DISCONTINUED | OUTPATIENT
Start: 2024-06-20 | End: 2024-06-22 | Stop reason: HOSPADM

## 2024-06-20 RX ORDER — TRAZODONE HYDROCHLORIDE 100 MG/1
300 TABLET ORAL
Status: DISCONTINUED | OUTPATIENT
Start: 2024-06-20 | End: 2024-06-22 | Stop reason: HOSPADM

## 2024-06-20 RX ADMIN — TRAZODONE HYDROCHLORIDE 300 MG: 100 TABLET ORAL at 21:12

## 2024-06-20 RX ADMIN — SODIUM CHLORIDE, SODIUM LACTATE, POTASSIUM CHLORIDE, AND CALCIUM CHLORIDE 150 ML/HR: .6; .31; .03; .02 INJECTION, SOLUTION INTRAVENOUS at 21:43

## 2024-06-20 RX ADMIN — BUPROPION HYDROCHLORIDE 300 MG: 150 TABLET, EXTENDED RELEASE ORAL at 09:05

## 2024-06-20 RX ADMIN — HYDROXYZINE HYDROCHLORIDE 50 MG: 25 TABLET ORAL at 00:48

## 2024-06-20 RX ADMIN — NICOTINE 1 PATCH: 14 PATCH, EXTENDED RELEASE TRANSDERMAL at 08:58

## 2024-06-20 RX ADMIN — HYDROMORPHONE HYDROCHLORIDE 0.2 MG: 0.2 INJECTION, SOLUTION INTRAMUSCULAR; INTRAVENOUS; SUBCUTANEOUS at 08:54

## 2024-06-20 RX ADMIN — HYDROMORPHONE HYDROCHLORIDE 0.2 MG: 0.2 INJECTION, SOLUTION INTRAMUSCULAR; INTRAVENOUS; SUBCUTANEOUS at 01:32

## 2024-06-20 RX ADMIN — LURASIDONE HYDROCHLORIDE 40 MG: 40 TABLET, COATED ORAL at 16:59

## 2024-06-20 RX ADMIN — ENOXAPARIN SODIUM 40 MG: 40 INJECTION SUBCUTANEOUS at 08:57

## 2024-06-20 RX ADMIN — HYDROMORPHONE HYDROCHLORIDE 0.2 MG: 0.2 INJECTION, SOLUTION INTRAMUSCULAR; INTRAVENOUS; SUBCUTANEOUS at 20:00

## 2024-06-20 RX ADMIN — PANTOPRAZOLE SODIUM 40 MG: 40 INJECTION, POWDER, FOR SOLUTION INTRAVENOUS at 09:00

## 2024-06-20 RX ADMIN — LURASIDONE HYDROCHLORIDE 20 MG: 20 TABLET, COATED ORAL at 00:48

## 2024-06-20 RX ADMIN — BUSPIRONE HYDROCHLORIDE 10 MG: 10 TABLET ORAL at 09:05

## 2024-06-20 RX ADMIN — HYDROXYZINE HYDROCHLORIDE 50 MG: 25 TABLET ORAL at 21:12

## 2024-06-20 RX ADMIN — TRAZODONE HYDROCHLORIDE 150 MG: 50 TABLET ORAL at 00:48

## 2024-06-20 RX ADMIN — HYDROMORPHONE HYDROCHLORIDE 0.2 MG: 0.2 INJECTION, SOLUTION INTRAMUSCULAR; INTRAVENOUS; SUBCUTANEOUS at 12:57

## 2024-06-20 RX ADMIN — HYDROMORPHONE HYDROCHLORIDE 0.2 MG: 0.2 INJECTION, SOLUTION INTRAMUSCULAR; INTRAVENOUS; SUBCUTANEOUS at 04:43

## 2024-06-20 RX ADMIN — OXYCODONE HYDROCHLORIDE 5 MG: 5 TABLET ORAL at 16:58

## 2024-06-20 RX ADMIN — BUSPIRONE HYDROCHLORIDE 10 MG: 10 TABLET ORAL at 00:48

## 2024-06-20 RX ADMIN — BUSPIRONE HYDROCHLORIDE 10 MG: 10 TABLET ORAL at 16:58

## 2024-06-20 RX ADMIN — PANTOPRAZOLE SODIUM 40 MG: 40 INJECTION, POWDER, FOR SOLUTION INTRAVENOUS at 00:50

## 2024-06-20 RX ADMIN — BUSPIRONE HYDROCHLORIDE 10 MG: 10 TABLET ORAL at 21:12

## 2024-06-20 NOTE — ASSESSMENT & PLAN NOTE
Results from last 7 days   Lab Units 06/19/24  2132   LEUKOCYTES UA  Small*   NITRITE UA  Negative   GLUCOSE UA mg/dl Negative   KETONES UA mg/dl Negative   BLOOD UA  Small*   WBC UA /hpf 4-10*   RBC UA /hpf 2-4*   BACTERIA UA /hpf Occasional     Patient presently denying symptoms of dysuria.

## 2024-06-20 NOTE — ASSESSMENT & PLAN NOTE
Symptoms started in the morning of 6/19/2024 with diffuse abdominal pain nausea and vomiting. No hx of ETOH, recent illness, steroid use. In the ER the patient required a few doses of Dilaudid to help with severe abdominal pain, Zofran given for nausea.     Workup:  - CT abdomen and pelvis shows mild intrahepatic biliary ductal dilatation. Common bile duct is normal in caliber. Findings may be due to prior cholecystectomy but recommend correlation with obstructive LFTs, minimal stranding in the region of the head of the pancreas, and around the upper SMV compatible with mild acute interstitial pancreatitis. No evidence of necrosis and no fluid or fluid collection  - Blood work shows triglycerides of 199, AST 12, lipase 42, amylase ordering, awaiting for results.  Leukocytosis and hyponatremia are present  - The patient does not meet SIRS or sepsis criteria as of now    Possible autoimmune vs idiopathic vs 2/2 biliary sludge    Plan:  - Continue IV fluids  cc/hr  - Currently n.p.o. due to EGD today.  Plan to reintroduce diet slowly after procedure.  - Pain regimen in place  - PRN zofran for nausea  - Protonix 40 mg daily  - GI consult: Plan for MRI/MRCP and EGD this afternoon to rule out PUD.

## 2024-06-20 NOTE — ASSESSMENT & PLAN NOTE
Status post left thyroidectomy, final path with NIFTP   RT nodules remain  Follows up with surgical oncology last visit February 6, 2024  Continue with follow-up as advised.  Specialty

## 2024-06-20 NOTE — ASSESSMENT & PLAN NOTE
Symptoms started in the morning of 6/19/2024 with diffuse abdominal pain nausea and vomiting  In the ER the patient required a few doses of Dilaudid to help with severe abdominal pain, Zofran given for nausea  CT abdomen and pelvis shows mild intrahepatic biliary ductal dilatation. Common bile duct is normal in caliber. Findings may be due to prior cholecystectomy but recommend correlation with obstructive LFTs, minimal stranding in the region of the head of the pancreas, and around the upper SMV compatible with mild acute interstitial pancreatitis. No evidence of necrosis and no fluid or fluid collection  Blood work shows triglycerides of 199, AST 12, lipase 42, amylase ordering, awaiting for results.  Leukocytosis and hyponatremia are present  The patient does not meet SIRS or sepsis criteria as of now  LR at 200 cc ordered in the ER, continue on the floors  Acetaminophen and Dilaudid along with nonpharmacological interventions ordered for pain  N.p.o., Protonix twice daily  Advance diet as tolerated

## 2024-06-20 NOTE — PROGRESS NOTES
[facility]  Progress Note  Name: Sara Law I  MRN: [id number]  Unit/Bed#: -01 I Date of Admission: 6/19/2024   Date of Service: 6/20/2024 I Hospital Day: 0    Assessment & Plan   * Acute pancreatitis  Assessment & Plan  Symptoms started in the morning of 6/19/2024 with diffuse abdominal pain nausea and vomiting. No hx of ETOH, recent illness, steroid use. In the ER the patient required a few doses of Dilaudid to help with severe abdominal pain, Zofran given for nausea.     Workup:  - CT abdomen and pelvis shows mild intrahepatic biliary ductal dilatation. Common bile duct is normal in caliber. Findings may be due to prior cholecystectomy but recommend correlation with obstructive LFTs, minimal stranding in the region of the head of the pancreas, and around the upper SMV compatible with mild acute interstitial pancreatitis. No evidence of necrosis and no fluid or fluid collection  - Blood work shows triglycerides of 199, AST 12, lipase 42, amylase ordering, awaiting for results.  Leukocytosis and hyponatremia are present  - The patient does not meet SIRS or sepsis criteria as of now    Possible autoimmune vs idiopathic vs 2/2 biliary sludge    Plan:  - Continue IV fluids  cc/hr  - Introduce clear liquid diet- advance as tolerated  - Pain regimen in   - PRN zofran for nausea  - Protonix 40 mg daily  - GI consult: Plan for MRI/MRCP and EGD tomorrow to rule out PUD.    Leukocytosis  Assessment & Plan  Recent Labs     06/19/24 1915 06/20/24  0432   WBC 13.91* 11.62*       Likely secondary to pancreatitis    Plan:  Trend CBC daily    Hyponatremia  Assessment & Plan  Recent Labs     06/19/24 1915 06/20/24  0432   SODIUM 134* 137         Likely 2/2 poor oral intake and dehydration.  Stable after IV fluids.    Plan:  Encourage adequate oral intake  Continue IV fluids  Monitor BMP daily; Adjust fluid rate/concentration accordingly     Anxiety and depression  Assessment &  Plan  On Wellbutrin, Buspar, Atarax, Latuda and trazodone, continue her regimen  Follows up with psych per family medicine note on April 7, 2023  Stable for now per patient report    Abnormal urinalysis  Assessment & Plan  Results from last 7 days   Lab Units 06/19/24  2132   LEUKOCYTES UA  Small*   NITRITE UA  Negative   GLUCOSE UA mg/dl Negative   KETONES UA mg/dl Negative   BLOOD UA  Small*   WBC UA /hpf 4-10*   RBC UA /hpf 2-4*   BACTERIA UA /hpf Occasional       Patient presently denying symptoms of dysuria.     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       Cigarette nicotine dependence with nicotine-induced disorder  Assessment & Plan  On Wellbutrin daily, continue regimen  Education on the importance of smoking cessation was provided at bedside greater than 3 minutes  Nicotine patch ordered          VTE Pharmacologic Prophylaxis: VTE Score: 3 Moderate Risk (Score 3-4) - Pharmacological DVT Prophylaxis Ordered: enoxaparin (Lovenox).    Mobility:   Basic Mobility Inpatient Raw Score: 24  JH-HLM Goal: 8: Walk 250 feet or more  JH-HLM Achieved: 6: Walk 10 steps or more  JH-HLM Goal NOT achieved. Continue with multidisciplinary rounding and encourage appropriate mobility to improve upon JH-HLM goals.    Patient Centered Rounds: I performed bedside rounds with nursing staff today.  Discussions with Specialists or Other Care Team Provider: attending. GI consulted pending recs.    Education and Discussions with Family / Patient: Patient declined call to .     Current Length of Stay: 0 day(s)  Current Patient Status: Observation   Discharge Plan: Anticipate discharge in 24-48 hrs to home.    Code Status: Level 1 - Full Code    Subjective:   Patient seen and evaluated at  bedside today.  Patient reporting 6 out of 10 left-sided abdominal pain.  Denies current nausea.  Patient reports that the last time she vomited was yesterday at around 2:30 PM.  Patient reports that her last bowel movement was yesterday.  She states that her abdominal pain is hard to describe.  Denies alcohol use or new medications.  Patient reports cholecystectomy years ago.  No other complaints by patient.    Objective:     Vitals:   Temp (24hrs), Av.2 °F (36.8 °C), Min:98.1 °F (36.7 °C), Max:98.5 °F (36.9 °C)    Temp:  [98.1 °F (36.7 °C)-98.5 °F (36.9 °C)] 98.5 °F (36.9 °C)  HR:  [63-91] 66  Resp:  [18] 18  BP: (126-146)/(60-78) 133/72  SpO2:  [92 %-97 %] 92 %  Body mass index is 37.9 kg/m².     Input and Output Summary (last 24 hours):     Intake/Output Summary (Last 24 hours) at 2024 1429  Last data filed at 2024 0854  Gross per 24 hour   Intake 480 ml   Output --   Net 480 ml       Physical Exam:   Physical Exam  Vitals and nursing note reviewed.   Constitutional:       General: She is not in acute distress.     Appearance: She is well-developed.   HENT:      Head: Normocephalic and atraumatic.      Mouth/Throat:      Comments: Poor dentition  Eyes:      Conjunctiva/sclera: Conjunctivae normal.   Cardiovascular:      Rate and Rhythm: Normal rate and regular rhythm.      Heart sounds: No murmur heard.  Pulmonary:      Effort: Pulmonary effort is normal. No respiratory distress.      Breath sounds: Normal breath sounds.   Abdominal:      General: Bowel sounds are normal.      Palpations: Abdomen is soft.      Tenderness: There is abdominal tenderness (LUQ/LLQ). There is no guarding or rebound.   Musculoskeletal:         General: No swelling.      Cervical back: Neck supple.      Right lower leg: No edema.      Left lower leg: No edema.   Skin:     General: Skin is warm and dry.      Capillary Refill: Capillary refill takes less than 2 seconds.   Neurological:      Mental Status: She is alert and  oriented to person, place, and time.   Psychiatric:         Mood and Affect: Mood normal.         Additional Data:     Labs:  Results from last 7 days   Lab Units 06/20/24  0432   WBC Thousand/uL 11.62*   HEMOGLOBIN g/dL 13.2   HEMATOCRIT % 39.8   PLATELETS Thousands/uL 235   SEGS PCT % 64   LYMPHO PCT % 26   MONO PCT % 8   EOS PCT % 1     Results from last 7 days   Lab Units 06/20/24  0432   SODIUM mmol/L 137   POTASSIUM mmol/L 4.2   CHLORIDE mmol/L 105   CO2 mmol/L 28   BUN mg/dL 9   CREATININE mg/dL 0.90   ANION GAP mmol/L 4   CALCIUM mg/dL 8.5   ALBUMIN g/dL 3.4*   TOTAL BILIRUBIN mg/dL 0.59   ALK PHOS U/L 72   ALT U/L 48   AST U/L 63*   GLUCOSE RANDOM mg/dL 117                       Lines/Drains:  Invasive Devices       Peripheral Intravenous Line  Duration             Peripheral IV 06/19/24 Right Antecubital <1 day                          Imaging: Reviewed radiology reports from this admission including: chest xray and abdominal/pelvic CT    Recent Cultures (last 7 days):         Last 24 Hours Medication List:   Current Facility-Administered Medications   Medication Dose Route Frequency Provider Last Rate    acetaminophen  650 mg Oral Q4H PRN Stacey Amaral DO      aluminum-magnesium hydroxide-simethicone  30 mL Oral Q6H PRN HERBIE Harding      buPROPion  300 mg Oral Daily HERBIE Harding      busPIRone  10 mg Oral TID HERBIE Harding      enoxaparin  40 mg Subcutaneous Daily HERBIE Harding      HYDROmorphone  0.2 mg Intravenous Q2H PRN Stacey Amaral DO      hydrOXYzine HCL  50 mg Oral HS HERBIE Harding      lactated ringers  150 mL/hr Intravenous Continuous Stacey Amaral  mL/hr (06/20/24 0856)    lurasidone  40 mg Oral Daily With Dinner Stacey Amaral DO      nicotine  1 patch Transdermal Daily HERBIE Harding      ondansetron  4 mg Intravenous Q6H PRN HERBIE Harding      oxyCODONE  2.5 mg Oral Q4H PRN Stacey Amaral DO      Or    oxyCODONE  5 mg Oral Q4H PRN Stacey Amaral, DO       [START ON 6/21/2024] pantoprazole  40 mg Intravenous Q24H [facility] Stacey Amaral DO      traZODone  300 mg Oral HS Stacey Amaral DO          Today, Patient Was Seen By: Stacey Amaral DO    **Please Note: This note may have been constructed using a voice recognition system.**

## 2024-06-20 NOTE — ASSESSMENT & PLAN NOTE
Recent Labs     06/19/24  1915 06/20/24  0432 06/21/24  0719   SODIUM 134* 137 138       Likely 2/2 poor oral intake and dehydration.  Stable after IV fluids.    Plan:  Encourage adequate oral intake  Continue IV fluids  Monitor BMP daily; Adjust fluid rate/concentration accordingly

## 2024-06-20 NOTE — CONSULTS
Consultation -  [facility]  Sara Law 58 y.o. female MRN: [id number]  Unit/Bed#: -01 Encounter: [id number]        Inpatient consult to gastroenterology  Consult performed by: Jalyn Santo PA-C  Consult ordered by: Stacey Amaral DO          Reason for Consult / Principal Problem: Upper abdominal pain with nausea and vomiting, abnormal CT scan pancreatitis    ASSESSMENT and PLAN:      58-year-old female with history of tobacco abuse who presented due to acute onset upper abdominal pain with nausea and vomiting, CT consistent with pancreatitis although lipase normal.    Acute upper abdominal pain with nausea and vomiting  Abnormal CT scan, intrahepatic biliary ductal dilatation and pancreatitis  Patient reports acute onset of symptoms yesterday morning.  No prior history of pancreatitis.  Denies alcohol use.  Denies any medications.  No recent antibiotics. Normal bilirubin and alkaline phosphatase.    -Will continue treatment for pancreatitis in the setting of typical symptoms and CT findings.  - Continue LR.  - Continue clear liquid diet for now.  -Pain control per primary team.  - Continue PPI.    -Will also plan for EGD tomorrow to rule out peptic ulcer disease.  -NPO at midnight.    - Will obtain MRI/MRCP due to biliary ductal dilatation  -Monitor liver enzymes      Informed primary team of above recommendations  -------------------------------------------------------------------------------------------------------------------    HPI:     Sara Law is a 58-year-old female with tobacco abuse who presents to the emergency room late last night 6/19 due to acute onset upper abdominal pain with nausea and vomiting.  Vital signs stable on arrival.  Lab work relatively unremarkable with normal liver enzymes initially.  Mild leukocytosis of 13.  Lipase normal at 42.  Amylase normal as well.  Minimal elevation of triglycerides.  CT scan with contrast obtained showing mild  intrahepatic biliary ductal dilatation with normal CBD, possibly in the setting of cholecystectomy however unable to rule out underlying obstructive process.  There is also mild stranding noted in the region of the head of the pancreas consistent with mild acute interstitial pancreatitis.  She was started on  mL.    Patient reports relatively acute onset of upper abdominal pain yesterday morning.  This progressed throughout the day.  She had 2 episodes of nonbloody emesis.  She denies ever having any previous symptoms.  She has a history of cholecystectomy which was many years ago.  She denies any recent antibiotics.  Denies any new medications.  She denies any alcohol use at all.  She reports taking Protonix daily.  Colonoscopy with removal of polyps and repeat recommended in 5 years.    She had previous EGD and colonoscopy in 2020.  EGD at that time with mild gastritis, esophagitis and normal duodenum.    REVIEW OF SYSTEMS:    CONSTITUTIONAL: Denies any fever, chills, or rigors. Good appetite, and no recent weight loss.  HEENT: No earache or tinnitus. Denies hearing loss or visual disturbances.  CARDIOVASCULAR: No chest pain or palpitations.   RESPIRATORY: Denies any cough, hemoptysis, shortness of breath or dyspnea on exertion.  GASTROINTESTINAL: As noted in the History of Present Illness.   GENITOURINARY: No problems with urination. Denies any hematuria or dysuria.  NEUROLOGIC: No dizziness or vertigo, denies headaches.   MUSCULOSKELETAL: Denies any muscle or joint pain.   SKIN: Denies skin rashes or itching.   ENDOCRINE: Denies excessive thirst. Denies intolerance to heat or cold.  PSYCHOSOCIAL: Denies depression or anxiety. Denies any recent memory loss.       Historical Information   Past Medical History:   Diagnosis Date    Anxiety     Back pain     Depression     GERD (gastroesophageal reflux disease)     SOB (shortness of breath)     Vertigo      Past Surgical History:   Procedure Laterality Date     CERVICAL DISC SURGERY      CHOLECYSTECTOMY      CHOLECYSTECTOMY      FOOT SURGERY      LAPAROTOMY N/A 02/02/2021    Procedure: LAPAROTOMY FOR SPINAL SURGERY;  Surgeon: Edison Glynn MD;  Location: [facility];  Service: General    LUMBAR FUSION N/A 02/02/2021    Procedure: Anterior lumbar interbody fusion L5-S1; Posterior lumbar laminectomy, decompression, instrumented fusion L5-S1 with allograft and neuromonitoring;  Surgeon: Aries Rivera MD;  Location: [facility];  Service: Orthopedics    NV EGD TRANSORAL BIOPSY SINGLE/MULTIPLE N/A 04/20/2016    Procedure: ESOPHAGOGASTRODUODENOSCOPY (EGD);  Surgeon: Darius Barr MD;  Location: [facility];  Service: Gastroenterology    THYROID LOBECTOMY N/A 07/03/2023    Procedure: LEFT THYROID LOBECTOMY;  Surgeon: Aishwarya Field MD;  Location: [facility];  Service: Surgical Oncology    TONSILLECTOMY      TUBAL LIGATION      UPPER GASTROINTESTINAL ENDOSCOPY      US GUIDED THYROID BIOPSY  05/30/2023     Social History   Social History     Substance and Sexual Activity   Alcohol Use Not Currently    Comment: 0     Social History     Substance and Sexual Activity   Drug Use Not Currently    Types: Oxycodone, Methamphetamines    Comment: 7/18/2020 last use     Social History     Tobacco Use   Smoking Status Every Day    Current packs/day: 1.00    Types: Cigarettes    Passive exposure: Current   Smokeless Tobacco Never   Tobacco Comments    started patch 1/2021, still using 3/4 ppd.      Family History   Problem Relation Age of Onset    Aneurysm Mother     Hypertension Mother     Heart attack Father     Aneurysm Sister     Aneurysm Brother     Mental illness Son        Meds/Allergies       Medications Prior to Admission:     lurasidone (LATUDA) 40 mg tablet    traZODone (DESYREL) 300 MG tablet    buPROPion (WELLBUTRIN XL) 300 mg 24 hr tablet    busPIRone (BUSPAR) 10 mg tablet    hydrOXYzine HCL (ATARAX) 50 mg tablet    hydrOXYzine pamoate (VISTARIL) 50 mg capsule     pantoprazole (PROTONIX) 40 mg tablet  Current Facility-Administered Medications   Medication Dose Route Frequency    acetaminophen (TYLENOL) tablet 650 mg  650 mg Oral Q4H PRN    aluminum-magnesium hydroxide-simethicone (MAALOX) oral suspension 30 mL  30 mL Oral Q6H PRN    buPROPion (WELLBUTRIN XL) 24 hr tablet 300 mg  300 mg Oral Daily    busPIRone (BUSPAR) tablet 10 mg  10 mg Oral TID    enoxaparin (LOVENOX) subcutaneous injection 40 mg  40 mg Subcutaneous Daily    HYDROmorphone HCl (DILAUDID) injection 0.2 mg  0.2 mg Intravenous Q2H PRN    hydrOXYzine HCL (ATARAX) tablet 50 mg  50 mg Oral HS    lactated ringers infusion  150 mL/hr Intravenous Continuous    lurasidone (LATUDA) tablet 40 mg  40 mg Oral Daily With Dinner    nicotine (NICODERM CQ) 14 mg/24hr TD 24 hr patch 1 patch  1 patch Transdermal Daily    ondansetron (ZOFRAN) injection 4 mg  4 mg Intravenous Q6H PRN    oxyCODONE (ROXICODONE) split tablet 2.5 mg  2.5 mg Oral Q4H PRN    Or    oxyCODONE (ROXICODONE) IR tablet 5 mg  5 mg Oral Q4H PRN    [START ON 6/21/2024] pantoprazole (PROTONIX) injection 40 mg  40 mg Intravenous Q24H LAVON    traZODone (DESYREL) tablet 300 mg  300 mg Oral HS       No Known Allergies        Objective     Blood pressure 133/72, pulse 66, temperature 98.5 °F (36.9 °C), temperature source Oral, resp. rate 18, weight 107 kg (234 lb 12.6 oz), SpO2 92%, not currently breastfeeding.      Intake/Output Summary (Last 24 hours) at 6/20/2024 1415  Last data filed at 6/20/2024 0854  Gross per 24 hour   Intake 480 ml   Output --   Net 480 ml         PHYSICAL EXAM:      General Appearance:   Alert, cooperative, no distress, appears stated age    HEENT:   Normocephalic, atraumatic, anicteric, no oropharyngeal thrush present.     Neck:  Supple, symmetrical, trachea midline, no adenopathy.   Lungs:   Clear to auscultation bilaterally; no rales, rhonchi or wheezing; respirations unlabored    Heart::   S1 and S2 normal; regular rate and rhythm; no  murmur, rub, or gallop.   Abdomen:   Soft, non-tender, non-distended; normal bowel sounds; no masses, no organomegaly    Genitalia:   Deferred    Rectal:   Deferred    Extremities:  No cyanosis, clubbing or edema    Pulses:  2+ and symmetric all extremities    Skin:  Skin color, texture, turgor normal, no rashes or lesions    Lymph nodes:  No palpable cervical, axillary or inguinal lymphadenopathy        Lab Results:   Results from last 7 days   Lab Units 06/20/24  0432   WBC Thousand/uL 11.62*   HEMOGLOBIN g/dL 13.2   HEMATOCRIT % 39.8   PLATELETS Thousands/uL 235   SEGS PCT % 64   LYMPHO PCT % 26   MONO PCT % 8   EOS PCT % 1     Results from last 7 days   Lab Units 06/20/24  0432   POTASSIUM mmol/L 4.2   CHLORIDE mmol/L 105   CO2 mmol/L 28   BUN mg/dL 9   CREATININE mg/dL 0.90   CALCIUM mg/dL 8.5   ALK PHOS U/L 72   ALT U/L 48   AST U/L 63*         Results from last 7 days   Lab Units 06/19/24  1915   LIPASE u/L 42   AMYLASE IU/L 57       Imaging Studies: I have personally reviewed pertinent imaging studies.    XR chest 1 view portable    Result Date: 6/20/2024  Impression: No acute cardiopulmonary disease. Workstation performed: [id number]     CT abdomen pelvis with contrast    Result Date: 6/19/2024  Impression: Mild intrahepatic biliary ductal dilatation. Common bile duct is normal in caliber. Findings may be due to prior cholecystectomy but recommend correlation with obstructive LFTs. Minimal stranding in the region of the head of the pancreas, and around the upper SMV compatible with mild acute interstitial pancreatitis. No evidence of necrosis and no fluid or fluid collection The study was marked in EPIC for immediate notification.. Workstation performed: [id number]           Patient was seen and examined by Dr. Christiansen. All [facility] medical decisions were made by Dr. Christiansen. Thank you for allowing us to participate in the care of this present patient. We will follow-up with you closely.

## 2024-06-20 NOTE — PLAN OF CARE
Problem: PAIN - ADULT  Goal: Verbalizes/displays adequate comfort level or baseline comfort level  Description: Interventions:  - Encourage patient to monitor pain and request assistance  - Assess pain using appropriate pain scale  - Administer analgesics based on type and severity of pain and evaluate response  - Implement non-pharmacological measures as appropriate and evaluate response  - Consider cultural and social influences on pain and pain management  - Notify physician/advanced practitioner if interventions unsuccessful or patient reports new pain  Outcome: Progressing     Problem: INFECTION - ADULT  Goal: Absence or prevention of progression during hospitalization  Description: INTERVENTIONS:  - Assess and monitor for signs and symptoms of infection  - Monitor lab/diagnostic results  - Monitor all insertion sites, i.e. indwelling lines, tubes, and drains  - Monitor endotracheal if appropriate and nasal secretions for changes in amount and color  - [place] appropriate cooling/warming therapies per order  - Administer medications as ordered  - Instruct and encourage patient and family to use good hand hygiene technique  - Identify and instruct in appropriate isolation precautions for identified infection/condition  Outcome: Progressing  Goal: Absence of fever/infection during neutropenic period  Description: INTERVENTIONS:  - Monitor WBC    Outcome: Progressing     Problem: SAFETY ADULT  Goal: Patient will remain free of falls  Description: INTERVENTIONS:  - Educate patient/family on patient safety including physical limitations  - Instruct patient to call for assistance with activity   - Consult OT/PT to assist with strengthening/mobility   - Keep Call bell within reach  - Keep bed low and locked with side rails adjusted as appropriate  - Keep care items and personal belongings within reach  - Initiate and maintain comfort rounds  - Make Fall Risk Sign visible to staff  - Offer Toileting every  Hours,  in advance of need  - Initiate/Maintain alarm  - Obtain necessary fall risk management equipment:   - Apply yellow socks and bracelet for high fall risk patients  - Consider moving patient to room near nurses station  Outcome: Progressing  Goal: Maintain or return to baseline ADL function  Description: INTERVENTIONS:  -  Assess patient's ability to carry out ADLs; assess patient's baseline for ADL function and identify physical deficits which impact ability to perform ADLs (bathing, care of mouth/teeth, toileting, grooming, dressing, etc.)  - Assess/evaluate cause of self-care deficits   - Assess range of motion  - Assess patient's mobility; develop plan if impaired  - Assess patient's need for assistive devices and provide as appropriate  - Encourage maximum independence but intervene and supervise when necessary  - Involve family in performance of ADLs  - Assess for home care needs following discharge   - Consider OT consult to assist with ADL evaluation and planning for discharge  - Provide patient education as appropriate  Outcome: Progressing  Goal: Maintains/Returns to pre admission functional level  Description: INTERVENTIONS:  - Perform AM-PAC 6 Click Basic Mobility/ Daily Activity assessment daily.  - Set and communicate daily mobility goal to care team and patient/family/caregiver.   - Collaborate with rehabilitation services on mobility goals if consulted  - Perform Range of Motion  times a day.  - Reposition patient every  hours.  - Dangle patient  times a day  - Stand patient times a day  - Ambulate patient  times a day  - Out of bed to chair  times a day   - Out of bed for meals times a day  - Out of bed for toileting  - Record patient progress and toleration of activity level   Outcome: Progressing     Problem: DISCHARGE PLANNING  Goal: Discharge to home or other facility with appropriate resources  Description: INTERVENTIONS:  - Identify barriers to discharge w/patient and caregiver  - Arrange for  needed discharge resources and transportation as appropriate  - Identify discharge learning needs (meds, wound care, etc.)  - Arrange for interpretive services to assist at discharge as needed  - Refer to Case Management Department for coordinating discharge planning if the patient needs post-hospital services based on physician/advanced practitioner order or complex needs related to functional status, cognitive ability, or social support system  Outcome: Progressing     Problem: DISCHARGE PLANNING  Goal: Discharge to home or other facility with appropriate resources  Description: INTERVENTIONS:  - Identify barriers to discharge w/patient and caregiver  - Arrange for needed discharge resources and transportation as appropriate  - Identify discharge learning needs (meds, wound care, etc.)  - Arrange for interpretive services to assist at discharge as needed  - Refer to Case Management Department for coordinating discharge planning if the patient needs post-hospital services based on physician/advanced practitioner order or complex needs related to functional status, cognitive ability, or social support system  Outcome: Progressing     Problem: Knowledge Deficit  Goal: Patient/family/caregiver demonstrates understanding of disease process, treatment plan, medications, and discharge instructions  Description: Complete learning assessment and assess knowledge base.  Interventions:  - Provide teaching at level of understanding  - Provide teaching via preferred learning methods  Outcome: Progressing

## 2024-06-20 NOTE — ASSESSMENT & PLAN NOTE
Status post left thyroidectomy, final path with NIFTP   RT nodules remain  Follows up with surgical oncology last visit February 6, 2024  Continue with follow-up as advised.

## 2024-06-20 NOTE — PLAN OF CARE
Problem: PAIN - ADULT  Goal: Verbalizes/displays adequate comfort level or baseline comfort level  Description: Interventions:  - Encourage patient to monitor pain and request assistance  - Assess pain using appropriate pain scale  - Administer analgesics based on type and severity of pain and evaluate response  - Implement non-pharmacological measures as appropriate and evaluate response  - Consider cultural and social influences on pain and pain management  - Notify physician/advanced practitioner if interventions unsuccessful or patient reports new pain  Outcome: Progressing     Problem: INFECTION - ADULT  Goal: Absence or prevention of progression during hospitalization  Description: INTERVENTIONS:  - Assess and monitor for signs and symptoms of infection  - Monitor lab/diagnostic results  - Monitor all insertion sites, i.e. indwelling lines, tubes, and drains  - Monitor endotracheal if appropriate and nasal secretions for changes in amount and color  - [place] appropriate cooling/warming therapies per order  - Administer medications as ordered  - Instruct and encourage patient and family to use good hand hygiene technique  - Identify and instruct in appropriate isolation precautions for identified infection/condition  Outcome: Progressing     Problem: Nutrition/Hydration-ADULT  Goal: Nutrient/Hydration intake appropriate for improving, restoring or maintaining nutritional needs  Description: Monitor and assess patient's nutrition/hydration status for malnutrition. Collaborate with interdisciplinary team and initiate plan and interventions as ordered.  Monitor patient's weight and dietary intake as ordered or per policy. Utilize nutrition screening tool and intervene as necessary. Determine patient's food preferences and provide high-protein, high-caloric foods as appropriate.     INTERVENTIONS:  - Monitor oral intake, urinary output, labs, and treatment plans  - Assess nutrition and hydration status and  recommend course of action  - Evaluate amount of meals eaten  - Assist patient with eating if necessary   - Allow adequate time for meals  - Recommend/ encourage appropriate diets, oral nutritional supplements, and vitamin/mineral supplements  - Order, calculate, and assess calorie counts as needed  - Recommend, monitor, and adjust tube feedings and TPN/PPN based on assessed needs  - Assess need for intravenous fluids  - Provide specific nutrition/hydration education as appropriate  - Include patient/family/caregiver in decisions related to nutrition  Outcome: Progressing     Problem: Knowledge Deficit  Goal: Patient/family/caregiver demonstrates understanding of disease process, treatment plan, medications, and discharge instructions  Description: Complete learning assessment and assess knowledge base.  Interventions:  - Provide teaching at level of understanding  - Provide teaching via preferred learning methods  Outcome: Progressing

## 2024-06-20 NOTE — ASSESSMENT & PLAN NOTE
Recent Labs     06/19/24  1915 06/20/24  0432   SODIUM 134* 137       Likely 2/2 poor oral intake and dehydration.  Stable after IV fluids.    Plan:  Encourage adequate oral intake  Continue IV fluids  Monitor BMP daily; Adjust fluid rate/concentration accordingly

## 2024-06-20 NOTE — ASSESSMENT & PLAN NOTE
Symptoms started in the morning of 6/19/2024 with diffuse abdominal pain nausea and vomiting. No hx of ETOH, recent illness, steroid use. In the ER the patient required a few doses of Dilaudid to help with severe abdominal pain, Zofran given for nausea.     Workup:  - CT abdomen and pelvis shows mild intrahepatic biliary ductal dilatation. Common bile duct is normal in caliber. Findings may be due to prior cholecystectomy but recommend correlation with obstructive LFTs, minimal stranding in the region of the head of the pancreas, and around the upper SMV compatible with mild acute interstitial pancreatitis. No evidence of necrosis and no fluid or fluid collection  - Blood work shows triglycerides of 199, AST 12, lipase 42, amylase ordering, awaiting for results.  Leukocytosis and hyponatremia are present  - The patient does not meet SIRS or sepsis criteria as of now    Possible autoimmune vs idiopathic vs 2/2 biliary sludge    Plan:  - Continue IV fluids  cc/hr  - Introduce clear liquid diet- advance as tolerated  - Pain regimen in   - PRN zofran for nausea  - Protonix 40 mg daily  - GI consult

## 2024-06-20 NOTE — H&P
[facility]  H&P  Name: Sara Law 58 y.o. female I MRN: [id number]  Unit/Bed#: -01 I Date of Admission: 6/19/2024   Date of Service: 6/20/2024 I Hospital Day: 0      Assessment & Plan   * Acute pancreatitis  Assessment & Plan  Symptoms started in the morning of 6/19/2024 with diffuse abdominal pain nausea and vomiting  In the ER the patient required a few doses of Dilaudid to help with severe abdominal pain, Zofran given for nausea  CT abdomen and pelvis shows mild intrahepatic biliary ductal dilatation. Common bile duct is normal in caliber. Findings may be due to prior cholecystectomy but recommend correlation with obstructive LFTs, minimal stranding in the region of the head of the pancreas, and around the upper SMV compatible with mild acute interstitial pancreatitis. No evidence of necrosis and no fluid or fluid collection  Blood work shows triglycerides of 199, AST 12, lipase 42, amylase ordering, awaiting for results.  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  Specialty      Cigarette nicotine dependence with nicotine-induced disorder  Assessment & Plan  On Wellbutrin daily, continue regimen  Education on the importance of smoking cessation was provided  at bedside greater than 3 minutes  Nicotine patch ordered    Depression  Assessment & Plan  On Wellbutrin, BuSpar, Atarax, Latuda and trazodone, continue her regimen  Follows up with psych per family medicine note on April 7, 2023  Stable for now per patient report         VTE Pharmacologic Prophylaxis: VTE Score: 3 Moderate Risk (Score 3-4) - Pharmacological DVT Prophylaxis Ordered: enoxaparin (Lovenox).  Code Status: Level 1 - Full Code   Discussion with family: Patient declined call to .  Reports family is aware of her admission    Anticipated Length of Stay: Patient will be admitted on an observation basis with an anticipated length of stay of less than 2 midnights secondary to acute pancreatitis, pain management and fluid hydration.    Total Time Spent on Date of Encounter in care of patient: 75 mins. This time was spent on one or more of the following: performing physical exam; counseling and coordination of care; obtaining or reviewing history; documenting in the medical record; reviewing/ordering tests, medications or procedures; communicating with other healthcare professionals and discussing with patient's family/caregivers.    Chief Complaint: Diffuse abdominal pain with nausea and vomiting that started around 11:30 AM on 6/19/2024    History of Present Illness:  Sara Law is a 58 y.o. female with a PMH of dizziness, obesity, thyroid nodule, depression, tobacco smoking and GERD who presents with with severe abdominal pain nausea vomiting.  Patient reports symptoms started this morning around 11:30 AM, patient states she thought might have been breakfast or any other food that she might of consumed but symptoms became worse including abdominal pain.  In the ER Dilaudid and Zofran was given for pain and nausea, CT abdomen and pelvis shows acute pancreatitis, blood work shows triglyceride of 1 99, HDL 38, leukocytosis, AST 12, lipase within normal limits, vital signs are stable.  The  patient reports abdominal pain 7 out of 10, the patient denies chills, fever, bleeding, indigestion, diarrhea, constipation, or any other symptoms than the stated above.    Review of Systems:  Review of Systems   Constitutional:  Negative for chills and fever.   HENT:  Negative for ear pain and sore throat.    Eyes:  Negative for pain and visual disturbance.   Respiratory:  Negative for cough and shortness of breath.    Cardiovascular:  Negative for chest pain and palpitations.   Gastrointestinal:  Positive for abdominal pain, nausea and vomiting.   Genitourinary:  Negative for dysuria and hematuria.   Musculoskeletal:  Negative for arthralgias and back pain.   Skin:  Negative for color change and rash.   Neurological:  Negative for seizures and syncope.   All other systems reviewed and are negative.      Past Medical and Surgical History:   Past Medical History:   Diagnosis Date    Anxiety     Back pain     Depression     GERD (gastroesophageal reflux disease)     SOB (shortness of breath)     Vertigo        Past Surgical History:   Procedure Laterality Date    CERVICAL DISC SURGERY      CHOLECYSTECTOMY      CHOLECYSTECTOMY      FOOT SURGERY      LAPAROTOMY N/A 02/02/2021    Procedure: LAPAROTOMY FOR SPINAL SURGERY;  Surgeon: Edison Glynn MD;  Location: [facility];  Service: General    LUMBAR FUSION N/A 02/02/2021    Procedure: Anterior lumbar interbody fusion L5-S1; Posterior lumbar laminectomy, decompression, instrumented fusion L5-S1 with allograft and neuromonitoring;  Surgeon: Aries Rivera MD;  Location: [facility];  Service: Orthopedics    PA EGD TRANSORAL BIOPSY SINGLE/MULTIPLE N/A 04/20/2016    Procedure: ESOPHAGOGASTRODUODENOSCOPY (EGD);  Surgeon: Darius Barr MD;  Location: [facility];  Service: Gastroenterology    THYROID LOBECTOMY N/A 07/03/2023    Procedure: LEFT THYROID LOBECTOMY;  Surgeon: Aishwarya Field MD;  Location: [facility];  Service: Surgical Oncology    TONSILLECTOMY      TUBAL  LIGATION      UPPER GASTROINTESTINAL ENDOSCOPY      US GUIDED THYROID BIOPSY  05/30/2023       Meds/Allergies:  Prior to Admission medications    Medication Sig Start Date End Date Taking? Authorizing Provider   buPROPion (WELLBUTRIN XL) 300 mg 24 hr tablet Take 300 mg by mouth daily    Historical Provider, MD   busPIRone (BUSPAR) 10 mg tablet Take 10 mg by mouth 3 (three) times a day    Historical Provider, MD   hydrOXYzine HCL (ATARAX) 50 mg tablet Take 50 mg by mouth daily at bedtime    Historical Provider, MD   hydrOXYzine pamoate (VISTARIL) 50 mg capsule  11/25/23   Historical Provider, MD   lurasidone (LATUDA) 20 mg tablet Take 20 mg by mouth daily at bedtime    Historical Provider, MD   pantoprazole (PROTONIX) 40 mg tablet Take 1 tablet (40 mg total) by mouth daily 5/21/24 11/17/24  Cipriano White MD   traZODone (DESYREL) 150 mg tablet Take 1 tablet (150 mg total) by mouth daily at bedtime 4/29/21   Demarco Rodriguez MD     I have reviewed home medications with patient personally.  At bedside    Allergies: No Known Allergies    Social History:  Marital Status: /Civil Union   Occupation: Unemployed  Patient Pre-hospital Living Situation: Home  Patient Pre-hospital Level of Mobility: walks  Patient Pre-hospital Diet Restrictions: None  Substance Use History:   Social History     Substance and Sexual Activity   Alcohol Use Not Currently    Comment: 0     Social History     Tobacco Use   Smoking Status Every Day    Current packs/day: 1.00    Types: Cigarettes    Passive exposure: Current   Smokeless Tobacco Never   Tobacco Comments    started patch 1/2021, still using 3/4 ppd.      Social History     Substance and Sexual Activity   Drug Use Not Currently    Types: Oxycodone, Methamphetamines    Comment: 7/18/2020 last use       Family History:  Family History   Problem Relation Age of Onset    Aneurysm Mother     Hypertension Mother     Heart attack Father     Aneurysm Sister     Aneurysm Brother     Mental illness  Son        Physical Exam:     Vitals:   Blood Pressure: 126/63 (06/20/24 0141)  Pulse: 63 (06/20/24 0141)  Temperature: 98.1 °F (36.7 °C) (06/20/24 0141)  Temp Source: Oral (06/20/24 0141)  Respirations: 18 (06/19/24 2259)  SpO2: 96 % (06/20/24 0141)    Physical Exam  Vitals and nursing note reviewed.   Constitutional:       General: She is not in acute distress.     Appearance: She is well-developed. She is obese.   HENT:      Head: Normocephalic and atraumatic.   Eyes:      Conjunctiva/sclera: Conjunctivae normal.   Cardiovascular:      Rate and Rhythm: Normal rate and regular rhythm.      Heart sounds: No murmur heard.  Pulmonary:      Effort: Pulmonary effort is normal. No respiratory distress.      Breath sounds: Normal breath sounds.   Abdominal:      General: Bowel sounds are normal.      Palpations: Abdomen is soft.      Tenderness: There is generalized abdominal tenderness.   Musculoskeletal:         General: No swelling.      Cervical back: Neck supple.   Skin:     General: Skin is warm and dry.      Capillary Refill: Capillary refill takes less than 2 seconds.   Neurological:      General: No focal deficit present.      Mental Status: She is alert and oriented to person, place, and time. Mental status is at baseline.   Psychiatric:         Mood and Affect: Mood normal.          Additional Data:     Lab Results:  Results from last 7 days   Lab Units 06/19/24 1915   WBC Thousand/uL 13.91*   HEMOGLOBIN g/dL 14.8   HEMATOCRIT % 45.2   PLATELETS Thousands/uL 278   SEGS PCT % 75   LYMPHO PCT % 19   MONO PCT % 5   EOS PCT % 1     Results from last 7 days   Lab Units 06/19/24 1915   SODIUM mmol/L 134*   POTASSIUM mmol/L 4.3   CHLORIDE mmol/L 101   CO2 mmol/L 25   BUN mg/dL 13   CREATININE mg/dL 1.09   ANION GAP mmol/L 8   CALCIUM mg/dL 9.4   ALBUMIN g/dL 4.2   TOTAL BILIRUBIN mg/dL 0.38   ALK PHOS U/L 68   ALT U/L 14   AST U/L 12*   GLUCOSE RANDOM mg/dL 132             Lab Results   Component Value Date     HGBA1C 5.6 03/30/2023    HGBA1C 5.5 01/18/2021           Lines/Drains:  Invasive Devices       Peripheral Intravenous Line  Duration             Peripheral IV 06/19/24 Right Antecubital <1 day                        Imaging: I personally reviewed all imaging pertaining this admission.  CT abdomen pelvis with contrast   Final Result by Olivia Hernadez MD (06/19 2257)      Mild intrahepatic biliary ductal dilatation. Common bile duct is normal in caliber. Findings may be due to prior cholecystectomy but recommend correlation with obstructive LFTs.      Minimal stranding in the region of the head of the pancreas, and around the upper SMV compatible with mild acute interstitial pancreatitis. No evidence of necrosis and no fluid or fluid collection      The study was marked in EPIC for immediate notification..               Workstation performed: [id number]         XR chest 1 view portable   ED Interpretation by Lavern Alvarado DO (06/19 2225)   No acute disease          EKG and Other Studies Reviewed on Admission:   EKG: NSR. HR 71.    ** Please Note: This note has been constructed using a voice recognition system. **

## 2024-06-20 NOTE — PLAN OF CARE
Problem: PAIN - ADULT  Goal: Verbalizes/displays adequate comfort level or baseline comfort level  Description: Interventions:  - Encourage patient to monitor pain and request assistance  - Assess pain using appropriate pain scale  - Administer analgesics based on type and severity of pain and evaluate response  - Implement non-pharmacological measures as appropriate and evaluate response  - Consider cultural and social influences on pain and pain management  - Notify physician/advanced practitioner if interventions unsuccessful or patient reports new pain  Outcome: Progressing     Problem: INFECTION - ADULT  Goal: Absence or prevention of progression during hospitalization  Description: INTERVENTIONS:  - Assess and monitor for signs and symptoms of infection  - Monitor lab/diagnostic results  - Monitor all insertion sites, i.e. indwelling lines, tubes, and drains  - Monitor endotracheal if appropriate and nasal secretions for changes in amount and color  - [place] appropriate cooling/warming therapies per order  - Administer medications as ordered  - Instruct and encourage patient and family to use good hand hygiene technique  - Identify and instruct in appropriate isolation precautions for identified infection/condition  Outcome: Progressing  Goal: Absence of fever/infection during neutropenic period  Description: INTERVENTIONS:  - Monitor WBC    Outcome: Progressing     Problem: SAFETY ADULT  Goal: Patient will remain free of falls  Description: INTERVENTIONS:  - Educate patient/family on patient safety including physical limitations  - Instruct patient to call for assistance with activity   - Consult OT/PT to assist with strengthening/mobility   - Keep Call bell within reach  - Keep bed low and locked with side rails adjusted as appropriate  - Keep care items and personal belongings within reach  - Initiate and maintain comfort rounds  - Make Fall Risk Sign visible to staff  - Offer Toileting every 2 Hours,  in advance of need  - Initiate/Maintain alarm  - Obtain necessary fall risk management equipment:   - Apply yellow socks and bracelet for high fall risk patients  - Consider moving patient to room near nurses station  Outcome: Progressing  Goal: Maintain or return to baseline ADL function  Description: INTERVENTIONS:  -  Assess patient's ability to carry out ADLs; assess patient's baseline for ADL function and identify physical deficits which impact ability to perform ADLs (bathing, care of mouth/teeth, toileting, grooming, dressing, etc.)  - Assess/evaluate cause of self-care deficits   - Assess range of motion  - Assess patient's mobility; develop plan if impaired  - Assess patient's need for assistive devices and provide as appropriate  - Encourage maximum independence but intervene and supervise when necessary  - Involve family in performance of ADLs  - Assess for home care needs following discharge   - Consider OT consult to assist with ADL evaluation and planning for discharge  - Provide patient education as appropriate  Outcome: Progressing  Goal: Maintains/Returns to pre admission functional level  Description: INTERVENTIONS:  - Perform AM-PAC 6 Click Basic Mobility/ Daily Activity assessment daily.  - Set and communicate daily mobility goal to care team and patient/family/caregiver.   - Collaborate with rehabilitation services on mobility goals if consulted  - Perform Range of Motion 2 times a day.  - Reposition patient every 2 hours.  - Dangle patient 2 times a day  - Stand patient 2 times a day  - Ambulate patient 2 times a day  - Out of bed to chair 2 times a day   - Out of bed for meals 2 times a day  - Out of bed for toileting  - Record patient progress and toleration of activity level   Outcome: Progressing     Problem: DISCHARGE PLANNING  Goal: Discharge to home or other facility with appropriate resources  Description: INTERVENTIONS:  - Identify barriers to discharge w/patient and caregiver  -  Arrange for needed discharge resources and transportation as appropriate  - Identify discharge learning needs (meds, wound care, etc.)  - Arrange for interpretive services to assist at discharge as needed  - Refer to Case Management Department for coordinating discharge planning if the patient needs post-hospital services based on physician/advanced practitioner order or complex needs related to functional status, cognitive ability, or social support system  Outcome: Progressing     Problem: Knowledge Deficit  Goal: Patient/family/caregiver demonstrates understanding of disease process, treatment plan, medications, and discharge instructions  Description: Complete learning assessment and assess knowledge base.  Interventions:  - Provide teaching at level of understanding  - Provide teaching via preferred learning methods  Outcome: Progressing

## 2024-06-20 NOTE — ASSESSMENT & PLAN NOTE
On Wellbutrin, BuSpar, Atarax, Latuda and trazodone, continue her regimen  Follows up with psych per family medicine note on April 7, 2023  Stable for now per patient report

## 2024-06-20 NOTE — ASSESSMENT & PLAN NOTE
In the setting of sodium of 134 on admission  Likely to be acute due to poor fluid intake  IV hydration ordered  Monitor electrolytes with daily BMP

## 2024-06-20 NOTE — ASSESSMENT & PLAN NOTE
Recent Labs     06/19/24  1915 06/20/24  0432 06/21/24  0719   WBC 13.91* 11.62* 9.56     Likely secondary to pancreatitis    Plan:  Trend CBC daily

## 2024-06-20 NOTE — ASSESSMENT & PLAN NOTE
On Wellbutrin daily, continue regimen  Education on the importance of smoking cessation was provided at bedside greater than 3 minutes  Nicotine patch ordered

## 2024-06-20 NOTE — ED PROVIDER NOTES
History  Chief Complaint   Patient presents with    Abdominal Pain     Reports diffuse mid abdominal pain with nausea/vomiting started this afternoon     58-year-old female presents the emergency department for evaluation of abdominal pain.  Patient states abdominal pain started around 11:30 AM.  Pain is associated with nausea and 2 episodes of nonbloody, nonbilious vomiting.  Patient reports similar but less severe pain a few days ago.  Patient denies fevers or chills.  No change in bowel habits.  No sick contacts or travel.  No history of alcohol use.  No new medications.      History provided by:  Patient, medical records and caregiver   used: No    Abdominal Pain  Pain location:  Epigastric, periumbilical, LUQ and RUQ  Pain quality: aching and sharp    Pain radiates to:  Back  Pain severity:  Severe  Onset quality:  Gradual  Duration:  3 hours  Timing:  Constant  Progression:  Worsening  Chronicity:  New  Context: eating    Context: not alcohol use, not awakening from sleep and not diet changes    Relieved by:  Nothing  Worsened by:  Nothing  Ineffective treatments:  None tried  Associated symptoms: nausea, shortness of breath and vomiting    Associated symptoms: no chest pain and no dysuria    Risk factors: no alcohol abuse and no recent hospitalization        Prior to Admission Medications   Prescriptions Last Dose Informant Patient Reported? Taking?   buPROPion (WELLBUTRIN XL) 300 mg 24 hr tablet  Self Yes No   Sig: Take 300 mg by mouth daily   busPIRone (BUSPAR) 10 mg tablet  Self Yes No   Sig: Take 10 mg by mouth 3 (three) times a day   hydrOXYzine HCL (ATARAX) 50 mg tablet  Self Yes No   Sig: Take 50 mg by mouth daily at bedtime   hydrOXYzine pamoate (VISTARIL) 50 mg capsule   Yes No   lurasidone (LATUDA) 40 mg tablet   Yes Yes   Sig: Take 40 mg by mouth daily with dinner   pantoprazole (PROTONIX) 40 mg tablet   No No   Sig: Take 1 tablet (40 mg total) by mouth daily   traZODone  (DESYREL) 300 MG tablet   Yes Yes   Sig: Take 300 mg by mouth daily at bedtime      Facility-Administered Medications: None       Past Medical History:   Diagnosis Date    Anxiety     Back pain     Depression     GERD (gastroesophageal reflux disease)     SOB (shortness of breath)     Vertigo        Past Surgical History:   Procedure Laterality Date    CERVICAL DISC SURGERY      CHOLECYSTECTOMY      CHOLECYSTECTOMY      FOOT SURGERY      LAPAROTOMY N/A 02/02/2021    Procedure: LAPAROTOMY FOR SPINAL SURGERY;  Surgeon: Edison Glynn MD;  Location: [facility];  Service: General    LUMBAR FUSION N/A 02/02/2021    Procedure: Anterior lumbar interbody fusion L5-S1; Posterior lumbar laminectomy, decompression, instrumented fusion L5-S1 with allograft and neuromonitoring;  Surgeon: Aries Rivera MD;  Location: [facility];  Service: Orthopedics    MA EGD TRANSORAL BIOPSY SINGLE/MULTIPLE N/A 04/20/2016    Procedure: ESOPHAGOGASTRODUODENOSCOPY (EGD);  Surgeon: Darius Barr MD;  Location: [facility];  Service: Gastroenterology    THYROID LOBECTOMY N/A 07/03/2023    Procedure: LEFT THYROID LOBECTOMY;  Surgeon: Aishwarya Field MD;  Location: [facility];  Service: Surgical Oncology    TONSILLECTOMY      TUBAL LIGATION      UPPER GASTROINTESTINAL ENDOSCOPY      US GUIDED THYROID BIOPSY  05/30/2023       Family History   Problem Relation Age of Onset    Aneurysm Mother     Hypertension Mother     Heart attack Father     Aneurysm Sister     Aneurysm Brother     Mental illness Son      I have reviewed and agree with the history as documented.    E-Cigarette/Vaping    E-Cigarette Use Never User      E-Cigarette/Vaping Substances    Nicotine No     THC No     CBD No     Flavoring No     Other No     Unknown No      Social History     Tobacco Use    Smoking status: Every Day     Current packs/day: 1.00     Types: Cigarettes     Passive exposure: Current    Smokeless tobacco: Never    Tobacco comments:     started patch 1/2021,  still using 3/4 ppd.    Vaping Use    Vaping status: Never Used   Substance Use Topics    Alcohol use: Not Currently     Comment: 0    Drug use: Not Currently     Types: Oxycodone, Methamphetamines     Comment: 7/18/2020 last use       Review of Systems   Constitutional:  Positive for appetite change.   Respiratory:  Positive for shortness of breath.    Cardiovascular:  Negative for chest pain and leg swelling.   Gastrointestinal:  Positive for abdominal pain, nausea and vomiting.   Genitourinary:  Negative for dysuria.   Neurological:  Negative for weakness.   All other systems reviewed and are negative.      Physical Exam  Physical Exam  Vitals reviewed.   Constitutional:       General: She is in acute distress.      Appearance: She is well-developed. She is not ill-appearing.   HENT:      Head: Normocephalic.      Nose: Nose normal.      Mouth/Throat:      Pharynx: No oropharyngeal exudate.   Eyes:      Conjunctiva/sclera: Conjunctivae normal.      Pupils: Pupils are equal, round, and reactive to light.   Cardiovascular:      Rate and Rhythm: Normal rate and regular rhythm.      Heart sounds: Normal heart sounds.   Pulmonary:      Effort: Pulmonary effort is normal.      Breath sounds: Normal breath sounds. No wheezing or rhonchi.   Abdominal:      General: Bowel sounds are normal. There is no distension.      Palpations: Abdomen is soft.      Tenderness: There is abdominal tenderness in the epigastric area, periumbilical area and left upper quadrant. There is no right CVA tenderness, left CVA tenderness, guarding or rebound.      Hernia: No hernia is present.   Musculoskeletal:         General: No tenderness or deformity. Normal range of motion.      Cervical back: Normal range of motion and neck supple.   Lymphadenopathy:      Cervical: No cervical adenopathy.   Skin:     General: Skin is warm and dry.      Findings: No rash.   Neurological:      Mental Status: She is alert and oriented to person, place, and  time.      Cranial Nerves: No cranial nerve deficit.      Sensory: No sensory deficit.      Motor: No abnormal muscle tone.      Coordination: Coordination normal.      Gait: Gait normal.      Deep Tendon Reflexes: Reflexes are normal and symmetric.   Psychiatric:         Behavior: Behavior normal.         Thought Content: Thought content normal.         Judgment: Judgment normal.         Vital Signs  ED Triage Vitals   Temperature Pulse Respirations Blood Pressure SpO2   06/19/24 1913 06/19/24 1913 06/19/24 1913 06/19/24 1913 06/19/24 1913   98.1 °F (36.7 °C) 91 18 146/78 97 %      Temp Source Heart Rate Source Patient Position - Orthostatic VS BP Location FiO2 (%)   06/19/24 1913 06/19/24 1913 06/20/24 0141 06/19/24 1913 --   Oral Monitor Lying Right arm       Pain Score       06/19/24 2321       7           Vitals:    06/21/24 1345 06/21/24 1508 06/21/24 2329 06/22/24 0754   BP: 105/55 124/69 104/56 130/74   Pulse: 59 57 56 56   Patient Position - Orthostatic VS:             Visual Acuity      ED Medications  Medications   buPROPion (WELLBUTRIN XL) 24 hr tablet 300 mg (300 mg Oral Given 6/22/24 0818)   busPIRone (BUSPAR) tablet 10 mg (10 mg Oral Given 6/22/24 0818)   hydrOXYzine HCL (ATARAX) tablet 50 mg (50 mg Oral Given 6/21/24 2108)   ondansetron (ZOFRAN) injection 4 mg (has no administration in time range)   nicotine (NICODERM CQ) 14 mg/24hr TD 24 hr patch 1 patch (1 patch Transdermal Medication Applied 6/22/24 0817)   aluminum-magnesium hydroxide-simethicone (MAALOX) oral suspension 30 mL (has no administration in time range)   enoxaparin (LOVENOX) subcutaneous injection 40 mg (40 mg Subcutaneous Given 6/22/24 0818)   traZODone (DESYREL) tablet 300 mg (300 mg Oral Given 6/21/24 2109)   lurasidone (LATUDA) tablet 40 mg (40 mg Oral Given 6/21/24 1628)   pantoprazole (PROTONIX) injection 40 mg (40 mg Intravenous Given 6/22/24 0818)   acetaminophen (TYLENOL) tablet 650 mg (has no administration in time range)    oxyCODONE (ROXICODONE) split tablet 2.5 mg (2.5 mg Oral Given 6/21/24 1838)     Or   oxyCODONE (ROXICODONE) IR tablet 5 mg ( Oral See Alternative 6/21/24 1838)   HYDROmorphone HCl (DILAUDID) injection 0.2 mg (0.2 mg Intravenous Given 6/20/24 2000)   sodium chloride 0.9 % bolus 1,000 mL (0 mL Intravenous Stopped 6/19/24 2258)   HYDROmorphone (DILAUDID) injection 0.5 mg (0.5 mg Intravenous Given 6/19/24 2045)   ondansetron (ZOFRAN) injection 4 mg (4 mg Intravenous Given 6/19/24 2045)   iohexol (OMNIPAQUE) 350 MG/ML injection (MULTI-DOSE) 100 mL (100 mL Intravenous Given 6/19/24 2214)   HYDROmorphone (DILAUDID) injection 0.5 mg (0.5 mg Intravenous Given 6/19/24 2321)   Gadobutrol injection (SINGLE-DOSE) SOLN 10 mL (10 mL Intravenous Given 6/21/24 1959)       Diagnostic Studies  Results Reviewed       Procedure Component Value Units Date/Time    Comprehensive metabolic panel [[id number]]  (Abnormal) Collected: 06/20/24 0432    Lab Status: Final result Specimen: Blood from Arm, Left Updated: 06/20/24 0515     Sodium 137 mmol/L      Potassium 4.2 mmol/L      Chloride 105 mmol/L      CO2 28 mmol/L      ANION GAP 4 mmol/L      BUN 9 mg/dL      Creatinine 0.90 mg/dL      Glucose 117 mg/dL      Calcium 8.5 mg/dL      Corrected Calcium 9.0 mg/dL      AST 63 U/L      ALT 48 U/L      Alkaline Phosphatase 72 U/L      Total Protein 6.1 g/dL      Albumin 3.4 g/dL      Total Bilirubin 0.59 mg/dL      eGFR 70 ml/min/1.73sq m     Narrative:      National Kid[employer] guidelines for Chronic Kidney Disease (CKD):     Stage 1 with normal or high GFR (GFR > 90 mL/min/1.73 square meters)    Stage 2 Mild CKD (GFR = 60-89 mL/min/1.73 square meters)    Stage 3A Moderate CKD (GFR = 45-59 mL/min/1.73 square meters)    Stage 3B Moderate CKD (GFR = 30-44 mL/min/1.73 square meters)    Stage 4 Severe CKD (GFR = 15-29 mL/min/1.73 square meters)    Stage 5 End Stage CKD (GFR <15 mL/min/1.73 square meters)  Note: GFR calculation is  accurate only with a steady state creatinine    Magnesium [[id number]]  (Normal) Collected: 06/20/24 0432    Lab Status: Final result Specimen: Blood from Arm, Left Updated: 06/20/24 0515     Magnesium 1.9 mg/dL     Phosphorus [[id number]]  (Normal) Collected: 06/20/24 0432    Lab Status: Final result Specimen: Blood from Arm, Left Updated: 06/20/24 0515     Phosphorus 3.4 mg/dL     CBC and differential [[id number]]  (Abnormal) Collected: 06/20/24 0432    Lab Status: Final result Specimen: Blood from Arm, Left Updated: 06/20/24 0452     WBC 11.62 Thousand/uL      RBC 4.24 Million/uL      Hemoglobin 13.2 g/dL      Hematocrit 39.8 %      MCV 94 fL      MCH 31.1 pg      MCHC 33.2 g/dL      RDW 14.0 %      MPV 9.7 fL      Platelets 235 Thousands/uL      nRBC 0 /100 WBCs      Segmented % 64 %      Immature Grans % 1 %      Lymphocytes % 26 %      Monocytes % 8 %      Eosinophils Relative 1 %      Basophils Relative 0 %      Absolute Neutrophils 7.61 Thousands/µL      Absolute Immature Grans 0.06 Thousand/uL      Absolute Lymphocytes 2.99 Thousands/µL      Absolute Monocytes 0.87 Thousand/µL      Eosinophils Absolute 0.07 Thousand/µL      Basophils Absolute 0.02 Thousands/µL     TSH, 3rd generation [[id number]]  (Normal) Collected: 06/19/24 1915    Lab Status: Final result Specimen: Blood from Arm, Right Updated: 06/20/24 0027     TSH 3RD GENERATON 2.908 uIU/mL     Lipid Panel with Direct LDL reflex [[id number]]  (Abnormal) Collected: 06/19/24 1915    Lab Status: Final result Specimen: Blood from Arm, Right Updated: 06/20/24 0027     Cholesterol 166 mg/dL      Triglycerides 199 mg/dL      HDL, Direct 38 mg/dL      LDL Calculated 88 mg/dL     Urine Microscopic [[id number]]  (Abnormal) Collected: 06/19/24 2132    Lab Status: Final result Specimen: Urine, Clean Catch Updated: 06/19/24 2147     RBC, UA 2-4 /hpf      WBC, UA 4-10 /hpf      Epithelial Cells Occasional /hpf      Bacteria, UA Occasional /hpf     UA w Reflex to  Microscopic w Reflex to Culture [[id number]]  (Abnormal) Collected: 06/19/24 2132    Lab Status: Final result Specimen: Urine, Clean Catch Updated: 06/19/24 2142     Color, UA Light Yellow     Clarity, UA Clear     Specific Gravity, UA 1.016     pH, UA 6.5     Leukocytes, UA Small     Nitrite, UA Negative     Protein, UA Negative mg/dl      Glucose, UA Negative mg/dl      Ketones, UA Negative mg/dl      Urobilinogen, UA <2.0 mg/dl      Bilirubin, UA Negative     Occult Blood, UA Small    HS Troponin 0hr (reflex protocol) [[id number]]  (Normal) Collected: 06/19/24 2057    Lab Status: Final result Specimen: Blood from Arm, Right Updated: 06/19/24 2142     hs TnI 0hr 3 ng/L     [place] draw [[id number]] Collected: 06/19/24 1915    Lab Status: Final result Specimen: Blood from Arm, Right Updated: 06/19/24 2101    Narrative:      The following orders were created for panel order [place] draw.  Procedure                               Abnormality         Status                     ---------                               -----------         ------                     Light Blue Top on hold[[id number]]                           Final result               Green / Black tube on hold[[id number]]                       Final result                 Please view results for these tests on the individual orders.    Comprehensive metabolic panel [[id number]]  (Abnormal) Collected: 06/19/24 1915    Lab Status: Final result Specimen: Blood from Arm, Right Updated: 06/19/24 1949     Sodium 134 mmol/L      Potassium 4.3 mmol/L      Chloride 101 mmol/L      CO2 25 mmol/L      ANION GAP 8 mmol/L      BUN 13 mg/dL      Creatinine 1.09 mg/dL      Glucose 132 mg/dL      Calcium 9.4 mg/dL      AST 12 U/L      ALT 14 U/L      Alkaline Phosphatase 68 U/L      Total Protein 7.5 g/dL      Albumin 4.2 g/dL      Total Bilirubin 0.38 mg/dL      eGFR 55 ml/min/1.73sq m     Narrative:      National Kid[employer] Disease Foundation guidelines for Chronic Kidney  Disease (CKD):     Stage 1 with normal or high GFR (GFR > 90 mL/min/1.73 square meters)    Stage 2 Mild CKD (GFR = 60-89 mL/min/1.73 square meters)    Stage 3A Moderate CKD (GFR = 45-59 mL/min/1.73 square meters)    Stage 3B Moderate CKD (GFR = 30-44 mL/min/1.73 square meters)    Stage 4 Severe CKD (GFR = 15-29 mL/min/1.73 square meters)    Stage 5 End Stage CKD (GFR <15 mL/min/1.73 square meters)  Note: GFR calculation is accurate only with a steady state creatinine    Lipase [[id number]]  (Normal) Collected: 06/19/24 1915    Lab Status: Final result Specimen: Blood from Arm, Right Updated: 06/19/24 1949     Lipase 42 u/L     CBC and differential [[id number]]  (Abnormal) Collected: 06/19/24 1915    Lab Status: Final result Specimen: Blood from Arm, Right Updated: 06/19/24 1923     WBC 13.91 Thousand/uL      RBC 4.87 Million/uL      Hemoglobin 14.8 g/dL      Hematocrit 45.2 %      MCV 93 fL      MCH 30.4 pg      MCHC 32.7 g/dL      RDW 13.9 %      MPV 9.8 fL      Platelets 278 Thousands/uL      nRBC 0 /100 WBCs      Segmented % 75 %      Immature Grans % 0 %      Lymphocytes % 19 %      Monocytes % 5 %      Eosinophils Relative 1 %      Basophils Relative 0 %      Absolute Neutrophils 10.50 Thousands/µL      Absolute Immature Grans 0.06 Thousand/uL      Absolute Lymphocytes 2.58 Thousands/µL      Absolute Monocytes 0.65 Thousand/µL      Eosinophils Absolute 0.07 Thousand/µL      Basophils Absolute 0.05 Thousands/µL                    MRI abdomen w wo contrast and mrcp   Final Result by Olivia Hernadez MD (06/21 2055)      Normal caliber intra and extrahepatic bile ducts. No evidence of choledocholithiasis.      Fullness in the head and uncinate process of the pancreas, with corresponding restricted diffusion. There is no upstream pancreatic ductal dilatation. The restricted diffusion is most likely due to the acute pancreatitis however, recommend follow-up MRI    pancreatic mass protocol once the symptoms have  subsided to exclude the possibility of an underlying mass      The study was marked in EPIC for immediate notification..      Workstation performed: [id number]         CT abdomen pelvis with contrast   Final Result by Olivia Hernadez MD (06/19 8367)      Mild intrahepatic biliary ductal dilatation. Common bile duct is normal in caliber. Findings may be due to prior cholecystectomy but recommend correlation with obstructive LFTs.      Minimal stranding in the region of the head of the pancreas, and around the upper SMV compatible with mild acute interstitial pancreatitis. No evidence of necrosis and no fluid or fluid collection      The study was marked in EPIC for immediate notification..               Workstation performed: [id number]         XR chest 1 view portable   ED Interpretation by Lavern Alvarado DO (06/19 2225)   No acute disease      Final Result by Tyson Duncan MD (06/20 0932)      No acute cardiopulmonary disease.            Workstation performed: [id number]                    Procedures  ECG 12 Lead Documentation Only    Date/Time: 6/19/2024 9:51 PM    Performed by: Lavern Alvarado DO  Authorized by: Lavern Alvarado DO    Indications / Diagnosis:  Epigastric pain  ECG reviewed by me, the ED Provider: yes    Patient location:  ED  Previous ECG:     Previous ECG:  Compared to current    Comparison ECG info:  3/2023    Similarity:  No change  Interpretation:     Interpretation: normal    Rate:     ECG rate:  71    ECG rate assessment: normal    Rhythm:     Rhythm: sinus rhythm    Ectopy:     Ectopy: none    QRS:     QRS axis:  Normal  Conduction:     Conduction: abnormal      Abnormal conduction: incomplete RBBB    ST segments:     ST segments:  Normal  T waves:     T waves: normal             ED Course                                             Medical Decision Making  58-year-old female presents with epigastric abdominal pain.  Differential diagnosis includes was not limited to acute pancreatitis, peptic  ulcer disease, myocardial ischemia, foodborne illness, GERD, hepatitis.    Problems Addressed:  Interstitial pancreatitis (HCC): acute illness or injury    Amount and/or Complexity of Data Reviewed  Independent Historian: caregiver     Details: Son at the bedside to help provide history  Labs: ordered.     Details: Labs ordered and independently interpreted by me, patient has mild leukocytosis likely secondary to acute pain.  Lab workup otherwise unremarkable  Radiology: ordered and independent interpretation performed.     Details: CT scan ordered by me and interpreted by radiology.  ECG/medicine tests: ordered and independent interpretation performed. Decision-making details documented in ED Course.  Discussion of management or test interpretation with external provider(s): Case discussed with internal medicine team who will admit to their service for further treatment.    Risk  Prescription drug management.  Decision regarding hospitalization.  Risk Details: Patient's workup is concerning for acute pancreatitis.  Patient does have pain localized to the epigastric area.  Currently lipase level is within normal limits though CT scan findings are concerning for interstitial pancreatitis.  Patient will be admitted for IV pain control and fluid hydration.             Disposition  Final diagnoses:   Interstitial pancreatitis (HCC)     Time reflects when diagnosis was documented in both MDM as applicable and the Disposition within this note       Time User Action Codes Description Comment    6/19/2024 11:31 PM Lavern Alvarado Add [K86.1] Interstitial pancreatitis (HCC)     6/20/2024 11:50 AM Staecy Amaral Add [K85.90] Acute pancreatitis, unspecified complication status, unspecified pancreatitis type     6/20/2024  2:26 PM Jalyn Santo Add [R10.13] Epigastric pain     6/20/2024  2:26 PM Jalyn Santo Add [R11.2] Nausea and vomiting, unspecified vomiting type     6/22/2024 10:13 AM Marielle Rivas Add [F17.219] Cigarette  nicotine dependence with nicotine-induced disorder           ED Disposition       ED Disposition   Admit    Condition   Stable    Date/Time   Wed Jun 19, 2024 11:32 PM    Comment   Case was discussed with HERBIE Harding and the patient's admission status was agreed to be Admission Status: observation status to the service of Dr. Calvin .               Follow-up Information    None         Current Discharge Medication List        START taking these medications    Details   nicotine (NICODERM CQ) 21 mg/24 hr TD 24 hr patch Place 1 patch on the skin over 24 hours daily Apply a new patch every 24 hours to a clean, dry, hairless site on the upper arm or hip.  Qty: 28 patch, Refills: 0    Associated Diagnoses: Cigarette nicotine dependence with nicotine-induced disorder           CONTINUE these medications which have NOT CHANGED    Details   lurasidone (LATUDA) 40 mg tablet Take 40 mg by mouth daily with dinner      traZODone (DESYREL) 300 MG tablet Take 300 mg by mouth daily at bedtime      buPROPion (WELLBUTRIN XL) 300 mg 24 hr tablet Take 300 mg by mouth daily      busPIRone (BUSPAR) 10 mg tablet Take 10 mg by mouth 3 (three) times a day      hydrOXYzine HCL (ATARAX) 50 mg tablet Take 50 mg by mouth daily at bedtime      hydrOXYzine pamoate (VISTARIL) 50 mg capsule       pantoprazole (PROTONIX) 40 mg tablet Take 1 tablet (40 mg total) by mouth daily  Qty: 30 tablet, Refills: 0    Associated Diagnoses: Gastroesophageal reflux disease                 PDMP Review         Value Time User    PDMP Reviewed  Yes 6/19/2024 11:51 PM HERBIE Harding            ED Provider  Electronically Signed by             Lavern Alvarado DO  06/22/24 9231

## 2024-06-20 NOTE — UTILIZATION REVIEW
Initial Clinical Review    Pt initially admitted as Observation on 6/19. Changed to Inpatient on 6/21. Pt requiring continued stay d/t ongoing management of acute pancreatitis.    Admission: Date/Time/Statement:   Admission Orders (From admission, onward)       Ordered        06/21/24 1435  INPATIENT ADMISSION  Once            06/19/24 2332  Place in Observation  Once                          Orders Placed This Encounter   Procedures    INPATIENT ADMISSION     Standing Status:   Standing     Number of Occurrences:   1     Order Specific Question:   Level of Care     Answer:   Med Surg [16]     Order Specific Question:   Estimated length of stay     Answer:   More than 2 Midnights     Order Specific Question:   Certification     Answer:   I certify that inpatient services are medically necessary for this patient for a duration of greater than two midnights. See H&P and MD Progress Notes for additional information about the patient's course of treatment.     ED Arrival Information       Expected   -    Arrival   6/19/2024 19:00    Acuity   Urgent              Means of arrival   Walk-In    Escorted by   Family Member    Service   Hospitalist    Admission type   Emergency              Arrival complaint   Abdominal Pain             Chief Complaint   Patient presents with    Abdominal Pain     Reports diffuse mid abdominal pain with nausea/vomiting started this afternoon       Initial Presentation: 58 y.o. female who presented self from home to [facility] ED. Admitted in observation status for evaluation and treatment of acute pancreatitis. PMHx: GERD, psychiatric disorder, vertigo.  Presented w/ severe abdominal pain, n/v that started this morning. Notes abdominal pain 7/10 in intensity. On exam, generalized abdominal tenderness. Imaging shows acute pancreatitis. Plan: NPO, PPI BID, IVF, analgesics, Trend labs, replete electrolytes as needed; continue PTA meds, nicotine patch.       6/20: Pt reports 6/10 L-sided  abdominal pain. Last BM yesterday. On exam. LUQ, LLQ tenderness. Plan: IVF, clear liquids, NPO after midnight, analgesics, antiemetics, PPI, Trend labs, replete electrolytes as needed. GI consulted.    GI: Pt w/ abdominal pain, evidence of acute pancreatitis. Clear liquids, NPO after midnight, IVF, MRI/MRCP, EGD possible to r/o peptic ulcer disease.       6/21 Changed to Inpatient Status: Pt reports ongoing epigastric pain, but slightly improved from yesterday. On exam, epigastric tenderness. Plan: IVF, NPO, analgesics, antiemetics, PPI, MRI/MRCP, EGD, continue current meds, Trend labs, replete electrolytes as needed.      06/22/24 Day 2: Pt imaging showed fullness in head and uncinate process of the pancreas; can follow up MRI outpatient. EGD unremarkable. Pt tolerating PO diet without n/v. On exam, poor dentition. Plan: continue current meds, slow advancement of diet, Trend labs, replete electrolytes as needed.    ED Triage Vitals   Temperature Pulse Respirations Blood Pressure SpO2 Pain Score   06/19/24 1913 06/19/24 1913 06/19/24 1913 06/19/24 1913 06/19/24 1913 06/19/24 2321   98.1 °F (36.7 °C) 91 18 146/78 97 % 7     Weight (last 2 days)       Date/Time Weight    06/22/24 0600 109 (240.08)    06/21/24 0600 106 (234.35)    06/20/24 0539 107 (234.79)            Vital Signs (last 3 days)       Date/Time Temp Pulse Resp BP MAP (mmHg) SpO2 O2 Flow Rate (L/min) O2 Device Patient Position - Orthostatic VS Pain    06/22/24 07:54:17 98.4 °F (36.9 °C) 56 -- 130/74 93 92 % -- -- -- --    06/21/24 23:29:13 98.5 °F (36.9 °C) 56 18 104/56 72 92 % -- -- -- --    06/21/24 2100 -- -- -- -- -- -- -- -- -- No Pain    06/21/24 1838 -- -- -- -- -- -- -- -- -- 4    06/21/24 15:08:14 97.6 °F (36.4 °C) 57 18 124/69 87 95 % -- -- -- --    06/21/24 1345 -- 59 18 105/55 -- 95 % -- None (Room air) -- No Pain    06/21/24 1336 97 °F (36.1 °C) 63 14 121/78 -- 99 % 6 L/min Simple mask -- No Pain    06/21/24 1246 97.7 °F (36.5 °C) 57 16  119/74 -- 98 % -- None (Room air) -- 4 06/21/24 1100 -- -- -- -- -- -- -- -- -- 5 06/21/24 0900 -- -- -- -- -- -- -- None (Room air) -- --    06/21/24 08:11:39 98.6 °F (37 °C) 62 16 115/68 84 91 % -- -- -- --    06/21/24 0648 -- -- -- -- -- -- -- -- -- 6 06/20/24 23:33:42 98.6 °F (37 °C) 66 18 109/63 78 94 % -- -- -- --    06/20/24 2145 -- -- -- -- -- -- -- None (Room air) -- 5 06/20/24 21:44:36 98.5 °F (36.9 °C) 73 18 111/85 94 91 % -- -- -- --    06/20/24 2000 -- -- -- -- -- -- -- -- -- 7 06/20/24 1658 -- -- -- -- -- -- -- -- -- 8 06/20/24 1509 98.5 °F (36.9 °C) 63 17 116/63 84 95 % -- None (Room air) Lying --    06/20/24 1257 -- -- -- -- -- -- -- -- -- 7 06/20/24 0854 -- -- -- -- -- -- -- -- -- 7 06/20/24 0539 98.5 °F (36.9 °C) 66 18 133/72 97 92 % -- None (Room air) Lying --    06/20/24 0443 -- -- -- -- -- -- -- -- -- 8 06/20/24 0200 -- -- -- -- -- -- -- -- -- 4 06/20/24 0141 98.1 °F (36.7 °C) 63 -- 126/63 89 96 % -- None (Room air) Lying --    06/19/24 2321 -- -- -- -- -- -- -- -- -- 7    06/19/24 2259 -- 66 18 127/60 -- 95 % -- None (Room air) -- --    06/19/24 1913 98.1 °F (36.7 °C) 91 18 146/78 -- 97 % -- None (Room air) -- --            Pertinent Labs/Diagnostic Test Results:   Radiology:  MRI abdomen w wo contrast and mrcp   Final Interpretation by Olivia Hernadez MD (06/21 2055)      Normal caliber intra and extrahepatic bile ducts. No evidence of choledocholithiasis.      Fullness in the head and uncinate process of the pancreas, with corresponding restricted diffusion. There is no upstream pancreatic ductal dilatation. The restricted diffusion is most likely due to the acute pancreatitis however, recommend follow-up MRI    pancreatic mass protocol once the symptoms have subsided to exclude the possibility of an underlying mass      The study was marked in EPIC for immediate notification..      Workstation performed: [id number]         CT abdomen pelvis with contrast    Final Interpretation by Olivia Hernadez MD (06/19 2257)      Mild intrahepatic biliary ductal dilatation. Common bile duct is normal in caliber. Findings may be due to prior cholecystectomy but recommend correlation with obstructive LFTs.      Minimal stranding in the region of the head of the pancreas, and around the upper SMV compatible with mild acute interstitial pancreatitis. No evidence of necrosis and no fluid or fluid collection      The study was marked in EPIC for immediate notification..               Workstation performed: [id number]         XR chest 1 view portable   ED Interpretation by Lavern Alvarado DO (06/19 2225)   No acute disease      Final Interpretation by Tyson Duncan MD (06/20 0932)      No acute cardiopulmonary disease.            Workstation performed: [id number]           Cardiology:  ECG 12 lead   Final Result by Laura Longo DO (06/20 1629)   Normal sinus rhythm   Incomplete right bundle branch block   Borderline ECG   When compared with ECG of 29-MAR-2023 13:37,   No significant change was found   Confirmed by Laura Longo ([id number]) on 6/20/2024 4:29:23 PM        GI:  EGD   Final Result by Ignacio Christiansen MD (06/21 1337)   The esophagus, stomach and duodenum appeared normal.         RECOMMENDATION:   There is no recommended follow-up for this procedure.                     Ignacio Christiansen MD               Results from last 7 days   Lab Units 06/22/24  0456 06/21/24 0719 06/20/24 0432 06/19/24 1915   WBC Thousand/uL 8.21 9.56 11.62* 13.91*   HEMOGLOBIN g/dL 11.9 12.2 13.2 14.8   HEMATOCRIT % 36.6 37.2 39.8 45.2   PLATELETS Thousands/uL 216 218 235 278   TOTAL NEUT ABS Thousands/µL 3.72 5.52 7.61 10.50*         Results from last 7 days   Lab Units 06/22/24  0456 06/21/24  0719 06/20/24 0432 06/19/24 1915   SODIUM mmol/L 139 138 137 134*   POTASSIUM mmol/L 4.0 3.9 4.2 4.3   CHLORIDE mmol/L 108 106 105 101   CO2 mmol/L 25 28 28 25   ANION GAP mmol/L 6 4 4 8   BUN mg/dL 6 5 9 13    CREATININE mg/dL 0.72 0.70 0.90 1.09   EGFR ml/min/1.73sq m 91 95 70 55   CALCIUM mg/dL 8.4 8.2* 8.5 9.4   MAGNESIUM mg/dL  --  1.9 1.9  --    PHOSPHORUS mg/dL  --   --  3.4  --      Results from last 7 days   Lab Units 06/22/24  0456 06/21/24  0719 06/20/24  0432 06/19/24 1915   AST U/L 11* 15 63* 12*   ALT U/L 17 25 48 14   ALK PHOS U/L 50 56 72 68   TOTAL PROTEIN g/dL 5.6* 5.7* 6.1* 7.5   ALBUMIN g/dL 3.1* 3.1* 3.4* 4.2   TOTAL BILIRUBIN mg/dL 0.51 0.58 0.59 0.38         Results from last 7 days   Lab Units 06/22/24  0456 06/21/24  0719 06/20/24  0432 06/19/24 1915   GLUCOSE RANDOM mg/dL 81 95 117 132          Results from last 7 days   Lab Units 06/19/24 2057   HS TNI 0HR ng/L 3      Results from last 7 days   Lab Units 06/19/24 1915   TSH 3RD GENERATON uIU/mL 2.908      Results from last 7 days   Lab Units 06/19/24 1915   LIPASE u/L 42   AMYLASE IU/L 57      Results from last 7 days   Lab Units 06/19/24  2132   CLARITY UA  Clear   COLOR UA  Light Yellow   SPEC GRAV UA  1.016   PH UA  6.5   GLUCOSE UA mg/dl Negative   KETONES UA mg/dl Negative   BLOOD UA  Small*   PROTEIN UA mg/dl Negative   NITRITE UA  Negative   BILIRUBIN UA  Negative   UROBILINOGEN UA (BE) mg/dl <2.0   LEUKOCYTES UA  Small*   WBC UA /hpf 4-10*   RBC UA /hpf 2-4*   BACTERIA UA /hpf Occasional   EPITHELIAL CELLS WET PREP /hpf Occasional       ED Treatment-Medication Administration from 06/19/2024 1900 to 06/20/2024 0126         Date/Time Order Dose Route Action     06/19/2024 2051 sodium chloride 0.9 % bolus 1,000 mL 1,000 mL Intravenous New Bag     06/19/2024 2045 HYDROmorphone (DILAUDID) injection 0.5 mg 0.5 mg Intravenous Given     06/19/2024 2045 ondansetron (ZOFRAN) injection 4 mg 4 mg Intravenous Given     06/19/2024 2214 iohexol (OMNIPAQUE) 350 MG/ML injection (MULTI-DOSE) 100 mL 100 mL Intravenous Given     06/19/2024 2325 lactated ringers infusion 200 mL/hr Intravenous New Bag     06/19/2024 2321 HYDROmorphone (DILAUDID)  injection 0.5 mg 0.5 mg Intravenous Given     06/20/2024 0048 busPIRone (BUSPAR) tablet 10 mg 10 mg Oral Given     06/20/2024 0048 hydrOXYzine HCL (ATARAX) tablet 50 mg 50 mg Oral Given     06/20/2024 0048 lurasidone (LATUDA) tablet 20 mg 20 mg Oral Given     06/20/2024 0048 traZODone (DESYREL) tablet 150 mg 150 mg Oral Given     06/20/2024 0050 pantoprazole (PROTONIX) injection 40 mg 40 mg Intravenous Given            Past Medical History:   Diagnosis Date    Anxiety     Back pain     Depression     GERD (gastroesophageal reflux disease)     SOB (shortness of breath)     Vertigo      Present on Admission:   Acute pancreatitis   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 female  Admission Orders:  Clear Liquid Diet. - NPO.   Up & OOB as tolerated.  DW. I&O. SCDs.    Scheduled Medications:  buPROPion, 300 mg, Oral, Daily  busPIRone, 10 mg, Oral, TID  enoxaparin, 40 mg, Subcutaneous, Daily  hydrOXYzine HCL, 50 mg, Oral, HS  lurasidone, 40 mg, Oral, Daily With Dinner  nicotine, 1 patch, Transdermal, Daily  pantoprazole, 40 mg, Intravenous, Q24H LAVON  traZODone, 300 mg, Oral, HS    Continuous IV Infusions:   lactated ringers, 100 mL/hr, Intravenous, Continuous    PRN Meds:  acetaminophen, 650 mg, Oral, Q4H PRN  aluminum-magnesium hydroxide-simethicone, 30 mL, Oral, Q6H PRN  HYDROmorphone, 0.2 mg, Intravenous, Q2H PRN; 6/20 x5  ondansetron, 4 mg, Intravenous, Q6H PRN  oxyCODONE, 2.5 mg, Oral, Q4H PRN; 6/21 x2  oxyCODONE, 5 mg, Oral, Q4H PRN; 6/20 x1, 6/21 x1        IP CONSULT TO GASTROENTEROLOGY    Network Utilization Review Department  ATTENTION: Please call with any questions or concerns to [phone] and carefully  listen to the prompts so that you are directed to the right person. All voicemails are confidential.   For Discharge needs, contact Care Management DC Support Team at [phone] opt. 2  Send all requests for admission clinical reviews, approved or denied determinations and any other requests to dedicated fax number below belonging to the campus where the patient is receiving treatment. List of dedicated fax numbers for the Facilities:  FACILITY NAME UR FAX NUMBER   ADMISSION DENIALS (Administrative/Medical Necessity) [phone]   DISCHARGE SUPPORT TEAM (NETWORK) [phone]   PARENT CHILD HEALTH (Maternity/NICU/Pediatrics) [phone]   [facility] [phone]   [facility] [phone]   [facility] [phone]   [facility] [phone]   [facility] [phone]   [facility] [phone]   [facility] [phone]   [facility] [phone]   [facility] [phone]   [facility] [phone]   [facility] [phone]   [facility] [phone]

## 2024-06-20 NOTE — ASSESSMENT & PLAN NOTE
Recent Labs     06/19/24  1915 06/20/24  0432   WBC 13.91* 11.62*     Likely secondary to pancreatitis    Plan:  Trend CBC daily

## 2024-06-21 ENCOUNTER — APPOINTMENT (INPATIENT)
Dept: MRI IMAGING | Facility: HOSPITAL | Age: 59
DRG: 282 | End: 2024-06-21
Payer: COMMERCIAL

## 2024-06-21 ENCOUNTER — APPOINTMENT (OUTPATIENT)
Dept: PERIOP | Facility: HOSPITAL | Age: 59
DRG: 282 | End: 2024-06-21
Payer: COMMERCIAL

## 2024-06-21 ENCOUNTER — ANESTHESIA (OUTPATIENT)
Dept: PERIOP | Facility: HOSPITAL | Age: 59
DRG: 282 | End: 2024-06-21
Payer: COMMERCIAL

## 2024-06-21 ENCOUNTER — ANESTHESIA EVENT (OUTPATIENT)
Dept: PERIOP | Facility: HOSPITAL | Age: 59
DRG: 282 | End: 2024-06-21
Payer: COMMERCIAL

## 2024-06-21 PROBLEM — E87.1 HYPONATREMIA: Status: RESOLVED | Noted: 2024-06-20 | Resolved: 2024-06-21

## 2024-06-21 PROBLEM — D72.829 LEUKOCYTOSIS: Status: RESOLVED | Noted: 2024-06-20 | Resolved: 2024-06-21

## 2024-06-21 LAB
ALBUMIN SERPL BCG-MCNC: 3.1 G/DL (ref 3.5–5)
ALP SERPL-CCNC: 56 U/L (ref 34–104)
ALT SERPL W P-5'-P-CCNC: 25 U/L (ref 7–52)
ANION GAP SERPL CALCULATED.3IONS-SCNC: 4 MMOL/L (ref 4–13)
AST SERPL W P-5'-P-CCNC: 15 U/L (ref 13–39)
BASOPHILS # BLD AUTO: 0.04 THOUSANDS/ÂΜL (ref 0–0.1)
BASOPHILS NFR BLD AUTO: 0 % (ref 0–1)
BILIRUB SERPL-MCNC: 0.58 MG/DL (ref 0.2–1)
BUN SERPL-MCNC: 5 MG/DL (ref 5–25)
CALCIUM ALBUM COR SERPL-MCNC: 8.9 MG/DL (ref 8.3–10.1)
CALCIUM SERPL-MCNC: 8.2 MG/DL (ref 8.4–10.2)
CHLORIDE SERPL-SCNC: 106 MMOL/L (ref 96–108)
CO2 SERPL-SCNC: 28 MMOL/L (ref 21–32)
CREAT SERPL-MCNC: 0.7 MG/DL (ref 0.6–1.3)
EOSINOPHIL # BLD AUTO: 0.12 THOUSAND/ÂΜL (ref 0–0.61)
EOSINOPHIL NFR BLD AUTO: 1 % (ref 0–6)
ERYTHROCYTE [DISTWIDTH] IN BLOOD BY AUTOMATED COUNT: 13.8 % (ref 11.6–15.1)
GFR SERPL CREATININE-BSD FRML MDRD: 95 ML/MIN/1.73SQ M
GLUCOSE P FAST SERPL-MCNC: 95 MG/DL (ref 65–99)
GLUCOSE SERPL-MCNC: 95 MG/DL (ref 65–140)
HCT VFR BLD AUTO: 37.2 % (ref 34.8–46.1)
HGB BLD-MCNC: 12.2 G/DL (ref 11.5–15.4)
IMM GRANULOCYTES # BLD AUTO: 0.03 THOUSAND/UL (ref 0–0.2)
IMM GRANULOCYTES NFR BLD AUTO: 0 % (ref 0–2)
LYMPHOCYTES # BLD AUTO: 3.07 THOUSANDS/ÂΜL (ref 0.6–4.47)
LYMPHOCYTES NFR BLD AUTO: 32 % (ref 14–44)
MAGNESIUM SERPL-MCNC: 1.9 MG/DL (ref 1.9–2.7)
MCH RBC QN AUTO: 30.7 PG (ref 26.8–34.3)
MCHC RBC AUTO-ENTMCNC: 32.8 G/DL (ref 31.4–37.4)
MCV RBC AUTO: 94 FL (ref 82–98)
MONOCYTES # BLD AUTO: 0.78 THOUSAND/ÂΜL (ref 0.17–1.22)
MONOCYTES NFR BLD AUTO: 8 % (ref 4–12)
NEUTROPHILS # BLD AUTO: 5.52 THOUSANDS/ÂΜL (ref 1.85–7.62)
NEUTS SEG NFR BLD AUTO: 59 % (ref 43–75)
NRBC BLD AUTO-RTO: 0 /100 WBCS
PLATELET # BLD AUTO: 218 THOUSANDS/UL (ref 149–390)
PMV BLD AUTO: 10.1 FL (ref 8.9–12.7)
POTASSIUM SERPL-SCNC: 3.9 MMOL/L (ref 3.5–5.3)
PROT SERPL-MCNC: 5.7 G/DL (ref 6.4–8.4)
RBC # BLD AUTO: 3.98 MILLION/UL (ref 3.81–5.12)
SODIUM SERPL-SCNC: 138 MMOL/L (ref 135–147)
WBC # BLD AUTO: 9.56 THOUSAND/UL (ref 4.31–10.16)

## 2024-06-21 PROCEDURE — 85025 COMPLETE CBC W/AUTO DIFF WBC: CPT | Performed by: PSYCHIATRY & NEUROLOGY

## 2024-06-21 PROCEDURE — 83735 ASSAY OF MAGNESIUM: CPT | Performed by: PSYCHIATRY & NEUROLOGY

## 2024-06-21 PROCEDURE — 0DJ08ZZ INSPECTION OF UPPER INTESTINAL TRACT, VIA NATURAL OR ARTIFICIAL OPENING ENDOSCOPIC: ICD-10-PCS | Performed by: INTERNAL MEDICINE

## 2024-06-21 PROCEDURE — 43235 EGD DIAGNOSTIC BRUSH WASH: CPT | Performed by: INTERNAL MEDICINE

## 2024-06-21 PROCEDURE — C9113 INJ PANTOPRAZOLE SODIUM, VIA: HCPCS | Performed by: PSYCHIATRY & NEUROLOGY

## 2024-06-21 PROCEDURE — A9585 GADOBUTROL INJECTION: HCPCS | Performed by: FAMILY MEDICINE

## 2024-06-21 PROCEDURE — 74183 MRI ABD W/O CNTR FLWD CNTR: CPT

## 2024-06-21 PROCEDURE — 80053 COMPREHEN METABOLIC PANEL: CPT | Performed by: PSYCHIATRY & NEUROLOGY

## 2024-06-21 PROCEDURE — 99232 SBSQ HOSP IP/OBS MODERATE 35: CPT | Performed by: INTERNAL MEDICINE

## 2024-06-21 RX ORDER — SODIUM CHLORIDE, SODIUM LACTATE, POTASSIUM CHLORIDE, CALCIUM CHLORIDE 600; 310; 30; 20 MG/100ML; MG/100ML; MG/100ML; MG/100ML
INJECTION, SOLUTION INTRAVENOUS CONTINUOUS PRN
Status: DISCONTINUED | OUTPATIENT
Start: 2024-06-21 | End: 2024-06-21

## 2024-06-21 RX ORDER — GADOBUTROL 604.72 MG/ML
10 INJECTION INTRAVENOUS
Status: COMPLETED | OUTPATIENT
Start: 2024-06-21 | End: 2024-06-21

## 2024-06-21 RX ORDER — PROPOFOL 10 MG/ML
INJECTION, EMULSION INTRAVENOUS AS NEEDED
Status: DISCONTINUED | OUTPATIENT
Start: 2024-06-21 | End: 2024-06-21

## 2024-06-21 RX ORDER — LIDOCAINE HYDROCHLORIDE 20 MG/ML
INJECTION, SOLUTION EPIDURAL; INFILTRATION; INTRACAUDAL; PERINEURAL AS NEEDED
Status: DISCONTINUED | OUTPATIENT
Start: 2024-06-21 | End: 2024-06-21

## 2024-06-21 RX ADMIN — TRAZODONE HYDROCHLORIDE 300 MG: 100 TABLET ORAL at 21:09

## 2024-06-21 RX ADMIN — LURASIDONE HYDROCHLORIDE 40 MG: 40 TABLET, COATED ORAL at 16:28

## 2024-06-21 RX ADMIN — PANTOPRAZOLE SODIUM 40 MG: 40 INJECTION, POWDER, FOR SOLUTION INTRAVENOUS at 09:17

## 2024-06-21 RX ADMIN — SODIUM CHLORIDE, SODIUM LACTATE, POTASSIUM CHLORIDE, AND CALCIUM CHLORIDE: .6; .31; .03; .02 INJECTION, SOLUTION INTRAVENOUS at 12:48

## 2024-06-21 RX ADMIN — PROPOFOL 150 MG: 10 INJECTION, EMULSION INTRAVENOUS at 13:26

## 2024-06-21 RX ADMIN — Medication 2.5 MG: at 18:38

## 2024-06-21 RX ADMIN — NICOTINE 1 PATCH: 14 PATCH, EXTENDED RELEASE TRANSDERMAL at 09:21

## 2024-06-21 RX ADMIN — ENOXAPARIN SODIUM 40 MG: 40 INJECTION SUBCUTANEOUS at 09:17

## 2024-06-21 RX ADMIN — OXYCODONE HYDROCHLORIDE 5 MG: 5 TABLET ORAL at 06:48

## 2024-06-21 RX ADMIN — BUSPIRONE HYDROCHLORIDE 10 MG: 10 TABLET ORAL at 09:17

## 2024-06-21 RX ADMIN — Medication 2.5 MG: at 11:00

## 2024-06-21 RX ADMIN — BUPROPION HYDROCHLORIDE 300 MG: 150 TABLET, EXTENDED RELEASE ORAL at 09:17

## 2024-06-21 RX ADMIN — BUSPIRONE HYDROCHLORIDE 10 MG: 10 TABLET ORAL at 21:08

## 2024-06-21 RX ADMIN — HYDROXYZINE HYDROCHLORIDE 50 MG: 25 TABLET ORAL at 21:08

## 2024-06-21 RX ADMIN — BUSPIRONE HYDROCHLORIDE 10 MG: 10 TABLET ORAL at 16:28

## 2024-06-21 RX ADMIN — GADOBUTROL 10 ML: 604.72 INJECTION INTRAVENOUS at 19:59

## 2024-06-21 RX ADMIN — LIDOCAINE HYDROCHLORIDE 100 MG: 20 INJECTION, SOLUTION EPIDURAL; INFILTRATION; INTRACAUDAL at 13:26

## 2024-06-21 NOTE — ANESTHESIA POSTPROCEDURE EVALUATION
Post-Op Assessment Note    CV Status:  Stable  Pain Score: 0    Pain management: adequate       Mental Status:  Alert and awake   Hydration Status:  Euvolemic   PONV Controlled:  Controlled   Airway Patency:  Patent     Post Op Vitals Reviewed: Yes    No anethesia notable event occurred.    Staff: CRNA               /78 (06/21/24 1336)    Temp (!) 97 °F (36.1 °C) (06/21/24 1336)    Pulse 63 (06/21/24 1336)   Resp 14 (06/21/24 1336)    SpO2 99 % (06/21/24 1336)

## 2024-06-21 NOTE — PROGRESS NOTES
[facility]  Progress Note  Name: Sara Law I  MRN: [id number]  Unit/Bed#: S -01 I Date of Admission: 6/19/2024   Date of Service: 6/21/2024 I Hospital Day: 0    Assessment & Plan   * Acute pancreatitis  Assessment & Plan  Symptoms started in the morning of 6/19/2024 with diffuse abdominal pain nausea and vomiting. No hx of ETOH, recent illness, steroid use. In the ER the patient required a few doses of Dilaudid to help with severe abdominal pain, Zofran given for nausea.     Workup:  - CT abdomen and pelvis shows mild intrahepatic biliary ductal dilatation. Common bile duct is normal in caliber. Findings may be due to prior cholecystectomy but recommend correlation with obstructive LFTs, minimal stranding in the region of the head of the pancreas, and around the upper SMV compatible with mild acute interstitial pancreatitis. No evidence of necrosis and no fluid or fluid collection  - Blood work shows triglycerides of 199, AST 12, lipase 42, amylase ordering, awaiting for results.  Leukocytosis and hyponatremia are present  - The patient does not meet SIRS or sepsis criteria as of now    Possible autoimmune vs idiopathic vs 2/2 biliary sludge    Plan:  - Continue IV fluids  cc/hr  - Currently n.p.o. due to EGD today.  Plan to reintroduce diet slowly after procedure.  - Pain regimen in place  - PRN zofran for nausea  - Protonix 40 mg daily  - GI consult: Plan for MRI/MRCP and EGD this afternoon to rule out PUD.    Anxiety and depression  Assessment & Plan  On Wellbutrin, Buspar, Atarax, Latuda and trazodone, continue her regimen  Follows up with psych per family medicine note on April 7, 2023  Stable for now per patient report    Leukocytosis-resolved as of 6/21/2024  Assessment & Plan  Recent Labs     06/19/24  1915 06/20/24  0432 06/21/24  0719   WBC 13.91* 11.62* 9.56     Likely secondary to pancreatitis    Plan:  Trend CBC daily    Hyponatremia-resolved as of  6/21/2024  Assessment & Plan  Recent Labs     06/19/24  1915 06/20/24  0432 06/21/24  0719   SODIUM 134* 137 138       Likely 2/2 poor oral intake and dehydration.  Stable after IV fluids.    Plan:  Encourage adequate oral intake  Continue IV fluids  Monitor BMP daily; Adjust fluid rate/concentration accordingly     Abnormal urinalysis  Assessment & Plan  Results from last 7 days   Lab Units 06/19/24  2132   LEUKOCYTES UA  Small*   NITRITE UA  Negative   GLUCOSE UA mg/dl Negative   KETONES UA mg/dl Negative   BLOOD UA  Small*   WBC UA /hpf 4-10*   RBC UA /hpf 2-4*   BACTERIA UA /hpf Occasional       Patient presently denying symptoms of dysuria.     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       Cigarette nicotine dependence with nicotine-induced disorder  Assessment & Plan  On Wellbutrin daily, continue regimen  Education on the importance of smoking cessation was provided at bedside greater than 3 minutes  Nicotine patch ordered           VTE Pharmacologic Prophylaxis: VTE Score: 3 Moderate Risk (Score 3-4) - Pharmacological DVT Prophylaxis Ordered: enoxaparin (Lovenox).    Mobility:   Basic Mobility Inpatient Raw Score: 24  JH-HLM Goal: 8: Walk 250 feet or more  JH-HLM Achieved: 6: Walk 10 steps or more  JH-HLM Goal NOT achieved. Continue with multidisciplinary rounding and encourage appropriate mobility to improve upon JH-HLM goals.    Patient Centered Rounds: I performed bedside rounds with nursing staff today.  Discussions with Specialists or Other Care Team Provider: GI    Education and Discussions with Family / Patient: Patient declined call to .     Current Length of Stay: 0 day(s)  Current Patient Status: Observation   Discharge Plan: Anticipate discharge in 24-48  hrs to home.    Code Status: Level 1 - Full Code    Subjective:   Patient seen and evaluated at bedside.  Patient reporting some epigastric abdominal pain.  She states that she feels slightly better than yesterday.  Denies any nausea or vomiting overnight.  No other complaints noted by patient.    Objective:     Vitals:   Temp (24hrs), Av.6 °F (37 °C), Min:98.5 °F (36.9 °C), Max:98.6 °F (37 °C)    Temp:  [98.5 °F (36.9 °C)-98.6 °F (37 °C)] 98.6 °F (37 °C)  HR:  [62-73] 62  Resp:  [16-18] 16  BP: (109-116)/(63-85) 115/68  SpO2:  [91 %-95 %] 91 %  Body mass index is 37.82 kg/m².     Input and Output Summary (last 24 hours):     Intake/Output Summary (Last 24 hours) at 2024 0906  Last data filed at 2024 0320  Gross per 24 hour   Intake 2000 ml   Output 500 ml   Net 1500 ml       Physical Exam:   Physical Exam  Vitals and nursing note reviewed.   Constitutional:       General: She is not in acute distress.     Appearance: She is well-developed. She is obese.   HENT:      Head: Normocephalic and atraumatic.      Mouth/Throat:      Comments: Poor dentition  Eyes:      Conjunctiva/sclera: Conjunctivae normal.   Cardiovascular:      Rate and Rhythm: Normal rate and regular rhythm.      Heart sounds: No murmur heard.  Pulmonary:      Effort: Pulmonary effort is normal. No respiratory distress.      Breath sounds: Normal breath sounds.   Abdominal:      Palpations: Abdomen is soft.      Tenderness: There is abdominal tenderness in the epigastric area.   Musculoskeletal:         General: No swelling.      Cervical back: Neck supple.   Skin:     General: Skin is warm and dry.      Capillary Refill: Capillary refill takes less than 2 seconds.   Neurological:      Mental Status: She is alert.   Psychiatric:         Mood and Affect: Mood normal.         Additional Data:     Labs:  Results from last 7 days   Lab Units 24  0719   WBC Thousand/uL 9.56   HEMOGLOBIN g/dL 12.2   HEMATOCRIT % 37.2   PLATELETS  Thousands/uL 218   SEGS PCT % 59   LYMPHO PCT % 32   MONO PCT % 8   EOS PCT % 1     Results from last 7 days   Lab Units 06/21/24  0719   SODIUM mmol/L 138   POTASSIUM mmol/L 3.9   CHLORIDE mmol/L 106   CO2 mmol/L 28   BUN mg/dL 5   CREATININE mg/dL 0.70   ANION GAP mmol/L 4   CALCIUM mg/dL 8.2*   ALBUMIN g/dL 3.1*   TOTAL BILIRUBIN mg/dL 0.58   ALK PHOS U/L 56   ALT U/L 25   AST U/L 15   GLUCOSE RANDOM mg/dL 95                       Lines/Drains:  Invasive Devices       Peripheral Intravenous Line  Duration             Peripheral IV 06/19/24 Right Antecubital 1 day                          Imaging: Reviewed radiology reports from this admission including: chest xray and abdominal/pelvic CT    Recent Cultures (last 7 days):         Last 24 Hours Medication List:   Current Facility-Administered Medications   Medication Dose Route Frequency Provider Last Rate    acetaminophen  650 mg Oral Q4H PRN Stacey Amaral DO      aluminum-magnesium hydroxide-simethicone  30 mL Oral Q6H PRN HERBIE Harding      buPROPion  300 mg Oral Daily HERBIE Harding      busPIRone  10 mg Oral TID HERBIE Harding      enoxaparin  40 mg Subcutaneous Daily HERBIE Harding      HYDROmorphone  0.2 mg Intravenous Q2H PRN Stacey Amaral DO      hydrOXYzine HCL  50 mg Oral HS HERBIE Harding      lactated ringers  150 mL/hr Intravenous Continuous Stacey Amaral  mL/hr (06/20/24 2143)    lurasidone  40 mg Oral Daily With Dinner Stacey Amaral DO      nicotine  1 patch Transdermal Daily HERBIE Harding      ondansetron  4 mg Intravenous Q6H PRN HERBIE Harding      oxyCODONE  2.5 mg Oral Q4H PRN Stacey Amaral DO      Or    oxyCODONE  5 mg Oral Q4H PRN Stacey Amaral DO      pantoprazole  40 mg Intravenous Q24H [facility] Amaral DO      traZODone  300 mg Oral HS Stacey Amaral DO          Today, Patient Was Seen By: Stacey Amaral DO    **Please Note: This note may have been constructed using a voice recognition system.**

## 2024-06-21 NOTE — ANESTHESIA PREPROCEDURE EVALUATION
Procedure:  EGD    Relevant Problems   GI/HEPATIC   (+) Acute pancreatitis   (+) Gastroesophageal reflux disease      MUSCULOSKELETAL   (+) Lower back pain   (+) Lumbar spondylosis   (+) Sacroiliitis (HCC)      NEURO/PSYCH   (+) Anxiety   (+) Anxiety and depression   (+) Chronic pain syndrome        Physical Exam    Airway    Mallampati score: III  TM Distance: >3 FB  Neck ROM: full     Dental    upper dentures and lower dentures,     Cardiovascular  Rhythm: regular, Rate: normal, Cardiovascular exam normal    Pulmonary  Pulmonary exam normal Breath sounds clear to auscultation    Other Findings  post-pubertal.      Anesthesia Plan  ASA Score- 2     Anesthesia Type- IV sedation with anesthesia with ASA Monitors.         Additional Monitors:     Airway Plan:     Comment: Discussed risks/benefits, including medication reactions, awareness, aspiration, and serious/life threatening complications.    Plan to maintain native airway with IVGA, monitored with EtCO2.       Plan Factors-Exercise tolerance (METS): >4 METS.    Chart reviewed.    Patient summary reviewed.      Patient instructed to abstain from smoking on day of procedure. Patient did not smoke on day of surgery.            Induction- intravenous.    Postoperative Plan-     Perioperative Resuscitation Plan - Level 1 - Full Code.       Informed Consent- Anesthetic plan and risks discussed with patient.  I personally reviewed this patient with the CRNA. Discussed and agreed on the Anesthesia Plan with the CRNA..

## 2024-06-21 NOTE — PLAN OF CARE
Problem: PAIN - ADULT  Goal: Verbalizes/displays adequate comfort level or baseline comfort level  Description: Interventions:  - Encourage patient to monitor pain and request assistance  - Assess pain using appropriate pain scale  - Administer analgesics based on type and severity of pain and evaluate response  - Implement non-pharmacological measures as appropriate and evaluate response  - Consider cultural and social influences on pain and pain management  - Notify physician/advanced practitioner if interventions unsuccessful or patient reports new pain  Outcome: Progressing     Problem: INFECTION - ADULT  Goal: Absence or prevention of progression during hospitalization  Description: INTERVENTIONS:  - Assess and monitor for signs and symptoms of infection  - Monitor lab/diagnostic results  - Monitor all insertion sites, i.e. indwelling lines, tubes, and drains  - Monitor endotracheal if appropriate and nasal secretions for changes in amount and color  - [place] appropriate cooling/warming therapies per order  - Administer medications as ordered  - Instruct and encourage patient and family to use good hand hygiene technique  - Identify and instruct in appropriate isolation precautions for identified infection/condition  Outcome: Progressing  Goal: Absence of fever/infection during neutropenic period  Description: INTERVENTIONS:  - Monitor WBC    Outcome: Progressing     Problem: SAFETY ADULT  Goal: Patient will remain free of falls  Description: INTERVENTIONS:  - Educate patient/family on patient safety including physical limitations  - Instruct patient to call for assistance with activity   - Consult OT/PT to assist with strengthening/mobility   - Keep Call bell within reach  - Keep bed low and locked with side rails adjusted as appropriate  - Keep care items and personal belongings within reach  - Initiate and maintain comfort rounds  - Make Fall Risk Sign visible to staff  - Offer Toileting every 2 Hours,  in advance of need  - Obtain necessary fall risk management equipment: call bell  - Apply yellow socks and bracelet for high fall risk patients  - Consider moving patient to room near nurses station  Outcome: Progressing  Goal: Maintain or return to baseline ADL function  Description: INTERVENTIONS:  -  Assess patient's ability to carry out ADLs; assess patient's baseline for ADL function and identify physical deficits which impact ability to perform ADLs (bathing, care of mouth/teeth, toileting, grooming, dressing, etc.)  - Assess/evaluate cause of self-care deficits   - Assess range of motion  - Assess patient's mobility; develop plan if impaired  - Assess patient's need for assistive devices and provide as appropriate  - Encourage maximum independence but intervene and supervise when necessary  - Involve family in performance of ADLs  - Assess for home care needs following discharge   - Consider OT consult to assist with ADL evaluation and planning for discharge  - Provide patient education as appropriate  Outcome: Progressing  Goal: Maintains/Returns to pre admission functional level  Description: INTERVENTIONS:  - Perform AM-PAC 6 Click Basic Mobility/ Daily Activity assessment daily.  - Set and communicate daily mobility goal to care team and patient/family/caregiver.   - Collaborate with rehabilitation services on mobility goals if consulted  - Perform Range of Motion 3 times a day.  - Reposition patient every 2 hours.  - Dangle patient 3 times a day  - Stand patient 3 times a day  - Ambulate patient 3 times a day  - Out of bed to chair 3 times a day   - Out of bed for meals 3 times a day  - Out of bed for toileting  - Record patient progress and toleration of activity level   Outcome: Progressing     Problem: DISCHARGE PLANNING  Goal: Discharge to home or other facility with appropriate resources  Description: INTERVENTIONS:  - Identify barriers to discharge w/patient and caregiver  - Arrange for needed  discharge resources and transportation as appropriate  - Identify discharge learning needs (meds, wound care, etc.)  - Arrange for interpretive services to assist at discharge as needed  - Refer to Case Management Department for coordinating discharge planning if the patient needs post-hospital services based on physician/advanced practitioner order or complex needs related to functional status, cognitive ability, or social support system  Outcome: Progressing     Problem: Knowledge Deficit  Goal: Patient/family/caregiver demonstrates understanding of disease process, treatment plan, medications, and discharge instructions  Description: Complete learning assessment and assess knowledge base.  Interventions:  - Provide teaching at level of understanding  - Provide teaching via preferred learning methods  Outcome: Progressing     Problem: Nutrition/Hydration-ADULT  Goal: Nutrient/Hydration intake appropriate for improving, restoring or maintaining nutritional needs  Description: Monitor and assess patient's nutrition/hydration status for malnutrition. Collaborate with interdisciplinary team and initiate plan and interventions as ordered.  Monitor patient's weight and dietary intake as ordered or per policy. Utilize nutrition screening tool and intervene as necessary. Determine patient's food preferences and provide high-protein, high-caloric foods as appropriate.     INTERVENTIONS:  - Monitor oral intake, urinary output, labs, and treatment plans  - Assess nutrition and hydration status and recommend course of action  - Evaluate amount of meals eaten  - Assist patient with eating if necessary   - Allow adequate time for meals  - Recommend/ encourage appropriate diets, oral nutritional supplements, and vitamin/mineral supplements  - Order, calculate, and assess calorie counts as needed  - Recommend, monitor, and adjust tube feedings and TPN/PPN based on assessed needs  - Assess need for intravenous fluids  - Provide  specific nutrition/hydration education as appropriate  - Include patient/family/caregiver in decisions related to nutrition  Outcome: Progressing

## 2024-06-22 VITALS
WEIGHT: 240.08 LBS | OXYGEN SATURATION: 92 % | BODY MASS INDEX: 38.75 KG/M2 | DIASTOLIC BLOOD PRESSURE: 74 MMHG | SYSTOLIC BLOOD PRESSURE: 130 MMHG | RESPIRATION RATE: 18 BRPM | HEART RATE: 56 BPM | TEMPERATURE: 98.4 F

## 2024-06-22 LAB
ALBUMIN SERPL BCG-MCNC: 3.1 G/DL (ref 3.5–5)
ALP SERPL-CCNC: 50 U/L (ref 34–104)
ALT SERPL W P-5'-P-CCNC: 17 U/L (ref 7–52)
ANION GAP SERPL CALCULATED.3IONS-SCNC: 6 MMOL/L (ref 4–13)
AST SERPL W P-5'-P-CCNC: 11 U/L (ref 13–39)
BASOPHILS # BLD AUTO: 0.03 THOUSANDS/ÂΜL (ref 0–0.1)
BASOPHILS NFR BLD AUTO: 0 % (ref 0–1)
BILIRUB SERPL-MCNC: 0.51 MG/DL (ref 0.2–1)
BUN SERPL-MCNC: 6 MG/DL (ref 5–25)
CALCIUM ALBUM COR SERPL-MCNC: 9.1 MG/DL (ref 8.3–10.1)
CALCIUM SERPL-MCNC: 8.4 MG/DL (ref 8.4–10.2)
CHLORIDE SERPL-SCNC: 108 MMOL/L (ref 96–108)
CO2 SERPL-SCNC: 25 MMOL/L (ref 21–32)
CREAT SERPL-MCNC: 0.72 MG/DL (ref 0.6–1.3)
EOSINOPHIL # BLD AUTO: 0.19 THOUSAND/ÂΜL (ref 0–0.61)
EOSINOPHIL NFR BLD AUTO: 2 % (ref 0–6)
ERYTHROCYTE [DISTWIDTH] IN BLOOD BY AUTOMATED COUNT: 13.7 % (ref 11.6–15.1)
GFR SERPL CREATININE-BSD FRML MDRD: 91 ML/MIN/1.73SQ M
GLUCOSE SERPL-MCNC: 81 MG/DL (ref 65–140)
HCT VFR BLD AUTO: 36.6 % (ref 34.8–46.1)
HGB BLD-MCNC: 11.9 G/DL (ref 11.5–15.4)
IMM GRANULOCYTES # BLD AUTO: 0.03 THOUSAND/UL (ref 0–0.2)
IMM GRANULOCYTES NFR BLD AUTO: 0 % (ref 0–2)
LYMPHOCYTES # BLD AUTO: 3.58 THOUSANDS/ÂΜL (ref 0.6–4.47)
LYMPHOCYTES NFR BLD AUTO: 44 % (ref 14–44)
MCH RBC QN AUTO: 30.6 PG (ref 26.8–34.3)
MCHC RBC AUTO-ENTMCNC: 32.5 G/DL (ref 31.4–37.4)
MCV RBC AUTO: 94 FL (ref 82–98)
MONOCYTES # BLD AUTO: 0.66 THOUSAND/ÂΜL (ref 0.17–1.22)
MONOCYTES NFR BLD AUTO: 8 % (ref 4–12)
NEUTROPHILS # BLD AUTO: 3.72 THOUSANDS/ÂΜL (ref 1.85–7.62)
NEUTS SEG NFR BLD AUTO: 46 % (ref 43–75)
NRBC BLD AUTO-RTO: 0 /100 WBCS
PLATELET # BLD AUTO: 216 THOUSANDS/UL (ref 149–390)
PMV BLD AUTO: 10 FL (ref 8.9–12.7)
POTASSIUM SERPL-SCNC: 4 MMOL/L (ref 3.5–5.3)
PROT SERPL-MCNC: 5.6 G/DL (ref 6.4–8.4)
RBC # BLD AUTO: 3.89 MILLION/UL (ref 3.81–5.12)
SODIUM SERPL-SCNC: 139 MMOL/L (ref 135–147)
WBC # BLD AUTO: 8.21 THOUSAND/UL (ref 4.31–10.16)

## 2024-06-22 PROCEDURE — 80053 COMPREHEN METABOLIC PANEL: CPT | Performed by: PSYCHIATRY & NEUROLOGY

## 2024-06-22 PROCEDURE — 99232 SBSQ HOSP IP/OBS MODERATE 35: CPT | Performed by: INTERNAL MEDICINE

## 2024-06-22 PROCEDURE — C9113 INJ PANTOPRAZOLE SODIUM, VIA: HCPCS | Performed by: INTERNAL MEDICINE

## 2024-06-22 PROCEDURE — 85025 COMPLETE CBC W/AUTO DIFF WBC: CPT | Performed by: PSYCHIATRY & NEUROLOGY

## 2024-06-22 PROCEDURE — 99239 HOSP IP/OBS DSCHRG MGMT >30: CPT | Performed by: INTERNAL MEDICINE

## 2024-06-22 RX ORDER — NICOTINE 21 MG/24HR
1 PATCH, TRANSDERMAL 24 HOURS TRANSDERMAL DAILY
Qty: 28 PATCH | Refills: 0 | Status: SHIPPED | OUTPATIENT
Start: 2024-06-22

## 2024-06-22 RX ADMIN — NICOTINE 1 PATCH: 14 PATCH, EXTENDED RELEASE TRANSDERMAL at 08:17

## 2024-06-22 RX ADMIN — BUSPIRONE HYDROCHLORIDE 10 MG: 10 TABLET ORAL at 08:18

## 2024-06-22 RX ADMIN — PANTOPRAZOLE SODIUM 40 MG: 40 INJECTION, POWDER, FOR SOLUTION INTRAVENOUS at 08:18

## 2024-06-22 RX ADMIN — BUPROPION HYDROCHLORIDE 300 MG: 150 TABLET, EXTENDED RELEASE ORAL at 08:18

## 2024-06-22 RX ADMIN — SODIUM CHLORIDE, SODIUM LACTATE, POTASSIUM CHLORIDE, AND CALCIUM CHLORIDE 100 ML/HR: .6; .31; .03; .02 INJECTION, SOLUTION INTRAVENOUS at 03:32

## 2024-06-22 RX ADMIN — ENOXAPARIN SODIUM 40 MG: 40 INJECTION SUBCUTANEOUS at 08:18

## 2024-06-22 NOTE — DISCHARGE SUMMARY
[facility]  Discharge- Sara Law 1965, 58 y.o. female MRN: [id number]  Unit/Bed#: S -01 Encounter: [id number]  Primary Care Provider: Cipriano White MD   Date and time admitted to hospital: 6/19/2024  8:10 PM    * Acute pancreatitis  Assessment & Plan  Symptoms started in the morning of 6/19/2024 with diffuse abdominal pain nausea and vomiting. No hx of ETOH, recent illness, steroid use. In the ER the patient required a few doses of Dilaudid to help with severe abdominal pain, Zofran given for nausea.     Workup:  - CT abdomen and pelvis shows mild intrahepatic biliary ductal dilatation. Common bile duct is normal in caliber. Findings may be due to prior cholecystectomy but recommend correlation with obstructive LFTs, minimal stranding in the region of the head of the pancreas, and around the upper SMV compatible with mild acute interstitial pancreatitis. No evidence of necrosis and no fluid or fluid collection  - Blood work shows triglycerides of 199, AST 12, lipase 42, amylase ordering, awaiting for results.  Leukocytosis and hyponatremia are present  - The patient does not meet SIRS or sepsis criteria as of now. Possible autoimmune vs idiopathic vs 2/2 biliary sludge    6/21 MRI: Normal caliber intra and extrahepatic bile ducts. No evidence of choledocholithiasis.  Fullness in the head and uncinate process of the pancreas, with corresponding restricted diffusion. There is no upstream pancreatic ductal dilatation. The restricted diffusion is most likely due to the acute pancreatitis however, recommend follow-up MRI   pancreatic mass protocol once the symptoms have subsided to exclude the possibility of an underlying mass    6/22 patient comfortably laying in bed not in any acute distress, tolerating p.o. intake denies having any abdominal pain nausea/vomiting.  Advised patient to follow-up with primary care and gastroenterology for repeat follow-up MRI pancreatic mass  protocol.     Abnormal urinalysis  Assessment & Plan  Results from last 7 days   Lab Units 06/19/24  2132   LEUKOCYTES UA  Small*   NITRITE UA  Negative   GLUCOSE UA mg/dl Negative   KETONES UA mg/dl Negative   BLOOD UA  Small*   WBC UA /hpf 4-10*   RBC UA /hpf 2-4*   BACTERIA UA /hpf Occasional       Patient presently denying symptoms of dysuria.     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       Anxiety and depression  Assessment & Plan  On Wellbutrin, Buspar, Atarax, Latuda and trazodone, continue her regimen  Follows up with psych per family medicine note on April 7, 2023  Stable for now per patient report    Cigarette nicotine dependence with nicotine-induced disorder  Assessment & Plan  On Wellbutrin daily, continue regimen  Education on the importance of smoking cessation was provided at bedside greater than 3 minutes  Nicotine patch ordered          Medical Problems         Resolved Problems  Date Reviewed: 6/22/2024              Resolved     Hyponatremia 6/21/2024       Resolved by  Cornell Graff MD     Leukocytosis 6/21/2024       Resolved by  Cornell Graff MD         Discharging Resident: Marielle Rivas MD  Discharging Attending: Cornell Persaud*  PCP: Cipriano White MD  Admission Date:   Admission Orders (From admission, onward)          Ordered         06/21/24 1435   INPATIENT ADMISSION  Once             06/19/24 2332   Place in Observation  Once                               Discharge Date: 06/22/24     Consultations During Hospital Stay:  IP CONSULT TO GASTROENTEROLOGY      Procedures Performed:         Significant Findings / Test Results:   MRI abdomen w wo contrast and mrcp   Final Result by Olivia Hernadez MD (06/21 2055)       Normal  caliber intra and extrahepatic bile ducts. No evidence of choledocholithiasis.       Fullness in the head and uncinate process of the pancreas, with corresponding restricted diffusion. There is no upstream pancreatic ductal dilatation. The restricted diffusion is most likely due to the acute pancreatitis however, recommend follow-up MRI    pancreatic mass protocol once the symptoms have subsided to exclude the possibility of an underlying mass       The study was marked in EPIC for immediate notification..       Workstation performed: [id number]           CT abdomen pelvis with contrast   Final Result by Olivia Hernadez MD (06/19 5866)       Mild intrahepatic biliary ductal dilatation. Common bile duct is normal in caliber. Findings may be due to prior cholecystectomy but recommend correlation with obstructive LFTs.       Minimal stranding in the region of the head of the pancreas, and around the upper SMV compatible with mild acute interstitial pancreatitis. No evidence of necrosis and no fluid or fluid collection       The study was marked in EPIC for immediate notification..                   Workstation performed: [id number]           XR chest 1 view portable   ED Interpretation by Lavern Alvarado DO (06/19 7538)   No acute disease       Final Result by Tyson Duncan MD (06/20 7718)       No acute cardiopulmonary disease.               Workstation performed: [id number]                 Incidental Findings:            Test Results Pending at Discharge (will require follow up):        Outpatient Tests Requested:  MRI     Complications:  none     Reason for Admission: Abdominal pain     Hospital Course:   Sara Law is a 58 y.o. female with a PMH of dizziness, obesity, thyroid nodule, depression, tobacco smoking and GERD who presents with with severe abdominal pain nausea vomiting. In the ER Dilaudid and Zofran was given for pain and nausea, CT abdomen and pelvis shows acute pancreatitis, blood work shows  triglyceride of 1 99, HDL 38, leukocytosis, AST 12, lipase within normal limits, vital signs are stable.  The patient reports abdominal pain 7 out of 10, the patient denies chills, fever, bleeding, indigestion, diarrhea, constipation, or any other symptoms than the stated above.  During the course of the hospitalization patient received IV fluids, pain medications and MRI was done with no evidence of choledocholithiasis although there is some fullness in the head and uncinate process of the pancreas and required follow-up MRI outpatient.  Otherwise patient also went EGD that did not reveal anything significant with normal esophagus, stomach and duodenum.  Patient will be discharged in stable condition with tolerating oral diet with pain controlled to follow-up with her outpatient gastroenterologist for repeat MRI.  All questions concerns were answered bedside        Please see above list of diagnoses and related plan for additional information.      Condition at Discharge: stable     Discharge Day Visit / Exam:   Subjective: Patient was seen and examined bedside this morning.  Patient able to tolerate p.o. intake denies any nausea or vomiting abdominal pain  Vitals: Blood Pressure: 130/74 (06/22/24 0754)  Pulse: 56 (06/22/24 0754)  Temperature: 98.4 °F (36.9 °C) (06/22/24 0754)  Temp Source: Temporal (06/21/24 1246)  Respirations: 18 (06/21/24 2329)  Weight - Scale: 109 kg (240 lb 1.3 oz) (06/22/24 0600)  SpO2: 92 % (06/22/24 0754)  Exam:   Physical Exam  Vitals and nursing note reviewed.   Constitutional:       General: She is not in acute distress.     Appearance: She is well-developed. She is obese.   HENT:      Head: Normocephalic and atraumatic.      Mouth/Throat:      Comments: Poor dentition  Eyes:      Conjunctiva/sclera: Conjunctivae normal.   Cardiovascular:      Rate and Rhythm: Normal rate and regular rhythm.      Heart sounds: No murmur heard.  Pulmonary:      Effort: Pulmonary effort is normal. No  respiratory distress.      Breath sounds: Normal breath sounds.   Abdominal:      Palpations: Abdomen is soft.   Musculoskeletal:         General: No swelling.      Cervical back: Neck supple.   Skin:     General: Skin is warm and dry.      Capillary Refill: Capillary refill takes less than 2 seconds.   Neurological:      Mental Status: She is alert.   Psychiatric:         Mood and Affect: Mood normal.            Discussion with Family: Patient declined call to .      Discharge instructions/Information to patient and family:   See after visit summary for information provided to patient and family.       Provisions for Follow-Up Care:  See after visit summary for information related to follow-up care and any pertinent home health orders.       Mobility at time of Discharge:   Basic Mobility Inpatient Raw Score: 24  JH-HLM Goal: 8: Walk 250 feet or more  JH-HLM Achieved: 6: Walk 10 steps or more        Disposition:   Home     Planned Readmission: no     Discharge Medications:  See after visit summary for reconciled discharge medications provided to patient and/or family.       **Please Note: This note may have been constructed using a voice recognition system**

## 2024-06-22 NOTE — PLAN OF CARE
Problem: PAIN - ADULT  Goal: Verbalizes/displays adequate comfort level or baseline comfort level  Description: Interventions:  - Encourage patient to monitor pain and request assistance  - Assess pain using appropriate pain scale  - Administer analgesics based on type and severity of pain and evaluate response  - Implement non-pharmacological measures as appropriate and evaluate response  - Consider cultural and social influences on pain and pain management  - Notify physician/advanced practitioner if interventions unsuccessful or patient reports new pain  Outcome: Adequate for Discharge     Problem: INFECTION - ADULT  Goal: Absence or prevention of progression during hospitalization  Description: INTERVENTIONS:  - Assess and monitor for signs and symptoms of infection  - Monitor lab/diagnostic results  - Monitor all insertion sites, i.e. indwelling lines, tubes, and drains  - Monitor endotracheal if appropriate and nasal secretions for changes in amount and color  - [place] appropriate cooling/warming therapies per order  - Administer medications as ordered  - Instruct and encourage patient and family to use good hand hygiene technique  - Identify and instruct in appropriate isolation precautions for identified infection/condition  Outcome: Adequate for Discharge  Goal: Absence of fever/infection during neutropenic period  Description: INTERVENTIONS:  - Monitor WBC    Outcome: Adequate for Discharge     Problem: SAFETY ADULT  Goal: Patient will remain free of falls  Description: INTERVENTIONS:  - Educate patient/family on patient safety including physical limitations  - Instruct patient to call for assistance with activity   - Consult OT/PT to assist with strengthening/mobility   - Keep Call bell within reach  - Keep bed low and locked with side rails adjusted as appropriate  - Keep care items and personal belongings within reach  - Initiate and maintain comfort rounds  - Make Fall Risk Sign visible to  staff  - Apply yellow socks and bracelet for high fall risk patients  - Consider moving patient to room near nurses station  Outcome: Adequate for Discharge  Goal: Maintain or return to baseline ADL function  Description: INTERVENTIONS:  -  Assess patient's ability to carry out ADLs; assess patient's baseline for ADL function and identify physical deficits which impact ability to perform ADLs (bathing, care of mouth/teeth, toileting, grooming, dressing, etc.)  - Assess/evaluate cause of self-care deficits   - Assess range of motion  - Assess patient's mobility; develop plan if impaired  - Assess patient's need for assistive devices and provide as appropriate  - Encourage maximum independence but intervene and supervise when necessary  - Involve family in performance of ADLs  - Assess for home care needs following discharge   - Consider OT consult to assist with ADL evaluation and planning for discharge  - Provide patient education as appropriate  Outcome: Adequate for Discharge  Goal: Maintains/Returns to pre admission functional level  Description: INTERVENTIONS:  - Perform AM-PAC 6 Click Basic Mobility/ Daily Activity assessment daily.  - Set and communicate daily mobility goal to care team and patient/family/caregiver.   - Collaborate with rehabilitation services on mobility goals if consulted  - Ambulate patient 3 times a day  - Out of bed to chair 3 times a day   - Out of bed for meals 3 times a day  - Out of bed for toileting  - Record patient progress and toleration of activity level   Outcome: Adequate for Discharge     Problem: DISCHARGE PLANNING  Goal: Discharge to home or other facility with appropriate resources  Description: INTERVENTIONS:  - Identify barriers to discharge w/patient and caregiver  - Arrange for needed discharge resources and transportation as appropriate  - Identify discharge learning needs (meds, wound care, etc.)  - Refer to Case Management Department for coordinating discharge  planning if the patient needs post-hospital services based on physician/advanced practitioner order or complex needs related to functional status, cognitive ability, or social support system  Outcome: Adequate for Discharge

## 2024-06-22 NOTE — UTILIZATION REVIEW
NOTIFICATION OF INPATIENT ADMISSION   AUTHORIZATION REQUEST   SERVICING FACILITY:   [place] [address]  Tax ID: [id number]  NPI: [id number]   ATTENDING PROVIDER:  Attending Name and NPI#: Cornell Graff Md [[id number]]  Address: [address] [place] [address]  Phone: [phone]     ADMISSION INFORMATION:  Place of Service: Inpatient [facility]  Place of Service Code: 21  Inpatient Admission Date/Time: 6/21/24  2:35 PM  Discharge Date/Time: No discharge date for patient encounter.  Admitting Diagnosis Code/Description:  Interstitial pancreatitis (HCC) [K86.1]  Abdominal pain [R10.9]     UTILIZATION REVIEW CONTACT:  Calista Kumar Utilization   Network Utilization Review Department  Phone: [phone]  Fax: [phone]  Email: [email or web address]  Contact for approvals/pending authorizations, clinical reviews, and discharge.     PHYSICIAN ADVISORY SERVICES:  Medical Necessity Denial & Fyqs-ih-Zpgg Review  Phone: [phone]  Fax: [phone]  Email: [email or web address]     DISCHARGE SUPPORT TEAM:  For Patients Discharge Needs & Updates  Phone: [phone] opt. 2 Fax: [phone]  Email: [email or web address]

## 2024-06-22 NOTE — DISCHARGE INSTR - AVS FIRST PAGE
Dear Sara Law,     It was our pleasure to care for you here at [facility].  It is our hope that we were always able to exceed the expected standards for your care during your stay.  You were hospitalized due to abdominal pain.  You were cared for on the 4th floor by Marielle Rivas MD under the service of Cornell Persaud* with the [facility] who covers for your primary care physician (PCP), Cipriano White MD, while you were hospitalized.  If you have any questions or concerns related to this hospitalization, you may contact us at .  For follow up as well as any medication refills, we recommend that you follow up with your primary care physician.  A registered nurse will reach out to you by phone within a few days after your discharge to answer any additional questions that you may have after going home.  However, at this time we provide for you here, the most important instructions / recommendations at discharge:     Notable Medication Adjustments -   Please resume all home meds as prescribed  Nicotine patch ordered 21mg/24hrs   Testing Required after Discharge -   Please follow up with gastroenterology for repeat MRI  Important follow up information -   Please follow-up with your primary care provider and outpatient gastroenterologist  Other Instructions -   If you have any worsening abdominal pain, nausea/vomiting or fever/chills please report back to the ED  Please review this entire after visit summary as additional general instructions including medication list, appointments, activity, diet, any pertinent wound care, and other additional recommendations from your care team that may be provided for you.      Sincerely,     Marielle Rivas MD

## 2024-06-22 NOTE — PROGRESS NOTES
[facility]  Progress Note  Name: Sara Law I  MRN: [id number]  Unit/Bed#: S -01 I Date of Admission: 6/19/2024   Date of Service: 6/22/2024 I Hospital Day: 1    Assessment & Plan   * Acute pancreatitis  Assessment & Plan  Symptoms started in the morning of 6/19/2024 with diffuse abdominal pain nausea and vomiting. No hx of ETOH, recent illness, steroid use. In the ER the patient required a few doses of Dilaudid to help with severe abdominal pain, Zofran given for nausea.     Workup:  - CT abdomen and pelvis shows mild intrahepatic biliary ductal dilatation. Common bile duct is normal in caliber. Findings may be due to prior cholecystectomy but recommend correlation with obstructive LFTs, minimal stranding in the region of the head of the pancreas, and around the upper SMV compatible with mild acute interstitial pancreatitis. No evidence of necrosis and no fluid or fluid collection  - Blood work shows triglycerides of 199, AST 12, lipase 42, amylase ordering, awaiting for results.  Leukocytosis and hyponatremia are present  - The patient does not meet SIRS or sepsis criteria as of now. Possible autoimmune vs idiopathic vs 2/2 biliary sludge    6/21 MRI: Normal caliber intra and extrahepatic bile ducts. No evidence of choledocholithiasis.  Fullness in the head and uncinate process of the pancreas, with corresponding restricted diffusion. There is no upstream pancreatic ductal dilatation. The restricted diffusion is most likely due to the acute pancreatitis however, recommend follow-up MRI   pancreatic mass protocol once the symptoms have subsided to exclude the possibility of an underlying mass    6/22 patient comfortably laying in bed not in any acute distress, tolerating p.o. intake denies having any abdominal pain nausea/vomiting.  Advised patient to follow-up with primary care and gastroenterology for repeat follow-up MRI pancreatic mass protocol.     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       Anxiety and depression  Assessment & Plan  On Wellbutrin, Buspar, Atarax, Latuda and trazodone, continue her regimen  Follows up with psych per family medicine note on April 7, 2023  Stable for now per patient report    Cigarette nicotine dependence with nicotine-induced disorder  Assessment & Plan  On Wellbutrin daily, continue regimen  Education on the importance of smoking cessation was provided at bedside greater than 3 minutes  Nicotine patch ordered             Medical Problems       Resolved Problems  Date Reviewed: 6/22/2024            Resolved    Hyponatremia 6/21/2024     Resolved by  Cornell Graff MD    Leukocytosis 6/21/2024     Resolved by  Cornell Graff MD        Discharging Resident: Marielle Rivas MD  Discharging Attending: Cornell Persaud*  PCP: Cipriano White MD  Admission Date:   Admission Orders (From admission, onward)       Ordered        06/21/24 1435  INPATIENT ADMISSION  Once            06/19/24 2332  Place in Observation  Once                          Discharge Date: 06/22/24    Consultations During Hospital Stay:  IP CONSULT TO GASTROENTEROLOGY     Procedures Performed:       Significant Findings / Test Results:   MRI abdomen w wo contrast and mrcp   Final Result by Olivia Hernadez MD (06/21 2055)      Normal caliber intra and extrahepatic bile ducts. No evidence of choledocholithiasis.      Fullness in the head and uncinate process of the pancreas, with corresponding restricted diffusion. There is no upstream pancreatic ductal dilatation. The restricted diffusion is most likely due to the acute pancreatitis however, recommend follow-up MRI    pancreatic mass protocol once the symptoms have  subsided to exclude the possibility of an underlying mass      The study was marked in EPIC for immediate notification..      Workstation performed: [id number]         CT abdomen pelvis with contrast   Final Result by Olivia Hernadez MD (06/19 2257)      Mild intrahepatic biliary ductal dilatation. Common bile duct is normal in caliber. Findings may be due to prior cholecystectomy but recommend correlation with obstructive LFTs.      Minimal stranding in the region of the head of the pancreas, and around the upper SMV compatible with mild acute interstitial pancreatitis. No evidence of necrosis and no fluid or fluid collection      The study was marked in EPIC for immediate notification..               Workstation performed: [id number]         XR chest 1 view portable   ED Interpretation by Lavern Alvarado DO (06/19 2225)   No acute disease      Final Result by Tyson Duncan MD (06/20 0932)      No acute cardiopulmonary disease.            Workstation performed: [id number]              Incidental Findings:          Test Results Pending at Discharge (will require follow up):       Outpatient Tests Requested:  MRI    Complications:  none    Reason for Admission: Abdominal pain    Hospital Course:   Sara Law is a 58 y.o. female with a PMH of dizziness, obesity, thyroid nodule, depression, tobacco smoking and GERD who presents with with severe abdominal pain nausea vomiting. In the ER Dilaudid and Zofran was given for pain and nausea, CT abdomen and pelvis shows acute pancreatitis, blood work shows triglyceride of 1 99, HDL 38, leukocytosis, AST 12, lipase within normal limits, vital signs are stable.  The patient reports abdominal pain 7 out of 10, the patient denies chills, fever, bleeding, indigestion, diarrhea, constipation, or any other symptoms than the stated above.  During the course of the hospitalization patient received IV fluids, pain medications and MRI was done with no evidence of  choledocholithiasis although there is some fullness in the head and uncinate process of the pancreas and required follow-up MRI outpatient.  Otherwise patient also went EGD that did not reveal anything significant with normal esophagus, stomach and duodenum.  Patient will be discharged in stable condition with tolerating oral diet with pain controlled to follow-up with her outpatient gastroenterologist for repeat MRI.  All questions concerns were answered bedside            Please see above list of diagnoses and related plan for additional information.     Condition at Discharge: stable    Discharge Day Visit / Exam:   Subjective: Patient was seen and examined bedside this morning.  Patient able to tolerate p.o. intake denies any nausea or vomiting abdominal pain  Vitals: Blood Pressure: 130/74 (06/22/24 0754)  Pulse: 56 (06/22/24 0754)  Temperature: 98.4 °F (36.9 °C) (06/22/24 0754)  Temp Source: Temporal (06/21/24 1246)  Respirations: 18 (06/21/24 2329)  Weight - Scale: 109 kg (240 lb 1.3 oz) (06/22/24 0600)  SpO2: 92 % (06/22/24 0754)  Exam:   Physical Exam  Vitals and nursing note reviewed.   Constitutional:       General: She is not in acute distress.     Appearance: She is well-developed. She is obese.   HENT:      Head: Normocephalic and atraumatic.      Mouth/Throat:      Comments: Poor dentition  Eyes:      Conjunctiva/sclera: Conjunctivae normal.   Cardiovascular:      Rate and Rhythm: Normal rate and regular rhythm.      Heart sounds: No murmur heard.  Pulmonary:      Effort: Pulmonary effort is normal. No respiratory distress.      Breath sounds: Normal breath sounds.   Abdominal:      Palpations: Abdomen is soft.   Musculoskeletal:         General: No swelling.      Cervical back: Neck supple.   Skin:     General: Skin is warm and dry.      Capillary Refill: Capillary refill takes less than 2 seconds.   Neurological:      Mental Status: She is alert.   Psychiatric:         Mood and Affect: Mood normal.           Discussion with Family: Patient declined call to .     Discharge instructions/Information to patient and family:   See after visit summary for information provided to patient and family.      Provisions for Follow-Up Care:  See after visit summary for information related to follow-up care and any pertinent home health orders.      Mobility at time of Discharge:   Basic Mobility Inpatient Raw Score: 24  JH-HLM Goal: 8: Walk 250 feet or more  JH-HLM Achieved: 6: Walk 10 steps or more       Disposition:   Home    Planned Readmission: no    Discharge Medications:  See after visit summary for reconciled discharge medications provided to patient and/or family.      **Please Note: This note may have been constructed using a voice recognition system**

## 2024-06-22 NOTE — PROGRESS NOTES
Progress Note - Sara Law 58 y.o. female MRN: [id number]    Unit/Bed#: S -01 Encounter: [id number]        Assessment/Plan:  58-year-old female with history of tobacco abuse who presented due to acute onset upper abdominal pain with nausea and vomiting, CT consistent with pancreatitis although lipase normal.     Acute upper abdominal pain with nausea and vomiting  Abnormal CT scan, intrahepatic biliary ductal dilatation and pancreatitis  -EGD 6/21 was normal  -MRI/MRCP showed normal caliber intra and extrahepatic bile ducts. No evidence of choledocholithiasis.  Fullness in the head and uncinate process of the pancreas, with corresponding restricted diffusion. There is no upstream pancreatic ductal dilatation. The restricted diffusion is most likely due to the acute pancreatitis however, recommend follow-up MRI   pancreatic mass protocol once the symptoms have subsided to exclude the possibility of an underlying mass (this can be repeated in about 6 weeks, order was placed)  -LFTs WNL  -On low fat diet, patient tolerating plan for discharge today  -Follow-up in office after MRI    Subjective:   Patient is lying in bed.  She reports that she is feeling much better and tolerating low-fat diet without any significant pain.    Objective:     Vitals: /74   Pulse 56   Temp 98.4 °F (36.9 °C)   Resp 18   Wt 109 kg (240 lb 1.3 oz)   SpO2 92%   BMI 38.75 kg/m²       Physical Exam:  Gen-alert acute distress  Abd-soft positive bowel sounds nontender nondistended       Lab, Imaging and other studies:   Recent Results (from the past 72 hour(s))   CBC and differential    Collection Time: 06/19/24  7:15 PM   Result Value Ref Range    WBC 13.91 (H) 4.31 - 10.16 Thousand/uL    RBC 4.87 3.81 - 5.12 Million/uL    Hemoglobin 14.8 11.5 - 15.4 g/dL    Hematocrit 45.2 34.8 - 46.1 %    MCV 93 82 - 98 fL    MCH 30.4 26.8 - 34.3 pg    MCHC 32.7 31.4 - 37.4 g/dL    RDW 13.9 11.6 - 15.1 %    MPV 9.8 8.9 - 12.7 fL     Platelets 278 149 - 390 Thousands/uL    nRBC 0 /100 WBCs    Segmented % 75 43 - 75 %    Immature Grans % 0 0 - 2 %    Lymphocytes % 19 14 - 44 %    Monocytes % 5 4 - 12 %    Eosinophils Relative 1 0 - 6 %    Basophils Relative 0 0 - 1 %    Absolute Neutrophils 10.50 (H) 1.85 - 7.62 Thousands/µL    Absolute Immature Grans 0.06 0.00 - 0.20 Thousand/uL    Absolute Lymphocytes 2.58 0.60 - 4.47 Thousands/µL    Absolute Monocytes 0.65 0.17 - 1.22 Thousand/µL    Eosinophils Absolute 0.07 0.00 - 0.61 Thousand/µL    Basophils Absolute 0.05 0.00 - 0.10 Thousands/µL   Comprehensive metabolic panel    Collection Time: 06/19/24  7:15 PM   Result Value Ref Range    Sodium 134 (L) 135 - 147 mmol/L    Potassium 4.3 3.5 - 5.3 mmol/L    Chloride 101 96 - 108 mmol/L    CO2 25 21 - 32 mmol/L    ANION GAP 8 4 - 13 mmol/L    BUN 13 5 - 25 mg/dL    Creatinine 1.09 0.60 - 1.30 mg/dL    Glucose 132 65 - 140 mg/dL    Calcium 9.4 8.4 - 10.2 mg/dL    AST 12 (L) 13 - 39 U/L    ALT 14 7 - 52 U/L    Alkaline Phosphatase 68 34 - 104 U/L    Total Protein 7.5 6.4 - 8.4 g/dL    Albumin 4.2 3.5 - 5.0 g/dL    Total Bilirubin 0.38 0.20 - 1.00 mg/dL    eGFR 55 ml/min/1.73sq m   Lipase    Collection Time: 06/19/24  7:15 PM   Result Value Ref Range    Lipase 42 11 - 82 u/L   Green / Black tube on hold    Collection Time: 06/19/24  7:15 PM   Result Value Ref Range    Extra Tube Hold for add-ons.    TSH, 3rd generation    Collection Time: 06/19/24  7:15 PM   Result Value Ref Range    TSH 3RD GENERATON 2.908 0.450 - 4.500 uIU/mL   Lipid Panel with Direct LDL reflex    Collection Time: 06/19/24  7:15 PM   Result Value Ref Range    Cholesterol 166 See Comment mg/dL    Triglycerides 199 (H) See Comment mg/dL    HDL, Direct 38 (L) >=50 mg/dL    LDL Calculated 88 0 - 100 mg/dL   Amylase    Collection Time: 06/19/24  7:15 PM   Result Value Ref Range    Amylase 57 29 - 103 IU/L   HS Troponin 0hr (reflex protocol)    Collection Time: 06/19/24  8:57 PM   Result Value  "Ref Range    hs TnI 0hr 3 \"Refer to ACS Flowchart\"- see link ng/L   ECG 12 lead    Collection Time: 06/19/24  8:58 PM   Result Value Ref Range    Ventricular Rate 71 BPM    Atrial Rate 71 BPM    MD Interval 156 ms    QRSD Interval 96 ms    QT Interval 390 ms    QTC Interval 423 ms    P Hunter 63 degrees    QRS Axis 83 degrees    T Wave Axis 68 degrees   UA w Reflex to Microscopic w Reflex to Culture    Collection Time: 06/19/24  9:32 PM    Specimen: Urine, Clean Catch   Result Value Ref Range    Color, UA Light Yellow     Clarity, UA Clear     Specific Gravity, UA 1.016 1.003 - 1.030    pH, UA 6.5 4.5, 5.0, 5.5, 6.0, 6.5, 7.0, 7.5, 8.0    Leukocytes, UA Small (A) Negative    Nitrite, UA Negative Negative    Protein, UA Negative Negative mg/dl    Glucose, UA Negative Negative mg/dl    Ketones, UA Negative Negative mg/dl    Urobilinogen, UA <2.0 <2.0 mg/dl mg/dl    Bilirubin, UA Negative Negative    Occult Blood, UA Small (A) Negative   Urine Microscopic    Collection Time: 06/19/24  9:32 PM   Result Value Ref Range    RBC, UA 2-4 (A) None Seen, 1-2 /hpf    WBC, UA 4-10 (A) None Seen, 1-2 /hpf    Epithelial Cells Occasional None Seen, Occasional /hpf    Bacteria, UA Occasional None Seen, Occasional /hpf   Comprehensive metabolic panel    Collection Time: 06/20/24  4:32 AM   Result Value Ref Range    Sodium 137 135 - 147 mmol/L    Potassium 4.2 3.5 - 5.3 mmol/L    Chloride 105 96 - 108 mmol/L    CO2 28 21 - 32 mmol/L    ANION GAP 4 4 - 13 mmol/L    BUN 9 5 - 25 mg/dL    Creatinine 0.90 0.60 - 1.30 mg/dL    Glucose 117 65 - 140 mg/dL    Calcium 8.5 8.4 - 10.2 mg/dL    Corrected Calcium 9.0 8.3 - 10.1 mg/dL    AST 63 (H) 13 - 39 U/L    ALT 48 7 - 52 U/L    Alkaline Phosphatase 72 34 - 104 U/L    Total Protein 6.1 (L) 6.4 - 8.4 g/dL    Albumin 3.4 (L) 3.5 - 5.0 g/dL    Total Bilirubin 0.59 0.20 - 1.00 mg/dL    eGFR 70 ml/min/1.73sq m   Magnesium    Collection Time: 06/20/24  4:32 AM   Result Value Ref Range    Magnesium 1.9 " 1.9 - 2.7 mg/dL   Phosphorus    Collection Time: 06/20/24  4:32 AM   Result Value Ref Range    Phosphorus 3.4 2.7 - 4.5 mg/dL   CBC and differential    Collection Time: 06/20/24  4:32 AM   Result Value Ref Range    WBC 11.62 (H) 4.31 - 10.16 Thousand/uL    RBC 4.24 3.81 - 5.12 Million/uL    Hemoglobin 13.2 11.5 - 15.4 g/dL    Hematocrit 39.8 34.8 - 46.1 %    MCV 94 82 - 98 fL    MCH 31.1 26.8 - 34.3 pg    MCHC 33.2 31.4 - 37.4 g/dL    RDW 14.0 11.6 - 15.1 %    MPV 9.7 8.9 - 12.7 fL    Platelets 235 149 - 390 Thousands/uL    nRBC 0 /100 WBCs    Segmented % 64 43 - 75 %    Immature Grans % 1 0 - 2 %    Lymphocytes % 26 14 - 44 %    Monocytes % 8 4 - 12 %    Eosinophils Relative 1 0 - 6 %    Basophils Relative 0 0 - 1 %    Absolute Neutrophils 7.61 1.85 - 7.62 Thousands/µL    Absolute Immature Grans 0.06 0.00 - 0.20 Thousand/uL    Absolute Lymphocytes 2.99 0.60 - 4.47 Thousands/µL    Absolute Monocytes 0.87 0.17 - 1.22 Thousand/µL    Eosinophils Absolute 0.07 0.00 - 0.61 Thousand/µL    Basophils Absolute 0.02 0.00 - 0.10 Thousands/µL   Comprehensive metabolic panel    Collection Time: 06/21/24  7:19 AM   Result Value Ref Range    Sodium 138 135 - 147 mmol/L    Potassium 3.9 3.5 - 5.3 mmol/L    Chloride 106 96 - 108 mmol/L    CO2 28 21 - 32 mmol/L    ANION GAP 4 4 - 13 mmol/L    BUN 5 5 - 25 mg/dL    Creatinine 0.70 0.60 - 1.30 mg/dL    Glucose 95 65 - 140 mg/dL    Glucose, Fasting 95 65 - 99 mg/dL    Calcium 8.2 (L) 8.4 - 10.2 mg/dL    Corrected Calcium 8.9 8.3 - 10.1 mg/dL    AST 15 13 - 39 U/L    ALT 25 7 - 52 U/L    Alkaline Phosphatase 56 34 - 104 U/L    Total Protein 5.7 (L) 6.4 - 8.4 g/dL    Albumin 3.1 (L) 3.5 - 5.0 g/dL    Total Bilirubin 0.58 0.20 - 1.00 mg/dL    eGFR 95 ml/min/1.73sq m   Magnesium    Collection Time: 06/21/24  7:19 AM   Result Value Ref Range    Magnesium 1.9 1.9 - 2.7 mg/dL   CBC and differential    Collection Time: 06/21/24  7:19 AM   Result Value Ref Range    WBC 9.56 4.31 - 10.16  Thousand/uL    RBC 3.98 3.81 - 5.12 Million/uL    Hemoglobin 12.2 11.5 - 15.4 g/dL    Hematocrit 37.2 34.8 - 46.1 %    MCV 94 82 - 98 fL    MCH 30.7 26.8 - 34.3 pg    MCHC 32.8 31.4 - 37.4 g/dL    RDW 13.8 11.6 - 15.1 %    MPV 10.1 8.9 - 12.7 fL    Platelets 218 149 - 390 Thousands/uL    nRBC 0 /100 WBCs    Segmented % 59 43 - 75 %    Immature Grans % 0 0 - 2 %    Lymphocytes % 32 14 - 44 %    Monocytes % 8 4 - 12 %    Eosinophils Relative 1 0 - 6 %    Basophils Relative 0 0 - 1 %    Absolute Neutrophils 5.52 1.85 - 7.62 Thousands/µL    Absolute Immature Grans 0.03 0.00 - 0.20 Thousand/uL    Absolute Lymphocytes 3.07 0.60 - 4.47 Thousands/µL    Absolute Monocytes 0.78 0.17 - 1.22 Thousand/µL    Eosinophils Absolute 0.12 0.00 - 0.61 Thousand/µL    Basophils Absolute 0.04 0.00 - 0.10 Thousands/µL   CBC and differential    Collection Time: 06/22/24  4:56 AM   Result Value Ref Range    WBC 8.21 4.31 - 10.16 Thousand/uL    RBC 3.89 3.81 - 5.12 Million/uL    Hemoglobin 11.9 11.5 - 15.4 g/dL    Hematocrit 36.6 34.8 - 46.1 %    MCV 94 82 - 98 fL    MCH 30.6 26.8 - 34.3 pg    MCHC 32.5 31.4 - 37.4 g/dL    RDW 13.7 11.6 - 15.1 %    MPV 10.0 8.9 - 12.7 fL    Platelets 216 149 - 390 Thousands/uL    nRBC 0 /100 WBCs    Segmented % 46 43 - 75 %    Immature Grans % 0 0 - 2 %    Lymphocytes % 44 14 - 44 %    Monocytes % 8 4 - 12 %    Eosinophils Relative 2 0 - 6 %    Basophils Relative 0 0 - 1 %    Absolute Neutrophils 3.72 1.85 - 7.62 Thousands/µL    Absolute Immature Grans 0.03 0.00 - 0.20 Thousand/uL    Absolute Lymphocytes 3.58 0.60 - 4.47 Thousands/µL    Absolute Monocytes 0.66 0.17 - 1.22 Thousand/µL    Eosinophils Absolute 0.19 0.00 - 0.61 Thousand/µL    Basophils Absolute 0.03 0.00 - 0.10 Thousands/µL   Comprehensive metabolic panel    Collection Time: 06/22/24  4:56 AM   Result Value Ref Range    Sodium 139 135 - 147 mmol/L    Potassium 4.0 3.5 - 5.3 mmol/L    Chloride 108 96 - 108 mmol/L    CO2 25 21 - 32 mmol/L    ANION  GAP 6 4 - 13 mmol/L    BUN 6 5 - 25 mg/dL    Creatinine 0.72 0.60 - 1.30 mg/dL    Glucose 81 65 - 140 mg/dL    Calcium 8.4 8.4 - 10.2 mg/dL    Corrected Calcium 9.1 8.3 - 10.1 mg/dL    AST 11 (L) 13 - 39 U/L    ALT 17 7 - 52 U/L    Alkaline Phosphatase 50 34 - 104 U/L    Total Protein 5.6 (L) 6.4 - 8.4 g/dL    Albumin 3.1 (L) 3.5 - 5.0 g/dL    Total Bilirubin 0.51 0.20 - 1.00 mg/dL    eGFR 91 ml/min/1.73sq m

## 2024-06-22 NOTE — ASSESSMENT & PLAN NOTE
Symptoms started in the morning of 6/19/2024 with diffuse abdominal pain nausea and vomiting. No hx of ETOH, recent illness, steroid use. In the ER the patient required a few doses of Dilaudid to help with severe abdominal pain, Zofran given for nausea.     Workup:  - CT abdomen and pelvis shows mild intrahepatic biliary ductal dilatation. Common bile duct is normal in caliber. Findings may be due to prior cholecystectomy but recommend correlation with obstructive LFTs, minimal stranding in the region of the head of the pancreas, and around the upper SMV compatible with mild acute interstitial pancreatitis. No evidence of necrosis and no fluid or fluid collection  - Blood work shows triglycerides of 199, AST 12, lipase 42, amylase ordering, awaiting for results.  Leukocytosis and hyponatremia are present  - The patient does not meet SIRS or sepsis criteria as of now. Possible autoimmune vs idiopathic vs 2/2 biliary sludge    6/21 MRI: Normal caliber intra and extrahepatic bile ducts. No evidence of choledocholithiasis.  Fullness in the head and uncinate process of the pancreas, with corresponding restricted diffusion. There is no upstream pancreatic ductal dilatation. The restricted diffusion is most likely due to the acute pancreatitis however, recommend follow-up MRI   pancreatic mass protocol once the symptoms have subsided to exclude the possibility of an underlying mass    6/22 patient comfortably laying in bed not in any acute distress, tolerating p.o. intake denies having any abdominal pain nausea/vomiting.  Advised patient to follow-up with primary care and gastroenterology for repeat follow-up MRI pancreatic mass protocol.

## 2024-06-24 ENCOUNTER — TRANSITIONAL CARE MANAGEMENT (OUTPATIENT)
Dept: FAMILY MEDICINE CLINIC | Facility: CLINIC | Age: 59
End: 2024-06-24

## 2024-06-24 NOTE — UTILIZATION REVIEW
NOTIFICATION OF ADMISSION DISCHARGE   This is a Notification of Discharge from [facility]. Please be advised that this patient has been discharge from our facility. Below you will find the admission and discharge date and time including the patient’s disposition.   UTILIZATION REVIEW CONTACT:  Calista Kumar  Utilization   Network Utilization Review Department  Phone: [phone] carefully listen to the prompts. All voicemails are confidential.  Email: [email or web address]     ADMISSION INFORMATION  PRESENTATION DATE: 6/19/2024  8:10 PM  OBERVATION ADMISSION DATE:   INPATIENT ADMISSION DATE: 6/21/24  2:35 PM   DISCHARGE DATE: 6/22/2024  1:56 PM   DISPOSITION:Home/Self Care    Network Utilization Review Department  ATTENTION: Please call with any questions or concerns to [phone] and carefully listen to the prompts so that you are directed to the right person. All voicemails are confidential.   For Discharge needs, contact Care Management DC Support Team at [phone] opt. [phone]  Send all requests for admission clinical reviews, approved or denied determinations and any other requests to dedicated fax number below belonging to the campus where the patient is receiving treatment. List of dedicated fax numbers for the Facilities:  FACILITY NAME UR FAX NUMBER   ADMISSION DENIALS (Administrative/Medical Necessity) [phone]   DISCHARGE SUPPORT TEAM ([facility]) [phone]   PARENT CHILD HEALTH (Maternity/NICU/Pediatrics) [phone]   [facility] [phone]   [facility] [phone]   [facility] [phone]   [facility] [phone]   [facility] [phone]   [facility] [phone]   [facility] [phone]   [facility] [phone]    [facility] [phone]   [facility] [phone]   [facility] [phone]   [facility] [phone]

## 2024-06-26 ENCOUNTER — NURSE TRIAGE (OUTPATIENT)
Age: 59
End: 2024-06-26

## 2024-06-26 NOTE — TELEPHONE ENCOUNTER
----- Message from Erica PACHECO sent at 6/26/2024  1:24 PM EDT -----  Patient seen by Dr. Christiansen in hospital for acute Pancreatitis, referral in chart for FU appt.  Patient states she was told she needs to see him so he can get her an MRI.  No available appointments  Please return patient call to advise

## 2024-06-26 NOTE — TELEPHONE ENCOUNTER
Spoke with pt, I advised MRI was ordered for her already. She can call CS to have this scheduled. This can be repeated in about 6 weeks and she should follow up in office AFTER MRI. She understood and will call back to schedule appt.   Reason for Disposition  • Information only question and nurse able to answer    Protocols used: Information Only Call - No Triage-ADULT-[place]

## 2024-07-01 ENCOUNTER — VBI (OUTPATIENT)
Dept: ADMINISTRATIVE | Facility: OTHER | Age: 59
End: 2024-07-01

## 2024-07-01 NOTE — TELEPHONE ENCOUNTER
07/01/24 2:47 PM     Chart reviewed for Pap Smear (HPV) aka Cervical Cancer Screening ; nothing is submitted to the patient's insurance at this time.     Marisa Adan   [facility]

## 2024-07-02 NOTE — UTILIZATION REVIEW
NOTIFICATION OF ADMISSION DISCHARGE   This is a Notification of Discharge from [facility]. Please be advised that this patient has been discharge from our facility. Below you will find the admission and discharge date and time including the patient’s disposition.   UTILIZATION REVIEW CONTACT:  Cailsta Kumar  Utilization   Network Utilization Review Department  Phone: [phone] carefully listen to the prompts. All voicemails are confidential.  Email: [email or web address]     ADMISSION INFORMATION  PRESENTATION DATE: 6/19/2024  8:10 PM  OBERVATION ADMISSION DATE:   INPATIENT ADMISSION DATE: 6/21/24  2:35 PM   DISCHARGE DATE: 6/22/2024  1:56 PM   DISPOSITION:Home/Self Care    Network Utilization Review Department  ATTENTION: Please call with any questions or concerns to [phone] and carefully listen to the prompts so that you are directed to the right person. All voicemails are confidential.   For Discharge needs, contact Care Management DC Support Team at [phone] opt. [phone]  Send all requests for admission clinical reviews, approved or denied determinations and any other requests to dedicated fax number below belonging to the campus where the patient is receiving treatment. List of dedicated fax numbers for the Facilities:  FACILITY NAME UR FAX NUMBER   ADMISSION DENIALS (Administrative/Medical Necessity) [phone]   DISCHARGE SUPPORT TEAM ([facility]) [phone]   PARENT CHILD HEALTH (Maternity/NICU/Pediatrics) [phone]   [facility] [phone]   [facility] [phone]   Critical access hospital [phone]   [facility] [phone]   [facility] [phone]   [facility] [phone]   [facility] [phone]   [facility] [phone]    [facility] [phone]   [facility] [phone]   [facility] [phone]   [facility] [phone]

## 2024-07-29 DIAGNOSIS — K21.9 GASTROESOPHAGEAL REFLUX DISEASE: ICD-10-CM

## 2024-07-29 RX ORDER — PANTOPRAZOLE SODIUM 40 MG/1
40 TABLET, DELAYED RELEASE ORAL DAILY
Qty: 30 TABLET | Refills: 5 | Status: SHIPPED | OUTPATIENT
Start: 2024-07-29 | End: 2025-01-25

## 2024-08-16 ENCOUNTER — HOSPITAL ENCOUNTER (OUTPATIENT)
Dept: MRI IMAGING | Facility: HOSPITAL | Age: 59
Discharge: HOME | End: 2024-08-16
Payer: COMMERCIAL

## 2024-08-16 DIAGNOSIS — K86.1 INTERSTITIAL PANCREATITIS (HCC): ICD-10-CM

## 2024-08-16 PROCEDURE — A9585 GADOBUTROL INJECTION: HCPCS | Performed by: PHYSICIAN ASSISTANT

## 2024-08-16 PROCEDURE — 74183 MRI ABD W/O CNTR FLWD CNTR: CPT

## 2024-08-16 RX ORDER — GADOBUTROL 604.72 MG/ML
10 INJECTION INTRAVENOUS
Status: COMPLETED | OUTPATIENT
Start: 2024-08-16 | End: 2024-08-16

## 2024-08-16 RX ADMIN — GADOBUTROL 10 ML: 604.72 INJECTION INTRAVENOUS at 11:58

## 2024-09-03 DIAGNOSIS — Z12.31 SCREENING MAMMOGRAM FOR BREAST CANCER: Primary | ICD-10-CM

## 2024-10-16 ENCOUNTER — APPOINTMENT (EMERGENCY)
Dept: CT IMAGING | Facility: HOSPITAL | Age: 59
DRG: 282 | End: 2024-10-16
Payer: COMMERCIAL

## 2024-10-16 ENCOUNTER — HOSPITAL ENCOUNTER (INPATIENT)
Facility: HOSPITAL | Age: 59
LOS: 3 days | Discharge: HOME | DRG: 282 | End: 2024-10-20
Attending: EMERGENCY MEDICINE | Admitting: INTERNAL MEDICINE
Payer: COMMERCIAL

## 2024-10-16 ENCOUNTER — APPOINTMENT (EMERGENCY)
Dept: RADIOLOGY | Facility: HOSPITAL | Age: 59
DRG: 282 | End: 2024-10-16
Payer: COMMERCIAL

## 2024-10-16 DIAGNOSIS — K85.90 ACUTE PANCREATITIS: Primary | ICD-10-CM

## 2024-10-16 DIAGNOSIS — F41.9 ANXIETY: ICD-10-CM

## 2024-10-16 DIAGNOSIS — F32.A ANXIETY AND DEPRESSION: ICD-10-CM

## 2024-10-16 DIAGNOSIS — F41.9 ANXIETY AND DEPRESSION: ICD-10-CM

## 2024-10-16 LAB
ALBUMIN SERPL BCG-MCNC: 4 G/DL (ref 3.5–5)
ALP SERPL-CCNC: 66 U/L (ref 34–104)
ALT SERPL W P-5'-P-CCNC: 19 U/L (ref 7–52)
ANION GAP SERPL CALCULATED.3IONS-SCNC: 8 MMOL/L (ref 4–13)
AST SERPL W P-5'-P-CCNC: 14 U/L (ref 13–39)
BASOPHILS # BLD AUTO: 0.04 THOUSANDS/ΜL (ref 0–0.1)
BASOPHILS NFR BLD AUTO: 0 % (ref 0–1)
BILIRUB SERPL-MCNC: 0.35 MG/DL (ref 0.2–1)
BUN SERPL-MCNC: 11 MG/DL (ref 5–25)
CALCIUM SERPL-MCNC: 9 MG/DL (ref 8.4–10.2)
CARDIAC TROPONIN I PNL SERPL HS: 3 NG/L
CHLORIDE SERPL-SCNC: 107 MMOL/L (ref 96–108)
CO2 SERPL-SCNC: 24 MMOL/L (ref 21–32)
CREAT SERPL-MCNC: 1.06 MG/DL (ref 0.6–1.3)
EOSINOPHIL # BLD AUTO: 0.15 THOUSAND/ΜL (ref 0–0.61)
EOSINOPHIL NFR BLD AUTO: 1 % (ref 0–6)
ERYTHROCYTE [DISTWIDTH] IN BLOOD BY AUTOMATED COUNT: 13.9 % (ref 11.6–15.1)
GFR SERPL CREATININE-BSD FRML MDRD: 57 ML/MIN/1.73SQ M
GLUCOSE SERPL-MCNC: 110 MG/DL (ref 65–140)
HCT VFR BLD AUTO: 42.4 % (ref 34.8–46.1)
HGB BLD-MCNC: 14.2 G/DL (ref 11.5–15.4)
IMM GRANULOCYTES # BLD AUTO: 0.05 THOUSAND/UL (ref 0–0.2)
IMM GRANULOCYTES NFR BLD AUTO: 0 % (ref 0–2)
LIPASE SERPL-CCNC: 39 U/L (ref 11–82)
LYMPHOCYTES # BLD AUTO: 4.25 THOUSANDS/ΜL (ref 0.6–4.47)
LYMPHOCYTES NFR BLD AUTO: 30 % (ref 14–44)
MCH RBC QN AUTO: 31.2 PG (ref 26.8–34.3)
MCHC RBC AUTO-ENTMCNC: 33.5 G/DL (ref 31.4–37.4)
MCV RBC AUTO: 93 FL (ref 82–98)
MONOCYTES # BLD AUTO: 0.83 THOUSAND/ΜL (ref 0.17–1.22)
MONOCYTES NFR BLD AUTO: 6 % (ref 4–12)
NEUTROPHILS # BLD AUTO: 8.79 THOUSANDS/ΜL (ref 1.85–7.62)
NEUTS SEG NFR BLD AUTO: 63 % (ref 43–75)
NRBC BLD AUTO-RTO: 0 /100 WBCS
PLATELET # BLD AUTO: 298 THOUSANDS/UL (ref 149–390)
PMV BLD AUTO: 9.8 FL (ref 8.9–12.7)
POTASSIUM SERPL-SCNC: 4 MMOL/L (ref 3.5–5.3)
PROT SERPL-MCNC: 7.1 G/DL (ref 6.4–8.4)
RBC # BLD AUTO: 4.55 MILLION/UL (ref 3.81–5.12)
SODIUM SERPL-SCNC: 139 MMOL/L (ref 135–147)
WBC # BLD AUTO: 14.11 THOUSAND/UL (ref 4.31–10.16)

## 2024-10-16 PROCEDURE — 99285 EMERGENCY DEPT VISIT HI MDM: CPT | Performed by: EMERGENCY MEDICINE

## 2024-10-16 PROCEDURE — 84484 ASSAY OF TROPONIN QUANT: CPT | Performed by: EMERGENCY MEDICINE

## 2024-10-16 PROCEDURE — 71045 X-RAY EXAM CHEST 1 VIEW: CPT

## 2024-10-16 PROCEDURE — 93005 ELECTROCARDIOGRAM TRACING: CPT

## 2024-10-16 PROCEDURE — 85025 COMPLETE CBC W/AUTO DIFF WBC: CPT | Performed by: EMERGENCY MEDICINE

## 2024-10-16 PROCEDURE — 83690 ASSAY OF LIPASE: CPT | Performed by: EMERGENCY MEDICINE

## 2024-10-16 PROCEDURE — 80053 COMPREHEN METABOLIC PANEL: CPT | Performed by: EMERGENCY MEDICINE

## 2024-10-16 PROCEDURE — 36415 COLL VENOUS BLD VENIPUNCTURE: CPT | Performed by: EMERGENCY MEDICINE

## 2024-10-16 PROCEDURE — 96366 THER/PROPH/DIAG IV INF ADDON: CPT

## 2024-10-16 PROCEDURE — 99285 EMERGENCY DEPT VISIT HI MDM: CPT

## 2024-10-16 PROCEDURE — 96365 THER/PROPH/DIAG IV INF INIT: CPT

## 2024-10-16 PROCEDURE — 74177 CT ABD & PELVIS W/CONTRAST: CPT

## 2024-10-16 PROCEDURE — 82150 ASSAY OF AMYLASE: CPT | Performed by: NURSE PRACTITIONER

## 2024-10-16 PROCEDURE — 96375 TX/PRO/DX INJ NEW DRUG ADDON: CPT

## 2024-10-16 PROCEDURE — 80076 HEPATIC FUNCTION PANEL: CPT | Performed by: INTERNAL MEDICINE

## 2024-10-16 PROCEDURE — 84478 ASSAY OF TRIGLYCERIDES: CPT | Performed by: NURSE PRACTITIONER

## 2024-10-16 RX ORDER — HYDROMORPHONE HCL/PF 1 MG/ML
0.5 SYRINGE (ML) INJECTION ONCE
Status: COMPLETED | OUTPATIENT
Start: 2024-10-16 | End: 2024-10-16

## 2024-10-16 RX ORDER — KETOROLAC TROMETHAMINE 30 MG/ML
15 INJECTION, SOLUTION INTRAMUSCULAR; INTRAVENOUS ONCE
Status: COMPLETED | OUTPATIENT
Start: 2024-10-17 | End: 2024-10-16

## 2024-10-16 RX ORDER — ONDANSETRON 2 MG/ML
4 INJECTION INTRAMUSCULAR; INTRAVENOUS ONCE
Status: COMPLETED | OUTPATIENT
Start: 2024-10-16 | End: 2024-10-16

## 2024-10-16 RX ADMIN — KETOROLAC TROMETHAMINE 15 MG: 30 INJECTION, SOLUTION INTRAMUSCULAR; INTRAVENOUS at 23:49

## 2024-10-16 RX ADMIN — ONDANSETRON 4 MG: 2 INJECTION INTRAMUSCULAR; INTRAVENOUS at 21:36

## 2024-10-16 RX ADMIN — HYDROMORPHONE HYDROCHLORIDE 0.5 MG: 1 INJECTION, SOLUTION INTRAMUSCULAR; INTRAVENOUS; SUBCUTANEOUS at 21:36

## 2024-10-16 RX ADMIN — IOHEXOL 100 ML: 350 INJECTION, SOLUTION INTRAVENOUS at 22:38

## 2024-10-16 RX ADMIN — SODIUM CHLORIDE, SODIUM LACTATE, POTASSIUM CHLORIDE, AND CALCIUM CHLORIDE 1000 ML: .6; .31; .03; .02 INJECTION, SOLUTION INTRAVENOUS at 21:36

## 2024-10-17 PROBLEM — R65.10 SIRS (SYSTEMIC INFLAMMATORY RESPONSE SYNDROME) (HCC): Status: ACTIVE | Noted: 2024-10-17

## 2024-10-17 LAB
ALBUMIN SERPL BCG-MCNC: 4 G/DL (ref 3.5–5)
ALP SERPL-CCNC: 66 U/L (ref 34–104)
ALT SERPL W P-5'-P-CCNC: 19 U/L (ref 7–52)
AMYLASE SERPL-CCNC: 56 IU/L (ref 29–103)
ANA SER QL IA: NEGATIVE
ANION GAP SERPL CALCULATED.3IONS-SCNC: 3 MMOL/L (ref 4–13)
AST SERPL W P-5'-P-CCNC: 14 U/L (ref 13–39)
ATRIAL RATE: 101 BPM
BILIRUB DIRECT SERPL-MCNC: 0 MG/DL (ref 0–0.2)
BILIRUB SERPL-MCNC: 0.35 MG/DL (ref 0.2–1)
BUN SERPL-MCNC: 12 MG/DL (ref 5–25)
CALCIUM SERPL-MCNC: 8.5 MG/DL (ref 8.4–10.2)
CHLORIDE SERPL-SCNC: 108 MMOL/L (ref 96–108)
CO2 SERPL-SCNC: 28 MMOL/L (ref 21–32)
CREAT SERPL-MCNC: 0.93 MG/DL (ref 0.6–1.3)
ERYTHROCYTE [DISTWIDTH] IN BLOOD BY AUTOMATED COUNT: 14.3 % (ref 11.6–15.1)
GFR SERPL CREATININE-BSD FRML MDRD: 67 ML/MIN/1.73SQ M
GLUCOSE SERPL-MCNC: 100 MG/DL (ref 65–140)
HCT VFR BLD AUTO: 41.1 % (ref 34.8–46.1)
HGB BLD-MCNC: 13.1 G/DL (ref 11.5–15.4)
LIPASE SERPL-CCNC: 33 U/L (ref 11–82)
MCH RBC QN AUTO: 30.7 PG (ref 26.8–34.3)
MCHC RBC AUTO-ENTMCNC: 31.9 G/DL (ref 31.4–37.4)
MCV RBC AUTO: 96 FL (ref 82–98)
P AXIS: 69 DEGREES
PLATELET # BLD AUTO: 261 THOUSANDS/UL (ref 149–390)
PMV BLD AUTO: 10 FL (ref 8.9–12.7)
POTASSIUM SERPL-SCNC: 3.9 MMOL/L (ref 3.5–5.3)
PR INTERVAL: 144 MS
PROT SERPL-MCNC: 7.1 G/DL (ref 6.4–8.4)
QRS AXIS: 83 DEGREES
QRSD INTERVAL: 84 MS
QT INTERVAL: 338 MS
QTC INTERVAL: 438 MS
RBC # BLD AUTO: 4.27 MILLION/UL (ref 3.81–5.12)
SODIUM SERPL-SCNC: 139 MMOL/L (ref 135–147)
T WAVE AXIS: 64 DEGREES
TRIGL SERPL-MCNC: 199 MG/DL
VENTRICULAR RATE: 101 BPM
WBC # BLD AUTO: 8.78 THOUSAND/UL (ref 4.31–10.16)

## 2024-10-17 PROCEDURE — 93010 ELECTROCARDIOGRAM REPORT: CPT | Performed by: INTERNAL MEDICINE

## 2024-10-17 PROCEDURE — 82784 ASSAY IGA/IGD/IGG/IGM EACH: CPT | Performed by: PHYSICIAN ASSISTANT

## 2024-10-17 PROCEDURE — 85027 COMPLETE CBC AUTOMATED: CPT | Performed by: NURSE PRACTITIONER

## 2024-10-17 PROCEDURE — 99223 1ST HOSP IP/OBS HIGH 75: CPT | Performed by: INTERNAL MEDICINE

## 2024-10-17 PROCEDURE — 80048 BASIC METABOLIC PNL TOTAL CA: CPT | Performed by: NURSE PRACTITIONER

## 2024-10-17 PROCEDURE — 82787 IGG 1 2 3 OR 4 EACH: CPT | Performed by: PHYSICIAN ASSISTANT

## 2024-10-17 PROCEDURE — 99222 1ST HOSP IP/OBS MODERATE 55: CPT | Performed by: INTERNAL MEDICINE

## 2024-10-17 PROCEDURE — 83690 ASSAY OF LIPASE: CPT | Performed by: NURSE PRACTITIONER

## 2024-10-17 PROCEDURE — 86038 ANTINUCLEAR ANTIBODIES: CPT | Performed by: PHYSICIAN ASSISTANT

## 2024-10-17 RX ORDER — BUPROPION HYDROCHLORIDE 150 MG/1
300 TABLET ORAL DAILY
Status: DISCONTINUED | OUTPATIENT
Start: 2024-10-17 | End: 2024-10-20 | Stop reason: HOSPADM

## 2024-10-17 RX ORDER — HYDROMORPHONE HCL IN WATER/PF 6 MG/30 ML
0.2 PATIENT CONTROLLED ANALGESIA SYRINGE INTRAVENOUS EVERY 4 HOURS PRN
Status: DISCONTINUED | OUTPATIENT
Start: 2024-10-17 | End: 2024-10-18

## 2024-10-17 RX ORDER — PANTOPRAZOLE SODIUM 40 MG/1
40 TABLET, DELAYED RELEASE ORAL
Status: DISCONTINUED | OUTPATIENT
Start: 2024-10-17 | End: 2024-10-20 | Stop reason: HOSPADM

## 2024-10-17 RX ORDER — ONDANSETRON 2 MG/ML
4 INJECTION INTRAMUSCULAR; INTRAVENOUS EVERY 6 HOURS PRN
Status: DISCONTINUED | OUTPATIENT
Start: 2024-10-17 | End: 2024-10-20 | Stop reason: HOSPADM

## 2024-10-17 RX ORDER — IBUPROFEN 400 MG/1
400 TABLET, FILM COATED ORAL EVERY 6 HOURS PRN
Status: DISCONTINUED | OUTPATIENT
Start: 2024-10-17 | End: 2024-10-18

## 2024-10-17 RX ORDER — SODIUM CHLORIDE, SODIUM LACTATE, POTASSIUM CHLORIDE, CALCIUM CHLORIDE 600; 310; 30; 20 MG/100ML; MG/100ML; MG/100ML; MG/100ML
150 INJECTION, SOLUTION INTRAVENOUS CONTINUOUS
Status: DISCONTINUED | OUTPATIENT
Start: 2024-10-17 | End: 2024-10-20 | Stop reason: HOSPADM

## 2024-10-17 RX ORDER — BUSPIRONE HYDROCHLORIDE 5 MG/1
10 TABLET ORAL 3 TIMES DAILY
Status: DISCONTINUED | OUTPATIENT
Start: 2024-10-17 | End: 2024-10-20 | Stop reason: HOSPADM

## 2024-10-17 RX ORDER — NICOTINE 21 MG/24HR
1 PATCH, TRANSDERMAL 24 HOURS TRANSDERMAL DAILY
Status: DISCONTINUED | OUTPATIENT
Start: 2024-10-17 | End: 2024-10-20 | Stop reason: HOSPADM

## 2024-10-17 RX ORDER — HYDROMORPHONE HCL/PF 1 MG/ML
0.5 SYRINGE (ML) INJECTION EVERY 4 HOURS PRN
Status: DISCONTINUED | OUTPATIENT
Start: 2024-10-17 | End: 2024-10-18

## 2024-10-17 RX ORDER — HYDROXYZINE HYDROCHLORIDE 25 MG/1
50 TABLET, FILM COATED ORAL
Status: DISCONTINUED | OUTPATIENT
Start: 2024-10-17 | End: 2024-10-20 | Stop reason: HOSPADM

## 2024-10-17 RX ADMIN — NICOTINE 1 PATCH: 21 PATCH, EXTENDED RELEASE TRANSDERMAL at 09:38

## 2024-10-17 RX ADMIN — HYDROXYZINE HYDROCHLORIDE 50 MG: 25 TABLET, FILM COATED ORAL at 03:22

## 2024-10-17 RX ADMIN — SODIUM CHLORIDE, SODIUM LACTATE, POTASSIUM CHLORIDE, AND CALCIUM CHLORIDE 150 ML/HR: .6; .31; .03; .02 INJECTION, SOLUTION INTRAVENOUS at 19:01

## 2024-10-17 RX ADMIN — HYDROMORPHONE HYDROCHLORIDE 0.5 MG: 1 INJECTION, SOLUTION INTRAMUSCULAR; INTRAVENOUS; SUBCUTANEOUS at 01:34

## 2024-10-17 RX ADMIN — BUPROPION HYDROCHLORIDE 300 MG: 150 TABLET, EXTENDED RELEASE ORAL at 09:38

## 2024-10-17 RX ADMIN — HYDROMORPHONE HYDROCHLORIDE 0.2 MG: 0.2 INJECTION, SOLUTION INTRAMUSCULAR; INTRAVENOUS; SUBCUTANEOUS at 21:55

## 2024-10-17 RX ADMIN — HYDROMORPHONE HYDROCHLORIDE 0.5 MG: 1 INJECTION, SOLUTION INTRAMUSCULAR; INTRAVENOUS; SUBCUTANEOUS at 18:58

## 2024-10-17 RX ADMIN — HYDROMORPHONE HYDROCHLORIDE 0.5 MG: 1 INJECTION, SOLUTION INTRAMUSCULAR; INTRAVENOUS; SUBCUTANEOUS at 06:38

## 2024-10-17 RX ADMIN — BUSPIRONE HYDROCHLORIDE 10 MG: 5 TABLET ORAL at 17:13

## 2024-10-17 RX ADMIN — IBUPROFEN 400 MG: 400 TABLET, FILM COATED ORAL at 14:54

## 2024-10-17 RX ADMIN — HYDROMORPHONE HYDROCHLORIDE 0.2 MG: 0.2 INJECTION, SOLUTION INTRAMUSCULAR; INTRAVENOUS; SUBCUTANEOUS at 09:44

## 2024-10-17 RX ADMIN — HYDROMORPHONE HYDROCHLORIDE 0.2 MG: 0.2 INJECTION, SOLUTION INTRAMUSCULAR; INTRAVENOUS; SUBCUTANEOUS at 14:59

## 2024-10-17 RX ADMIN — SODIUM CHLORIDE, SODIUM LACTATE, POTASSIUM CHLORIDE, AND CALCIUM CHLORIDE 150 ML/HR: .6; .31; .03; .02 INJECTION, SOLUTION INTRAVENOUS at 09:44

## 2024-10-17 RX ADMIN — SODIUM CHLORIDE, SODIUM LACTATE, POTASSIUM CHLORIDE, AND CALCIUM CHLORIDE 150 ML/HR: .6; .31; .03; .02 INJECTION, SOLUTION INTRAVENOUS at 01:34

## 2024-10-17 RX ADMIN — PANTOPRAZOLE SODIUM 40 MG: 40 TABLET, DELAYED RELEASE ORAL at 05:47

## 2024-10-17 RX ADMIN — BUSPIRONE HYDROCHLORIDE 10 MG: 5 TABLET ORAL at 09:38

## 2024-10-17 RX ADMIN — BUSPIRONE HYDROCHLORIDE 10 MG: 5 TABLET ORAL at 21:02

## 2024-10-17 NOTE — ASSESSMENT & PLAN NOTE
Patient reports that her previous therapist recently left the office she was seeing her through and she is in need of acquiring a new therapist; hence, she has been unable to get her prescriptions ordered. She reports that she still has a supply of Wellbutrin and Buspar. She reports that she has not taking Trazodone or Atarax as she ran out of these medications.  Continue Wellbutrin and Buspar.  Restart Atarax.  CM consult to assist in acquiring a new outpatient therapist.

## 2024-10-17 NOTE — ASSESSMENT & PLAN NOTE
"Presentation: Patient presented with complaints of epigastric pain and bloating that started on the evening of 10/15/2024.. She reports that her pain was worse with consumption of food and drink. She denies nausea, vomiting, fever or chills. She denies alcohol use. She reports history of cholecystectomy. She required multiply doses of pain medication in the ED.  CT A/P: \"Inflammatory change about the pancreatic head and uncinate process, raising concern for acute pancreatitis. No evidence of pancreatic necrosis or encapsulated peripancreatic fluid collection \"  Lipase 39, trend lipase.  Amylase pending.  NPO.  Vigorous IV hydration.  Pain control.  IV antiemetics.  Triglycerides pending.  "

## 2024-10-17 NOTE — ASSESSMENT & PLAN NOTE
POA, WBCs 14.11  Suspect reactive in the setting of acute pancreatitis.  Patient did not meet 2 SIRS criteria on admission.  Trend CBC.

## 2024-10-17 NOTE — ED PROVIDER NOTES
Time reflects when diagnosis was documented in both MDM as applicable and the Disposition within this note       Time User Action Codes Description Comment    10/17/2024 12:14 AM Leroy Beckett Add [K85.90] Acute pancreatitis     10/17/2024 12:57 AM Shanti Alvarado Add [F41.9,  F32.A] Anxiety and depression           ED Disposition       ED Disposition   Admit    Condition   Stable    Date/Time   u Oct 17, 2024 12:15 AM    Comment   Case was discussed with MONIQUE Moser and the patient's admission status was agreed to be Admission Status: inpatient status to the service of Dr. Beltrán .               Assessment & Plan       Medical Decision Making  59 yo F w. Hx of pancreatitis. Similar symptoms today. Tenderness to mid abdomen on exam.  ED workup - labs, CT abd pelvis - reveals acute pancreatitis. Admit for further workup and treatment. D/w [facility] AP who accepts under Dr Beltrán.    Amount and/or Complexity of Data Reviewed  External Data Reviewed: labs and notes.  Labs: ordered. Decision-making details documented in ED Course.  Radiology: ordered. Decision-making details documented in ED Course.    Risk  Prescription drug management.  Decision regarding hospitalization.        ED Course as of 10/17/24 2037   Wed Oct 16, 2024   2358 Patient states feeling better with meds and IV fluids.   2358 WBC(!): 14.11       Medications   lactated ringers bolus 1,000 mL (0 mL Intravenous Stopped 10/17/24 0018)   ondansetron (ZOFRAN) injection 4 mg (4 mg Intravenous Given 10/16/24 2136)   HYDROmorphone (DILAUDID) injection 0.5 mg (0.5 mg Intravenous Given 10/16/24 2136)   iohexol (OMNIPAQUE) 350 MG/ML injection (MULTI-DOSE) 100 mL (100 mL Intravenous Given 10/16/24 2238)   ketorolac (TORADOL) injection 15 mg (15 mg Intravenous Given 10/16/24 2349)       ED Risk Strat Scores                                               History of Present Illness       Chief Complaint   Patient presents with    Abdominal Pain    Palpitations      Reports started with palpitations last night, insomnia, then this morning has diffuse upper abdominal pain, Hx pancreatitis. +sweats       Past Medical History:   Diagnosis Date    Anxiety     Back pain     Depression     GERD (gastroesophageal reflux disease)     SOB (shortness of breath)     Vertigo       Past Surgical History:   Procedure Laterality Date    CERVICAL DISC SURGERY      CHOLECYSTECTOMY      CHOLECYSTECTOMY      FOOT SURGERY      LAPAROTOMY N/A 02/02/2021    Procedure: LAPAROTOMY FOR SPINAL SURGERY;  Surgeon: Edison Glynn MD;  Location: [facility];  Service: General    LUMBAR FUSION N/A 02/02/2021    Procedure: Anterior lumbar interbody fusion L5-S1; Posterior lumbar laminectomy, decompression, instrumented fusion L5-S1 with allograft and neuromonitoring;  Surgeon: Aries Rivera MD;  Location: [facility];  Service: Orthopedics    MI EGD TRANSORAL BIOPSY SINGLE/MULTIPLE N/A 04/20/2016    Procedure: ESOPHAGOGASTRODUODENOSCOPY (EGD);  Surgeon: Darius Barr MD;  Location: [facility];  Service: Gastroenterology    THYROID LOBECTOMY N/A 07/03/2023    Procedure: LEFT THYROID LOBECTOMY;  Surgeon: Aishwarya Field MD;  Location: [facility];  Service: Surgical Oncology    TONSILLECTOMY      TUBAL LIGATION      UPPER GASTROINTESTINAL ENDOSCOPY      US GUIDED THYROID BIOPSY  05/30/2023      Family History   Problem Relation Age of Onset    Aneurysm Mother     Hypertension Mother     Heart attack Father     Aneurysm Sister     Aneurysm Brother     Mental illness Son       Social History     Tobacco Use    Smoking status: Every Day     Current packs/day: 1.00     Types: Cigarettes     Passive exposure: Current    Smokeless tobacco: Never    Tobacco comments:     started patch 1/2021, still using 3/4 ppd.    Vaping Use    Vaping status: Never Used   Substance Use Topics    Alcohol use: Not Currently     Comment: 0    Drug use: Not Currently     Types: Oxycodone, Methamphetamines     Comment: 7/18/2020 last  use      E-Cigarette/Vaping    E-Cigarette Use Never User       E-Cigarette/Vaping Substances    Nicotine No     THC No     CBD No     Flavoring No     Other No     Unknown No       I have reviewed and agree with the history as documented.     This is a 58-year-old female coming into the ED for evaluation of upper abdominal pain that started yesterday, with associated palpitations, radiating around the abdomen bilaterally.  History of pancreatitis, and states symptoms feel similar to prior episode this past summer when she was admitted. No clear cause of pancreatitis, denies ETOH consumption, prior cholecystectomy.      History provided by:  Patient   used: No        Review of Systems   Gastrointestinal:  Positive for abdominal pain.   All other systems reviewed and are negative.          Objective       ED Triage Vitals   Temperature Pulse Blood Pressure Respirations SpO2 Patient Position - Orthostatic VS   10/16/24 2042 10/16/24 2042 10/16/24 2042 10/16/24 2042 10/16/24 2042 --   98.2 °F (36.8 °C) 102 138/93 20 96 %       Temp Source Heart Rate Source BP Location FiO2 (%) Pain Score    10/16/24 2042 10/16/24 2042 -- -- 10/17/24 0100    Oral Monitor   7      Vitals      Date and Time Temp Pulse SpO2 Resp BP Pain Score FACES Pain Rating User   10/17/24 1858 -- -- -- -- -- 7 -- KE   10/17/24 1524 97.4 °F (36.3 °C) 69 93 % 16 111/66 -- -- DII   10/17/24 1459 -- -- -- -- -- 6 -- KE   10/17/24 1454 -- -- -- -- -- 5 -- KE   10/17/24 0659 98.9 °F (37.2 °C) 67 94 % 16 100/61 -- -- DII   10/17/24 0638 -- -- -- -- -- 7 -- MB   10/17/24 0134 -- -- -- -- -- 7 -- MB   10/17/24 0100 -- -- -- -- -- 7 -- MB   10/17/24 0048 97.2 °F (36.2 °C) 92 95 % 16 144/88 -- -- DII   10/16/24 2042 98.2 °F (36.8 °C) 102 96 % 20 138/93 -- --             Physical Exam  Vitals and nursing note reviewed.   Constitutional:       General: She is not in acute distress.     Appearance: Normal appearance. She is well-developed and  normal weight. She is not ill-appearing, toxic-appearing or diaphoretic.   HENT:      Head: Normocephalic and atraumatic.      Right Ear: External ear normal.      Left Ear: External ear normal.      Nose: Nose normal.      Mouth/Throat:      Mouth: Mucous membranes are moist.      Pharynx: Oropharynx is clear.   Eyes:      Conjunctiva/sclera: Conjunctivae normal.   Cardiovascular:      Rate and Rhythm: Normal rate and regular rhythm.      Pulses: Normal pulses.      Heart sounds: Normal heart sounds.   Pulmonary:      Effort: Pulmonary effort is normal.      Breath sounds: Normal breath sounds.   Abdominal:      General: Abdomen is flat. Bowel sounds are normal. There is no distension or abdominal bruit. There are no signs of injury.      Palpations: Abdomen is soft. There is no shifting dullness, fluid wave, hepatomegaly, splenomegaly, mass or pulsatile mass.      Tenderness: There is abdominal tenderness in the epigastric area and periumbilical area. There is no right CVA tenderness, left CVA tenderness, guarding or rebound. Negative signs include Dillon's sign and Rovsing's sign.      Hernia: No hernia is present.   Genitourinary:     Adnexa: Right adnexa normal and left adnexa normal.   Musculoskeletal:         General: Normal range of motion.      Cervical back: Normal range of motion and neck supple.   Skin:     General: Skin is warm and dry.      Capillary Refill: Capillary refill takes less than 2 seconds.   Neurological:      General: No focal deficit present.      Mental Status: She is alert and oriented to person, place, and time. Mental status is at baseline.   Psychiatric:         Mood and Affect: Mood normal.         Behavior: Behavior normal.         Results Reviewed       Procedure Component Value Units Date/Time    Hepatic function panel [[id number]]  (Normal) Collected: 10/16/24 2130    Lab Status: Final result Specimen: Blood from Arm, Right Updated: 10/17/24 0757     Total Bilirubin 0.35 mg/dL       Bilirubin, Direct 0.00 mg/dL      Alkaline Phosphatase 66 U/L      AST 14 U/L      ALT 19 U/L      Total Protein 7.1 g/dL      Albumin 4.0 g/dL     Triglycerides [[id number]]  (Abnormal) Collected: 10/16/24 2130    Lab Status: Final result Specimen: Blood from Arm, Right Updated: 10/17/24 0636     Triglycerides 199 mg/dL     Amylase [[id number]]  (Normal) Collected: 10/16/24 2130    Lab Status: Final result Specimen: Blood from Arm, Right Updated: 10/17/24 0555     Amylase 56 IU/L     HS Troponin 0hr (reflex protocol) [[id number]]  (Normal) Collected: 10/16/24 2130    Lab Status: Final result Specimen: Blood from Arm, Right Updated: 10/16/24 2156     hs TnI 0hr 3 ng/L     Comprehensive metabolic panel [[id number]] Collected: 10/16/24 2130    Lab Status: Final result Specimen: Blood from Arm, Right Updated: 10/16/24 2151     Sodium 139 mmol/L      Potassium 4.0 mmol/L      Chloride 107 mmol/L      CO2 24 mmol/L      ANION GAP 8 mmol/L      BUN 11 mg/dL      Creatinine 1.06 mg/dL      Glucose 110 mg/dL      Calcium 9.0 mg/dL      AST 14 U/L      ALT 19 U/L      Alkaline Phosphatase 66 U/L      Total Protein 7.1 g/dL      Albumin 4.0 g/dL      Total Bilirubin 0.35 mg/dL      eGFR 57 ml/min/1.73sq m     Narrative:      National Kidney Disease Foundation guidelines for Chronic Kidney Disease (CKD):     Stage 1 with normal or high GFR (GFR > 90 mL/min/1.73 square meters)    Stage 2 Mild CKD (GFR = 60-89 mL/min/1.73 square meters)    Stage 3A Moderate CKD (GFR = 45-59 mL/min/1.73 square meters)    Stage 3B Moderate CKD (GFR = 30-44 mL/min/1.73 square meters)    Stage 4 Severe CKD (GFR = 15-29 mL/min/1.73 square meters)    Stage 5 End Stage CKD (GFR <15 mL/min/1.73 square meters)  Note: GFR calculation is accurate only with a steady state creatinine    Lipase [[id number]]  (Normal) Collected: 10/16/24 2130    Lab Status: Final result Specimen: Blood from Arm, Right Updated: 10/16/24 2151     Lipase 39 u/L     CBC and  differential [[id number]]  (Abnormal) Collected: 10/16/24 2130    Lab Status: Final result Specimen: Blood from Arm, Right Updated: 10/16/24 2137     WBC 14.11 Thousand/uL      RBC 4.55 Million/uL      Hemoglobin 14.2 g/dL      Hematocrit 42.4 %      MCV 93 fL      MCH 31.2 pg      MCHC 33.5 g/dL      RDW 13.9 %      MPV 9.8 fL      Platelets 298 Thousands/uL      nRBC 0 /100 WBCs      Segmented % 63 %      Immature Grans % 0 %      Lymphocytes % 30 %      Monocytes % 6 %      Eosinophils Relative 1 %      Basophils Relative 0 %      Absolute Neutrophils 8.79 Thousands/µL      Absolute Immature Grans 0.05 Thousand/uL      Absolute Lymphocytes 4.25 Thousands/µL      Absolute Monocytes 0.83 Thousand/µL      Eosinophils Absolute 0.15 Thousand/µL      Basophils Absolute 0.04 Thousands/µL             CT abdomen pelvis with contrast   Final Interpretation by Juan Pillai DO (10/16 2357)      Inflammatory change about the pancreatic head and uncinate process, raising concern for acute pancreatitis. No evidence of pancreatic necrosis or encapsulated peripancreatic fluid collection      The study was marked in EPIC for immediate notification.      Workstation performed: [id number]         XR chest 1 view portable   Final Interpretation by Cruz Loera MD (10/17 0827)      No acute cardiopulmonary disease.            Workstation performed: [id number]             Procedures    ED Medication and Procedure Management   Prior to Admission Medications   Prescriptions Last Dose Informant Patient Reported? Taking?   buPROPion (WELLBUTRIN XL) 300 mg 24 hr tablet Past Week Self Yes Yes   Sig: Take 300 mg by mouth daily   busPIRone (BUSPAR) 10 mg tablet Past Week Self Yes Yes   Sig: Take 10 mg by mouth 3 (three) times a day   hydrOXYzine HCL (ATARAX) 50 mg tablet Not Taking Self Yes No   Sig: Take 50 mg by mouth daily at bedtime   Patient not taking: Reported on 10/17/2024   nicotine (NICODERM CQ) 21 mg/24 hr TD 24 hr patch Not  Taking  No No   Sig: Place 1 patch on the skin over 24 hours daily Apply a new patch every 24 hours to a clean, dry, hairless site on the upper arm or hip.   Patient not taking: Reported on 10/17/2024   pantoprazole (PROTONIX) 40 mg tablet Past Week  No Yes   Sig: Take 1 tablet (40 mg total) by mouth daily   traZODone (DESYREL) 300 MG tablet Not Taking  Yes No   Sig: Take 300 mg by mouth daily at bedtime   Patient not taking: Reported on 10/17/2024      Facility-Administered Medications: None     Current Discharge Medication List        CONTINUE these medications which have NOT CHANGED    Details   buPROPion (WELLBUTRIN XL) 300 mg 24 hr tablet Take 300 mg by mouth daily      busPIRone (BUSPAR) 10 mg tablet Take 10 mg by mouth 3 (three) times a day      pantoprazole (PROTONIX) 40 mg tablet Take 1 tablet (40 mg total) by mouth daily  Qty: 30 tablet, Refills: 5    Associated Diagnoses: Gastroesophageal reflux disease      hydrOXYzine HCL (ATARAX) 50 mg tablet Take 50 mg by mouth daily at bedtime      nicotine (NICODERM CQ) 21 mg/24 hr TD 24 hr patch Place 1 patch on the skin over 24 hours daily Apply a new patch every 24 hours to a clean, dry, hairless site on the upper arm or hip.  Qty: 28 patch, Refills: 0    Associated Diagnoses: Cigarette nicotine dependence with nicotine-induced disorder      traZODone (DESYREL) 300 MG tablet Take 300 mg by mouth daily at bedtime           No discharge procedures on file.  ED SEPSIS DOCUMENTATION   Time reflects when diagnosis was documented in both MDM as applicable and the Disposition within this note       Time User Action Codes Description Comment    10/17/2024 12:14 AM Leroy Beckett [K85.90] Acute pancreatitis     10/17/2024 12:57 AM Shanti Alvarado [F41.9,  F32.A] Anxiety and depression                  Leroy Beckett DO  10/17/24 2037

## 2024-10-17 NOTE — ASSESSMENT & PLAN NOTE
SIRS criteria: Leukocytosis, tachycardia  Suspected source: Pancreatitis  Monitor off antibiotics.  Follow up on culture results.  Monitor vital signs, laboratory studies.

## 2024-10-17 NOTE — ASSESSMENT & PLAN NOTE
Previous episode of idiopathic pancreatitis 6/2024.  Followed up with MRI/MRCP that was unremarkable.  Presented with recurrence of classic upper abdominal pain and CT findings consistent with pancreatitis, although lipase normal.  Triglycerides, calcium normal.  No new medications.  No alcohol.  No unintentional weight loss.  Postcholecystectomy.    Unclear etiology, due to 2 recent episodes with atypical presentation, would recommend outpatient EUS in 6 to 8 weeks to rule out any underlying concerning lesion.  Will also rule out autoimmune cause with CHELY and IgG4  Abdominal pain is improving.  No nausea or vomiting.  She has an appetite.  Will start on full liquid diet and advance as tolerated  Patient with good improvement of leukocytosis after starting fluids.  Recommend decreasing fluids with increasing oral intake  Encouraged ambulation  Strongly recommend smoking cessation  Informed [facility]

## 2024-10-17 NOTE — H&P
"H&P - Hospitalist   Name: Sara Law 58 y.o. female I MRN: [id number]  Unit/Bed#: W -01 I Date of Admission: 10/16/2024   Date of Service: 10/17/2024 I Hospital Day: 0     Assessment & Plan  Acute pancreatitis  Presentation: Patient presented with complaints of epigastric pain and bloating that started on the evening of 10/15/2024.. She reports that her pain was worse with consumption of food and drink. She denies nausea, vomiting, fever or chills. She denies alcohol use. She reports history of cholecystectomy. She required multiply doses of pain medication in the ED.  CT A/P: \"Inflammatory change about the pancreatic head and uncinate process, raising concern for acute pancreatitis. No evidence of pancreatic necrosis or encapsulated peripancreatic fluid collection \"  Lipase 39, trend lipase.  Amylase pending.  NPO.  Vigorous IV hydration.  Pain control.  IV antiemetics.  Triglycerides pending.  SIRS (systemic inflammatory response syndrome) (HCC)  SIRS criteria: Leukocytosis, tachycardia  Suspected source: Pancreatitis  Monitor off antibiotics.  Follow up on culture results.  Monitor vital signs, laboratory studies.  Anxiety and depression  Patient reports that her previous therapist recently left the office she was seeing her through and she is in need of acquiring a new therapist; hence, she has been unable to get her prescriptions ordered. She reports that she still has a supply of Wellbutrin and Buspar. She reports that she has not taking Trazodone or Atarax as she ran out of these medications.  Continue Wellbutrin and Buspar.  Restart Atarax.  CM consult to assist in acquiring a new outpatient therapist.  Gastroesophageal reflux disease  Continue PPI.  Cigarette nicotine dependence with nicotine-induced disorder  Encouraged smoking cessation.  NRT.  Obesity (BMI 30-39.9)  Encouraged lifestyle modifications.      VTE Pharmacologic Prophylaxis: VTE Score: 2 Low Risk (Score 0-2) - Encourage " Ambulation.  Code Status: Level 3 - DNAR and DNI per patient  Discussion with family: Patient declined call to .     Anticipated Length of Stay: Patient will be admitted on an inpatient basis with an anticipated length of stay of greater than 2 midnights secondary to acute pancreatitis.    History of Present Illness   Chief Complaint: Epigastric pain    Sara Law is a 58 y.o. female with a PMH of anxiety, depression, GERD, obesity and nicotine dependence who presents with complaints of epigastric pain and bloating that started on the evening of 10/15/2024.. She reports that her pain was worse with consumption of food and drink. She denies nausea, vomiting, fever or chills. She denies alcohol use. She reports history of cholecystectomy. She required multiply doses of pain medication in the ED. Patient reports a history of pancreatitis in the past.    Review of Systems   Constitutional:  Negative for chills and fever.   Respiratory:  Negative for shortness of breath.    Cardiovascular:  Negative for chest pain.   Gastrointestinal:  Positive for abdominal distention and abdominal pain. Negative for constipation, diarrhea, nausea and vomiting.   Genitourinary:  Negative for dysuria.   Neurological:  Positive for headaches. Negative for dizziness and light-headedness.   All other systems reviewed and are negative.      Historical Information   Past Medical History:   Diagnosis Date    Anxiety     Back pain     Depression     GERD (gastroesophageal reflux disease)     SOB (shortness of breath)     Vertigo      Past Surgical History:   Procedure Laterality Date    CERVICAL DISC SURGERY      CHOLECYSTECTOMY      CHOLECYSTECTOMY      FOOT SURGERY      LAPAROTOMY N/A 02/02/2021    Procedure: LAPAROTOMY FOR SPINAL SURGERY;  Surgeon: Edison Glynn MD;  Location: [facility];  Service: General    LUMBAR FUSION N/A 02/02/2021    Procedure: Anterior lumbar interbody fusion L5-S1; Posterior lumbar  laminectomy, decompression, instrumented fusion L5-S1 with allograft and neuromonitoring;  Surgeon: Aries Rivera MD;  Location: [facility];  Service: Orthopedics    NE EGD TRANSORAL BIOPSY SINGLE/MULTIPLE N/A 04/20/2016    Procedure: ESOPHAGOGASTRODUODENOSCOPY (EGD);  Surgeon: Darius Barr MD;  Location: [facility];  Service: Gastroenterology    THYROID LOBECTOMY N/A 07/03/2023    Procedure: LEFT THYROID LOBECTOMY;  Surgeon: Aishwarya Field MD;  Location: [facility];  Service: Surgical Oncology    TONSILLECTOMY      TUBAL LIGATION      UPPER GASTROINTESTINAL ENDOSCOPY      US GUIDED THYROID BIOPSY  05/30/2023     Social History     Tobacco Use    Smoking status: Every Day     Current packs/day: 1.00     Types: Cigarettes     Passive exposure: Current    Smokeless tobacco: Never    Tobacco comments:     started patch 1/2021, still using 3/4 ppd.    Vaping Use    Vaping status: Never Used   Substance and Sexual Activity    Alcohol use: Not Currently     Comment: 0    Drug use: Not Currently     Types: Oxycodone, Methamphetamines     Comment: 7/18/2020 last use    Sexual activity: Not Currently     E-Cigarette/Vaping    E-Cigarette Use Never User      E-Cigarette/Vaping Substances    Nicotine No     THC No     CBD No     Flavoring No     Other No     Unknown No      Family History   Problem Relation Age of Onset    Aneurysm Mother     Hypertension Mother     Heart attack Father     Aneurysm Sister     Aneurysm Brother     Mental illness Son      Social History:  Marital Status: /Civil Union   Occupation: Unknown  Patient Pre-hospital Living Situation: Home  Patient Pre-hospital Level of Mobility: walks  Patient Pre-hospital Diet Restrictions: None    Meds/Allergies   I have reviewed home medications with patient personally.  Prior to Admission medications    Medication Sig Start Date End Date Taking? Authorizing Provider   buPROPion (WELLBUTRIN XL) 300 mg 24 hr tablet Take 300 mg by mouth daily   Yes  Historical Provider, MD   busPIRone (BUSPAR) 10 mg tablet Take 10 mg by mouth 3 (three) times a day   Yes Historical Provider, MD   pantoprazole (PROTONIX) 40 mg tablet Take 1 tablet (40 mg total) by mouth daily 7/29/24 1/25/25 Yes Cipriano White MD   hydrOXYzine HCL (ATARAX) 50 mg tablet Take 50 mg by mouth daily at bedtime  Patient not taking: Reported on 10/17/2024    Historical Provider, MD   nicotine (NICODERM CQ) 21 mg/24 hr TD 24 hr patch Place 1 patch on the skin over 24 hours daily Apply a new patch every 24 hours to a clean, dry, hairless site on the upper arm or hip.  Patient not taking: Reported on 10/17/2024 6/22/24   Marielle Rivas MD   traZODone (DESYREL) 300 MG tablet Take 300 mg by mouth daily at bedtime  Patient not taking: Reported on 10/17/2024 5/26/24   Historical Provider, MD   hydrOXYzine pamoate (VISTARIL) 50 mg capsule  11/25/23 10/17/24  Historical Provider, MD   lurasidone (LATUDA) 40 mg tablet Take 40 mg by mouth daily with dinner  Patient not taking: Reported on 10/17/2024 5/26/24 10/17/24  Historical Provider, MD     No Known Allergies    Objective :  Temp:  [97.2 °F (36.2 °C)-98.2 °F (36.8 °C)] 97.2 °F (36.2 °C)  HR:  [] 92  BP: (138-144)/(88-93) 144/88  Resp:  [16-20] 16  SpO2:  [95 %-96 %] 95 %  O2 Device: None (Room air)    Physical Exam  Vitals and nursing note reviewed.   Constitutional:       General: She is not in acute distress.     Appearance: She is obese. She is not ill-appearing or diaphoretic.   HENT:      Head: Normocephalic.      Nose: Nose normal.      Mouth/Throat:      Pharynx: Oropharynx is clear.   Eyes:      General: No scleral icterus.     Conjunctiva/sclera: Conjunctivae normal.   Cardiovascular:      Rate and Rhythm: Normal rate and regular rhythm.      Pulses: Normal pulses.      Heart sounds: Normal heart sounds. No murmur heard.  Pulmonary:      Effort: Pulmonary effort is normal. No respiratory distress.      Breath sounds: Normal breath sounds. No wheezing,  rhonchi or rales.   Chest:      Chest wall: No tenderness.   Abdominal:      General: Bowel sounds are normal. There is no distension.      Palpations: Abdomen is soft.      Tenderness: There is abdominal tenderness in the epigastric area.   Musculoskeletal:         General: No swelling or deformity. Normal range of motion.      Cervical back: Normal range of motion.   Skin:     General: Skin is warm and dry.      Capillary Refill: Capillary refill takes 2 to 3 seconds.   Neurological:      Mental Status: She is alert and oriented to person, place, and time.   Psychiatric:         Speech: Speech normal.          Lines/Drains:            Lab Results: I have reviewed the following results:  Results from last 7 days   Lab Units 10/16/24  2130   WBC Thousand/uL 14.11*   HEMOGLOBIN g/dL 14.2   HEMATOCRIT % 42.4   PLATELETS Thousands/uL 298   SEGS PCT % 63   LYMPHO PCT % 30   MONO PCT % 6   EOS PCT % 1     Results from last 7 days   Lab Units 10/16/24  2130   SODIUM mmol/L 139   POTASSIUM mmol/L 4.0   CHLORIDE mmol/L 107   CO2 mmol/L 24   BUN mg/dL 11   CREATININE mg/dL 1.06   ANION GAP mmol/L 8   CALCIUM mg/dL 9.0   ALBUMIN g/dL 4.0   TOTAL BILIRUBIN mg/dL 0.35   ALK PHOS U/L 66   ALT U/L 19   AST U/L 14   GLUCOSE RANDOM mg/dL 110             Lab Results   Component Value Date    HGBA1C 5.6 03/30/2023    HGBA1C 5.5 01/18/2021           Imaging Results Review: I reviewed radiology reports from this admission including: CT abdomen/pelvis.  Other Study Results Review: EKG was reviewed.     Administrative Statements   I have spent a total time of 70 minutes in caring for this patient on the day of the visit/encounter including Diagnostic results, Risks and benefits of tx options, Instructions for management, Patient and family education, Importance of tx compliance, Risk factor reductions, Impressions, Counseling / Coordination of care, Documenting in the medical record, Reviewing / ordering tests, medicine, procedures  ,  Obtaining or reviewing history  , and Communicating with other healthcare professionals .    ** Please Note: This note has been constructed using a voice recognition system. **

## 2024-10-17 NOTE — PLAN OF CARE
Problem: PAIN - ADULT  Goal: Verbalizes/displays adequate comfort level or baseline comfort level  Description: Interventions:  - Encourage patient to monitor pain and request assistance  - Assess pain using appropriate pain scale  - Administer analgesics based on type and severity of pain and evaluate response  - Implement non-pharmacological measures as appropriate and evaluate response  - Consider cultural and social influences on pain and pain management  - Notify physician/advanced practitioner if interventions unsuccessful or patient reports new pain  Outcome: Progressing     Problem: INFECTION - ADULT  Goal: Absence or prevention of progression during hospitalization  Description: INTERVENTIONS:  - Assess and monitor for signs and symptoms of infection  - Monitor lab/diagnostic results  - Monitor all insertion sites, i.e. indwelling lines, tubes, and drains  - Monitor endotracheal if appropriate and nasal secretions for changes in amount and color  - [place] appropriate cooling/warming therapies per order  - Administer medications as ordered  - Instruct and encourage patient and family to use good hand hygiene technique  - Identify and instruct in appropriate isolation precautions for identified infection/condition  Outcome: Progressing  Goal: Absence of fever/infection during neutropenic period  Description: INTERVENTIONS:  - Monitor WBC    Outcome: Progressing     Problem: SAFETY ADULT  Goal: Patient will remain free of falls  Description: INTERVENTIONS:  - Educate patient/family on patient safety including physical limitations  - Instruct patient to call for assistance with activity   - Consult OT/PT to assist with strengthening/mobility   - Keep Call bell within reach  - Keep bed low and locked with side rails adjusted as appropriate  - Keep care items and personal belongings within reach  - Initiate and maintain comfort rounds  - Make Fall Risk Sign visible to staff  - Offer Toileting every  Hours,  in advance of need  - Initiate/Maintain alarm  - Obtain necessary fall risk management equipment:   - Apply yellow socks and bracelet for high fall risk patients  - Consider moving patient to room near nurses station  Outcome: Progressing  Goal: Maintain or return to baseline ADL function  Description: INTERVENTIONS:  -  Assess patient's ability to carry out ADLs; assess patient's baseline for ADL function and identify physical deficits which impact ability to perform ADLs (bathing, care of mouth/teeth, toileting, grooming, dressing, etc.)  - Assess/evaluate cause of self-care deficits   - Assess range of motion  - Assess patient's mobility; develop plan if impaired  - Assess patient's need for assistive devices and provide as appropriate  - Encourage maximum independence but intervene and supervise when necessary  - Involve family in performance of ADLs  - Assess for home care needs following discharge   - Consider OT consult to assist with ADL evaluation and planning for discharge  - Provide patient education as appropriate  Outcome: Progressing  Goal: Maintains/Returns to pre admission functional level  Description: INTERVENTIONS:  - Perform AM-PAC 6 Click Basic Mobility/ Daily Activity assessment daily.  - Set and communicate daily mobility goal to care team and patient/family/caregiver.   - Collaborate with rehabilitation services on mobility goals if consulted  - Perform Range of Motion  times a day.  - Reposition patient every  hours.  - Dangle patient  times a day  - Stand patient  times a day  - Ambulate patient  times a day  - Out of bed to chair  times a day   - Out of bed for meals times a day  - Out of bed for toileting  - Record patient progress and toleration of activity level   Outcome: Progressing     Problem: DISCHARGE PLANNING  Goal: Discharge to home or other facility with appropriate resources  Description: INTERVENTIONS:  - Identify barriers to discharge w/patient and caregiver  - Arrange for  needed discharge resources and transportation as appropriate  - Identify discharge learning needs (meds, wound care, etc.)  - Arrange for interpretive services to assist at discharge as needed  - Refer to Case Management Department for coordinating discharge planning if the patient needs post-hospital services based on physician/advanced practitioner order or complex needs related to functional status, cognitive ability, or social support system  Outcome: Progressing     Problem: Knowledge Deficit  Goal: Patient/family/caregiver demonstrates understanding of disease process, treatment plan, medications, and discharge instructions  Description: Complete learning assessment and assess knowledge base.  Interventions:  - Provide teaching at level of understanding  - Provide teaching via preferred learning methods  Outcome: Progressing

## 2024-10-17 NOTE — PLAN OF CARE
Problem: PAIN - ADULT  Goal: Verbalizes/displays adequate comfort level or baseline comfort level  Description: Interventions:  - Encourage patient to monitor pain and request assistance  - Assess pain using appropriate pain scale  - Administer analgesics based on type and severity of pain and evaluate response  - Implement non-pharmacological measures as appropriate and evaluate response  - Consider cultural and social influences on pain and pain management  - Notify physician/advanced practitioner if interventions unsuccessful or patient reports new pain  Outcome: Progressing     Problem: INFECTION - ADULT  Goal: Absence or prevention of progression during hospitalization  Description: INTERVENTIONS:  - Assess and monitor for signs and symptoms of infection  - Monitor lab/diagnostic results  - Monitor all insertion sites, i.e. indwelling lines, tubes, and drains  - Monitor endotracheal if appropriate and nasal secretions for changes in amount and color  - [place] appropriate cooling/warming therapies per order  - Administer medications as ordered  - Instruct and encourage patient and family to use good hand hygiene technique  - Identify and instruct in appropriate isolation precautions for identified infection/condition  Outcome: Progressing  Goal: Absence of fever/infection during neutropenic period  Description: INTERVENTIONS:  - Monitor WBC    Outcome: Progressing     Problem: SAFETY ADULT  Goal: Patient will remain free of falls  Description: INTERVENTIONS:  - Educate patient/family on patient safety including physical limitations  - Instruct patient to call for assistance with activity   - Consult OT/PT to assist with strengthening/mobility   - Keep Call bell within reach  - Keep bed low and locked with side rails adjusted as appropriate  - Keep care items and personal belongings within reach  - Initiate and maintain comfort rounds  - Make Fall Risk Sign visible to staff  - Offer Toileting every  Hours,  in advance of need  - Initiate/Maintain alarm  - Obtain necessary fall risk management equipment:   - Apply yellow socks and bracelet for high fall risk patients  - Consider moving patient to room near nurses station  Outcome: Progressing  Goal: Maintain or return to baseline ADL function  Description: INTERVENTIONS:  -  Assess patient's ability to carry out ADLs; assess patient's baseline for ADL function and identify physical deficits which impact ability to perform ADLs (bathing, care of mouth/teeth, toileting, grooming, dressing, etc.)  - Assess/evaluate cause of self-care deficits   - Assess range of motion  - Assess patient's mobility; develop plan if impaired  - Assess patient's need for assistive devices and provide as appropriate  - Encourage maximum independence but intervene and supervise when necessary  - Involve family in performance of ADLs  - Assess for home care needs following discharge   - Consider OT consult to assist with ADL evaluation and planning for discharge  - Provide patient education as appropriate  Outcome: Progressing  Goal: Maintains/Returns to pre admission functional level  Description: INTERVENTIONS:  - Perform AM-PAC 6 Click Basic Mobility/ Daily Activity assessment daily.  - Set and communicate daily mobility goal to care team and patient/family/caregiver.   - Collaborate with rehabilitation services on mobility goals if consulted  - Perform Range of Motion  times a day.  - Reposition patient every  hours.  - Dangle patient  times a day  - Stand patient  times a day  - Ambulate patient  times a day  - Out of bed to chair  times a day   - Out of bed for meals  times a day  - Out of bed for toileting  - Record patient progress and toleration of activity level   Outcome: Progressing     Problem: DISCHARGE PLANNING  Goal: Discharge to home or other facility with appropriate resources  Description: INTERVENTIONS:  - Identify barriers to discharge w/patient and caregiver  - Arrange for  needed discharge resources and transportation as appropriate  - Identify discharge learning needs (meds, wound care, etc.)  - Arrange for interpretive services to assist at discharge as needed  - Refer to Case Management Department for coordinating discharge planning if the patient needs post-hospital services based on physician/advanced practitioner order or complex needs related to functional status, cognitive ability, or social support system  Outcome: Progressing     Problem: Knowledge Deficit  Goal: Patient/family/caregiver demonstrates understanding of disease process, treatment plan, medications, and discharge instructions  Description: Complete learning assessment and assess knowledge base.  Interventions:  - Provide teaching at level of understanding  - Provide teaching via preferred learning methods  Outcome: Progressing

## 2024-10-17 NOTE — CONSULTS
Consultation - Gastroenterology   Name: Sara Law 58 y.o. female I MRN: [id number]  Unit/Bed#: W -01 I Date of Admission: 10/16/2024   Date of Service: 10/17/2024 I Hospital Day: 0   Inpatient consult to gastroenterology  Consult performed by: Jalyn Santo PA-C  Consult ordered by: Dharmesh Bernal MD        Physician Requesting Evaluation: Dharmesh Bernal MD   Reason for Evaluation / Principal Problem: pancreatitis    Assessment & Plan  Acute pancreatitis  Previous episode of idiopathic pancreatitis 6/2024.  Followed up with MRI/MRCP that was unremarkable.  Presented with recurrence of classic upper abdominal pain and CT findings consistent with pancreatitis, although lipase normal.  Triglycerides, calcium normal.  No new medications.  No alcohol.  No unintentional weight loss.  Postcholecystectomy.    Unclear etiology, due to 2 recent episodes with atypical presentation, would recommend outpatient EUS in 6 to 8 weeks to rule out any underlying concerning lesion.  Will also rule out autoimmune cause with CHELY and IgG4  Abdominal pain is improving.  No nausea or vomiting.  She has an appetite.  Will start on full liquid diet and advance as tolerated  Patient with good improvement of leukocytosis after starting fluids.  Recommend decreasing fluids with increasing oral intake  Encouraged ambulation  Strongly recommend smoking cessation  Informed SLIM      History of Present Illness   HPI:  Sara Law is a 58 y.o. female who presents with history of anxiety, depression, tobacco use, cholecystectomy who presented due to upper abdominal pain.  She had CT scan performed showing inflammatory changes on the pancreatic head and uncinate process raising concern for acute pancreatitis.  No complications noted.  Lipase normal.  Triglycerides minimally elevated at 199.  Liver enzymes normal.  Calcium normal.    Patient had very similar episode a few months ago.  She had MRI/MRCP performed about 2  months after episode which showed resolution of findings and no concerning findings otherwise noted.  She denies any new medications including herbal supplementation or antibiotics.  She reports the only change in her medications was actually stopping a few as she ran out.  She denies any alcohol use at all.  No excessive NSAIDs or Tylenol.  No recent travel.  She denies any unintentional weight loss or change in her appetite.  No associated nausea or vomiting this time.    She reports some improvement in her symptoms now with abdominal pain decreasing.  She has an appetite.  Bowel movement yesterday.    Review of Systems   All other systems reviewed and are negative.    I have reviewed the patient's PMH, PSH, Social History, Family History, Meds, and Allergies    Objective :  Temp:  [97.2 °F (36.2 °C)-98.9 °F (37.2 °C)] 98.9 °F (37.2 °C)  HR:  [] 67  BP: (100-144)/(61-93) 100/61  Resp:  [16-20] 16  SpO2:  [94 %-96 %] 94 %  O2 Device: None (Room air)    Physical Exam  Vitals reviewed.   Constitutional:       General: She is not in acute distress.     Appearance: Normal appearance. She is not ill-appearing.   HENT:      Head: Normocephalic and atraumatic.   Eyes:      Conjunctiva/sclera: Conjunctivae normal.   Cardiovascular:      Rate and Rhythm: Normal rate and regular rhythm.      Heart sounds: No murmur heard.  Pulmonary:      Effort: Pulmonary effort is normal. No respiratory distress.      Breath sounds: Normal breath sounds.   Abdominal:      General: Abdomen is flat. There is no distension.      Palpations: Abdomen is soft.      Tenderness: There is abdominal tenderness (mild tenderness in the LUQ/epigastric area with deep palpation). There is no guarding or rebound.   Musculoskeletal:         General: No swelling.      Cervical back: Normal range of motion.      Right lower leg: No edema.      Left lower leg: No edema.   Skin:     General: Skin is warm.      Coloration: Skin is not jaundiced.    Neurological:      General: No focal deficit present.      Mental Status: She is alert and oriented to person, place, and time. Mental status is at baseline.   Psychiatric:         Mood and Affect: Mood normal.         Behavior: Behavior normal.           Lab Results: I have reviewed the following results:

## 2024-10-18 ENCOUNTER — PREP FOR PROCEDURE (OUTPATIENT)
Dept: GASTROENTEROLOGY | Facility: AMBULARY SURGERY CENTER | Age: 59
End: 2024-10-18

## 2024-10-18 DIAGNOSIS — K85.90 ACUTE RECURRENT PANCREATITIS: Primary | ICD-10-CM

## 2024-10-18 PROBLEM — E78.2 ELEVATED CHOLESTEROL WITH ELEVATED TRIGLYCERIDES: Status: ACTIVE | Noted: 2024-10-18

## 2024-10-18 LAB
ALBUMIN SERPL BCG-MCNC: 3.1 G/DL (ref 3.5–5)
ALP SERPL-CCNC: 70 U/L (ref 34–104)
ALT SERPL W P-5'-P-CCNC: 63 U/L (ref 7–52)
ANION GAP SERPL CALCULATED.3IONS-SCNC: 4 MMOL/L (ref 4–13)
AST SERPL W P-5'-P-CCNC: 39 U/L (ref 13–39)
BASOPHILS # BLD AUTO: 0.05 THOUSANDS/ΜL (ref 0–0.1)
BASOPHILS NFR BLD AUTO: 1 % (ref 0–1)
BILIRUB SERPL-MCNC: 0.5 MG/DL (ref 0.2–1)
BUN SERPL-MCNC: 8 MG/DL (ref 5–25)
CALCIUM ALBUM COR SERPL-MCNC: 9 MG/DL (ref 8.3–10.1)
CALCIUM SERPL-MCNC: 8.3 MG/DL (ref 8.4–10.2)
CHLORIDE SERPL-SCNC: 109 MMOL/L (ref 96–108)
CO2 SERPL-SCNC: 26 MMOL/L (ref 21–32)
CREAT SERPL-MCNC: 0.69 MG/DL (ref 0.6–1.3)
EOSINOPHIL # BLD AUTO: 0.18 THOUSAND/ΜL (ref 0–0.61)
EOSINOPHIL NFR BLD AUTO: 2 % (ref 0–6)
ERYTHROCYTE [DISTWIDTH] IN BLOOD BY AUTOMATED COUNT: 14.2 % (ref 11.6–15.1)
GFR SERPL CREATININE-BSD FRML MDRD: 95 ML/MIN/1.73SQ M
GLUCOSE SERPL-MCNC: 96 MG/DL (ref 65–140)
HCT VFR BLD AUTO: 38.4 % (ref 34.8–46.1)
HGB BLD-MCNC: 12.3 G/DL (ref 11.5–15.4)
IMM GRANULOCYTES # BLD AUTO: 0.02 THOUSAND/UL (ref 0–0.2)
IMM GRANULOCYTES NFR BLD AUTO: 0 % (ref 0–2)
LIPASE SERPL-CCNC: 30 U/L (ref 11–82)
LYMPHOCYTES # BLD AUTO: 3.04 THOUSANDS/ΜL (ref 0.6–4.47)
LYMPHOCYTES NFR BLD AUTO: 36 % (ref 14–44)
MAGNESIUM SERPL-MCNC: 2 MG/DL (ref 1.9–2.7)
MCH RBC QN AUTO: 30.8 PG (ref 26.8–34.3)
MCHC RBC AUTO-ENTMCNC: 32 G/DL (ref 31.4–37.4)
MCV RBC AUTO: 96 FL (ref 82–98)
MONOCYTES # BLD AUTO: 0.54 THOUSAND/ΜL (ref 0.17–1.22)
MONOCYTES NFR BLD AUTO: 6 % (ref 4–12)
NEUTROPHILS # BLD AUTO: 4.6 THOUSANDS/ΜL (ref 1.85–7.62)
NEUTS SEG NFR BLD AUTO: 55 % (ref 43–75)
NRBC BLD AUTO-RTO: 0 /100 WBCS
PLATELET # BLD AUTO: 242 THOUSANDS/UL (ref 149–390)
PMV BLD AUTO: 9.9 FL (ref 8.9–12.7)
POTASSIUM SERPL-SCNC: 4 MMOL/L (ref 3.5–5.3)
PROT SERPL-MCNC: 5.8 G/DL (ref 6.4–8.4)
RBC # BLD AUTO: 3.99 MILLION/UL (ref 3.81–5.12)
SODIUM SERPL-SCNC: 139 MMOL/L (ref 135–147)
WBC # BLD AUTO: 8.43 THOUSAND/UL (ref 4.31–10.16)

## 2024-10-18 PROCEDURE — 83690 ASSAY OF LIPASE: CPT | Performed by: INTERNAL MEDICINE

## 2024-10-18 PROCEDURE — 99232 SBSQ HOSP IP/OBS MODERATE 35: CPT | Performed by: INTERNAL MEDICINE

## 2024-10-18 PROCEDURE — 83735 ASSAY OF MAGNESIUM: CPT | Performed by: INTERNAL MEDICINE

## 2024-10-18 PROCEDURE — 85025 COMPLETE CBC W/AUTO DIFF WBC: CPT | Performed by: INTERNAL MEDICINE

## 2024-10-18 PROCEDURE — 80053 COMPREHEN METABOLIC PANEL: CPT | Performed by: INTERNAL MEDICINE

## 2024-10-18 PROCEDURE — 99232 SBSQ HOSP IP/OBS MODERATE 35: CPT | Performed by: PHYSICIAN ASSISTANT

## 2024-10-18 RX ORDER — POLYETHYLENE GLYCOL 3350 17 G/17G
17 POWDER, FOR SOLUTION ORAL DAILY
Status: DISCONTINUED | OUTPATIENT
Start: 2024-10-18 | End: 2024-10-20

## 2024-10-18 RX ORDER — ACETAMINOPHEN 325 MG/1
650 TABLET ORAL EVERY 6 HOURS PRN
Status: DISCONTINUED | OUTPATIENT
Start: 2024-10-18 | End: 2024-10-20 | Stop reason: HOSPADM

## 2024-10-18 RX ORDER — HYDROMORPHONE HCL/PF 1 MG/ML
0.5 SYRINGE (ML) INJECTION EVERY 4 HOURS PRN
Status: DISCONTINUED | OUTPATIENT
Start: 2024-10-18 | End: 2024-10-20 | Stop reason: HOSPADM

## 2024-10-18 RX ORDER — FENOFIBRATE 145 MG/1
145 TABLET, COATED ORAL DAILY
Status: DISCONTINUED | OUTPATIENT
Start: 2024-10-18 | End: 2024-10-20 | Stop reason: HOSPADM

## 2024-10-18 RX ORDER — OXYCODONE HYDROCHLORIDE 5 MG/1
5 TABLET ORAL EVERY 4 HOURS PRN
Status: DISCONTINUED | OUTPATIENT
Start: 2024-10-18 | End: 2024-10-20 | Stop reason: HOSPADM

## 2024-10-18 RX ADMIN — FENOFIBRATE 145 MG: 145 TABLET, FILM COATED ORAL at 11:32

## 2024-10-18 RX ADMIN — HYDROMORPHONE HYDROCHLORIDE 0.5 MG: 1 INJECTION, SOLUTION INTRAMUSCULAR; INTRAVENOUS; SUBCUTANEOUS at 21:21

## 2024-10-18 RX ADMIN — HYDROMORPHONE HYDROCHLORIDE 0.5 MG: 1 INJECTION, SOLUTION INTRAMUSCULAR; INTRAVENOUS; SUBCUTANEOUS at 09:46

## 2024-10-18 RX ADMIN — POLYETHYLENE GLYCOL 3350 17 G: 17 POWDER, FOR SOLUTION ORAL at 11:32

## 2024-10-18 RX ADMIN — BUSPIRONE HYDROCHLORIDE 10 MG: 5 TABLET ORAL at 21:03

## 2024-10-18 RX ADMIN — IBUPROFEN 400 MG: 400 TABLET, FILM COATED ORAL at 01:21

## 2024-10-18 RX ADMIN — PANTOPRAZOLE SODIUM 40 MG: 40 TABLET, DELAYED RELEASE ORAL at 05:00

## 2024-10-18 RX ADMIN — SODIUM CHLORIDE, SODIUM LACTATE, POTASSIUM CHLORIDE, AND CALCIUM CHLORIDE 150 ML/HR: .6; .31; .03; .02 INJECTION, SOLUTION INTRAVENOUS at 11:37

## 2024-10-18 RX ADMIN — NICOTINE 1 PATCH: 21 PATCH, EXTENDED RELEASE TRANSDERMAL at 09:21

## 2024-10-18 RX ADMIN — BUPROPION HYDROCHLORIDE 300 MG: 150 TABLET, EXTENDED RELEASE ORAL at 09:16

## 2024-10-18 RX ADMIN — OXYCODONE HYDROCHLORIDE 5 MG: 5 TABLET ORAL at 14:42

## 2024-10-18 RX ADMIN — BUSPIRONE HYDROCHLORIDE 10 MG: 5 TABLET ORAL at 09:16

## 2024-10-18 RX ADMIN — HYDROMORPHONE HYDROCHLORIDE 0.5 MG: 1 INJECTION, SOLUTION INTRAMUSCULAR; INTRAVENOUS; SUBCUTANEOUS at 04:59

## 2024-10-18 RX ADMIN — BUSPIRONE HYDROCHLORIDE 10 MG: 5 TABLET ORAL at 16:55

## 2024-10-18 RX ADMIN — SODIUM CHLORIDE, SODIUM LACTATE, POTASSIUM CHLORIDE, AND CALCIUM CHLORIDE 150 ML/HR: .6; .31; .03; .02 INJECTION, SOLUTION INTRAVENOUS at 01:22

## 2024-10-18 RX ADMIN — OXYCODONE HYDROCHLORIDE 5 MG: 5 TABLET ORAL at 19:36

## 2024-10-18 RX ADMIN — SODIUM CHLORIDE, SODIUM LACTATE, POTASSIUM CHLORIDE, AND CALCIUM CHLORIDE 150 ML/HR: .6; .31; .03; .02 INJECTION, SOLUTION INTRAVENOUS at 21:04

## 2024-10-18 NOTE — PLAN OF CARE
Problem: PAIN - ADULT  Goal: Verbalizes/displays adequate comfort level or baseline comfort level  Description: Interventions:  - Encourage patient to monitor pain and request assistance  - Assess pain using appropriate pain scale  - Administer analgesics based on type and severity of pain and evaluate response  - Implement non-pharmacological measures as appropriate and evaluate response  - Consider cultural and social influences on pain and pain management  - Notify physician/advanced practitioner if interventions unsuccessful or patient reports new pain  Outcome: Progressing     Problem: INFECTION - ADULT  Goal: Absence or prevention of progression during hospitalization  Description: INTERVENTIONS:  - Assess and monitor for signs and symptoms of infection  - Monitor lab/diagnostic results  - Monitor all insertion sites, i.e. indwelling lines, tubes, and drains  - Monitor endotracheal if appropriate and nasal secretions for changes in amount and color  - [place] appropriate cooling/warming therapies per order  - Administer medications as ordered  - Instruct and encourage patient and family to use good hand hygiene technique  - Identify and instruct in appropriate isolation precautions for identified infection/condition  Outcome: Progressing  Goal: Absence of fever/infection during neutropenic period  Description: INTERVENTIONS:  - Monitor WBC    Outcome: Progressing     Problem: SAFETY ADULT  Goal: Patient will remain free of falls  Description: INTERVENTIONS:  - Educate patient/family on patient safety including physical limitations  - Instruct patient to call for assistance with activity   - Consult OT/PT to assist with strengthening/mobility   - Keep Call bell within reach  - Keep bed low and locked with side rails adjusted as appropriate  - Keep care items and personal belongings within reach  - Initiate and maintain comfort rounds  - Make Fall Risk Sign visible to staff  - Offer Toileting every  Hours,  in advance of need  - Initiate/Maintain alarm  - Obtain necessary fall risk management equipment:   - Apply yellow socks and bracelet for high fall risk patients  - Consider moving patient to room near nurses station  Outcome: Progressing  Goal: Maintain or return to baseline ADL function  Description: INTERVENTIONS:  -  Assess patient's ability to carry out ADLs; assess patient's baseline for ADL function and identify physical deficits which impact ability to perform ADLs (bathing, care of mouth/teeth, toileting, grooming, dressing, etc.)  - Assess/evaluate cause of self-care deficits   - Assess range of motion  - Assess patient's mobility; develop plan if impaired  - Assess patient's need for assistive devices and provide as appropriate  - Encourage maximum independence but intervene and supervise when necessary  - Involve family in performance of ADLs  - Assess for home care needs following discharge   - Consider OT consult to assist with ADL evaluation and planning for discharge  - Provide patient education as appropriate  Outcome: Progressing  Goal: Maintains/Returns to pre admission functional level  Description: INTERVENTIONS:  - Perform AM-PAC 6 Click Basic Mobility/ Daily Activity assessment daily.  - Set and communicate daily mobility goal to care team and patient/family/caregiver.   - Collaborate with rehabilitation services on mobility goals if consulted  - Perform Range of Motion  times a day.  - Reposition patient every  hours.  - Dangle patient times a day  - Stand patient  times a day  - Ambulate patient  times a day  - Out of bed to chair  times a day   - Out of bed for meals  times a day  - Out of bed for toileting  - Record patient progress and toleration of activity level   Outcome: Progressing     Problem: DISCHARGE PLANNING  Goal: Discharge to home or other facility with appropriate resources  Description: INTERVENTIONS:  - Identify barriers to discharge w/patient and caregiver  - Arrange for  needed discharge resources and transportation as appropriate  - Identify discharge learning needs (meds, wound care, etc.)  - Arrange for interpretive services to assist at discharge as needed  - Refer to Case Management Department for coordinating discharge planning if the patient needs post-hospital services based on physician/advanced practitioner order or complex needs related to functional status, cognitive ability, or social support system  Outcome: Progressing     Problem: Knowledge Deficit  Goal: Patient/family/caregiver demonstrates understanding of disease process, treatment plan, medications, and discharge instructions  Description: Complete learning assessment and assess knowledge base.  Interventions:  - Provide teaching at level of understanding  - Provide teaching via preferred learning methods  Outcome: Progressing

## 2024-10-18 NOTE — UTILIZATION REVIEW
NOTIFICATION OF INPATIENT ADMISSION   AUTHORIZATION REQUEST   SERVICING FACILITY:   [place] [address]  Tax ID: [id number]  NPI: [id number]   ATTENDING PROVIDER:  Attending Name and NPI#: Calvin Calvin Md [[id number]]  Address: [address] [place] [address]  Phone: [phone]     ADMISSION INFORMATION:  Place of Service: Inpatient [facility]  Place of Service Code: 21  Inpatient Admission Date/Time: 10/17/24 12:16 AM  Discharge Date/Time: No discharge date for patient encounter.  Admitting Diagnosis Code/Description:  Acute pancreatitis [K85.90]  Unspecified abdominal pain [R10.9]     UTILIZATION REVIEW CONTACT:  Calista Kumar Utilization   Network Utilization Review Department  Phone: [phone]  Fax: [phone]  Email: [email or web address]  Contact for approvals/pending authorizations, clinical reviews, and discharge.     PHYSICIAN ADVISORY SERVICES:  Medical Necessity Denial & Odkc-xj-Ztun Review  Phone: [phone]  Fax: [phone]  Email: [email or web address]     DISCHARGE SUPPORT TEAM:  For Patients Discharge Needs & Updates  Phone: [phone] opt. 2 Fax: [phone]  Email: [email or web address]

## 2024-10-18 NOTE — ASSESSMENT & PLAN NOTE
Per discussion with patient would suggest initiation of Tricor to attempt to lower the triglyceride level

## 2024-10-18 NOTE — ASSESSMENT & PLAN NOTE
"Patient presented with complaints of epigastric pain and bloating that started on the evening of 10/15/2024.She reported that her pain was worse with consumption of food and drink.  Overall improving  CT A/P: \"Inflammatory change about the pancreatic head and uncinate process, raising concern for acute pancreatitis. 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  Patient does not consume alcohol.  She does have moderately elevated triglyceride level of 199  Appreciate GI input--lab workup pending  Lipase levels normal  Slowly advancing diet as tolerated while continuing fluids and pain control measures  "

## 2024-10-18 NOTE — PROGRESS NOTES
"Progress Note - Hospitalist   Name: aSra Law 58 y.o. female I MRN: [id number]  Unit/Bed#: W -01 I Date of Admission: 10/16/2024   Date of Service: 10/18/2024 I Hospital Day: 1    Assessment & Plan  Acute pancreatitis  Patient presented with complaints of epigastric pain and bloating that started on the evening of 10/15/2024.She reported that her pain was worse with consumption of food and drink.  Overall improving  CT A/P: \"Inflammatory change about the pancreatic head and uncinate process, raising concern for acute pancreatitis. 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  Patient does not consume alcohol.  She does have moderately elevated triglyceride level of 199  Appreciate GI input--lab workup pending  Lipase levels normal  Slowly advancing diet as tolerated while continuing fluids and pain control measures  SIRS (systemic inflammatory response syndrome) ([facility])  SIRS criteria: Leukocytosis >14, tachycardia 102 (suspect this was more likely triggered by pain)  Suspected source: Pancreatitis  Monitor off antibiotics.  Monitor vital signs, laboratory studies.  Cigarette nicotine dependence with nicotine-induced disorder  Encouraged smoking cessation.  NRT.  Obesity (BMI 30-39.9)  BMI 38.75. encouraged lifestyle modifications.  Elevated cholesterol with elevated triglycerides  Per discussion with patient would suggest initiation of Tricor to attempt to lower the triglyceride level    VTE Pharmacologic Prophylaxis: VTE Score: 2  OOB    Mobility:   Basic Mobility Inpatient Raw Score: 24  JH-HLM Goal: 8: Walk 250 feet or more  JH-HLM Achieved: 8: Walk 250 feet ot more  JH-HLM Goal achieved. Continue to encourage appropriate mobility.    Patient Centered Rounds: I performed bedside rounds with nursing staff today.   Discussions with Specialists or Other Care Team Provider: "     Education and Discussions with Family / Patient:     Current Length of Stay: 1 day(s)  Current Patient Status: Inpatient   Certification Statement: The patient will continue to require additional inpatient hospital stay due to ongoing abdominal pain  Discharge Plan: Anticipate discharge tomorrow to home.    Code Status: Level 3 - DNAR and DNI    Subjective   Patient reports pain at this time is about 5-6 out of 10.  It was a little worse last night but did not seem to correlate with when she ate.  Pain continues to be mostly epigastric and with deeper palpation.  She reiterates that she does not drink any alcohol.  Admits that she does have unhealthy snacking habits.  Ports no bowel movement x 48 hours.  Denies nausea, vomiting or any other concerns or complaints    Objective :  Temp:  [97.4 °F (36.3 °C)-97.7 °F (36.5 °C)] 97.5 °F (36.4 °C)  HR:  [62-71] 71  BP: (111-139)/(66-83) 139/83  Resp:  [16-20] 20  SpO2:  [93 %-97 %] 96 %    There is no height or weight on file to calculate BMI.     Input and Output Summary (last 24 hours):     Intake/Output Summary (Last 24 hours) at 10/18/2024 1004  Last data filed at 10/17/2024 1901  Gross per 24 hour   Intake 2382 ml   Output --   Net 2382 ml       Physical Exam  Vitals reviewed.   Constitutional:       General: She is not in acute distress.     Appearance: Normal appearance. She is obese. She is not ill-appearing, toxic-appearing or diaphoretic.      Comments: Well-appearing, nontoxic and with no outward signs of discomfort   Eyes:      General: No scleral icterus.        Right eye: No discharge.         Left eye: No discharge.      Conjunctiva/sclera: Conjunctivae normal.   Cardiovascular:      Rate and Rhythm: Normal rate and regular rhythm.      Heart sounds: No murmur heard.  Pulmonary:      Effort: No respiratory distress.      Breath sounds: No stridor. No wheezing, rhonchi or rales.   Abdominal:      General: Bowel sounds are normal. There is no distension.       Palpations: Abdomen is soft.      Tenderness: There is abdominal tenderness. There is no guarding.      Comments: Epigastric tenderness to palpation   Musculoskeletal:      Right lower leg: No edema.      Left lower leg: No edema.   Skin:     General: Skin is warm and dry.      Coloration: Skin is not jaundiced or pale.      Findings: No bruising, erythema, lesion or rash.   Neurological:      General: No focal deficit present.      Mental Status: She is alert. Mental status is at baseline.   Psychiatric:         Mood and Affect: Mood normal.         Thought Content: Thought content normal.       Lines/Drains:        Lab Results: I have reviewed the following results:   Results from last 7 days   Lab Units 10/18/24  0518   WBC Thousand/uL 8.43   HEMOGLOBIN g/dL 12.3   HEMATOCRIT % 38.4   PLATELETS Thousands/uL 242   SEGS PCT % 55   LYMPHO PCT % 36   MONO PCT % 6   EOS PCT % 2     Results from last 7 days   Lab Units 10/18/24  0518   SODIUM mmol/L 139   POTASSIUM mmol/L 4.0   CHLORIDE mmol/L 109*   CO2 mmol/L 26   BUN mg/dL 8   CREATININE mg/dL 0.69   ANION GAP mmol/L 4   CALCIUM mg/dL 8.3*   ALBUMIN g/dL 3.1*   TOTAL BILIRUBIN mg/dL 0.50   ALK PHOS U/L 70   ALT U/L 63*   AST U/L 39   GLUCOSE RANDOM mg/dL 96                       Recent Cultures (last 7 days):         CT A/P      Last 24 Hours Medication List:     Current Facility-Administered Medications:     acetaminophen (TYLENOL) tablet 650 mg, Q6H PRN    buPROPion (WELLBUTRIN XL) 24 hr tablet 300 mg, Daily    busPIRone (BUSPAR) tablet 10 mg, TID    fenofibrate (TRICOR) tablet 145 mg, Daily    HYDROmorphone (DILAUDID) injection 0.5 mg, Q4H PRN    hydrOXYzine HCL (ATARAX) tablet 50 mg, HS PRN    lactated ringers infusion, Continuous, Last Rate: 150 mL/hr (10/18/24 0122)    nicotine (NICODERM CQ) 21 mg/24 hr TD 24 hr patch 1 patch, Daily    ondansetron (ZOFRAN) injection 4 mg, Q6H PRN    oxyCODONE (ROXICODONE) IR tablet 5 mg, Q4H PRN    oxyCODONE (ROXICODONE)  split tablet 2.5 mg, Q4H PRN    pantoprazole (PROTONIX) EC tablet 40 mg, Early Morning    Administrative Statements   Today, Patient Was Seen By: Petra Upton PA-C      **Please Note: This note may have been constructed using a voice recognition system.**

## 2024-10-18 NOTE — ASSESSMENT & PLAN NOTE
SIRS criteria: Leukocytosis >14, tachycardia 102 (suspect this was more likely triggered by pain)  Suspected source: Pancreatitis  Monitor off antibiotics.  Monitor vital signs, laboratory studies.

## 2024-10-18 NOTE — PROGRESS NOTES
Progress Note - Gastroenterology   Name: Sara Law 58 y.o. female I MRN: [id number]  Unit/Bed#: W -01 I Date of Admission: 10/16/2024   Date of Service: 10/18/2024 I Hospital Day: 1    Assessment & Plan  Acute pancreatitis  Previous episode of idiopathic pancreatitis 6/2024.  Followed up with MRI/MRCP that was unremarkable.  Presented with recurrence of classic upper abdominal pain and CT findings consistent with pancreatitis, although lipase normal.  Triglycerides, calcium normal.  No new medications.  No alcohol.  No unintentional weight loss.  Postcholecystectomy.    Unclear etiology, due to 2 recent episodes with atypical presentation, would recommend outpatient EUS in 6 to 8 weeks to rule out any underlying concerning lesion.  Will also rule out autoimmune cause with CHELY and IgG4  EGD after previous episode 6/2024 unremarkable  Abdominal pain improved from arrival however still persistent requiring pain medication  Continue full liquid diet for now, if improved transition to low-fat low fiber this evening  Continue fluids for now  Daily miralax  Encouraged ambulation  Strongly recommended smoking cessation  Informed SLIM        Subjective   Patient reports feeling better than arrival however still having some intermittent upper abdominal pain requiring pain medication.  She is tolerating full liquid diet and reports no significant worsening of symptoms after eating.  No bowel movement.  No nausea or vomiting.    Objective :  Temp:  [97.4 °F (36.3 °C)-97.7 °F (36.5 °C)] 97.5 °F (36.4 °C)  HR:  [62-71] 71  BP: (111-139)/(66-83) 139/83  Resp:  [16-20] 20  SpO2:  [93 %-97 %] 96 %    Physical Exam  Vitals reviewed.   Constitutional:       General: She is not in acute distress.     Appearance: Normal appearance. She is not ill-appearing.   HENT:      Head: Normocephalic and atraumatic.   Eyes:      Conjunctiva/sclera: Conjunctivae normal.   Cardiovascular:      Rate and Rhythm: Normal rate and regular  rhythm.      Heart sounds: No murmur heard.  Pulmonary:      Effort: Pulmonary effort is normal. No respiratory distress.      Breath sounds: Normal breath sounds.   Abdominal:      General: Abdomen is flat. There is no distension.      Palpations: Abdomen is soft.      Tenderness: There is abdominal tenderness (stable, mild tenderness in the epigastric area). There is no guarding or rebound.   Musculoskeletal:         General: No swelling.      Cervical back: Normal range of motion.      Right lower leg: No edema.      Left lower leg: No edema.   Skin:     General: Skin is warm.      Coloration: Skin is not jaundiced.   Neurological:      General: No focal deficit present.      Mental Status: She is alert and oriented to person, place, and time. Mental status is at baseline.   Psychiatric:         Mood and Affect: Mood normal.         Behavior: Behavior normal.           Lab Results: I have reviewed the following results:

## 2024-10-18 NOTE — ASSESSMENT & PLAN NOTE
Previous episode of idiopathic pancreatitis 6/2024.  Followed up with MRI/MRCP that was unremarkable.  Presented with recurrence of classic upper abdominal pain and CT findings consistent with pancreatitis, although lipase normal.  Triglycerides, calcium normal.  No new medications.  No alcohol.  No unintentional weight loss.  Postcholecystectomy.    Unclear etiology, due to 2 recent episodes with atypical presentation, would recommend outpatient EUS in 6 to 8 weeks to rule out any underlying concerning lesion.  Will also rule out autoimmune cause with CHELY and IgG4  EGD after previous episode 6/2024 unremarkable  Abdominal pain improved from arrival however still persistent requiring pain medication  Continue full liquid diet for now, if improved transition to low-fat low fiber this evening  Continue fluids for now  Daily miralax  Encouraged ambulation  Strongly recommended smoking cessation  Informed SLIM

## 2024-10-18 NOTE — PLAN OF CARE
Problem: PAIN - ADULT  Goal: Verbalizes/displays adequate comfort level or baseline comfort level  Description: Interventions:  - Encourage patient to monitor pain and request assistance  - Assess pain using appropriate pain scale  - Administer analgesics based on type and severity of pain and evaluate response  - Implement non-pharmacological measures as appropriate and evaluate response  - Consider cultural and social influences on pain and pain management  - Notify physician/advanced practitioner if interventions unsuccessful or patient reports new pain  Outcome: Progressing     Problem: INFECTION - ADULT  Goal: Absence or prevention of progression during hospitalization  Description: INTERVENTIONS:  - Assess and monitor for signs and symptoms of infection  - Monitor lab/diagnostic results  - Monitor all insertion sites, i.e. indwelling lines, tubes, and drains  - Monitor endotracheal if appropriate and nasal secretions for changes in amount and color  - [place] appropriate cooling/warming therapies per order  - Administer medications as ordered  - Instruct and encourage patient and family to use good hand hygiene technique  - Identify and instruct in appropriate isolation precautions for identified infection/condition  Outcome: Progressing  Goal: Absence of fever/infection during neutropenic period  Description: INTERVENTIONS:  - Monitor WBC    Outcome: Progressing     Problem: DISCHARGE PLANNING  Goal: Discharge to home or other facility with appropriate resources  Description: INTERVENTIONS:  - Identify barriers to discharge w/patient and caregiver  - Arrange for needed discharge resources and transportation as appropriate  - Identify discharge learning needs (meds, wound care, etc.)  - Arrange for interpretive services to assist at discharge as needed  - Refer to Case Management Department for coordinating discharge planning if the patient needs post-hospital services based on physician/advanced  practitioner order or complex needs related to functional status, cognitive ability, or social support system  Outcome: Progressing

## 2024-10-18 NOTE — UTILIZATION REVIEW
Initial Clinical Review    Admission: Date/Time/Statement:   Admission Orders (From admission, onward)       Ordered        10/17/24 0016  INPATIENT ADMISSION  Once                          Orders Placed This Encounter   Procedures    INPATIENT ADMISSION     Standing Status:   Standing     Number of Occurrences:   1     Order Specific Question:   Level of Care     Answer:   Med Surg [16]     Order Specific Question:   Estimated length of stay     Answer:   More than 2 Midnights     Order Specific Question:   Certification     Answer:   I certify that inpatient services are medically necessary for this patient for a duration of greater than two midnights. See H&P and MD Progress Notes for additional information about the patient's course of treatment.     ED Arrival Information       Expected   -    Arrival   10/16/2024 20:29    Acuity   Urgent              Means of arrival   Walk-In    Escorted by   Self    Service   Hospitalist    Admission type   Emergency              Arrival complaint   Abdominal pain  Heart Palpitations             Chief Complaint   Patient presents with    Abdominal Pain    Palpitations     Reports started with palpitations last night, insomnia, then this morning has diffuse upper abdominal pain, Hx pancreatitis. +sweats       Initial Presentation: 58 y.o. female to ED as a walk-in with c/o epigastric pain and bloating starting a few days ago. She reports that her pain was worse with consumption of food and drink. Denies n/v, fever or chills. No alcohol use. H/O cholecystectomy, GERD, tobacco use, anxiety/depression, obesity. On presentation VSS, WBC 14.11, lipase 39. Abd tenderness in the epigastric area on exam. CT AP showed concernf or acute pancreatitic. Admitted Inpatient to [facility] unit with Acute Pancreatitis -- npo, IVFs. Analgesics, antiemetics prn. Trend lipase. Amylase, triglycerides pending. Consult GI. Continue pta po meds. SCDs.     Anticipated Length of Stay/Certification Statement:  Patient will be admitted on an inpatient basis with an anticipated length of stay of greater than 2 midnights secondary to acute pancreatitis.     GI consult: Recurrent interstitial pancreatitis - unclear etiology, pt is s/p cholecystectomy, triglyceride borderline elevated 199, no new meds, no alcohol use.  Pt does report tobacco use.  No family history of pancreatic cancer.  MRI/MRCP previously was unremarkable.  Recommend endoscopic ultrasound in the op setting to assess for small pancreatic pathology/autoimmune pancreatitis. Also recommend checking CHELY and IgG4 levels. Meanwhile continue supportive care with IV fluids, antiemetics, pain control. Recommend smoking cessation.      Date: 10/18   Day 2: on exam today, pt reports pain 5-6 / 10.  It was a little worse last night but did not seem to correlate with when she ate.  Pain continues to be mostly epigastric and with deeper palpation.  She reiterates that she does not drink any alcohol.  Admits that she does have unhealthy snacking habits.  No BM x 48 hrs. Epigastric tenderness to palpation. Continue full liquid diet for now. If improvement in symptoms, transition to low-fat/low fiber this evening. Continue IVFs for now. Miralax daily. Analgesics/antiemetics prn. Encourage ambulation.        ED Treatment-Medication Administration from 10/16/2024 2029 to 10/17/2024 0042         Date/Time Order Dose Route Action     10/16/2024 2136 lactated ringers bolus 1,000 mL 1,000 mL Intravenous New Bag     10/16/2024 2136 ondansetron (ZOFRAN) injection 4 mg 4 mg Intravenous Given     10/16/2024 2136 HYDROmorphone (DILAUDID) injection 0.5 mg 0.5 mg Intravenous Given     10/16/2024 2349 ketorolac (TORADOL) injection 15 mg 15 mg Intravenous Given         Scheduled Medications:  buPROPion, 300 mg, Oral, Daily  busPIRone, 10 mg, Oral, TID  fenofibrate, 145 mg, Oral, Daily  nicotine, 1 patch, Transdermal, Daily  pantoprazole, 40 mg, Oral, Early Morning  polyethylene glycol, 17  g, Oral, Daily      Continuous IV Infusions:  lactated ringers, 150 mL/hr, Intravenous, Continuous      PRN Meds:  acetaminophen, 650 mg, Oral, Q6H PRN  HYDROmorphone, 0.5 mg, Intravenous, Q4H PRN 10/17 x3, 10/18 x2 then d/c'd  HYDROmorphone, 0.2 mg, Intravenous, Q4H PRN 10/17 x3 - d/c'd 10/18  hydrOXYzine HCL, 50 mg, Oral, HS PRN 10/17 x1  Ibuprofen 500 mg Oral Q6H PRN 10/17 x1, 10/18 x1 then d/c'd  ondansetron, 4 mg, Intravenous, Q6H PRN  oxyCODONE, 5 mg, Oral, Q4H PRN  oxyCODONE, 2.5 mg, Oral, Q4H PRN      ED Triage Vitals   Temperature Pulse Respirations Blood Pressure SpO2 Pain Score   10/16/24 2042 10/16/24 2042 10/16/24 2042 10/16/24 2042 10/16/24 2042 10/17/24 0100   98.2 °F (36.8 °C) 102 20 138/93 96 % 7     Weight (last 2 days)       None            Vital Signs (last 3 days)       Date/Time Temp Pulse Resp BP MAP (mmHg) SpO2 O2 Device Pain    10/18/24 1100 -- -- -- -- -- -- -- 3    10/18/24 0946 -- -- -- -- -- -- -- 7    10/18/24 07:48:30 97.5 °F (36.4 °C) 71 -- 139/83 102 96 % -- --    10/18/24 0121 -- -- -- -- -- -- -- 7    10/17/24 23:56:31 97.7 °F (36.5 °C) 62 20 125/72 90 97 % -- --    10/17/24 2300 -- -- -- -- -- -- -- No Pain    10/17/24 2155 -- -- -- -- -- -- -- 8    10/17/24 1858 -- -- -- -- -- -- -- 7    10/17/24 15:24:14 97.4 °F (36.3 °C) 69 16 111/66 81 93 % -- --    10/17/24 1459 -- -- -- -- -- -- -- 6    10/17/24 1454 -- -- -- -- -- -- -- 5    10/17/24 0900 -- -- -- -- -- -- None (Room air) --    10/17/24 06:59:09 98.9 °F (37.2 °C) 67 16 100/61 74 94 % -- --    10/17/24 0638 -- -- -- -- -- -- -- 7    10/17/24 0134 -- -- -- -- -- -- -- 7    10/17/24 0100 -- -- -- -- -- -- -- 7    10/17/24 00:48:37 97.2 °F (36.2 °C) 92 16 144/88 107 95 % -- --    10/16/24 2042 98.2 °F (36.8 °C) 102 20 138/93 -- 96 % None (Room air) --              Pertinent Labs/Diagnostic Test Results:   Radiology:  CT abdomen pelvis with contrast   Final Interpretation by Juan Pillai DO (10/16 6932)      Inflammatory  change about the pancreatic head and uncinate process, raising concern for acute pancreatitis. No evidence of pancreatic necrosis or encapsulated peripancreatic fluid collection      The study was marked in EPIC for immediate notification.      Workstation performed: [id number]         XR chest 1 view portable   Final Interpretation by Cruz Loera MD (10/17 0827)      No acute cardiopulmonary disease.            Workstation performed: [id number]           Cardiology:  ECG 12 lead   Final Result by Chani Beckham MD (10/17 2318)   Sinus tachycardia   Otherwise normal ECG   When compared with ECG of 19-JUN-2024 20:58,   No significant change was found   Confirmed by Chani Beckham ([id number]) on 10/17/2024 11:18:55 PM        GI:  No orders to display           Results from last 7 days   Lab Units 10/18/24  0518 10/17/24  0604 10/16/24  2130   WBC Thousand/uL 8.43 8.78 14.11*   HEMOGLOBIN g/dL 12.3 13.1 14.2   HEMATOCRIT % 38.4 41.1 42.4   PLATELETS Thousands/uL 242 261 298   TOTAL NEUT ABS Thousands/µL 4.60  --  8.79*         Results from last 7 days   Lab Units 10/18/24  0518 10/17/24  0604 10/16/24  2130   SODIUM mmol/L 139 139 139   POTASSIUM mmol/L 4.0 3.9 4.0   CHLORIDE mmol/L 109* 108 107   CO2 mmol/L 26 28 24   ANION GAP mmol/L 4 3* 8   BUN mg/dL 8 12 11   CREATININE mg/dL 0.69 0.93 1.06   EGFR ml/min/1.73sq m 95 67 57   CALCIUM mg/dL 8.3* 8.5 9.0   MAGNESIUM mg/dL 2.0  --   --      Results from last 7 days   Lab Units 10/18/24  0518 10/16/24  2130   AST U/L 39 14  14   ALT U/L 63* 19  19   ALK PHOS U/L 70 66  66   TOTAL PROTEIN g/dL 5.8* 7.1  7.1   ALBUMIN g/dL 3.1* 4.0  4.0   TOTAL BILIRUBIN mg/dL 0.50 0.35  0.35   BILIRUBIN DIRECT mg/dL  --  0.00       Results from last 7 days   Lab Units 10/18/24  0518 10/17/24  0604 10/16/24  2130   GLUCOSE RANDOM mg/dL 96 100 110       Results from last 7 days   Lab Units 10/16/24  2130   HS TNI 0HR ng/L 3       Results from last 7 days   Lab Units 10/18/24  0518  10/17/24  0604 10/16/24  2130   LIPASE u/L 30 33 39   AMYLASE IU/L  --   --  56             Past Medical History:   Diagnosis Date    Anxiety     Back pain     Depression     GERD (gastroesophageal reflux disease)     SOB (shortness of breath)     Vertigo      Present on Admission:   Acute pancreatitis   Cigarette nicotine dependence with nicotine-induced disorder   Obesity (BMI 30-39.9)      Admitting Diagnosis: Acute pancreatitis [K85.90]  Unspecified abdominal pain [R10.9]  Age/Sex: 58 y.o. female    Network Utilization Review Department  ATTENTION: Please call with any questions or concerns to [phone] and carefully listen to the prompts so that you are directed to the right person. All voicemails are confidential.   For Discharge needs, contact Care Management DC Support Team at [phone] opt. [phone]  Send all requests for admission clinical reviews, approved or denied determinations and any other requests to dedicated fax number below belonging to the campus where the patient is receiving treatment. List of dedicated fax numbers for the Facilities:  FACILITY NAME UR FAX NUMBER   ADMISSION DENIALS (Administrative/Medical Necessity) [phone]   DISCHARGE SUPPORT TEAM (NETWORK) [phone]   PARENT CHILD HEALTH (Maternity/NICU/Pediatrics) [phone]   [facility] [phone]   [facility] [phone]   [facility] [phone]   General acute hospital [phone]   [facility] [phone]   [facility] [phone]   General acute hospital [phone]   [facility] [phone]   [facility] [phone]   [facility] [phone]   [facility] [phone]   [facility] [phone]

## 2024-10-19 LAB
ALBUMIN SERPL BCG-MCNC: 3.2 G/DL (ref 3.5–5)
ALP SERPL-CCNC: 82 U/L (ref 34–104)
ALT SERPL W P-5'-P-CCNC: 70 U/L (ref 7–52)
ANION GAP SERPL CALCULATED.3IONS-SCNC: 5 MMOL/L (ref 4–13)
AST SERPL W P-5'-P-CCNC: 42 U/L (ref 13–39)
BASOPHILS # BLD AUTO: 0.04 THOUSANDS/ΜL (ref 0–0.1)
BASOPHILS NFR BLD AUTO: 1 % (ref 0–1)
BILIRUB SERPL-MCNC: 0.55 MG/DL (ref 0.2–1)
BUN SERPL-MCNC: 6 MG/DL (ref 5–25)
CALCIUM ALBUM COR SERPL-MCNC: 9.1 MG/DL (ref 8.3–10.1)
CALCIUM SERPL-MCNC: 8.5 MG/DL (ref 8.4–10.2)
CHLORIDE SERPL-SCNC: 107 MMOL/L (ref 96–108)
CO2 SERPL-SCNC: 27 MMOL/L (ref 21–32)
CREAT SERPL-MCNC: 0.75 MG/DL (ref 0.6–1.3)
EOSINOPHIL # BLD AUTO: 0.15 THOUSAND/ΜL (ref 0–0.61)
EOSINOPHIL NFR BLD AUTO: 2 % (ref 0–6)
ERYTHROCYTE [DISTWIDTH] IN BLOOD BY AUTOMATED COUNT: 14 % (ref 11.6–15.1)
GFR SERPL CREATININE-BSD FRML MDRD: 87 ML/MIN/1.73SQ M
GLUCOSE SERPL-MCNC: 94 MG/DL (ref 65–140)
HCT VFR BLD AUTO: 38.7 % (ref 34.8–46.1)
HGB BLD-MCNC: 12.4 G/DL (ref 11.5–15.4)
IGG SERPL-MCNC: 815 MG/DL (ref 586–1602)
IGG1 SER-MCNC: 383 MG/DL (ref 248–810)
IGG2 SER-MCNC: 196 MG/DL (ref 130–555)
IGG3 SER-MCNC: 112 MG/DL (ref 15–102)
IGG4 SER-MCNC: 23 MG/DL (ref 2–96)
IMM GRANULOCYTES # BLD AUTO: 0.04 THOUSAND/UL (ref 0–0.2)
IMM GRANULOCYTES NFR BLD AUTO: 1 % (ref 0–2)
LYMPHOCYTES # BLD AUTO: 2.44 THOUSANDS/ΜL (ref 0.6–4.47)
LYMPHOCYTES NFR BLD AUTO: 32 % (ref 14–44)
MCH RBC QN AUTO: 30.8 PG (ref 26.8–34.3)
MCHC RBC AUTO-ENTMCNC: 32 G/DL (ref 31.4–37.4)
MCV RBC AUTO: 96 FL (ref 82–98)
MONOCYTES # BLD AUTO: 0.53 THOUSAND/ΜL (ref 0.17–1.22)
MONOCYTES NFR BLD AUTO: 7 % (ref 4–12)
NEUTROPHILS # BLD AUTO: 4.49 THOUSANDS/ΜL (ref 1.85–7.62)
NEUTS SEG NFR BLD AUTO: 57 % (ref 43–75)
NRBC BLD AUTO-RTO: 0 /100 WBCS
PLATELET # BLD AUTO: 248 THOUSANDS/UL (ref 149–390)
PMV BLD AUTO: 10.1 FL (ref 8.9–12.7)
POTASSIUM SERPL-SCNC: 4 MMOL/L (ref 3.5–5.3)
PROT SERPL-MCNC: 6.1 G/DL (ref 6.4–8.4)
RBC # BLD AUTO: 4.02 MILLION/UL (ref 3.81–5.12)
SODIUM SERPL-SCNC: 139 MMOL/L (ref 135–147)
WBC # BLD AUTO: 7.69 THOUSAND/UL (ref 4.31–10.16)

## 2024-10-19 PROCEDURE — 99232 SBSQ HOSP IP/OBS MODERATE 35: CPT | Performed by: STUDENT IN AN ORGANIZED HEALTH CARE EDUCATION/TRAINING PROGRAM

## 2024-10-19 PROCEDURE — 80053 COMPREHEN METABOLIC PANEL: CPT | Performed by: PHYSICIAN ASSISTANT

## 2024-10-19 PROCEDURE — 85025 COMPLETE CBC W/AUTO DIFF WBC: CPT | Performed by: PHYSICIAN ASSISTANT

## 2024-10-19 RX ADMIN — PANTOPRAZOLE SODIUM 40 MG: 40 TABLET, DELAYED RELEASE ORAL at 05:38

## 2024-10-19 RX ADMIN — OXYCODONE HYDROCHLORIDE 5 MG: 5 TABLET ORAL at 20:26

## 2024-10-19 RX ADMIN — POLYETHYLENE GLYCOL 3350 17 G: 17 POWDER, FOR SOLUTION ORAL at 09:26

## 2024-10-19 RX ADMIN — BUSPIRONE HYDROCHLORIDE 10 MG: 5 TABLET ORAL at 15:20

## 2024-10-19 RX ADMIN — OXYCODONE HYDROCHLORIDE 5 MG: 5 TABLET ORAL at 16:26

## 2024-10-19 RX ADMIN — OXYCODONE HYDROCHLORIDE 5 MG: 5 TABLET ORAL at 10:49

## 2024-10-19 RX ADMIN — FENOFIBRATE 145 MG: 145 TABLET, FILM COATED ORAL at 09:26

## 2024-10-19 RX ADMIN — OXYCODONE HYDROCHLORIDE 5 MG: 5 TABLET ORAL at 04:22

## 2024-10-19 RX ADMIN — BUPROPION HYDROCHLORIDE 300 MG: 150 TABLET, EXTENDED RELEASE ORAL at 09:26

## 2024-10-19 RX ADMIN — BUSPIRONE HYDROCHLORIDE 10 MG: 5 TABLET ORAL at 09:26

## 2024-10-19 RX ADMIN — NICOTINE 1 PATCH: 21 PATCH, EXTENDED RELEASE TRANSDERMAL at 09:26

## 2024-10-19 RX ADMIN — BUSPIRONE HYDROCHLORIDE 10 MG: 5 TABLET ORAL at 20:26

## 2024-10-19 NOTE — PROGRESS NOTES
"Progress Note - Hospitalist   Name: Sara Law 58 y.o. female I MRN: [id number]  Unit/Bed#: W -01 I Date of Admission: 10/16/2024   Date of Service: 10/19/2024 I Hospital Day: 2     Assessment & Plan  Acute pancreatitis  Patient presented with complaints of epigastric pain and bloating that started on the evening of 10/15/2024.She reported that her pain was worse with consumption of food and drink.  Overall improving  CT A/P: \"Inflammatory change about the pancreatic head and uncinate process, raising concern for acute pancreatitis. 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  Patient does not consume alcohol.  She does have moderately elevated triglyceride level of 199  Appreciate GI input--lab workup pending  Lipase levels normal  Slowly advancing diet as tolerated while continuing fluids and pain control measures  SIRS (systemic inflammatory response syndrome) ([facility])  SIRS criteria: Leukocytosis >14, tachycardia 102 (suspect this was more likely triggered by pain)  Suspected source: Pancreatitis  Monitor off antibiotics.  Monitor vital signs, laboratory studies.  Cigarette nicotine dependence with nicotine-induced disorder  Encouraged smoking cessation.  NRT.  Obesity (BMI 30-39.9)  BMI 38.75. encouraged lifestyle modifications.  Elevated cholesterol with elevated triglycerides  Per discussion with patient would suggest initiation of Tricor to attempt to lower the triglyceride level    VTE Pharmacologic Prophylaxis: VTE Score: 2 Low Risk (Score 0-2) - Encourage Ambulation.    Mobility:   Basic Mobility Inpatient Raw Score: 24  JH-HLM Goal: 8: Walk 250 feet or more  JH-HLM Achieved: 7: Walk 25 feet or more  JH-HLM Goal achieved. Continue to encourage appropriate mobility.    Patient Centered Rounds: I performed bedside rounds with nursing staff today.   Discussions with " Specialists or Other Care Team Provider: Appreciate gastroenterology recommendations    Education and Discussions with Family / Patient: Patient seen and examined at bedside this morning    Current Length of Stay: 2 day(s)  Current Patient Status: Inpatient   Certification Statement: The patient will continue to require additional inpatient hospital stay due to acute pancreatitis symptoms, advance diet as tolerated  Discharge Plan: Anticipate discharge in 48 hrs to home.    Code Status: Level 3 - DNAR and DNI    Subjective   Patient seen and examined at bedside this morning, somewhat feeling better, our goal is to advance diet as tolerated today with appropriate hydration.    Objective :  Temp:  [97.7 °F (36.5 °C)-97.8 °F (36.6 °C)] 97.7 °F (36.5 °C)  HR:  [62-77] 77  BP: (117-144)/(75-82) 144/82  Resp:  [16-18] 18  SpO2:  [91 %-95 %] 94 %    There is no height or weight on file to calculate BMI.     Input and Output Summary (last 24 hours):     Intake/Output Summary (Last 24 hours) at 10/19/2024 1124  Last data filed at 10/19/2024 0756  Gross per 24 hour   Intake 5025 ml   Output --   Net 5025 ml       Physical Exam  Vitals and nursing note reviewed.   Constitutional:       General: She is not in acute distress.     Appearance: She is well-developed.   HENT:      Head: Normocephalic and atraumatic.   Eyes:      General: No scleral icterus.     Conjunctiva/sclera: Conjunctivae normal.   Cardiovascular:      Rate and Rhythm: Normal rate and regular rhythm.      Pulses: Normal pulses.      Heart sounds: No murmur heard.  Pulmonary:      Effort: Pulmonary effort is normal. No respiratory distress.      Breath sounds: Normal breath sounds.   Abdominal:      General: Bowel sounds are normal. There is no distension.      Palpations: Abdomen is soft.      Tenderness: There is abdominal tenderness.   Musculoskeletal:         General: No swelling. Normal range of motion.      Cervical back: Neck supple.   Skin:     General:  Skin is warm and dry.      Capillary Refill: Capillary refill takes less than 2 seconds.   Neurological:      General: No focal deficit present.      Mental Status: She is alert and oriented to person, place, and time. Mental status is at baseline.   Psychiatric:         Mood and Affect: Mood normal.         Behavior: Behavior normal.         Lines/Drains:              Lab Results: I have reviewed the following results:   Results from last 7 days   Lab Units 10/19/24  0523   WBC Thousand/uL 7.69   HEMOGLOBIN g/dL 12.4   HEMATOCRIT % 38.7   PLATELETS Thousands/uL 248   SEGS PCT % 57   LYMPHO PCT % 32   MONO PCT % 7   EOS PCT % 2     Results from last 7 days   Lab Units 10/19/24  0523   SODIUM mmol/L 139   POTASSIUM mmol/L 4.0   CHLORIDE mmol/L 107   CO2 mmol/L 27   BUN mg/dL 6   CREATININE mg/dL 0.75   ANION GAP mmol/L 5   CALCIUM mg/dL 8.5   ALBUMIN g/dL 3.2*   TOTAL BILIRUBIN mg/dL 0.55   ALK PHOS U/L 82   ALT U/L 70*   AST U/L 42*   GLUCOSE RANDOM mg/dL 94                       Recent Cultures (last 7 days):           Last 24 Hours Medication List:     Current Facility-Administered Medications:     acetaminophen (TYLENOL) tablet 650 mg, Q6H PRN    buPROPion (WELLBUTRIN XL) 24 hr tablet 300 mg, Daily    busPIRone (BUSPAR) tablet 10 mg, TID    fenofibrate (TRICOR) tablet 145 mg, Daily    HYDROmorphone (DILAUDID) injection 0.5 mg, Q4H PRN    hydrOXYzine HCL (ATARAX) tablet 50 mg, HS PRN    lactated ringers infusion, Continuous, Last Rate: 150 mL/hr (10/18/24 2104)    nicotine (NICODERM CQ) 21 mg/24 hr TD 24 hr patch 1 patch, Daily    ondansetron (ZOFRAN) injection 4 mg, Q6H PRN    oxyCODONE (ROXICODONE) IR tablet 5 mg, Q4H PRN    oxyCODONE (ROXICODONE) split tablet 2.5 mg, Q4H PRN    pantoprazole (PROTONIX) EC tablet 40 mg, Early Morning    polyethylene glycol (MIRALAX) packet 17 g, Daily    Administrative Statements   Today, Patient Was Seen By: Mauro Solomon DO      **Please Note: This note may have been  constructed using a voice recognition system.**

## 2024-10-19 NOTE — PLAN OF CARE
Problem: INFECTION - ADULT  Goal: Absence or prevention of progression during hospitalization  Description: INTERVENTIONS:  - Assess and monitor for signs and symptoms of infection  - Monitor lab/diagnostic results  - Monitor all insertion sites, i.e. indwelling lines, tubes, and drains  - Monitor endotracheal if appropriate and nasal secretions for changes in amount and color  - [place] appropriate cooling/warming therapies per order  - Administer medications as ordered  - Instruct and encourage patient and family to use good hand hygiene technique  - Identify and instruct in appropriate isolation precautions for identified infection/condition  Outcome: Progressing  Goal: Absence of fever/infection during neutropenic period  Description: INTERVENTIONS:  - Monitor WBC    Outcome: Progressing     Problem: SAFETY ADULT  Goal: Patient will remain free of falls  Description: INTERVENTIONS:  - Educate patient/family on patient safety including physical limitations  - Instruct patient to call for assistance with activity   - Consult OT/PT to assist with strengthening/mobility   - Keep Call bell within reach  - Keep bed low and locked with side rails adjusted as appropriate  - Keep care items and personal belongings within reach  - Initiate and maintain comfort rounds  - Make Fall Risk Sign visible to staff  - Apply yellow socks and bracelet for high fall risk patients  - Consider moving patient to room near nurses station  Outcome: Progressing  Goal: Maintain or return to baseline ADL function  Description: INTERVENTIONS:  -  Assess patient's ability to carry out ADLs; assess patient's baseline for ADL function and identify physical deficits which impact ability to perform ADLs (bathing, care of mouth/teeth, toileting, grooming, dressing, etc.)  - Assess/evaluate cause of self-care deficits   - Assess range of motion  - Assess patient's mobility; develop plan if impaired  - Assess patient's need for assistive devices  and provide as appropriate  - Encourage maximum independence but intervene and supervise when necessary  - Involve family in performance of ADLs  - Assess for home care needs following discharge   - Consider OT consult to assist with ADL evaluation and planning for discharge  - Provide patient education as appropriate  Outcome: Progressing  Goal: Maintains/Returns to pre admission functional level  Description: INTERVENTIONS:  - Perform AM-PAC 6 Click Basic Mobility/ Daily Activity assessment daily.  - Set and communicate daily mobility goal to care team and patient/family/caregiver.   - Collaborate with rehabilitation services on mobility goals if consulted  - Out of bed for toileting  - Record patient progress and toleration of activity level   Outcome: Progressing     Problem: DISCHARGE PLANNING  Goal: Discharge to home or other facility with appropriate resources  Description: INTERVENTIONS:  - Identify barriers to discharge w/patient and caregiver  - Arrange for needed discharge resources and transportation as appropriate  - Identify discharge learning needs (meds, wound care, etc.)  - Arrange for interpretive services to assist at discharge as needed  - Refer to Case Management Department for coordinating discharge planning if the patient needs post-hospital services based on physician/advanced practitioner order or complex needs related to functional status, cognitive ability, or social support system  Outcome: Progressing     Problem: Knowledge Deficit  Goal: Patient/family/caregiver demonstrates understanding of disease process, treatment plan, medications, and discharge instructions  Description: Complete learning assessment and assess knowledge base.  Interventions:  - Provide teaching at level of understanding  - Provide teaching via preferred learning methods  Outcome: Progressing

## 2024-10-20 VITALS
TEMPERATURE: 97.5 F | HEART RATE: 67 BPM | SYSTOLIC BLOOD PRESSURE: 139 MMHG | DIASTOLIC BLOOD PRESSURE: 85 MMHG | OXYGEN SATURATION: 94 % | RESPIRATION RATE: 18 BRPM

## 2024-10-20 PROCEDURE — 99239 HOSP IP/OBS DSCHRG MGMT >30: CPT | Performed by: PHYSICIAN ASSISTANT

## 2024-10-20 PROCEDURE — 99232 SBSQ HOSP IP/OBS MODERATE 35: CPT | Performed by: INTERNAL MEDICINE

## 2024-10-20 RX ORDER — FENOFIBRATE 145 MG/1
145 TABLET, COATED ORAL DAILY
Qty: 30 TABLET | Refills: 0 | Status: SHIPPED | OUTPATIENT
Start: 2024-10-21

## 2024-10-20 RX ORDER — HYDROXYZINE HYDROCHLORIDE 50 MG/1
50 TABLET, FILM COATED ORAL
Qty: 30 TABLET | Refills: 0 | Status: SHIPPED | OUTPATIENT
Start: 2024-10-20

## 2024-10-20 RX ADMIN — PANTOPRAZOLE SODIUM 40 MG: 40 TABLET, DELAYED RELEASE ORAL at 05:45

## 2024-10-20 RX ADMIN — BUSPIRONE HYDROCHLORIDE 10 MG: 5 TABLET ORAL at 09:32

## 2024-10-20 RX ADMIN — BUPROPION HYDROCHLORIDE 300 MG: 150 TABLET, EXTENDED RELEASE ORAL at 09:31

## 2024-10-20 RX ADMIN — FENOFIBRATE 145 MG: 145 TABLET, FILM COATED ORAL at 09:31

## 2024-10-20 RX ADMIN — NICOTINE 1 PATCH: 21 PATCH, EXTENDED RELEASE TRANSDERMAL at 09:32

## 2024-10-20 NOTE — PLAN OF CARE
Problem: PAIN - ADULT  Goal: Verbalizes/displays adequate comfort level or baseline comfort level  Description: Interventions:  - Encourage patient to monitor pain and request assistance  - Assess pain using appropriate pain scale  - Administer analgesics based on type and severity of pain and evaluate response  - Implement non-pharmacological measures as appropriate and evaluate response  - Consider cultural and social influences on pain and pain management  - Notify physician/advanced practitioner if interventions unsuccessful or patient reports new pain  Outcome: Progressing     Problem: INFECTION - ADULT  Goal: Absence or prevention of progression during hospitalization  Description: INTERVENTIONS:  - Assess and monitor for signs and symptoms of infection  - Monitor lab/diagnostic results  - Monitor all insertion sites, i.e. indwelling lines, tubes, and drains  - Monitor endotracheal if appropriate and nasal secretions for changes in amount and color  - [place] appropriate cooling/warming therapies per order  - Administer medications as ordered  - Instruct and encourage patient and family to use good hand hygiene technique  - Identify and instruct in appropriate isolation precautions for identified infection/condition  Outcome: Progressing  Goal: Absence of fever/infection during neutropenic period  Description: INTERVENTIONS:  - Monitor WBC    Outcome: Progressing     Problem: SAFETY ADULT  Goal: Patient will remain free of falls  Description: INTERVENTIONS:  - Educate patient/family on patient safety including physical limitations  - Instruct patient to call for assistance with activity   - Consult OT/PT to assist with strengthening/mobility   - Keep Call bell within reach  - Keep bed low and locked with side rails adjusted as appropriate  - Keep care items and personal belongings within reach  - Initiate and maintain comfort rounds  - Make Fall Risk Sign visible to staff  - Apply yellow socks and bracelet  for high fall risk patients  - Consider moving patient to room near nurses station  Outcome: Progressing  Goal: Maintain or return to baseline ADL function  Description: INTERVENTIONS:  -  Assess patient's ability to carry out ADLs; assess patient's baseline for ADL function and identify physical deficits which impact ability to perform ADLs (bathing, care of mouth/teeth, toileting, grooming, dressing, etc.)  - Assess/evaluate cause of self-care deficits   - Assess range of motion  - Assess patient's mobility; develop plan if impaired  - Assess patient's need for assistive devices and provide as appropriate  - Encourage maximum independence but intervene and supervise when necessary  - Involve family in performance of ADLs  - Assess for home care needs following discharge   - Consider OT consult to assist with ADL evaluation and planning for discharge  - Provide patient education as appropriate  Outcome: Progressing  Goal: Maintains/Returns to pre admission functional level  Description: INTERVENTIONS:  - Perform AM-PAC 6 Click Basic Mobility/ Daily Activity assessment daily.  - Set and communicate daily mobility goal to care team and patient/family/caregiver.   - Collaborate with rehabilitation services on mobility goals if consulted  - Out of bed for toileting  - Record patient progress and toleration of activity level   Outcome: Progressing     Problem: DISCHARGE PLANNING  Goal: Discharge to home or other facility with appropriate resources  Description: INTERVENTIONS:  - Identify barriers to discharge w/patient and caregiver  - Arrange for needed discharge resources and transportation as appropriate  - Identify discharge learning needs (meds, wound care, etc.)  - Arrange for interpretive services to assist at discharge as needed  - Refer to Case Management Department for coordinating discharge planning if the patient needs post-hospital services based on physician/advanced practitioner order or complex needs  related to functional status, cognitive ability, or social support system  Outcome: Progressing     Problem: Knowledge Deficit  Goal: Patient/family/caregiver demonstrates understanding of disease process, treatment plan, medications, and discharge instructions  Description: Complete learning assessment and assess knowledge base.  Interventions:  - Provide teaching at level of understanding  - Provide teaching via preferred learning methods  Outcome: Progressing

## 2024-10-20 NOTE — ASSESSMENT & PLAN NOTE
"Patient presented with complaints of epigastric pain and bloating that started on the evening of 10/15/2024.She reported that her pain was worse with consumption of food and drink.  Overall improving  CT A/P: \"Inflammatory change about the pancreatic head and uncinate process, raising concern for acute pancreatitis. 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  Patient does not consume alcohol.  She does have moderately elevated triglyceride level of 199. She was started on tricor  Appreciate GI input  Lipase levels normal  Advanced to regular diet today and she tolerated lunch well with no pain  Per GI, stable for discharge home today on low fat diet. She will need a repeat CT scan in 6 weeks and outpatient follow up with GI in the office after the CT scan  "

## 2024-10-20 NOTE — PROGRESS NOTES
Progress Note - Gastroenterology   Name: Sara Law 58 y.o. female I MRN: [id number]  Unit/Bed#: W -01 I Date of Admission: 10/16/2024   Date of Service: 10/20/2024 I Hospital Day: 3    Assessment & Plan  Acute pancreatitis  Previous episode of idiopathic pancreatitis 6/2024.  Followed up with MRI/MRCP that was unremarkable.  Presented with recurrence of classic upper abdominal pain and CT findings consistent with pancreatitis, although lipase normal.  Triglycerides, calcium normal.  No new medications.  No alcohol.  No unintentional weight loss.  Postcholecystectomy.  Workup for autoimmune disease pancreatitis negative.  Pancreatitis may be idiopathic.    Unclear etiology, due to 2 recent episodes with atypical presentation, would recommend outpatient EUS in 6 to 8 weeks to rule out any underlying concerning lesion.   CHELY normal IgG4 normal no autoimmune pancreatitis.  EGD after previous episode 6/2024 unremarkable  Abdominal pain improved from arrival however still persistent requiring pain medication  Repeat CT imaging in 6 weeks for further evaluation of pancreatitis.  Low-fat diet  Discontinue MiraLAX.   Strongly recommended smoking cessation  Okay to discharge from GI standpoint          Subjective   Lying in bed in no acute distress.  Tolerating diet.  Denies nausea, vomiting, or abdominal pain.  Does report mild tenderness in epigastric area with palpation.  Patient is moving bowels.  He reports having diarrhea 2 bowel movement daily which was loose in nature and not associated with blood.     Objective :  Temp:  [97.5 °F (36.4 °C)-98.4 °F (36.9 °C)] 97.5 °F (36.4 °C)  HR:  [67-75] 67  BP: (120-139)/(69-85) 139/85  Resp:  [16-20] 18  SpO2:  [93 %-94 %] 94 %    Physical Exam  Vitals and nursing note reviewed.   Constitutional:       General: She is not in acute distress.     Appearance: She is well-developed.   HENT:      Head: Normocephalic and atraumatic.   Eyes:      Conjunctiva/sclera:  Conjunctivae normal.   Cardiovascular:      Rate and Rhythm: Normal rate and regular rhythm.      Pulses: Normal pulses.      Heart sounds: Normal heart sounds. No murmur heard.  Pulmonary:      Effort: Pulmonary effort is normal. No respiratory distress.      Breath sounds: Normal breath sounds. No stridor. No wheezing, rhonchi or rales.   Abdominal:      General: Bowel sounds are normal. There is no distension.      Palpations: Abdomen is soft. There is no mass.      Tenderness: There is abdominal tenderness. There is no guarding or rebound.      Comments: Mild tenderness in epigastric area with palpation   Musculoskeletal:         General: No swelling.      Cervical back: Neck supple.      Right lower leg: No edema.      Left lower leg: No edema.   Skin:     General: Skin is warm and dry.      Capillary Refill: Capillary refill takes less than 2 seconds.      Coloration: Skin is not jaundiced or pale.   Neurological:      Mental Status: She is alert and oriented to person, place, and time.   Psychiatric:         Mood and Affect: Mood normal.         Lab Results: I have reviewed the following results:CBC/BMP: No new results in last 24 hours. , LFTs: No new results in last 24 hours. , PTT/INR:No new results in last 24 hours.     Imaging Results Review: I reviewed radiology reports from this admission including: chest xray and CT abdomen/pelvis.  Other Study Results Review: No additional pertinent studies reviewed.

## 2024-10-20 NOTE — ASSESSMENT & PLAN NOTE
Previous episode of idiopathic pancreatitis 6/2024.  Followed up with MRI/MRCP that was unremarkable.  Presented with recurrence of classic upper abdominal pain and CT findings consistent with pancreatitis, although lipase normal.  Triglycerides, calcium normal.  No new medications.  No alcohol.  No unintentional weight loss.  Postcholecystectomy.  Workup for autoimmune disease pancreatitis negative.  Pancreatitis may be idiopathic.    Unclear etiology, due to 2 recent episodes with atypical presentation, would recommend outpatient EUS in 6 to 8 weeks to rule out any underlying concerning lesion.   CHELY normal IgG4 normal no autoimmune pancreatitis.  EGD after previous episode 6/2024 unremarkable  Abdominal pain improved from arrival however still persistent requiring pain medication  Repeat CT imaging in 6 weeks for further evaluation of pancreatitis.  Low-fat diet  Discontinue MiraLAX.   Strongly recommended smoking cessation  Okay to discharge from GI standpoint

## 2024-10-20 NOTE — DISCHARGE SUMMARY
"Discharge Summary - Hospitalist   Name: Sara Law 58 y.o. female I MRN: [id number]  Unit/Bed#: W -01 I Date of Admission: 10/16/2024   Date of Service: 10/20/2024 I Hospital Day: 3     Assessment & Plan  Acute pancreatitis  Patient presented with complaints of epigastric pain and bloating that started on the evening of 10/15/2024.She reported that her pain was worse with consumption of food and drink.  Overall improving  CT A/P: \"Inflammatory change about the pancreatic head and uncinate process, raising concern for acute pancreatitis. 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  Patient does not consume alcohol.  She does have moderately elevated triglyceride level of 199. She was started on tricor  Appreciate GI input  Lipase levels normal  Advanced to regular diet today and she tolerated lunch well with no pain  Per GI, stable for discharge home today on low fat diet. She will need a repeat CT scan in 6 weeks and outpatient follow up with GI in the office after the CT scan  SIRS (systemic inflammatory response syndrome) (HCC)  SIRS criteria: Leukocytosis >14, tachycardia 102 (suspect this was more likely triggered by pain)  Suspected source: Pancreatitis  resolved  Cigarette nicotine dependence with nicotine-induced disorder  Encouraged smoking cessation.  Nicotine patch ordered  Obesity (BMI 30-39.9)  BMI 38.75. encouraged lifestyle modifications.  Elevated cholesterol with elevated triglycerides  Per discussion with patient would suggest initiation of Tricor to attempt to lower the triglyceride level     Discharging Physician / Practitioner: Laura Casas PA-C  PCP: Cipriano White MD  Admission Date:   Admission Orders (From admission, onward)       Ordered        10/17/24 0016  INPATIENT ADMISSION  Once                          Discharge Date: 10/20/24    Medical Problems  "      Resolved Problems  Date Reviewed: 10/18/2024   None         Consultations During Hospital Stay:  Gastroenterology     Procedures Performed:   none    Significant Findings / Test Results:   CT scan 10/16/24- Inflammatory change about the pancreatic head and uncinate process, raising concern for acute pancreatitis. No evidence of pancreatic necrosis or encapsulated peripancreatic fluid collection       Test Results Pending at Discharge (will require follow up):   none     Outpatient Tests Requested:  Repeat CT scan ordered by GI in 6 weeks, follow up with GI in the office after the CT scan    Complications:  none    Reason for Admission: epigastric pain    Hospital Course:     Sara Law is a 58 y.o. female patient who originally presented to the hospital on 10/16/2024 due to epigastric pain for a couple days, worse with eating. Has a hx of pancreatitis in the past  Lipase has remained normal.   CT scan showed inflammatory change about the pancreatic head concerning for acute pancreatitis.   She was seen by GI. She was started on tricor for hypertriglycerides. Her diet was slowly advanced.   In the morning on 10/20 she denied pain. She tolerated a regular lunch. She was cleared by GI for discharge. She will need repeat CT in 6 weeks followed by outpatient appt with GI in the office          Please see above list of diagnoses and related plan for additional information.     Condition at Discharge: stable     Discharge Day Visit / Exam:     Subjective:  I ate my lunch and have no pain  Vitals: Blood Pressure: 139/85 (10/20/24 0801)  Pulse: 67 (10/20/24 0801)  Temperature: 97.5 °F (36.4 °C) (10/20/24 0801)  Temp Source: Oral (10/16/24 2042)  Respirations: 18 (10/20/24 0801)  SpO2: 94 % (10/20/24 0801)  Exam:   Physical Exam  Vitals reviewed.   Constitutional:       General: She is not in acute distress.     Appearance: She is not ill-appearing or diaphoretic.   HENT:      Head: Normocephalic and atraumatic.       Nose: Nose normal.      Mouth/Throat:      Pharynx: Oropharynx is clear.   Cardiovascular:      Rate and Rhythm: Normal rate.   Pulmonary:      Effort: Pulmonary effort is normal. No respiratory distress.   Abdominal:      General: There is no distension.      Palpations: Abdomen is soft.      Tenderness: There is no abdominal tenderness.   Musculoskeletal:         General: No deformity or signs of injury.   Skin:     General: Skin is warm and dry.   Neurological:      Mental Status: She is alert and oriented to person, place, and time.         Discussion with Family: declined call to     Discharge instructions/Information to patient and family:   See after visit summary for information provided to patient and family.      Provisions for Follow-Up Care:  See after visit summary for information related to follow-up care and any pertinent home health orders.      Disposition:     Home        Planned Readmission: no     Discharge Statement:  I spent 40 minutes discharging the patient. This time was spent on the day of discharge. I had direct contact with the patient on the day of discharge. Greater than 50% of the total time was spent examining patient, answering all patient questions, arranging and discussing plan of care with patient as well as directly providing post-discharge instructions.  Additional time then spent on discharge activities.    Discharge Medications:  See after visit summary for reconciled discharge medications provided to patient and family.      ** Please Note: This note has been constructed using a voice recognition system **

## 2024-10-20 NOTE — PLAN OF CARE
Problem: PAIN - ADULT  Goal: Verbalizes/displays adequate comfort level or baseline comfort level  Description: Interventions:  - Encourage patient to monitor pain and request assistance  - Assess pain using appropriate pain scale  - Administer analgesics based on type and severity of pain and evaluate response  - Implement non-pharmacological measures as appropriate and evaluate response  - Consider cultural and social influences on pain and pain management  - Notify physician/advanced practitioner if interventions unsuccessful or patient reports new pain  Outcome: Progressing     Problem: INFECTION - ADULT  Goal: Absence or prevention of progression during hospitalization  Description: INTERVENTIONS:  - Assess and monitor for signs and symptoms of infection  - Monitor lab/diagnostic results  - Monitor all insertion sites, i.e. indwelling lines, tubes, and drains  - Monitor endotracheal if appropriate and nasal secretions for changes in amount and color  - [place] appropriate cooling/warming therapies per order  - Administer medications as ordered  - Instruct and encourage patient and family to use good hand hygiene technique  - Identify and instruct in appropriate isolation precautions for identified infection/condition  Outcome: Progressing  Goal: Absence of fever/infection during neutropenic period  Description: INTERVENTIONS:  - Monitor WBC    Outcome: Progressing     Problem: SAFETY ADULT  Goal: Patient will remain free of falls  Description: INTERVENTIONS:  - Educate patient/family on patient safety including physical limitations  - Instruct patient to call for assistance with activity   - Consult OT/PT to assist with strengthening/mobility   - Keep Call bell within reach  - Keep bed low and locked with side rails adjusted as appropriate  - Keep care items and personal belongings within reach  - Initiate and maintain comfort rounds  - Make Fall Risk Sign visible to staff  - Offer Toileting every  Hours,  Endocrinology consulted for BG management.   BG goal 140-180      - Levemir Flex Pen 6 units BID  - Novolog (aspart) insulin 4 Units SQ TIDWM and prn for BG excursions LDC (150/50) SSI.  - BG checks AC/HS   - Change diet to sugar-free clears.       ** Please notify Endocrine for any change and/or advance in diet**  ** Please call Endocrine for any BG related issues **    Discharge Planning:   TBD. Please notify endocrinology prior to discharge.       in advance of need  - Initiate/Maintain alarm  - Obtain necessary fall risk management equipment:   - Apply yellow socks and bracelet for high fall risk patients  - Consider moving patient to room near nurses station  Outcome: Progressing  Goal: Maintain or return to baseline ADL function  Description: INTERVENTIONS:  -  Assess patient's ability to carry out ADLs; assess patient's baseline for ADL function and identify physical deficits which impact ability to perform ADLs (bathing, care of mouth/teeth, toileting, grooming, dressing, etc.)  - Assess/evaluate cause of self-care deficits   - Assess range of motion  - Assess patient's mobility; develop plan if impaired  - Assess patient's need for assistive devices and provide as appropriate  - Encourage maximum independence but intervene and supervise when necessary  - Involve family in performance of ADLs  - Assess for home care needs following discharge   - Consider OT consult to assist with ADL evaluation and planning for discharge  - Provide patient education as appropriate  Outcome: Progressing  Goal: Maintains/Returns to pre admission functional level  Description: INTERVENTIONS:  - Perform AM-PAC 6 Click Basic Mobility/ Daily Activity assessment daily.  - Set and communicate daily mobility goal to care team and patient/family/caregiver.   - Collaborate with rehabilitation services on mobility goals if consulted  - Perform Range of Motion  times a day.  - Reposition patient every  hours.  - Dangle patient  times a day  - Stand patient  times a day  - Ambulate patient  times a day  - Out of bed to chair  times a day   - Out of bed for meals times a day  - Out of bed for toileting  - Record patient progress and toleration of activity level   Outcome: Progressing     Problem: DISCHARGE PLANNING  Goal: Discharge to home or other facility with appropriate resources  Description: INTERVENTIONS:  - Identify barriers to discharge w/patient and caregiver  - Arrange for  needed discharge resources and transportation as appropriate  - Identify discharge learning needs (meds, wound care, etc.)  - Arrange for interpretive services to assist at discharge as needed  - Refer to Case Management Department for coordinating discharge planning if the patient needs post-hospital services based on physician/advanced practitioner order or complex needs related to functional status, cognitive ability, or social support system  Outcome: Progressing     Problem: Knowledge Deficit  Goal: Patient/family/caregiver demonstrates understanding of disease process, treatment plan, medications, and discharge instructions  Description: Complete learning assessment and assess knowledge base.  Interventions:  - Provide teaching at level of understanding  - Provide teaching via preferred learning methods  Outcome: Progressing

## 2024-10-20 NOTE — ASSESSMENT & PLAN NOTE
SIRS criteria: Leukocytosis >14, tachycardia 102 (suspect this was more likely triggered by pain)  Suspected source: Pancreatitis  resolved

## 2024-10-20 NOTE — PLAN OF CARE
Problem: PAIN - ADULT  Goal: Verbalizes/displays adequate comfort level or baseline comfort level  Description: Interventions:  - Encourage patient to monitor pain and request assistance  - Assess pain using appropriate pain scale  - Administer analgesics based on type and severity of pain and evaluate response  - Implement non-pharmacological measures as appropriate and evaluate response  - Consider cultural and social influences on pain and pain management  - Notify physician/advanced practitioner if interventions unsuccessful or patient reports new pain  10/20/2024 1345 by Dodie Church RN  Outcome: Adequate for Discharge  10/20/2024 1345 by Dodie Church RN  Outcome: Progressing     Problem: SAFETY ADULT  Goal: Patient will remain free of falls  Description: INTERVENTIONS:  - Educate patient/family on patient safety including physical limitations  - Instruct patient to call for assistance with activity   - Consult OT/PT to assist with strengthening/mobility   - Keep Call bell within reach  - Keep bed low and locked with side rails adjusted as appropriate  - Keep care items and personal belongings within reach  - Initiate and maintain comfort rounds  - Make Fall Risk Sign visible to staff  - Apply yellow socks and bracelet for high fall risk patients  - Consider moving patient to room near nurses station  10/20/2024 1345 by Dodie Church RN  Outcome: Adequate for Discharge  10/20/2024 1345 by Dodie Church RN  Outcome: Progressing  Goal: Maintain or return to baseline ADL function  Description: INTERVENTIONS:  -  Assess patient's ability to carry out ADLs; assess patient's baseline for ADL function and identify physical deficits which impact ability to perform ADLs (bathing, care of mouth/teeth, toileting, grooming, dressing, etc.)  - Assess/evaluate cause of self-care deficits   - Assess range of motion  - Assess patient's mobility; develop plan if impaired  - Assess patient's need for assistive  devices and provide as appropriate  - Encourage maximum independence but intervene and supervise when necessary  - Involve family in performance of ADLs  - Assess for home care needs following discharge   - Consider OT consult to assist with ADL evaluation and planning for discharge  - Provide patient education as appropriate  10/20/2024 1345 by Dodie Church RN  Outcome: Adequate for Discharge  10/20/2024 1345 by Dodie Church RN  Outcome: Progressing  Goal: Maintains/Returns to pre admission functional level  Description: INTERVENTIONS:  - Perform AM-PAC 6 Click Basic Mobility/ Daily Activity assessment daily.  - Set and communicate daily mobility goal to care team and patient/family/caregiver.   - Collaborate with rehabilitation services on mobility goals if consulted  - Out of bed for toileting  - Record patient progress and toleration of activity level   10/20/2024 1345 by Dodie Church RN  Outcome: Adequate for Discharge  10/20/2024 1345 by Dodie Church RN  Outcome: Progressing     Problem: DISCHARGE PLANNING  Goal: Discharge to home or other facility with appropriate resources  Description: INTERVENTIONS:  - Identify barriers to discharge w/patient and caregiver  - Arrange for needed discharge resources and transportation as appropriate  - Identify discharge learning needs (meds, wound care, etc.)  - Arrange for interpretive services to assist at discharge as needed  - Refer to Case Management Department for coordinating discharge planning if the patient needs post-hospital services based on physician/advanced practitioner order or complex needs related to functional status, cognitive ability, or social support system  10/20/2024 1345 by Dodie Church RN  Outcome: Adequate for Discharge  10/20/2024 1345 by Dodie Church RN  Outcome: Progressing     Problem: Knowledge Deficit  Goal: Patient/family/caregiver demonstrates understanding of disease process, treatment plan, medications, and  discharge instructions  Description: Complete learning assessment and assess knowledge base.  Interventions:  - Provide teaching at level of understanding  - Provide teaching via preferred learning methods  10/20/2024 1345 by Dodie Church RN  Outcome: Adequate for Discharge  10/20/2024 1345 by Dodie Church RN  Outcome: Progressing

## 2024-10-21 ENCOUNTER — TRANSITIONAL CARE MANAGEMENT (OUTPATIENT)
Dept: FAMILY MEDICINE CLINIC | Facility: CLINIC | Age: 59
End: 2024-10-21

## 2024-10-25 ENCOUNTER — TELEPHONE (OUTPATIENT)
Dept: GASTROENTEROLOGY | Facility: CLINIC | Age: 59
End: 2024-10-25

## 2024-10-25 NOTE — TELEPHONE ENCOUNTER
Procedure:  EUS   Scheduled date of procedure (as of today): 12/2/24  Physician performing procedure: Dr. Rueda   Location of procedure: [facility]   Instructions reviewed with patient by:  Nicole cesar   Clearances:  n/a

## 2024-10-25 NOTE — TELEPHONE ENCOUNTER
----- Message from Nicole HUGHES sent at 10/22/2024  9:28 AM EDT -----    ----- Message -----  From: Stephanie Adams PA-C  Sent: 10/22/2024   8:49 AM EDT  To: [facility]    Ready to schedule thank you! Non-urgent.  ----- Message -----  From: Jalyn Santo PA-C  Sent: 10/18/2024  10:51 AM EDT  To: [facility]    Please call patient after discharge for nonurgent outpatient EUS in at least 6 to 8 weeks due to recurrent pancreatitis of unclear etiology. No blood thinners. Thank you!

## 2024-10-28 ENCOUNTER — TELEPHONE (OUTPATIENT)
Age: 59
End: 2024-10-28

## 2024-10-28 NOTE — TELEPHONE ENCOUNTER
Pt called to set up her TCM appt. Please call her back at [phone].    I attempted a warm transfer to clinical; however, clinical was not available at time of call.

## 2024-10-31 ENCOUNTER — OFFICE VISIT (OUTPATIENT)
Dept: FAMILY MEDICINE CLINIC | Facility: CLINIC | Age: 59
End: 2024-10-31
Payer: COMMERCIAL

## 2024-10-31 ENCOUNTER — TELEPHONE (OUTPATIENT)
Age: 59
End: 2024-10-31

## 2024-10-31 VITALS
RESPIRATION RATE: 18 BRPM | WEIGHT: 242 LBS | DIASTOLIC BLOOD PRESSURE: 84 MMHG | HEART RATE: 77 BPM | HEIGHT: 66 IN | OXYGEN SATURATION: 96 % | BODY MASS INDEX: 38.89 KG/M2 | TEMPERATURE: 96.5 F | SYSTOLIC BLOOD PRESSURE: 136 MMHG

## 2024-10-31 DIAGNOSIS — F41.9 ANXIETY: ICD-10-CM

## 2024-10-31 DIAGNOSIS — Z09 HOSPITAL DISCHARGE FOLLOW-UP: Primary | ICD-10-CM

## 2024-10-31 DIAGNOSIS — F33.2 SEVERE EPISODE OF RECURRENT MAJOR DEPRESSIVE DISORDER, WITHOUT PSYCHOTIC FEATURES (HCC): ICD-10-CM

## 2024-10-31 DIAGNOSIS — F51.01 PRIMARY INSOMNIA: ICD-10-CM

## 2024-10-31 DIAGNOSIS — K85.90 ACUTE PANCREATITIS WITHOUT INFECTION OR NECROSIS, UNSPECIFIED PANCREATITIS TYPE: ICD-10-CM

## 2024-10-31 PROCEDURE — 99495 TRANSJ CARE MGMT MOD F2F 14D: CPT | Performed by: FAMILY MEDICINE

## 2024-10-31 RX ORDER — TRAZODONE HYDROCHLORIDE 300 MG/1
300 TABLET ORAL
Qty: 90 TABLET | Refills: 1 | Status: SHIPPED | OUTPATIENT
Start: 2024-10-31 | End: 2024-10-31 | Stop reason: SDUPTHER

## 2024-10-31 RX ORDER — TRAZODONE HYDROCHLORIDE 300 MG/1
300 TABLET ORAL
Qty: 30 TABLET | Refills: 5 | Status: SHIPPED | OUTPATIENT
Start: 2024-10-31

## 2024-10-31 RX ORDER — DULOXETIN HYDROCHLORIDE 20 MG/1
20 CAPSULE, DELAYED RELEASE ORAL DAILY
Qty: 30 CAPSULE | Refills: 5 | Status: SHIPPED | OUTPATIENT
Start: 2024-10-31

## 2024-10-31 RX ORDER — HYDROXYZINE HYDROCHLORIDE 50 MG/1
50 TABLET, FILM COATED ORAL
Qty: 90 TABLET | Refills: 1 | Status: SHIPPED | OUTPATIENT
Start: 2024-10-31

## 2024-10-31 RX ORDER — BUPROPION HYDROCHLORIDE 300 MG/1
300 TABLET ORAL DAILY
Qty: 90 TABLET | Refills: 3 | Status: SHIPPED | OUTPATIENT
Start: 2024-10-31

## 2024-10-31 NOTE — ASSESSMENT & PLAN NOTE
Orders:    buPROPion (WELLBUTRIN XL) 300 mg 24 hr tablet; Take 1 tablet (300 mg total) by mouth daily    hydrOXYzine HCL (ATARAX) 50 mg tablet; Take 1 tablet (50 mg total) by mouth daily at bedtime    Ambulatory referral to Psych Services; Future

## 2024-10-31 NOTE — TELEPHONE ENCOUNTER
Contacted the patient regarding a routine referral to verify service needs, spoke with pt interested in both TT and MM, pt would like to be placed on waitlist, writer also provided additional resource guide to email and mailing address on file per pt's request. Also informed pt she can contact her insurance carrier to obtain additional listings of in-network providers.     Closed referral

## 2024-10-31 NOTE — PROGRESS NOTES
Transition of Care Visit  Name: Sara Law      : 1965      MRN: [id number]  Encounter Provider: Cipriano White MD  Encounter Date: 10/31/2024   Encounter department: [facility]    Assessment & Plan  Hospital discharge follow-up  Reviewed hospital record.        Acute pancreatitis without infection or necrosis, unspecified pancreatitis type  [facility].        Primary insomnia    Orders:    traZODone (DESYREL) 300 MG tablet; Take 1 tablet (300 mg total) by mouth daily at bedtime    Ambulatory referral to Psych Services; Future    Anxiety    Orders:    buPROPion (WELLBUTRIN XL) 300 mg 24 hr tablet; Take 1 tablet (300 mg total) by mouth daily    hydrOXYzine HCL (ATARAX) 50 mg tablet; Take 1 tablet (50 mg total) by mouth daily at bedtime    Ambulatory referral to Psych Services; Future    Severe episode of recurrent major depressive disorder, without psychotic features (HCC)  Depression Screening Follow-up Plan: Patient's depression screening was positive with a PHQ-9 score of 22. Patient assessed for underlying major depression. They have no active suicidal ideations. Brief counseling provided and recommend additional follow-up/re-evaluation next office visit.    Orders:    Ambulatory referral to Psych Services; Future    DULoxetine (CYMBALTA) 20 mg capsule; Take 1 capsule (20 mg total) by mouth daily         History of Present Illness     Transitional Care Management Review:   Sara Law is a 58 y.o. female here for TCM follow up.     During the TCM phone call patient stated:  TCM Call       Date and time call was made  10/21/2024 10:33 AM    Hospital care reviewed  Records reviewed    Patient was hospitialized at  [facility]    Date of Admission  10/16/24    Date of discharge  10/20/24    Diagnosis  Acute pancreatitis    Disposition  Home    Were the patients medications reviewed and updated  Yes    Current Symptoms  None          TCM Call       Post hospital issues   None    Should patient be enrolled in anticoag monitoring?  No    Scheduled for follow up?  Yes    Patient refusal reason  F/U with Ortho    Patients specialists  Other (comment)    Other specialists names  Ortho    Did you obtain your prescribed medications  Yes    Do you need help managing your prescriptions or medications  No    Is transportation to your appointment needed  No    I have advised the patient to call PCP with any new or worsening symptoms  Che Soliman     Living Arrangements  Family members    Support System  None    The type of support provided  Emotional; Physical    Do you have social support  Yes, quite a bit    Are you recieving any outpatient services  No    Are you recieving home care services  No    Are you using any community resources  No    Current waiver services  No    Have you fallen in the last 12 months  No    Interperter language line needed  No    Counseling  Patient    Counseling topics  instructions for management          HPI    Pt is here by herself.   Was in hospital 10/16-10/20/2024 for acute pancreatitis.   CT abdomen pelvis showed acute pancreatitis.   GI consulted.   This is recurrent pancreatitis unclear etiology.   Started on tricor.   Advanced diet.   Discharged home.   Will see GI in 12/2024.      Depression/anxiety/insomnia---Stress in life. Divorce, lives with son now.   [facility] psychiatrist Dr. Appiah from [facility] before, not any more. Need referral.   She is out of wellbutrin, trazodone, latuda, cymbalta, abilify for 2 months.   Cannot sleep per pt.      GERD---She is on pantoprazole 40mg QD. Miss dose make symptoms coming back per pt.     Thyroid nodule---Had left thyroid lobectomy in 7/2023. Final path NIFTP.   No additional surveillance. MICHELLE surg/onc prn.      Smoke 1ppd for 40 years. Refused to quit now. 4/2023 CT lung screen negative for nodules.   No alcohol.  No drugs.         Review of Systems   Constitutional:  Negative for appetite  "change, chills and fever.   HENT:  Negative for congestion, ear pain, sinus pain and sore throat.    Eyes:  Negative for discharge and itching.   Respiratory:  Negative for apnea, cough, chest tightness, shortness of breath and wheezing.    Cardiovascular:  Negative for chest pain, palpitations and leg swelling.   Gastrointestinal:  Negative for abdominal pain, anal bleeding, constipation, diarrhea, nausea and vomiting.   Endocrine: Negative for cold intolerance, heat intolerance and polyuria.   Genitourinary:  Negative for difficulty urinating and dysuria.   Musculoskeletal:  Negative for arthralgias, back pain and myalgias.   Skin:  Negative for rash.   Neurological:  Negative for dizziness and headaches.   Psychiatric/Behavioral:  Positive for sleep disturbance. Negative for agitation, self-injury and suicidal ideas. The patient is nervous/anxious.      Objective     /84   Pulse 77   Temp (!) 96.5 °F (35.8 °C) (Tympanic)   Resp 18   Ht 5' 6\" (1.676 m)   Wt 110 kg (242 lb)   SpO2 96%   BMI 39.06 kg/m²     Physical Exam  Constitutional:       General: She is not in acute distress.     Appearance: She is well-developed.   HENT:      Head: Normocephalic.   Eyes:      General:         Right eye: No discharge.         Left eye: No discharge.      Conjunctiva/sclera: Conjunctivae normal.   Neck:      Thyroid: No thyromegaly.   Cardiovascular:      Rate and Rhythm: Normal rate and regular rhythm.      Heart sounds: Normal heart sounds. No murmur heard.     No friction rub. No gallop.   Pulmonary:      Effort: Pulmonary effort is normal. No respiratory distress.      Breath sounds: Normal breath sounds. No wheezing or rales.   Chest:      Chest wall: No tenderness.   Abdominal:      General: Bowel sounds are normal. There is no distension.      Palpations: Abdomen is soft. There is no mass.      Tenderness: There is no abdominal tenderness. There is no guarding or rebound.   Musculoskeletal:         General: " No tenderness or deformity. Normal range of motion.      Cervical back: Normal range of motion.   Lymphadenopathy:      Cervical: No cervical adenopathy.   Neurological:      Mental Status: She is alert.       Medications have been reviewed by provider in current encounter

## 2024-11-20 DIAGNOSIS — K85.90 ACUTE PANCREATITIS: ICD-10-CM

## 2024-11-20 DIAGNOSIS — F41.9 ANXIETY: ICD-10-CM

## 2024-11-20 RX ORDER — FENOFIBRATE 145 MG/1
145 TABLET, COATED ORAL DAILY
Qty: 90 TABLET | Refills: 1 | Status: SHIPPED | OUTPATIENT
Start: 2024-11-20

## 2024-11-20 RX ORDER — HYDROXYZINE HYDROCHLORIDE 50 MG/1
50 TABLET, FILM COATED ORAL
Qty: 90 TABLET | Refills: 1 | Status: SHIPPED | OUTPATIENT
Start: 2024-11-20

## 2024-12-01 RX ORDER — SODIUM CHLORIDE, SODIUM LACTATE, POTASSIUM CHLORIDE, CALCIUM CHLORIDE 600; 310; 30; 20 MG/100ML; MG/100ML; MG/100ML; MG/100ML
125 INJECTION, SOLUTION INTRAVENOUS CONTINUOUS
Status: CANCELLED | OUTPATIENT
Start: 2024-12-01

## 2024-12-02 ENCOUNTER — ANESTHESIA EVENT (OUTPATIENT)
Dept: GASTROENTEROLOGY | Facility: HOSPITAL | Age: 59
End: 2024-12-02
Payer: COMMERCIAL

## 2024-12-02 ENCOUNTER — ANESTHESIA (OUTPATIENT)
Dept: GASTROENTEROLOGY | Facility: HOSPITAL | Age: 59
End: 2024-12-02
Payer: COMMERCIAL

## 2024-12-02 ENCOUNTER — HOSPITAL ENCOUNTER (OUTPATIENT)
Dept: GASTROENTEROLOGY | Facility: HOSPITAL | Age: 59
Setting detail: OUTPATIENT SURGERY
Discharge: HOME | End: 2024-12-02
Attending: INTERNAL MEDICINE
Payer: COMMERCIAL

## 2024-12-02 VITALS
DIASTOLIC BLOOD PRESSURE: 72 MMHG | OXYGEN SATURATION: 98 % | SYSTOLIC BLOOD PRESSURE: 117 MMHG | WEIGHT: 242 LBS | HEART RATE: 63 BPM | TEMPERATURE: 96.5 F | BODY MASS INDEX: 38.89 KG/M2 | RESPIRATION RATE: 20 BRPM | HEIGHT: 66 IN

## 2024-12-02 DIAGNOSIS — K21.9 GASTROESOPHAGEAL REFLUX DISEASE: ICD-10-CM

## 2024-12-02 DIAGNOSIS — K85.90 ACUTE RECURRENT PANCREATITIS: ICD-10-CM

## 2024-12-02 PROCEDURE — 88305 TISSUE EXAM BY PATHOLOGIST: CPT | Performed by: PATHOLOGY

## 2024-12-02 PROCEDURE — 43237 ENDOSCOPIC US EXAM ESOPH: CPT | Performed by: INTERNAL MEDICINE

## 2024-12-02 PROCEDURE — 43239 EGD BIOPSY SINGLE/MULTIPLE: CPT | Performed by: INTERNAL MEDICINE

## 2024-12-02 RX ORDER — GLYCOPYRROLATE 0.2 MG/ML
INJECTION INTRAMUSCULAR; INTRAVENOUS AS NEEDED
Status: DISCONTINUED | OUTPATIENT
Start: 2024-12-02 | End: 2024-12-02

## 2024-12-02 RX ORDER — PROPOFOL 10 MG/ML
INJECTION, EMULSION INTRAVENOUS CONTINUOUS PRN
Status: DISCONTINUED | OUTPATIENT
Start: 2024-12-02 | End: 2024-12-02

## 2024-12-02 RX ORDER — PROPOFOL 10 MG/ML
INJECTION, EMULSION INTRAVENOUS AS NEEDED
Status: DISCONTINUED | OUTPATIENT
Start: 2024-12-02 | End: 2024-12-02

## 2024-12-02 RX ORDER — SODIUM CHLORIDE 9 MG/ML
INJECTION, SOLUTION INTRAVENOUS CONTINUOUS PRN
Status: DISCONTINUED | OUTPATIENT
Start: 2024-12-02 | End: 2024-12-02

## 2024-12-02 RX ORDER — LIDOCAINE HYDROCHLORIDE 10 MG/ML
INJECTION, SOLUTION EPIDURAL; INFILTRATION; INTRACAUDAL; PERINEURAL AS NEEDED
Status: DISCONTINUED | OUTPATIENT
Start: 2024-12-02 | End: 2024-12-02

## 2024-12-02 RX ORDER — PANTOPRAZOLE SODIUM 40 MG/1
40 TABLET, DELAYED RELEASE ORAL 2 TIMES DAILY
Qty: 60 TABLET | Refills: 5 | Status: SHIPPED | OUTPATIENT
Start: 2024-12-02 | End: 2025-05-31

## 2024-12-02 RX ORDER — FENTANYL CITRATE 50 UG/ML
INJECTION, SOLUTION INTRAMUSCULAR; INTRAVENOUS AS NEEDED
Status: DISCONTINUED | OUTPATIENT
Start: 2024-12-02 | End: 2024-12-02

## 2024-12-02 RX ADMIN — PROPOFOL 100 MCG/KG/MIN: 10 INJECTION, EMULSION INTRAVENOUS at 10:35

## 2024-12-02 RX ADMIN — LIDOCAINE HYDROCHLORIDE 100 MG: 10 INJECTION, SOLUTION EPIDURAL; INFILTRATION; INTRACAUDAL; PERINEURAL at 10:32

## 2024-12-02 RX ADMIN — FENTANYL CITRATE 50 MCG: 50 INJECTION INTRAMUSCULAR; INTRAVENOUS at 10:42

## 2024-12-02 RX ADMIN — PROPOFOL 100 MG: 10 INJECTION, EMULSION INTRAVENOUS at 10:35

## 2024-12-02 RX ADMIN — GLYCOPYRROLATE 0.2 MCG: 0.2 INJECTION, SOLUTION INTRAMUSCULAR; INTRAVENOUS at 10:32

## 2024-12-02 RX ADMIN — FENTANYL CITRATE 50 MCG: 50 INJECTION INTRAMUSCULAR; INTRAVENOUS at 10:32

## 2024-12-02 RX ADMIN — SODIUM CHLORIDE: 9 INJECTION, SOLUTION INTRAVENOUS at 10:31

## 2024-12-02 NOTE — ANESTHESIA PREPROCEDURE EVALUATION
Procedure:  ENDOSCOPIC ULTRASOUND (UPPER)    Relevant Problems   CARDIO   (+) Elevated cholesterol with elevated triglycerides      GI/HEPATIC   (+) Acute pancreatitis   (+) Gastroesophageal reflux disease      MUSCULOSKELETAL   (+) Lower back pain   (+) Lumbar spondylosis      NEURO/PSYCH   (+) Anxiety   (+) Anxiety and depression   (+) Chronic pain syndrome      Other   (+) Obesity (BMI 30-39.9)        Physical Exam    Airway    Mallampati score: II  TM Distance: >3 FB  Neck ROM: full     Dental   Comment: Edentulous     Cardiovascular      Pulmonary      Other Findings  post-pubertal.      Anesthesia Plan  ASA Score- 3     Anesthesia Type- IV sedation with anesthesia with ASA Monitors.         Additional Monitors:     Airway Plan:            Plan Factors-    Chart reviewed. EKG reviewed.  Existing labs reviewed. Patient summary reviewed.    Patient is a current smoker. Patient not instructed to abstain from smoking on day of procedure. Patient smoked on day of surgery.    Obstructive sleep apnea risk education given perioperatively.        Induction- intravenous.    Postoperative Plan-     Perioperative Resuscitation Plan - Level 1 - Full Code.       Informed Consent- Anesthetic plan and risks discussed with patient.  I personally reviewed this patient with the CRNA. Discussed and agreed on the Anesthesia Plan with the CRNA..

## 2024-12-02 NOTE — ANESTHESIA POSTPROCEDURE EVALUATION
Post-Op Assessment Note    CV Status:  Stable  Pain Score: 0    Pain management: adequate       Mental Status:  Sleepy and arousable   PONV Controlled:  None   Airway Patency:  Patent     Post Op Vitals Reviewed: Yes    No anethesia notable event occurred.    Staff: CRNA           Last Filed PACU Vitals:  Vitals Value Taken Time   Temp 96.5 °F (35.8 °C) 12/02/24 1055   Pulse 68 12/02/24 1055   BP 99/57 12/02/24 1055   Resp 20 12/02/24 1055   SpO2 100 % 12/02/24 1055       Modified Dasha:  Activity: 2 (12/2/2024 10:57 AM)  Respiration: 2 (12/2/2024 10:57 AM)  Circulation: 2 (12/2/2024 10:57 AM)  Consciousness: 1 (12/2/2024 10:57 AM)  Oxygen Saturation: 2 (12/2/2024 10:57 AM)  Modified Dasha Score: 9 (12/2/2024 10:57 AM)

## 2024-12-02 NOTE — H&P
History and Physical - [facility]  Sara Law 58 y.o. female MRN: [id number]                  HPI: Sara Law is a 58 y.o. year old female who presents for recurrent pancreatitis, EUS      REVIEW OF SYSTEMS: Per the HPI, and otherwise unremarkable.    Historical Information   Past Medical History:   Diagnosis Date    Anxiety     Back pain     Depression     GERD (gastroesophageal reflux disease)     SOB (shortness of breath)     Vertigo      Past Surgical History:   Procedure Laterality Date    CERVICAL DISC SURGERY      CHOLECYSTECTOMY      CHOLECYSTECTOMY      FOOT SURGERY      LAPAROTOMY N/A 02/02/2021    Procedure: LAPAROTOMY FOR SPINAL SURGERY;  Surgeon: Edison Glynn MD;  Location: [facility];  Service: General    LUMBAR FUSION N/A 02/02/2021    Procedure: Anterior lumbar interbody fusion L5-S1; Posterior lumbar laminectomy, decompression, instrumented fusion L5-S1 with allograft and neuromonitoring;  Surgeon: Aries Rivera MD;  Location: [facility];  Service: Orthopedics    VA EGD TRANSORAL BIOPSY SINGLE/MULTIPLE N/A 04/20/2016    Procedure: ESOPHAGOGASTRODUODENOSCOPY (EGD);  Surgeon: Darius Barr MD;  Location: [facility];  Service: Gastroenterology    THYROID LOBECTOMY N/A 07/03/2023    Procedure: LEFT THYROID LOBECTOMY;  Surgeon: Aishwarya Field MD;  Location: [facility];  Service: Surgical Oncology    TONSILLECTOMY      TUBAL LIGATION      UPPER GASTROINTESTINAL ENDOSCOPY      US GUIDED THYROID BIOPSY  05/30/2023     Social History   Social History     Substance and Sexual Activity   Alcohol Use Not Currently    Comment: 0     Social History     Substance and Sexual Activity   Drug Use Not Currently    Types: Oxycodone, Methamphetamines    Comment: 7/18/2020 last use     Social History     Tobacco Use   Smoking Status Every Day    Current packs/day: 1.00    Types: Cigarettes    Passive exposure: Current   Smokeless Tobacco Never   Tobacco Comments    started  patch 1/2021, still using 3/4 ppd.      Family History   Problem Relation Age of Onset    Aneurysm Mother     Hypertension Mother     Heart attack Father     Aneurysm Sister     Aneurysm Brother     Mental illness Son        Meds/Allergies       Current Outpatient Medications:     buPROPion (WELLBUTRIN XL) 300 mg 24 hr tablet    busPIRone (BUSPAR) 10 mg tablet    DULoxetine (CYMBALTA) 20 mg capsule    fenofibrate (TRICOR) 145 mg tablet    hydrOXYzine HCL (ATARAX) 50 mg tablet    pantoprazole (PROTONIX) 40 mg tablet    traZODone (DESYREL) 300 MG tablet    No Known Allergies    Objective     There were no vitals taken for this visit.      PHYSICAL EXAM    Gen: NAD  Head: NCAT  CV: RRR  CHEST: Clear  ABD: soft, NT/ND  EXT: no edema      ASSESSMENT/PLAN:  This is a 58 y.o. year old female here for EUS, and she is stable and optimized for her procedure.

## 2024-12-04 ENCOUNTER — RESULTS FOLLOW-UP (OUTPATIENT)
Dept: GASTROENTEROLOGY | Facility: CLINIC | Age: 59
End: 2024-12-04

## 2024-12-04 PROCEDURE — 88305 TISSUE EXAM BY PATHOLOGIST: CPT | Performed by: PATHOLOGY

## 2024-12-06 ENCOUNTER — OFFICE VISIT (OUTPATIENT)
Dept: GASTROENTEROLOGY | Facility: CLINIC | Age: 59
End: 2024-12-06
Payer: COMMERCIAL

## 2024-12-06 VITALS
DIASTOLIC BLOOD PRESSURE: 74 MMHG | SYSTOLIC BLOOD PRESSURE: 116 MMHG | TEMPERATURE: 97.6 F | WEIGHT: 243.2 LBS | BODY MASS INDEX: 39.25 KG/M2

## 2024-12-06 DIAGNOSIS — K85.00 IDIOPATHIC ACUTE PANCREATITIS WITHOUT INFECTION OR NECROSIS: Primary | ICD-10-CM

## 2024-12-06 DIAGNOSIS — Z86.0100 HISTORY OF COLON POLYPS: ICD-10-CM

## 2024-12-06 DIAGNOSIS — K21.00 GASTROESOPHAGEAL REFLUX DISEASE WITH ESOPHAGITIS WITHOUT HEMORRHAGE: ICD-10-CM

## 2024-12-06 PROCEDURE — 99204 OFFICE O/P NEW MOD 45 MIN: CPT | Performed by: INTERNAL MEDICINE

## 2024-12-06 RX ORDER — SODIUM CHLORIDE, SODIUM LACTATE, POTASSIUM CHLORIDE, CALCIUM CHLORIDE 600; 310; 30; 20 MG/100ML; MG/100ML; MG/100ML; MG/100ML
125 INJECTION, SOLUTION INTRAVENOUS CONTINUOUS
OUTPATIENT
Start: 2024-12-06

## 2024-12-06 NOTE — PROGRESS NOTES
Name: Sara Law      : 1965      MRN: [id number]  Encounter Provider: Brain Bhat MD  Encounter Date: 2024   Encounter department: [facility] [place]  :  Assessment & Plan  Idiopathic acute pancreatitis without infection or necrosis  Two bouts, July and October. No etiology identified. Recent EUS without evidence of chronic pancreatitis or anatomical etiology. AI workup negative. TG only 200s, started on fenofibrate after first episode. No other culprit medications or herbal supplements. No etoh use. No FH to warrant genetic testing at this time, however, if recurrence, can consider as alternate etiologies have been unrevealing. Leading cause at this juncture is tobacco use. She was advised to stop smoking and offered resources. She has patches at home and will consider it        History of colon polyps  Due for recall colonoscopy 10/2025 for h/o colon polyps. Patient will call to schedule in 1-2 months once schedule is out   Orders:    Colonoscopy; Future    Gastroesophageal reflux disease with esophagitis without hemorrhage  Grade A esophagitis on recent EGD. Recommended increasing PPI to BID at that time due to breakthrough reflux symptoms for 8 weeks then decreasing back to daily dosing        RTC as needed for f/u.     History of Present Illness   HPI  Sara Law is a 58 y.o. female with h/o depression, tobacoc use, CCY, pancreatitis who presents for follow up. Last GI visit telemed 2020. At that time was having daily reflux symptoms, started on PPI and scheduled for EGD. Also scheduled for screening colonoscopy. Colonoscopy showed three small polyps, diverticulosis, and hemorrhoids; recommended recall in 5 years. EGD with LA A esophagitis, mild gastritis, normal duodenum; biopsies negative. EGD 2024 for epigastric pain and n/v was normal. She also underwent EGD/EUS 2024 for acute recurrent pancreatitis and had grade A esophagitis, a  4mm duodenal polyp which was biopsied, path negative. EUS showed normal celiac takeoff, no pancreatic ductal dilation, moderate fatty infiltration, no honeycombing or signs of chronic pancreatitis. No clear pancreatic divisum. No bile duct abnormalities or filling defects. Normal ampulla. No masses or LAD. Further workup of pancreatitis showed normal IgG4, negative CHELY, .     She presents today for follow up and reports no abdominal pain, n/v. Continues to smoke 1ppd. No etoh or drug use. No herbal supplements. Rare NSAIDs- motrin or tylenol for headache or back pain, approx twice a month. Increased Ppi after EUS - tolerating. No reflux symptoms unless misses medication dose. No constipation, blood in stool. Occasional diarrhea. 2 bouts of pancreatitis- July and October.     FH- no pancreatitis, mother with brain aneurysm and possible cancer but did not share with her family, half brother liver transplant for cirrhosis from alcohol      History obtained from: patient    Review of Systems  ROS negative unless noted above.     Medical History Reviewed by provider this encounter:     . Previous admissions, EGD reports, pathology, prior encounter notes.      Objective   /74 (BP Location: Left arm, Patient Position: Sitting, Cuff Size: Standard)   Temp 97.6 °F (36.4 °C) (Tympanic)   Wt 110 kg (243 lb 3.2 oz)   BMI 39.25 kg/m²      Physical Exam  Constitutional:       General: She is not in acute distress.  HENT:      Head: Normocephalic and atraumatic.      Mouth/Throat:      Mouth: Mucous membranes are moist.   Cardiovascular:      Rate and Rhythm: Normal rate and regular rhythm.   Pulmonary:      Effort: Pulmonary effort is normal. No respiratory distress.   Abdominal:      General: There is no distension.      Palpations: Abdomen is soft.      Tenderness: There is abdominal tenderness (mild epigastric).   Musculoskeletal:         General: No swelling.      Cervical back: Neck supple.   Lymphadenopathy:       Cervical: No cervical adenopathy.   Skin:     General: Skin is warm and dry.      Capillary Refill: Capillary refill takes less than 2 seconds.   Neurological:      Mental Status: She is alert.   Psychiatric:         Mood and Affect: Mood normal.

## 2024-12-06 NOTE — ASSESSMENT & PLAN NOTE
Two bouts, July and October. No etiology identified. Recent EUS without evidence of chronic pancreatitis or anatomical etiology. AI workup negative. TG only 200s, started on fenofibrate after first episode. No other culprit medications or herbal supplements. No etoh use. No FH to warrant genetic testing at this time, however, if recurrence, can consider as alternate etiologies have been unrevealing. Leading cause at this juncture is tobacco use. She was advised to stop smoking and offered resources. She has patches at home and will consider it

## 2024-12-06 NOTE — ASSESSMENT & PLAN NOTE
Grade A esophagitis on recent EGD. Recommended increasing PPI to BID at that time due to breakthrough reflux symptoms for 8 weeks then decreasing back to daily dosing

## 2024-12-06 NOTE — ASSESSMENT & PLAN NOTE
Due for recall colonoscopy 10/2025 for h/o colon polyps. Patient will call to schedule in 1-2 months once schedule is out   Orders:    Colonoscopy; Future

## 2024-12-26 ENCOUNTER — HOSPITAL ENCOUNTER (EMERGENCY)
Facility: HOSPITAL | Age: 59
Discharge: HOME | End: 2024-12-26
Attending: EMERGENCY MEDICINE
Payer: COMMERCIAL

## 2024-12-26 VITALS
BODY MASS INDEX: 39.05 KG/M2 | HEART RATE: 91 BPM | DIASTOLIC BLOOD PRESSURE: 77 MMHG | SYSTOLIC BLOOD PRESSURE: 145 MMHG | OXYGEN SATURATION: 99 % | WEIGHT: 243 LBS | HEIGHT: 66 IN | TEMPERATURE: 98.4 F | RESPIRATION RATE: 18 BRPM

## 2024-12-26 DIAGNOSIS — J06.9 VIRAL URI WITH COUGH: Primary | ICD-10-CM

## 2024-12-26 LAB
FLUAV AG UPPER RESP QL IA.RAPID: NEGATIVE
FLUBV AG UPPER RESP QL IA.RAPID: NEGATIVE
S PYO DNA THROAT QL NAA+PROBE: NOT DETECTED
SARS-COV+SARS-COV-2 AG RESP QL IA.RAPID: NEGATIVE

## 2024-12-26 PROCEDURE — 87804 INFLUENZA ASSAY W/OPTIC: CPT | Performed by: EMERGENCY MEDICINE

## 2024-12-26 PROCEDURE — 87811 SARS-COV-2 COVID19 W/OPTIC: CPT | Performed by: EMERGENCY MEDICINE

## 2024-12-26 PROCEDURE — 99284 EMERGENCY DEPT VISIT MOD MDM: CPT | Performed by: EMERGENCY MEDICINE

## 2024-12-26 PROCEDURE — 99283 EMERGENCY DEPT VISIT LOW MDM: CPT

## 2024-12-26 PROCEDURE — 87651 STREP A DNA AMP PROBE: CPT

## 2024-12-26 NOTE — ED PROVIDER NOTES
"Time reflects when diagnosis was documented in both MDM as applicable and the Disposition within this note       Time User Action Codes Description Comment    12/26/2024  6:43 PM Jacky Louis Add [J06.9] Viral URI with cough           ED Disposition       ED Disposition   Discharge    Condition   Stable    Date/Time   Thu Dec 26, 2024  6:47 PM    Comment   Sara OJEDA Ani discharge to home/self care.                   Assessment & Plan       Medical Decision Making  Amount and/or Complexity of Data Reviewed  Labs: ordered.    \"   Chief Complaint   Patient presents with    Cough     Patient reports she has had a cough since having a procedure done on 12/2 where they put a camera down her esophagus to look at her pancreas       Initial ED Assessment: 59-year-old female with PMH of acute pancreatitis X2 episodes, GERD who presents for 1 week of cough, body aches, sore throat and fevers.     Differential dx includes but is not limited to strep throat, influenza, viral URI    Initial ED Plan and results: Strep PCR, flu COVID swab which are both negative.    Counseled the patient to pursue OTC viral URI treatment including steam showers, humidifier, cough medicine including honey and Tylenol as needed for symptomatic treatment.    \"Patient appears well, is nontoxic appearing, Sara expresses understanding and agrees with plan of care at this time.\"           Medications - No data to display    ED Risk Strat Scores                                              History of Present Illness       Chief Complaint   Patient presents with    Cough     Patient reports she has had a cough since having a procedure done on 12/2 where they put a camera down her esophagus to look at her pancreas       Past Medical History:   Diagnosis Date    Anxiety     Back pain     Depression     GERD (gastroesophageal reflux disease)     SOB (shortness of breath)     Vertigo       Past Surgical History:   Procedure Laterality Date    CERVICAL DISC " SURGERY      CHOLECYSTECTOMY      CHOLECYSTECTOMY      COLONOSCOPY      FOOT SURGERY      LAPAROTOMY N/A 02/02/2021    Procedure: LAPAROTOMY FOR SPINAL SURGERY;  Surgeon: Edison Glynn MD;  Location: [facility];  Service: General    LUMBAR FUSION N/A 02/02/2021    Procedure: Anterior lumbar interbody fusion L5-S1; Posterior lumbar laminectomy, decompression, instrumented fusion L5-S1 with allograft and neuromonitoring;  Surgeon: Aries Rivera MD;  Location: [facility];  Service: Orthopedics    RI EGD TRANSORAL BIOPSY SINGLE/MULTIPLE N/A 04/20/2016    Procedure: ESOPHAGOGASTRODUODENOSCOPY (EGD);  Surgeon: Darius Barr MD;  Location: [facility];  Service: Gastroenterology    THYROID LOBECTOMY N/A 07/03/2023    Procedure: LEFT THYROID LOBECTOMY;  Surgeon: Aishwarya Field MD;  Location: [facility];  Service: Surgical Oncology    TONSILLECTOMY      TUBAL LIGATION      UPPER GASTROINTESTINAL ENDOSCOPY      US GUIDED THYROID BIOPSY  05/30/2023      Family History   Problem Relation Age of Onset    Aneurysm Mother     Hypertension Mother     Heart attack Father     Aneurysm Sister     Aneurysm Brother     Mental illness Son       Social History     Tobacco Use    Smoking status: Every Day     Current packs/day: 1.00     Types: Cigarettes     Passive exposure: Current    Smokeless tobacco: Never    Tobacco comments:     started patch 1/2021, still using 3/4 ppd.    Vaping Use    Vaping status: Never Used   Substance Use Topics    Alcohol use: Not Currently     Comment: 0    Drug use: Not Currently     Types: Oxycodone, Methamphetamines     Comment: 7/18/2020 last use      E-Cigarette/Vaping    E-Cigarette Use Never User       E-Cigarette/Vaping Substances    Nicotine No     THC No     CBD No     Flavoring No     Other No     Unknown No       I have reviewed and agree with the history as documented.     HPI  59-year-old female with PMH of acute pancreatitis X2 episodes, GERD who presents for 1 week of cough, body  aches, sore throat and fevers.  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    Review of Systems   Constitutional:  Positive for appetite change, chills, fatigue and fever.   HENT:  Positive for postnasal drip and sore throat. Negative for ear pain.    Eyes:  Negative for pain and visual disturbance.   Respiratory:  Positive for cough and shortness of breath.    Cardiovascular:  Negative for chest pain and palpitations.   Gastrointestinal:  Negative for abdominal pain and vomiting.   Genitourinary:  Negative for dysuria and hematuria.   Musculoskeletal:  Negative for arthralgias and back pain.   Skin:  Negative for color change and rash.   Neurological:  Negative for seizures and syncope.   All other systems reviewed and are negative.          Objective       ED Triage Vitals [12/26/24 1450]   Temperature Pulse Blood Pressure Respirations SpO2 Patient Position - Orthostatic VS   98.4 °F (36.9 °C) 91 145/77 18 99 % Sitting      Temp Source Heart Rate Source BP Location FiO2 (%) Pain Score    Oral Monitor Right arm -- --      Vitals      Date and Time Temp Pulse SpO2 Resp BP Pain Score FACES Pain Rating User   12/26/24 1450 98.4 °F (36.9 °C) 91 99 % 18 145/77 -- -- KK            Physical Exam  Vitals and nursing note reviewed.   Constitutional:       General: She is not in acute distress.     Appearance: She is well-developed.   HENT:      Head: Normocephalic and atraumatic.   Eyes:      Conjunctiva/sclera: Conjunctivae normal.   Cardiovascular:      Rate and Rhythm: Normal rate and regular rhythm.      Heart sounds: No murmur heard.  Pulmonary:      Effort: Pulmonary effort is normal. No respiratory distress.      Breath sounds: Normal breath sounds.   Abdominal:      Palpations: Abdomen is soft.      Tenderness: There is no abdominal tenderness.   Musculoskeletal:          General: No swelling.      Cervical back: Neck supple.   Skin:     General: Skin is warm and dry.      Capillary Refill: Capillary refill takes less than 2 seconds.   Neurological:      Mental Status: She is alert.   Psychiatric:         Mood and Affect: Mood normal.         Results Reviewed       Procedure Component Value Units Date/Time    Strep A PCR [[id number]]  (Normal) Collected: 12/26/24 1747    Lab Status: Final result Specimen: Throat Updated: 12/26/24 1822     STREP A PCR Not Detected    FLU/COVID Rapid Antigen (30 min. TAT) - Preferred screening test in ED [[id number]]  (Normal) Collected: 12/26/24 1729    Lab Status: Final result Specimen: Nares from Nose Updated: 12/26/24 1748     SARS COV Rapid Antigen Negative     Influenza A Rapid Antigen Negative     Influenza B Rapid Antigen Negative    Narrative:      This test has been performed using the [employer] [facility] 2 FLU+SARS Antigen test under the Emergency Use Authorization (EUA). This test has been validated by the  and verified by the performing laboratory. The Audrey uses lateral flow immunofluorescent sandwich assay to detect SARS-COV, Influenza A and Influenza B Antigen.     The [employer] Audrey 2 SARS Antigen test does not differentiate between SARS-CoV and SARS-CoV-2.     Negative results are presumptive and may be confirmed with a molecular assay, if necessary, for patient management. Negative results do not rule out SARS-CoV-2 or influenza infection and should not be used as the sole basis for treatment or patient management decisions. A negative test result may occur if the level of antigen in a sample is below the limit of detection of this test.     Positive results are indicative of the presence of viral antigens, but do not rule out bacterial infection or co-infection with other viruses.     All test results should be used as an adjunct to clinical observations and other information available to the provider.    FOR PEDIATRIC PATIENTS -  copy/paste COVID Guidelines URL to browser: [email or web address]            No orders to display       Procedures    ED Medication and Procedure Management   Prior to Admission Medications   Prescriptions Last Dose Informant Patient Reported? Taking?   DULoxetine (CYMBALTA) 20 mg capsule   No No   Sig: Take 1 capsule (20 mg total) by mouth daily   buPROPion (WELLBUTRIN XL) 300 mg 24 hr tablet   No No   Sig: Take 1 tablet (300 mg total) by mouth daily   busPIRone (BUSPAR) 10 mg tablet  Self Yes No   Sig: Take 10 mg by mouth 3 (three) times a day   fenofibrate (TRICOR) 145 mg tablet   No No   Sig: Take 1 tablet (145 mg total) by mouth daily   hydrOXYzine HCL (ATARAX) 50 mg tablet   No No   Sig: Take 1 tablet (50 mg total) by mouth daily at bedtime   pantoprazole (PROTONIX) 40 mg tablet   No No   Sig: Take 1 tablet (40 mg total) by mouth 2 (two) times a day   traZODone (DESYREL) 300 MG tablet   No No   Sig: Take 1 tablet (300 mg total) by mouth daily at bedtime      Facility-Administered Medications: None     Discharge Medication List as of 12/26/2024  7:58 PM        CONTINUE these medications which have NOT CHANGED    Details   buPROPion (WELLBUTRIN XL) 300 mg 24 hr tablet Take 1 tablet (300 mg total) by mouth daily, Starting Thu 10/31/2024, Normal      busPIRone (BUSPAR) 10 mg tablet Take 10 mg by mouth 3 (three) times a day, Historical Med      DULoxetine (CYMBALTA) 20 mg capsule Take 1 capsule (20 mg total) by mouth daily, Starting Thu 10/31/2024, Normal      fenofibrate (TRICOR) 145 mg tablet Take 1 tablet (145 mg total) by mouth daily, Starting Wed 11/20/2024, Normal      hydrOXYzine HCL (ATARAX) 50 mg tablet Take 1 tablet (50 mg total) by mouth daily at bedtime, Starting Wed 11/20/2024, Normal      pantoprazole (PROTONIX) 40 mg tablet Take 1 tablet (40 mg total) by mouth 2 (two) times a day, Starting Mon 12/2/2024, Until Sat 5/31/2025, Normal      traZODone  (DESYREL) 300 MG tablet Take 1 tablet (300 mg total) by mouth daily at bedtime, Starting Thu 10/31/2024, Normal           No discharge procedures on file.  ED SEPSIS DOCUMENTATION   Time reflects when diagnosis was documented in both MDM as applicable and the Disposition within this note       Time User Action Codes Description Comment    12/26/2024  6:43 PM Jacky Louis Add [J06.9] Viral URI with cough                  Jacky Louis, DO  12/27/24 0245

## 2024-12-28 NOTE — ED ATTENDING ATTESTATION
12/26/2024  I, Derick Edgar MD, saw and evaluated the patient. I have discussed the patient with the resident/non-physician practitioner and agree with the resident's/non-physician practitioner's findings, Plan of Care, and MDM as documented in the resident's/non-physician practitioner's note, except where noted. All available labs and Radiology studies were reviewed.  I was present for key portions of any procedure(s) performed by the resident/non-physician practitioner and I was immediately available to provide assistance.       At this point I agree with the current assessment done in the Emergency Department.  I have conducted an independent evaluation of this patient a history and physical is as follows:    ED Course  ED Course as of 12/28/24 1844   Thu Dec 26, 2024   1829 SARS COV Rapid Antigen: Negative   1829 Influenza A Rapid Antigen: Negative   1829 Influenza B Rapid Antigen: Negative   1829 STREP A PCR: Not Detected         Critical Care Time  Procedures

## 2025-01-27 DIAGNOSIS — F41.9 ANXIETY: Primary | ICD-10-CM

## 2025-01-28 RX ORDER — BUSPIRONE HYDROCHLORIDE 10 MG/1
10 TABLET ORAL 3 TIMES DAILY
Qty: 90 TABLET | Refills: 0 | Status: SHIPPED | OUTPATIENT
Start: 2025-01-28

## 2025-01-29 ENCOUNTER — APPOINTMENT (EMERGENCY)
Dept: RADIOLOGY | Facility: HOSPITAL | Age: 60
End: 2025-01-29
Payer: COMMERCIAL

## 2025-01-29 ENCOUNTER — HOSPITAL ENCOUNTER (OUTPATIENT)
Facility: HOSPITAL | Age: 60
Setting detail: OBSERVATION
Discharge: HOME | End: 2025-01-31
Attending: EMERGENCY MEDICINE | Admitting: INTERNAL MEDICINE
Payer: COMMERCIAL

## 2025-01-29 ENCOUNTER — APPOINTMENT (EMERGENCY)
Dept: CT IMAGING | Facility: HOSPITAL | Age: 60
End: 2025-01-29
Payer: COMMERCIAL

## 2025-01-29 DIAGNOSIS — R10.11 RIGHT UPPER QUADRANT ABDOMINAL PAIN: Primary | ICD-10-CM

## 2025-01-29 LAB
ALBUMIN SERPL BCG-MCNC: 4 G/DL (ref 3.5–5)
ALP SERPL-CCNC: 41 U/L (ref 34–104)
ALT SERPL W P-5'-P-CCNC: 11 U/L (ref 7–52)
ANION GAP SERPL CALCULATED.3IONS-SCNC: 6 MMOL/L (ref 4–13)
AST SERPL W P-5'-P-CCNC: 16 U/L (ref 13–39)
BASOPHILS # BLD AUTO: 0.03 THOUSANDS/ΜL (ref 0–0.1)
BASOPHILS NFR BLD AUTO: 0 % (ref 0–1)
BILIRUB SERPL-MCNC: 0.27 MG/DL (ref 0.2–1)
BUN SERPL-MCNC: 18 MG/DL (ref 5–25)
CALCIUM SERPL-MCNC: 9.3 MG/DL (ref 8.4–10.2)
CHLORIDE SERPL-SCNC: 106 MMOL/L (ref 96–108)
CO2 SERPL-SCNC: 27 MMOL/L (ref 21–32)
CREAT SERPL-MCNC: 1.27 MG/DL (ref 0.6–1.3)
EOSINOPHIL # BLD AUTO: 0.13 THOUSAND/ΜL (ref 0–0.61)
EOSINOPHIL NFR BLD AUTO: 1 % (ref 0–6)
ERYTHROCYTE [DISTWIDTH] IN BLOOD BY AUTOMATED COUNT: 13.1 % (ref 11.6–15.1)
GFR SERPL CREATININE-BSD FRML MDRD: 45 ML/MIN/1.73SQ M
GLUCOSE SERPL-MCNC: 110 MG/DL (ref 65–140)
HCT VFR BLD AUTO: 41.3 % (ref 34.8–46.1)
HGB BLD-MCNC: 13.4 G/DL (ref 11.5–15.4)
IMM GRANULOCYTES # BLD AUTO: 0.02 THOUSAND/UL (ref 0–0.2)
IMM GRANULOCYTES NFR BLD AUTO: 0 % (ref 0–2)
LIPASE SERPL-CCNC: 48 U/L (ref 11–82)
LYMPHOCYTES # BLD AUTO: 3.31 THOUSANDS/ΜL (ref 0.6–4.47)
LYMPHOCYTES NFR BLD AUTO: 33 % (ref 14–44)
MCH RBC QN AUTO: 30.6 PG (ref 26.8–34.3)
MCHC RBC AUTO-ENTMCNC: 32.4 G/DL (ref 31.4–37.4)
MCV RBC AUTO: 94 FL (ref 82–98)
MONOCYTES # BLD AUTO: 0.45 THOUSAND/ΜL (ref 0.17–1.22)
MONOCYTES NFR BLD AUTO: 5 % (ref 4–12)
NEUTROPHILS # BLD AUTO: 6.12 THOUSANDS/ΜL (ref 1.85–7.62)
NEUTS SEG NFR BLD AUTO: 61 % (ref 43–75)
NRBC BLD AUTO-RTO: 0 /100 WBCS
PLATELET # BLD AUTO: 288 THOUSANDS/UL (ref 149–390)
PMV BLD AUTO: 9.7 FL (ref 8.9–12.7)
POTASSIUM SERPL-SCNC: 4.5 MMOL/L (ref 3.5–5.3)
PROT SERPL-MCNC: 6.8 G/DL (ref 6.4–8.4)
RBC # BLD AUTO: 4.38 MILLION/UL (ref 3.81–5.12)
SODIUM SERPL-SCNC: 139 MMOL/L (ref 135–147)
WBC # BLD AUTO: 10.06 THOUSAND/UL (ref 4.31–10.16)

## 2025-01-29 PROCEDURE — 96374 THER/PROPH/DIAG INJ IV PUSH: CPT

## 2025-01-29 PROCEDURE — 96375 TX/PRO/DX INJ NEW DRUG ADDON: CPT

## 2025-01-29 PROCEDURE — 83690 ASSAY OF LIPASE: CPT

## 2025-01-29 PROCEDURE — 36415 COLL VENOUS BLD VENIPUNCTURE: CPT

## 2025-01-29 PROCEDURE — 96361 HYDRATE IV INFUSION ADD-ON: CPT

## 2025-01-29 PROCEDURE — 85025 COMPLETE CBC W/AUTO DIFF WBC: CPT

## 2025-01-29 PROCEDURE — 99284 EMERGENCY DEPT VISIT MOD MDM: CPT

## 2025-01-29 PROCEDURE — 99285 EMERGENCY DEPT VISIT HI MDM: CPT | Performed by: EMERGENCY MEDICINE

## 2025-01-29 PROCEDURE — 99222 1ST HOSP IP/OBS MODERATE 55: CPT | Performed by: HOSPITALIST

## 2025-01-29 PROCEDURE — 71045 X-RAY EXAM CHEST 1 VIEW: CPT

## 2025-01-29 PROCEDURE — 74177 CT ABD & PELVIS W/CONTRAST: CPT

## 2025-01-29 PROCEDURE — 80053 COMPREHEN METABOLIC PANEL: CPT

## 2025-01-29 RX ORDER — ACETAMINOPHEN 325 MG/1
975 TABLET ORAL EVERY 6 HOURS PRN
Status: DISCONTINUED | OUTPATIENT
Start: 2025-01-29 | End: 2025-01-31 | Stop reason: HOSPADM

## 2025-01-29 RX ORDER — HYDROMORPHONE HCL/PF 1 MG/ML
1 SYRINGE (ML) INJECTION EVERY 4 HOURS PRN
Status: DISCONTINUED | OUTPATIENT
Start: 2025-01-29 | End: 2025-01-31

## 2025-01-29 RX ORDER — SODIUM CHLORIDE, SODIUM LACTATE, POTASSIUM CHLORIDE, CALCIUM CHLORIDE 600; 310; 30; 20 MG/100ML; MG/100ML; MG/100ML; MG/100ML
1000 INJECTION, SOLUTION INTRAVENOUS CONTINUOUS
Status: DISPENSED | OUTPATIENT
Start: 2025-01-29 | End: 2025-01-29

## 2025-01-29 RX ORDER — METHOCARBAMOL 750 MG/1
750 TABLET, FILM COATED ORAL EVERY 6 HOURS PRN
Status: DISCONTINUED | OUTPATIENT
Start: 2025-01-29 | End: 2025-01-31 | Stop reason: HOSPADM

## 2025-01-29 RX ORDER — ONDANSETRON 2 MG/ML
4 INJECTION INTRAMUSCULAR; INTRAVENOUS ONCE
Status: COMPLETED | OUTPATIENT
Start: 2025-01-29 | End: 2025-01-29

## 2025-01-29 RX ORDER — MAGNESIUM HYDROXIDE/ALUMINUM HYDROXICE/SIMETHICONE 120; 1200; 1200 MG/30ML; MG/30ML; MG/30ML
30 SUSPENSION ORAL EVERY 6 HOURS PRN
Status: DISCONTINUED | OUTPATIENT
Start: 2025-01-29 | End: 2025-01-31 | Stop reason: HOSPADM

## 2025-01-29 RX ORDER — ONDANSETRON 2 MG/ML
4 INJECTION INTRAMUSCULAR; INTRAVENOUS EVERY 6 HOURS PRN
Status: DISCONTINUED | OUTPATIENT
Start: 2025-01-29 | End: 2025-01-31 | Stop reason: HOSPADM

## 2025-01-29 RX ORDER — HYDROMORPHONE HCL/PF 1 MG/ML
0.5 SYRINGE (ML) INJECTION ONCE
Refills: 0 | Status: COMPLETED | OUTPATIENT
Start: 2025-01-29 | End: 2025-01-29

## 2025-01-29 RX ORDER — SUCRALFATE 1 G/1
1 TABLET ORAL ONCE
Status: COMPLETED | OUTPATIENT
Start: 2025-01-29 | End: 2025-01-29

## 2025-01-29 RX ORDER — BUSPIRONE HYDROCHLORIDE 5 MG/1
10 TABLET ORAL 3 TIMES DAILY
Status: DISCONTINUED | OUTPATIENT
Start: 2025-01-29 | End: 2025-01-31 | Stop reason: HOSPADM

## 2025-01-29 RX ORDER — FAMOTIDINE 10 MG/ML
20 INJECTION, SOLUTION INTRAVENOUS ONCE
Status: COMPLETED | OUTPATIENT
Start: 2025-01-29 | End: 2025-01-29

## 2025-01-29 RX ORDER — FENOFIBRATE 145 MG/1
145 TABLET, COATED ORAL DAILY
Status: DISCONTINUED | OUTPATIENT
Start: 2025-01-30 | End: 2025-01-31 | Stop reason: HOSPADM

## 2025-01-29 RX ORDER — NICOTINE 21 MG/24HR
1 PATCH, TRANSDERMAL 24 HOURS TRANSDERMAL DAILY
Status: DISCONTINUED | OUTPATIENT
Start: 2025-01-29 | End: 2025-01-31 | Stop reason: HOSPADM

## 2025-01-29 RX ORDER — DULOXETIN HYDROCHLORIDE 20 MG/1
20 CAPSULE, DELAYED RELEASE ORAL DAILY
Status: DISCONTINUED | OUTPATIENT
Start: 2025-01-30 | End: 2025-01-31 | Stop reason: HOSPADM

## 2025-01-29 RX ORDER — POLYETHYLENE GLYCOL 3350 17 G/17G
17 POWDER, FOR SOLUTION ORAL DAILY PRN
Status: DISCONTINUED | OUTPATIENT
Start: 2025-01-29 | End: 2025-01-31 | Stop reason: HOSPADM

## 2025-01-29 RX ORDER — HYDROXYZINE HYDROCHLORIDE 50 MG/1
50 TABLET, FILM COATED ORAL
Status: DISCONTINUED | OUTPATIENT
Start: 2025-01-29 | End: 2025-01-31 | Stop reason: HOSPADM

## 2025-01-29 RX ORDER — BUPROPION HYDROCHLORIDE 150 MG/1
300 TABLET ORAL DAILY
Status: DISCONTINUED | OUTPATIENT
Start: 2025-01-30 | End: 2025-01-31 | Stop reason: HOSPADM

## 2025-01-29 RX ORDER — PANTOPRAZOLE SODIUM 40 MG/1
40 TABLET, DELAYED RELEASE ORAL 2 TIMES DAILY
Status: DISCONTINUED | OUTPATIENT
Start: 2025-01-30 | End: 2025-01-31 | Stop reason: HOSPADM

## 2025-01-29 RX ORDER — ENOXAPARIN SODIUM 100 MG/ML
40 INJECTION SUBCUTANEOUS DAILY
Status: DISCONTINUED | OUTPATIENT
Start: 2025-01-30 | End: 2025-01-31 | Stop reason: HOSPADM

## 2025-01-29 RX ORDER — TRAZODONE HYDROCHLORIDE 100 MG/1
300 TABLET ORAL
Status: DISCONTINUED | OUTPATIENT
Start: 2025-01-29 | End: 2025-01-31 | Stop reason: HOSPADM

## 2025-01-29 RX ADMIN — ONDANSETRON 4 MG: 2 INJECTION INTRAMUSCULAR; INTRAVENOUS at 19:38

## 2025-01-29 RX ADMIN — HYDROXYZINE HYDROCHLORIDE 50 MG: 25 TABLET ORAL at 23:54

## 2025-01-29 RX ADMIN — NICOTINE 1 PATCH: 21 PATCH, EXTENDED RELEASE TRANSDERMAL at 23:54

## 2025-01-29 RX ADMIN — TRAZODONE HYDROCHLORIDE 300 MG: 100 TABLET ORAL at 23:54

## 2025-01-29 RX ADMIN — IOHEXOL 100 ML: 350 INJECTION, SOLUTION INTRAVENOUS at 20:35

## 2025-01-29 RX ADMIN — SODIUM CHLORIDE, SODIUM LACTATE, POTASSIUM CHLORIDE, AND CALCIUM CHLORIDE 1000 ML/HR: .6; .31; .03; .02 INJECTION, SOLUTION INTRAVENOUS at 19:39

## 2025-01-29 RX ADMIN — SUCRALFATE 1 G: 1 TABLET ORAL at 22:14

## 2025-01-29 RX ADMIN — HYDROMORPHONE HYDROCHLORIDE 0.5 MG: 1 INJECTION, SOLUTION INTRAMUSCULAR; INTRAVENOUS; SUBCUTANEOUS at 19:38

## 2025-01-29 RX ADMIN — FAMOTIDINE 20 MG: 10 INJECTION INTRAVENOUS at 22:14

## 2025-01-30 PROBLEM — R10.11 RIGHT UPPER QUADRANT ABDOMINAL PAIN: Status: ACTIVE | Noted: 2025-01-30

## 2025-01-30 LAB
ALBUMIN SERPL BCG-MCNC: 3.4 G/DL (ref 3.5–5)
ALP SERPL-CCNC: 34 U/L (ref 34–104)
ALT SERPL W P-5'-P-CCNC: 16 U/L (ref 7–52)
ANION GAP SERPL CALCULATED.3IONS-SCNC: 5 MMOL/L (ref 4–13)
AST SERPL W P-5'-P-CCNC: 31 U/L (ref 13–39)
BASOPHILS # BLD AUTO: 0.03 THOUSANDS/ΜL (ref 0–0.1)
BASOPHILS NFR BLD AUTO: 0 % (ref 0–1)
BILIRUB SERPL-MCNC: 0.44 MG/DL (ref 0.2–1)
BUN SERPL-MCNC: 18 MG/DL (ref 5–25)
CALCIUM ALBUM COR SERPL-MCNC: 8.9 MG/DL (ref 8.3–10.1)
CALCIUM SERPL-MCNC: 8.4 MG/DL (ref 8.4–10.2)
CHLORIDE SERPL-SCNC: 105 MMOL/L (ref 96–108)
CO2 SERPL-SCNC: 28 MMOL/L (ref 21–32)
CREAT SERPL-MCNC: 1.04 MG/DL (ref 0.6–1.3)
EOSINOPHIL # BLD AUTO: 0.18 THOUSAND/ΜL (ref 0–0.61)
EOSINOPHIL NFR BLD AUTO: 2 % (ref 0–6)
ERYTHROCYTE [DISTWIDTH] IN BLOOD BY AUTOMATED COUNT: 13.2 % (ref 11.6–15.1)
GFR SERPL CREATININE-BSD FRML MDRD: 58 ML/MIN/1.73SQ M
GLUCOSE SERPL-MCNC: 101 MG/DL (ref 65–140)
HCT VFR BLD AUTO: 37.4 % (ref 34.8–46.1)
HGB BLD-MCNC: 12 G/DL (ref 11.5–15.4)
IMM GRANULOCYTES # BLD AUTO: 0.03 THOUSAND/UL (ref 0–0.2)
IMM GRANULOCYTES NFR BLD AUTO: 0 % (ref 0–2)
LYMPHOCYTES # BLD AUTO: 3.85 THOUSANDS/ΜL (ref 0.6–4.47)
LYMPHOCYTES NFR BLD AUTO: 44 % (ref 14–44)
MAGNESIUM SERPL-MCNC: 1.7 MG/DL (ref 1.9–2.7)
MCH RBC QN AUTO: 30.5 PG (ref 26.8–34.3)
MCHC RBC AUTO-ENTMCNC: 32.1 G/DL (ref 31.4–37.4)
MCV RBC AUTO: 95 FL (ref 82–98)
MONOCYTES # BLD AUTO: 0.58 THOUSAND/ΜL (ref 0.17–1.22)
MONOCYTES NFR BLD AUTO: 7 % (ref 4–12)
NEUTROPHILS # BLD AUTO: 4.11 THOUSANDS/ΜL (ref 1.85–7.62)
NEUTS SEG NFR BLD AUTO: 47 % (ref 43–75)
NRBC BLD AUTO-RTO: 0 /100 WBCS
PHOSPHATE SERPL-MCNC: 4.1 MG/DL (ref 2.3–4.1)
PLATELET # BLD AUTO: 235 THOUSANDS/UL (ref 149–390)
PMV BLD AUTO: 9.7 FL (ref 8.9–12.7)
POTASSIUM SERPL-SCNC: 4.1 MMOL/L (ref 3.5–5.3)
PROT SERPL-MCNC: 5.9 G/DL (ref 6.4–8.4)
RBC # BLD AUTO: 3.93 MILLION/UL (ref 3.81–5.12)
SODIUM SERPL-SCNC: 138 MMOL/L (ref 135–147)
WBC # BLD AUTO: 8.78 THOUSAND/UL (ref 4.31–10.16)

## 2025-01-30 PROCEDURE — 83735 ASSAY OF MAGNESIUM: CPT

## 2025-01-30 PROCEDURE — 99214 OFFICE O/P EST MOD 30 MIN: CPT | Performed by: INTERNAL MEDICINE

## 2025-01-30 PROCEDURE — 84100 ASSAY OF PHOSPHORUS: CPT

## 2025-01-30 PROCEDURE — 80053 COMPREHEN METABOLIC PANEL: CPT

## 2025-01-30 PROCEDURE — 85025 COMPLETE CBC W/AUTO DIFF WBC: CPT

## 2025-01-30 PROCEDURE — 36415 COLL VENOUS BLD VENIPUNCTURE: CPT

## 2025-01-30 RX ORDER — MAGNESIUM SULFATE HEPTAHYDRATE 40 MG/ML
2 INJECTION, SOLUTION INTRAVENOUS ONCE
Status: COMPLETED | OUTPATIENT
Start: 2025-01-30 | End: 2025-01-30

## 2025-01-30 RX ORDER — SUCRALFATE 1 G/1
1 TABLET ORAL
Status: DISCONTINUED | OUTPATIENT
Start: 2025-01-30 | End: 2025-01-31 | Stop reason: HOSPADM

## 2025-01-30 RX ADMIN — PANTOPRAZOLE SODIUM 40 MG: 40 TABLET, DELAYED RELEASE ORAL at 08:09

## 2025-01-30 RX ADMIN — HYDROMORPHONE HYDROCHLORIDE 1 MG: 1 INJECTION, SOLUTION INTRAMUSCULAR; INTRAVENOUS; SUBCUTANEOUS at 00:11

## 2025-01-30 RX ADMIN — SUCRALFATE 1 G: 1 TABLET ORAL at 16:43

## 2025-01-30 RX ADMIN — HYDROXYZINE HYDROCHLORIDE 50 MG: 25 TABLET ORAL at 21:12

## 2025-01-30 RX ADMIN — BUSPIRONE HYDROCHLORIDE 10 MG: 10 TABLET ORAL at 21:12

## 2025-01-30 RX ADMIN — ENOXAPARIN SODIUM 40 MG: 40 INJECTION SUBCUTANEOUS at 08:11

## 2025-01-30 RX ADMIN — HYDROMORPHONE HYDROCHLORIDE 1 MG: 1 INJECTION, SOLUTION INTRAMUSCULAR; INTRAVENOUS; SUBCUTANEOUS at 04:36

## 2025-01-30 RX ADMIN — ONDANSETRON 4 MG: 2 INJECTION INTRAMUSCULAR; INTRAVENOUS at 23:17

## 2025-01-30 RX ADMIN — BUPROPION HYDROCHLORIDE 300 MG: 150 TABLET, EXTENDED RELEASE ORAL at 08:10

## 2025-01-30 RX ADMIN — SUCRALFATE 1 G: 1 TABLET ORAL at 21:12

## 2025-01-30 RX ADMIN — TRAZODONE HYDROCHLORIDE 300 MG: 100 TABLET ORAL at 21:12

## 2025-01-30 RX ADMIN — ACETAMINOPHEN 975 MG: 325 TABLET, FILM COATED ORAL at 12:01

## 2025-01-30 RX ADMIN — PANTOPRAZOLE SODIUM 40 MG: 40 TABLET, DELAYED RELEASE ORAL at 18:14

## 2025-01-30 RX ADMIN — BUSPIRONE HYDROCHLORIDE 10 MG: 10 TABLET ORAL at 00:11

## 2025-01-30 RX ADMIN — ONDANSETRON 4 MG: 2 INJECTION INTRAMUSCULAR; INTRAVENOUS at 15:25

## 2025-01-30 RX ADMIN — BUSPIRONE HYDROCHLORIDE 10 MG: 10 TABLET ORAL at 08:11

## 2025-01-30 RX ADMIN — HYDROMORPHONE HYDROCHLORIDE 1 MG: 1 INJECTION, SOLUTION INTRAMUSCULAR; INTRAVENOUS; SUBCUTANEOUS at 15:19

## 2025-01-30 RX ADMIN — HYDROMORPHONE HYDROCHLORIDE 1 MG: 1 INJECTION, SOLUTION INTRAMUSCULAR; INTRAVENOUS; SUBCUTANEOUS at 21:12

## 2025-01-30 RX ADMIN — BUSPIRONE HYDROCHLORIDE 10 MG: 10 TABLET ORAL at 16:43

## 2025-01-30 RX ADMIN — SUCRALFATE 1 G: 1 TABLET ORAL at 12:33

## 2025-01-30 RX ADMIN — HYDROMORPHONE HYDROCHLORIDE 1 MG: 1 INJECTION, SOLUTION INTRAMUSCULAR; INTRAVENOUS; SUBCUTANEOUS at 09:38

## 2025-01-30 RX ADMIN — DULOXETINE 20 MG: 20 CAPSULE, DELAYED RELEASE ORAL at 08:11

## 2025-01-30 RX ADMIN — FENOFIBRATE 145 MG: 145 TABLET ORAL at 08:10

## 2025-01-30 RX ADMIN — MAGNESIUM SULFATE HEPTAHYDRATE 2 G: 40 INJECTION, SOLUTION INTRAVENOUS at 06:05

## 2025-01-30 NOTE — ASSESSMENT & PLAN NOTE
Gastroenterology Progress Note      Subjective  No acute GI events overnight.    Objective    Last Recorded Vitals  Vitals with min/max:    Vital Last Value 24 Hour Range   Temperature 97.7 °F (36.5 °C) (03/11/23 0608) Temp  Min: 97.7 °F (36.5 °C)  Max: 98.4 °F (36.9 °C)   Pulse 77 (03/11/23 0608) Pulse  Min: 72  Max: 89   Respiratory 16 (03/10/23 1613) Resp  Min: 16  Max: 16   Non-Invasive  Blood Pressure (!) 154/82 (03/11/23 0608) BP  Min: 97/58  Max: 154/82   Pulse Oximetry 97 % (03/11/23 0608) SpO2  Min: 97 %  Max: 97 %   Arterial   Blood Pressure   No data recorded       Physical Exam  General:  Alert, oriented, no distress.    Gastrointestinal:  Soft and nontender.  +BS.  Nondistended    Labs   Recent Labs   Lab 03/11/23  0839   WBC 23.7*   RBC 4.15*   HGB 10.3*   HCT 32.0*        Recent Labs   Lab 03/11/23  0603 03/10/23  0556 03/09/23  0603   SODIUM 127* 124* 124*   CHLORIDE 86* 85* 86*   CO2 20* 21 22   BUN 68* 94* 74*   CREATININE 5.74* 7.25* 6.33*   CALCIUM 8.5 8.2* 8.4   ALBUMIN 1.9* 1.7* 1.8*   BILIRUBIN 0.6 0.7 0.7   ALKPT 395* 326* 310*   * 307* 516*   * 296* 443*   GLUCOSE 230* 223* 241*     Lab Results   Component Value Date    HGB 10.3 (L) 03/11/2023    HGB 9.8 (L) 03/10/2023    HGB 10.2 (L) 03/09/2023    HGB 10.1 (L) 09/02/2019    HGB 9.6 (L) 09/01/2019    HGB 10.2 (L) 08/31/2019    HCT 32.0 (L) 03/11/2023    HCT 30.3 (L) 03/10/2023    HCT 30.9 (L) 03/09/2023    HCT 32 (L) 09/02/2019    HCT 30 (L) 09/01/2019    HCT 32 (L) 08/31/2019     (H) 03/11/2023     (H) 03/10/2023    BILIRUBIN 0.6 03/11/2023    BILIRUBIN 0.7 03/10/2023    ALKPT 395 (H) 03/11/2023    ALKPT 326 (H) 03/10/2023    NH3 27 03/11/2023    NH3 <10 03/08/2023    INR 1.1 03/11/2023    INR 1.0 01/20/2023        Imaging  US LIVER GALLBLADDER PANCREAS (RUQ)   Final Result   1. No suspicious hepatic mass.   Mild fatty infiltration of the liver.   2. No bile duct dilatation.   3. No shadowing calcified  Continues smoking cigarettes  Education on the importance of smoking cessation was provided at bedside greater than 3 minutes  NRT ordered   gallstones in the gallbladder.   Small volume of mobile sludge in the gallbladder.      Electronically Signed by: PEG BLANCO MD    Signed on: 3/10/2023 4:59 PM    Workstation ID: [id number]      XR CERVICAL SPINE 4 OR 5 VIEWS   Final Result    The study is limited by the patient's body habitus. Prominent degenerative   disc changes are noted at C5-6. No instability identified on the flexion or   extension views. I suspect prevertebral soft tissue swelling. I recommend   MRI of the cervical spine without and with gadolinium for further imaging   evaluation of this finding.      Electronically Signed by: TEREISTA BONILLA MD    Signed on: 3/10/2023 5:09 PM    Workstation ID: [id number]      XR LUMBAR SPINE FLEXION AND EXTENSION ONLY 2 VIEWS   Final Result   Severe spondylosis at L4-L5.  No evidence of dynamic in stability on   flexion or extension.      If clinically concerned for osteomyelitis.  Would recommend gallium scan   for further evaluation.      Electronically Signed by: VIVEK ALONSO M.D.    Signed on: 3/10/2023 5:34 PM    Workstation ID: [id number]      CT CERVICAL SPINE WO CONTRAST   Final Result      No evidence for acute fracture.  No acute misalignment.  There appears to   be some soft tissue swelling of the a prevertebral soft tissues which   measure up to a 2 cm in thickness.  There is some hypoattenuation in the a   prevertebral retropharyngeal space anterior to the C3-C4 and the C5   vertebra.  This may be due to a  fluid collection..  Precise nature of the   fluid collection is uncertain.  Possibility of an infectious fluid   collection should be considered.      There is some mild irregularity along the a vertebral endplates of C5-C6.    There is some disc space narrowing at this level and there is at least a a   diffuse disc bulge or disc protrusion at this level.  CT findings are not   entirely specific for a discitis osteomyelitis with CT is significantly   less sensitive than the  MRI.  When combined with the MRI findings the a   findings at this level are expected to be due to a discitis osteomyelitis.    Remaining cervical levels show multilevel degenerative change including   small vertebral body osteophytes and disc degeneration.      Electronically Signed by: MARICHUY GONZALEZ MD    Signed on: 3/10/2023 2:45 PM    Workstation ID: [id number]      MRI CERVICAL SPINE WO CONTRAST   Final Result      Severely compromised examination due to severe motion, patient condition   related to pain.  The appropriate coil could not be used.      Examination is not of adequate quality to evaluate for abnormal signal   within the cervical spinal cord.      Within the limitations of the  examination is significant finding is   detected:      There is abnormal increased T2 and STIR signal within the C5-C6 and disc   and adjacent vertebral endplates.  Findings are concerning for discitis   osteomyelitis at this level.  In addition there is a diffuse broad-based   disc protrusion which results in some mild deformity the anterior margin of   the cervical spinal cord at the C5-C6 level.        Electronically Signed by: MARICHUY GONZALEZ MD    Signed on: 3/10/2023 10:38 AM    Workstation ID: [id number]      IR PERMANENT DIALYSIS CATHETER REMOVAL   Final Result   1.  Technically successful placement of dual lumen non-tunneled   hemodialysis catheter on the left as described.   2.  Technically successful removal of tunneled central venous catheter.    Purulent drainage noted from tunnel.  Catheter tip sent for culture.      PLAN:   1.  The non-tunneled left catheter is ready for immediate use.     2.  The right catheter site should be dressed with non-occlusive dressing   to allow for drainage.   ___________________________________________________________________________   _____________________________________      PROCEDURES PERFORMED:   Ultrasound Guided Venous Access   Fluoroscopic Guided Non Tunneled Central Venous  Access Placement   Tunneled Central Venous Catheter Removal      ANESTHESIA/SEDATION: Conscious sedation was not used for the procedure.      PROPHYLACTIC ANTIBIOTIC: None.      PREPARATION: The site was prepared and draped and all elements of maximal   sterile barrier technique including sterile gloves, sterile gown, mask,   large sterile sheet, hand hygiene and cutaneous antisepsis with 2%   chlorhexidine +70% isopropyl alcohol were used. Discussion of Risks,   Benefits and Alternatives completed with patient/authorized decision maker.   Additionally, potential problems related to recuperation, likelihood of   achieving care, treatment and service goals were discussed. Relevant risks,   benefits and side effects related to alternatives, including the possible   results of not receiving care, treatment and services discussed. When   indicated, any limitations on the confidentiality of information learned   from or about the patient were explained. All patient/authorized decision   maker questions answered and consent for procedure was provided. The   patient's identification, correct procedure and position were verified. The   appropriate site was verified and marked as appropriate. Equipment/Implants   were checked for functionality and availability.      PROCEDURE/FINDINGS:   ULTRASOUND GUIDED VENOUS ACCESS: Local anesthesia was administered. The   vessel was sonographically evaluated and judged to be appropriate for   access. Real-time ultrasound was used to visualize needle entry into the   vessel and an image of the patent vein was sent to the PACS for   documentation.         Vessel accessed: Left internal jugular vein        Access Technique: 21-gauge micropuncture needle with micropuncture   set.      FLUOROSCOPY-GUIDED CENTRAL VENOUS CATHETER PLACEMENT: A guidewire was then   advanced through a catheter and into the venous system using fluoroscopic   guidance. The central venous catheter was advanced over  the guidewire and   into the central circulation.  A final fluoroscopic image was sent to the   PACS for documentation.  The catheter was capped and flushed with 100   units/mL heparin.  It was secured in place.  A sterile dressing was   applied.        Catheter type: Duoglide        Catheter tip location: Right atrium        Catheter size: 13 Vietnamese        Catheter Length: 20 cm        Additional description of procedure: None      TUNNELED CATHETER REMOVAL: Local anesthesia was administered at the   catheter exit site. The catheter cuff was bluntly dissected free from the   surrounding soft tissues. The catheter was removed, inspected and confirmed   to be removed in its entirety. Hemostasis was achieved by manual   compression.  A sterile dressing was applied.      The tip of the catheter was cut and sent to the lab for cultures.    Purulence was noted from the catheter site.         COMPLICATIONS: None      IMPLANTS: None      ESTIMATED BLOOD LOSS: Minimal      RADIATION/CONTRAST DOSE:   FLUOROSCOPY TIME: 0.9 minutes.   CUMULATIVE AIR KERMA: 10.56 mGy   CUMULATIVE DOSE AREA PRODUCT: 3.52 Gy-cm2   CONTRAST DOSE: 0 cc      Electronically Signed by: IGNACIO BATEMAN MD    Signed on: 3/9/2023 6:05 PM    Workstation ID: [id number]      IR TEMPORARY DIALYSIS CATHETER INSERTION AGE 4 OR OLDER   Final Result   1.  Technically successful placement of dual lumen non-tunneled   hemodialysis catheter on the left as described.   2.  Technically successful removal of tunneled central venous catheter.    Purulent drainage noted from tunnel.  Catheter tip sent for culture.      PLAN:   1.  The non-tunneled left catheter is ready for immediate use.     2.  The right catheter site should be dressed with non-occlusive dressing   to allow for drainage.   ___________________________________________________________________________   _____________________________________      PROCEDURES PERFORMED:   Ultrasound Guided Venous Access    Fluoroscopic Guided Non Tunneled Central Venous Access Placement   Tunneled Central Venous Catheter Removal      ANESTHESIA/SEDATION: Conscious sedation was not used for the procedure.      PROPHYLACTIC ANTIBIOTIC: None.      PREPARATION: The site was prepared and draped and all elements of maximal   sterile barrier technique including sterile gloves, sterile gown, mask,   large sterile sheet, hand hygiene and cutaneous antisepsis with 2%   chlorhexidine +70% isopropyl alcohol were used. Discussion of Risks,   Benefits and Alternatives completed with patient/authorized decision maker.   Additionally, potential problems related to recuperation, likelihood of   achieving care, treatment and service goals were discussed. Relevant risks,   benefits and side effects related to alternatives, including the possible   results of not receiving care, treatment and services discussed. When   indicated, any limitations on the confidentiality of information learned   from or about the patient were explained. All patient/authorized decision   maker questions answered and consent for procedure was provided. The   patient's identification, correct procedure and position were verified. The   appropriate site was verified and marked as appropriate. Equipment/Implants   were checked for functionality and availability.      PROCEDURE/FINDINGS:   ULTRASOUND GUIDED VENOUS ACCESS: Local anesthesia was administered. The   vessel was sonographically evaluated and judged to be appropriate for   access. Real-time ultrasound was used to visualize needle entry into the   vessel and an image of the patent vein was sent to the PACS for   documentation.         Vessel accessed: Left internal jugular vein        Access Technique: 21-gauge micropuncture needle with micropuncture   set.      FLUOROSCOPY-GUIDED CENTRAL VENOUS CATHETER PLACEMENT: A guidewire was then   advanced through a catheter and into the venous system using fluoroscopic    guidance. The central venous catheter was advanced over the guidewire and   into the central circulation.  A final fluoroscopic image was sent to the   PACS for documentation.  The catheter was capped and flushed with 100   units/mL heparin.  It was secured in place.  A sterile dressing was   applied.        Catheter type: Duoglide        Catheter tip location: Right atrium        Catheter size: 13 Tanzanian        Catheter Length: 20 cm        Additional description of procedure: None      TUNNELED CATHETER REMOVAL: Local anesthesia was administered at the   catheter exit site. The catheter cuff was bluntly dissected free from the   surrounding soft tissues. The catheter was removed, inspected and confirmed   to be removed in its entirety. Hemostasis was achieved by manual   compression.  A sterile dressing was applied.      The tip of the catheter was cut and sent to the lab for cultures.    Purulence was noted from the catheter site.         COMPLICATIONS: None      IMPLANTS: None      ESTIMATED BLOOD LOSS: Minimal      RADIATION/CONTRAST DOSE:   FLUOROSCOPY TIME: 0.9 minutes.   CUMULATIVE AIR KERMA: 10.56 mGy   CUMULATIVE DOSE AREA PRODUCT: 3.52 Gy-cm2   CONTRAST DOSE: 0 cc      Electronically Signed by: IGNACIO BATEMAN MD    Signed on: 3/9/2023 6:05 PM    Workstation ID: [id number]      MRI THORACIC SPINE WO CONTRAST   Final Result      Severe lumbar spondylosis at L4-L5, significantly progressed since prior CT   dated 03/29/2016.  There is evidence of mixed Modic type I and II change   secondary to disc height loss.  Osteomyelitis-discitis considered less   likely.  Would recommend correlation with ESR and CRP.      Normal MRI of the thoracic spine.      Electronically Signed by: VIVEK ALONSO M.D.    Signed on: 3/9/2023 9:54 AM    Workstation ID: [id number]      MRI LUMBAR SPINE WO CONTRAST   Final Result      Severe lumbar spondylosis at L4-L5, significantly progressed since prior CT   dated 03/29/2016.   There is evidence of mixed Modic type I and II change   secondary to disc height loss.  Osteomyelitis-discitis considered less   likely.  Would recommend correlation with ESR and CRP.      Normal MRI of the thoracic spine.      Electronically Signed by: VIVEK ALONSO M.D.    Signed on: 3/9/2023 9:54 AM    Workstation ID: [id number]      CT HEAD WO CONTRAST   Final Result      There is no evidence for acute intracranial abnormality. Read full report.            Electronically Signed by: MARICHUY GONZALEZ MD    Signed on: 3/7/2023 2:21 PM          XR CHEST PA OR AP 1 VIEW   Final Result   No focal infiltrate..      Electronically Signed by: WALE EPPS MD    Signed on: 3/6/2023 2:48 PM          NM ABSCESS INFLAMMATION WHOLE BODY SINGLE DAY    (Results Pending)       Assessment  Abnormal LFTs  Most likely multifactorial: bacteremia/sepsis, cardiac, meds, etc.    PLAN  Supportive care  Monitor LFTs and INR trend    GI service will follow peripherally    Enrike Moreno MD  [facility]   [phone]  3/11/2023

## 2025-01-30 NOTE — ASSESSMENT & PLAN NOTE
Patient is currently treated for acid reflux by GI  Likely the etiology of her abdominal pain  Continue with Protonix twice daily

## 2025-01-30 NOTE — H&P
H&P - Hospitalist   Name: Sara Law 59 y.o. female I MRN: [id number]  Unit/Bed#: ED-21 I Date of Admission: 1/29/2025   Date of Service: 1/30/2025 I Hospital Day: 0     Assessment & Plan  Right upper quadrant abdominal pain  Pain starter on 1/29/2025 in right upper quadrant and per patient report moved to the epigastric area, reports nausea and 1 episode of diarrhea at home.  The patient has a history of idiopathic acute pancreatitis without infection or necrosis, history of colon polyps and GERD.  Discharge in the month of October due to acute pancreatitis  Patient reports the pain felt the same as acute pancreatitis so decided to seek further evaluation.  Last colonoscopy showed 3 small polyps, hemorrhoids and diverticulosis, EGD/EUS on December 2024 was obtained due to acute recurrent pancreatitis and showed grade a esophagitis with a 4 mm duodenal polyp which was biopsied results were negative, EUS showed moderate fatty infiltration  CT abdomen on admission is negative for any acute findings  Blood work is unremarkable  On assessment the patient is in very good spirits, hemodynamically stable, reports right upper quadrant moderate/severe, states Dilaudid works for her  Etiology of right upper quadrant could be related to acid reflux  Control pain  Continue with Protonix  Clear liquid diet  GI consult    Gastroesophageal reflux disease  Patient is currently treated for acid reflux by GI  Likely the etiology of her abdominal pain  Continue with Protonix twice daily  Cigarette nicotine dependence with nicotine-induced disorder  Continues smoking cigarettes  Education on the importance of smoking cessation was provided at bedside greater than 3 minutes  NRT ordered  Anxiety and depression  On Wellbutrin, BuSpar, Cymbalta and Atarax daily, continue  Stable per patient report    VTE Pharmacologic Prophylaxis:   Moderate Risk (Score 3-4) - Pharmacological DVT Prophylaxis Ordered: enoxaparin (Lovenox).  Code  Status: Level 1 - Full Code   Discussion with family: Patient declined call to .     Anticipated Length of Stay: Patient will be admitted on an observation basis with an anticipated length of stay of less than 2 midnights secondary to abdominal pain, pain control and GI consult.    History of Present Illness   Chief Complaint: Right upper quadrant pain    Sara Law is a 59 y.o. female with a PMH of anxiety, back pain, smoker, GERD, idiopathic pancreatitis who presents with abdominal pain.  She reports she was at home when suddenly fell a moderate/severe pain in right upper quadrant.  Patient states she feels care because he feels like the last 2 occasions she was diagnosed with acute pancreatitis.  The patient follow-up with GI as outpatient.  CT abdomen is negative for any acute findings.  The patient has been admitted under observation for further management and treatment.    Review of Systems   Constitutional:  Negative for chills and fever.   HENT:  Negative for ear pain and sore throat.    Eyes:  Negative for pain and visual disturbance.   Respiratory:  Negative for cough and shortness of breath.    Cardiovascular:  Negative for chest pain and palpitations.   Gastrointestinal:  Positive for abdominal pain, diarrhea and nausea. Negative for vomiting.   Genitourinary:  Negative for dysuria and hematuria.   Musculoskeletal:  Negative for arthralgias and back pain.   Skin:  Negative for color change and rash.   Neurological:  Negative for seizures and syncope.   All other systems reviewed and are negative.      Historical Information   Past Medical History:   Diagnosis Date    Anxiety     Back pain     Depression     GERD (gastroesophageal reflux disease)     SOB (shortness of breath)     Vertigo      Past Surgical History:   Procedure Laterality Date    CERVICAL DISC SURGERY      CHOLECYSTECTOMY      CHOLECYSTECTOMY      COLONOSCOPY      FOOT SURGERY      LAPAROTOMY N/A 02/02/2021     Procedure: LAPAROTOMY FOR SPINAL SURGERY;  Surgeon: Edison Glynn MD;  Location: [facility];  Service: General    LUMBAR FUSION N/A 02/02/2021    Procedure: Anterior lumbar interbody fusion L5-S1; Posterior lumbar laminectomy, decompression, instrumented fusion L5-S1 with allograft and neuromonitoring;  Surgeon: Aries Rivera MD;  Location: [facility];  Service: Orthopedics    RI EGD TRANSORAL BIOPSY SINGLE/MULTIPLE N/A 04/20/2016    Procedure: ESOPHAGOGASTRODUODENOSCOPY (EGD);  Surgeon: Darius Barr MD;  Location: [facility];  Service: Gastroenterology    THYROID LOBECTOMY N/A 07/03/2023    Procedure: LEFT THYROID LOBECTOMY;  Surgeon: Aishwarya Field MD;  Location: [facility];  Service: Surgical Oncology    TONSILLECTOMY      TUBAL LIGATION      UPPER GASTROINTESTINAL ENDOSCOPY      US GUIDED THYROID BIOPSY  05/30/2023     Social History     Tobacco Use    Smoking status: Every Day     Current packs/day: 0.50     Types: Cigarettes     Passive exposure: Current    Smokeless tobacco: Never    Tobacco comments:     started patch 1/2021, still using 3/4 ppd.    Vaping Use    Vaping status: Never Used   Substance and Sexual Activity    Alcohol use: Not Currently     Comment: 0    Drug use: Not Currently     Types: Oxycodone, Methamphetamines     Comment: 7/18/2020 last use    Sexual activity: Not Currently     E-Cigarette/Vaping    E-Cigarette Use Never User      E-Cigarette/Vaping Substances    Nicotine No     THC No     CBD No     Flavoring No     Other No     Unknown No      Family History   Problem Relation Age of Onset    Aneurysm Mother     Hypertension Mother     Heart attack Father     Aneurysm Sister     Aneurysm Brother     Mental illness Son      Social History:  Marital Status: /Civil Union   Occupation: Works  Patient Pre-hospital Living Situation: Home  Patient Pre-hospital Level of Mobility: walks  Patient Pre-hospital Diet Restrictions: None    Meds/Allergies   I have reviewed home  medications with patient personally.  Prior to Admission medications    Medication Sig Start Date End Date Taking? Authorizing Provider   buPROPion (WELLBUTRIN XL) 300 mg 24 hr tablet Take 1 tablet (300 mg total) by mouth daily 10/31/24   Cipriano White MD   busPIRone (BUSPAR) 10 mg tablet Take 1 tablet (10 mg total) by mouth 3 (three) times a day 1/28/25   Cipriano White MD   DULoxetine (CYMBALTA) 20 mg capsule Take 1 capsule (20 mg total) by mouth daily 10/31/24   Cipriano White MD   fenofibrate (TRICOR) 145 mg tablet Take 1 tablet (145 mg total) by mouth daily 11/20/24   Cipriano White MD   hydrOXYzine HCL (ATARAX) 50 mg tablet Take 1 tablet (50 mg total) by mouth daily at bedtime 11/20/24   Cipriano White MD   pantoprazole (PROTONIX) 40 mg tablet Take 1 tablet (40 mg total) by mouth 2 (two) times a day 12/2/24 5/31/25  Colleen Rueda MD   traZODone (DESYREL) 300 MG tablet Take 1 tablet (300 mg total) by mouth daily at bedtime 10/31/24   Cipriano White MD     No Known Allergies    Objective :  Temp:  [98.3 °F (36.8 °C)] 98.3 °F (36.8 °C)  HR:  [61-95] 61  BP: (122-143)/(58-81) 123/58  Resp:  [17-18] 18  SpO2:  [96 %] 96 %  O2 Device: None (Room air)    Physical Exam  Vitals and nursing note reviewed.   Constitutional:       General: She is not in acute distress.     Appearance: Normal appearance. She is well-developed. She is obese.   HENT:      Head: Normocephalic and atraumatic.      Mouth/Throat:      Mouth: Mucous membranes are moist.      Pharynx: Oropharynx is clear.   Eyes:      Conjunctiva/sclera: Conjunctivae normal.      Pupils: Pupils are equal, round, and reactive to light.   Cardiovascular:      Rate and Rhythm: Normal rate and regular rhythm.      Heart sounds: No murmur heard.  Pulmonary:      Effort: Pulmonary effort is normal. No respiratory distress.      Breath sounds: Normal breath sounds.   Abdominal:      General: Bowel sounds are normal.      Palpations: Abdomen is soft.      Tenderness: There is no abdominal  tenderness.   Musculoskeletal:         General: No swelling.      Cervical back: Neck supple.   Skin:     General: Skin is warm and dry.      Capillary Refill: Capillary refill takes less than 2 seconds.   Neurological:      General: No focal deficit present.      Mental Status: She is alert and oriented to person, place, and time. Mental status is at baseline.   Psychiatric:         Mood and Affect: Mood normal.          Lines/Drains:            Lab Results: I have reviewed the following results:  Results from last 7 days   Lab Units 01/29/25  1740   WBC Thousand/uL 10.06   HEMOGLOBIN g/dL 13.4   HEMATOCRIT % 41.3   PLATELETS Thousands/uL 288   SEGS PCT % 61   LYMPHO PCT % 33   MONO PCT % 5   EOS PCT % 1     Results from last 7 days   Lab Units 01/29/25  1740   SODIUM mmol/L 139   POTASSIUM mmol/L 4.5   CHLORIDE mmol/L 106   CO2 mmol/L 27   BUN mg/dL 18   CREATININE mg/dL 1.27   ANION GAP mmol/L 6   CALCIUM mg/dL 9.3   ALBUMIN g/dL 4.0   TOTAL BILIRUBIN mg/dL 0.27   ALK PHOS U/L 41   ALT U/L 11   AST U/L 16   GLUCOSE RANDOM mg/dL 110             Lab Results   Component Value Date    HGBA1C 5.6 03/30/2023    HGBA1C 5.5 01/18/2021           Imaging Results Review: I personally reviewed the following image studies/reports in PACS and discussed pertinent findings with Radiology: CT abdomen/pelvis. My interpretation of the radiology images/reports is: Negative for any acute finding.  Other Study Results Review: EKG was reviewed.     Administrative Statements   I have spent a total time of 45 minutes in caring for this patient on the day of the visit/encounter including Diagnostic results, Prognosis, Risks and benefits of tx options, Instructions for management, Patient and family education, Importance of tx compliance, Risk factor reductions, Impressions, Counseling / Coordination of care, Documenting in the medical record, Reviewing / ordering tests, medicine, procedures  , Obtaining or reviewing history  , and  Communicating with other healthcare professionals .    ** Please Note: This note has been constructed using a voice recognition system. **

## 2025-01-30 NOTE — UTILIZATION REVIEW
Initial Clinical Review    Admission: Date/Time/Statement:   Admission Orders (From admission, onward)       Ordered        01/29/25 2272  Place in Observation  Once                          Orders Placed This Encounter   Procedures    Place in Observation     Standing Status:   Standing     Number of Occurrences:   1     Level of Care:   Med Surg [16]     ED Arrival Information       Expected   -    Arrival   1/29/2025 17:04    Acuity   Urgent              Means of arrival   Walk-In    Escorted by   Self    Service   Hospitalist    Admission type   Emergency              Arrival complaint   Flank Pain / Nausea / Rapid Heart Rate             Chief Complaint   Patient presents with    Abdominal Pain     Pt reports upper abdominal pain starting today. Hx of pancreatitis. Reporting heart racing, nausea, loose stools. Denies vomiting, chest pain.        Initial Presentation: 59 y.o. female  with a PMH of anxiety, back pain, smoker, GERD, idiopathic pancreatitis who presents with abdominal pain. She reports she was at home when suddenly fell a moderate/severe pain in right upper quadrant. Patient states she feels care because he feels like the last 2 occasions she was diagnosed with acute pancreatitis. CT abdomen is negative for any acute findings. Plan: Observation for RUQ abd pain, GERD, tobacco abuse, anxiety/depression: pain management, Protonix, clear liquid diet, GI consult, NRT ordered.     1/30:    GI consult: Okay to advance diet at this time. Will see how she does with increased p.o. intake. Continue PPI bid. Would hold off on EGD at this time in the setting of recent endoscopic ultrasound which looked relatively unremarkable.     Internal medicine: RUQ abd pain - suspicious for PUD/esophagitis. However, recent EUS without any obvious ulcers. GI following. Trial of carafate with meals. Advance diet as tolerated. Cont PPI, no biliary pathology noted on imaging.     1/31:    GI: continue bland diet as tolerated.  Continue PPI bid, Carafate prior to meals, Bentyl prn.    ED Treatment-Medication Administration from 01/29/2025 1704 to 01/30/2025 0831         Date/Time Order Dose Route Action     01/29/2025 1938 ondansetron (ZOFRAN) injection 4 mg 4 mg Intravenous Given     01/29/2025 1939 lactated ringers infusion 1,000 mL/hr Intravenous New Bag     01/29/2025 1938 HYDROmorphone (DILAUDID) injection 0.5 mg 0.5 mg Intravenous Given     01/29/2025 2035 iohexol (OMNIPAQUE) 350 MG/ML injection (MULTI-DOSE) 100 mL 100 mL Intravenous Given     01/29/2025 2214 Famotidine (PF) (PEPCID) injection 20 mg 20 mg Intravenous Given     01/29/2025 2214 sucralfate (CARAFATE) tablet 1 g 1 g Oral Given     01/30/2025 0810 buPROPion (WELLBUTRIN XL) 24 hr tablet 300 mg 300 mg Oral Given     01/30/2025 0011 busPIRone (BUSPAR) tablet 10 mg 10 mg Oral Given     01/30/2025 0811 busPIRone (BUSPAR) tablet 10 mg 10 mg Oral Given     01/30/2025 0811 DULoxetine (CYMBALTA) delayed release capsule 20 mg 20 mg Oral Given     01/30/2025 0810 fenofibrate (TRICOR) tablet 145 mg 145 mg Oral Given     01/29/2025 2354 hydrOXYzine HCL (ATARAX) tablet 50 mg 50 mg Oral Given     01/30/2025 0809 pantoprazole (PROTONIX) EC tablet 40 mg 40 mg Oral Given     01/29/2025 2354 traZODone (DESYREL) tablet 300 mg 300 mg Oral Given     01/30/2025 0011 HYDROmorphone (DILAUDID) injection 1 mg 1 mg Intravenous Given     01/30/2025 0436 HYDROmorphone (DILAUDID) injection 1 mg 1 mg Intravenous Given     01/29/2025 2354 nicotine (NICODERM CQ) 21 mg/24 hr TD 24 hr patch 1 patch 1 patch Transdermal Medication Applied     01/30/2025 0811 nicotine (NICODERM CQ) 21 mg/24 hr TD 24 hr patch 1 patch 1 patch Transdermal Patch Removed     01/30/2025 0811 enoxaparin (LOVENOX) subcutaneous injection 40 mg 40 mg Subcutaneous Given     01/30/2025 0605 magnesium sulfate 2 g/50 mL IVPB (premix) 2 g 2 g Intravenous New Bag            Scheduled Medications:  buPROPion, 300 mg, Oral, Daily  busPIRone, 10  mg, Oral, TID  DULoxetine, 20 mg, Oral, Daily  enoxaparin, 40 mg, Subcutaneous, Daily  fenofibrate, 145 mg, Oral, Daily  hydrOXYzine HCL, 50 mg, Oral, HS  nicotine, 1 patch, Transdermal, Daily  pantoprazole, 40 mg, Oral, BID  traZODone, 300 mg, Oral, HS      Continuous IV Infusions:     PRN Meds:  acetaminophen, 975 mg, Oral, Q6H PRN  aluminum-magnesium hydroxide-simethicone, 30 mL, Oral, Q6H PRN  HYDROmorphone, 1 mg, Intravenous, Q4H PRN  methocarbamol, 750 mg, Oral, Q6H PRN  ondansetron, 4 mg, Intravenous, Q6H PRN  polyethylene glycol, 17 g, Oral, Daily PRN      ED Triage Vitals [01/29/25 1732]   Temperature Pulse Respirations Blood Pressure SpO2 Pain Score   98.3 °F (36.8 °C) 95 17 143/81 96 % 7     Weight (last 2 days)       None            Vital Signs (last 3 days)       Date/Time Temp Pulse Resp BP MAP (mmHg) SpO2 O2 Device Patient Position - Orthostatic VS Leticia Coma Scale Score Pain    01/30/25 0815 -- 64 -- 97/52 71 94 % -- -- -- --    01/30/25 0600 -- 60 -- 103/59 79 90 % -- -- -- --    01/30/25 0500 -- 59 -- -- -- 94 % -- -- -- --    01/30/25 0436 -- -- -- -- -- -- -- -- -- 8    01/30/25 0300 -- 65 -- -- -- 91 % -- -- -- --    01/30/25 0208 -- -- -- -- -- -- -- -- 15 --    01/30/25 0000 -- -- -- -- -- -- -- -- -- 7    01/29/25 2353 -- 61 18 123/58 -- 96 % None (Room air) Sitting -- 7 01/29/25 1945 -- 78 -- 122/76 94 96 % -- -- -- --    01/29/25 1938 -- -- -- -- -- -- -- -- -- 8    01/29/25 1732 98.3 °F (36.8 °C) 95 17 143/81 105 96 % None (Room air) Sitting -- 7              Pertinent Labs/Diagnostic Test Results:   Radiology:  CT abdomen pelvis with contrast   Final Interpretation by Cornelio Pina DO (01/29 2114)      No acute findings in the abdomen or pelvis.      Colonic diverticulosis without diverticulitis.      Workstation performed: [id number]         XR chest 1 view portable   ED Interpretation by Dequan Looney MD (01/29 1949)   No acute cardiopulmonary disease.       by Tyson CLARK  MD Perla (01/30 0825)        Cardiology:  No orders to display     GI:  No orders to display           Results from last 7 days   Lab Units 01/30/25  0415 01/29/25  1740   WBC Thousand/uL 8.78 10.06   HEMOGLOBIN g/dL 12.0 13.4   HEMATOCRIT % 37.4 41.3   PLATELETS Thousands/uL 235 288   TOTAL NEUT ABS Thousands/µL 4.11 6.12         Results from last 7 days   Lab Units 01/30/25  0415 01/29/25  1740   SODIUM mmol/L 138 139   POTASSIUM mmol/L 4.1 4.5   CHLORIDE mmol/L 105 106   CO2 mmol/L 28 27   ANION GAP mmol/L 5 6   BUN mg/dL 18 18   CREATININE mg/dL 1.04 1.27   EGFR ml/min/1.73sq m 58 45   CALCIUM mg/dL 8.4 9.3   MAGNESIUM mg/dL 1.7*  --    PHOSPHORUS mg/dL 4.1  --      Results from last 7 days   Lab Units 01/30/25  0415 01/29/25  1740   AST U/L 31 16   ALT U/L 16 11   ALK PHOS U/L 34 41   TOTAL PROTEIN g/dL 5.9* 6.8   ALBUMIN g/dL 3.4* 4.0   TOTAL BILIRUBIN mg/dL 0.44 0.27         Results from last 7 days   Lab Units 01/30/25  0415 01/29/25  1740   GLUCOSE RANDOM mg/dL 101 110           Results from last 7 days   Lab Units 01/29/25  1740   LIPASE u/L 48         Past Medical History:   Diagnosis Date    Anxiety     Back pain     Depression     GERD (gastroesophageal reflux disease)     SOB (shortness of breath)     Vertigo      Present on Admission:   Cigarette nicotine dependence with nicotine-induced disorder   Anxiety and depression   Right upper quadrant abdominal pain   Gastroesophageal reflux disease      Admitting Diagnosis: Abdominal pain [R10.9]  Age/Sex: 59 y.o. female    Network Utilization Review Department  ATTENTION: Please call with any questions or concerns to [phone] and carefully listen to the prompts so that you are directed to the right person. All voicemails are confidential.   For Discharge needs, contact Care Management DC Support Team at [phone] opt. [phone]  Send all requests for admission clinical reviews, approved or denied determinations and any other requests to dedicated fax  number below belonging to the campus where the patient is receiving treatment. List of dedicated fax numbers for the Facilities:  FACILITY NAME UR FAX NUMBER   ADMISSION DENIALS (Administrative/Medical Necessity) [phone]   DISCHARGE SUPPORT TEAM (NETWORK) [phone]   PARENT CHILD HEALTH (Maternity/NICU/Pediatrics) [phone]   [facility] [phone]   [facility] [phone]   [facility] [phone]   [facility] [phone]   [facility] [phone]   General acute hospital [phone]   [facility] [phone]   [facility] [phone]   [facility] [phone]   [facility] [phone]   [facility] [phone]   [facility] [phone]

## 2025-01-30 NOTE — ED PROVIDER NOTES
Time reflects when diagnosis was documented in both MDM as applicable and the Disposition within this note       Time User Action Codes Description Comment    1/29/2025 10:31 PM Dequan Kinney Add [R10.11] Right upper quadrant abdominal pain           ED Disposition       ED Disposition   Admit    Condition   Stable    Date/Time   Wed Jan 29, 2025 10:31 PM    Comment   Case was discussed with MONIQUE and the patient's admission status was agreed to be Admission Status: observation status to the service of Dr. Mendoza .               Assessment & Plan       Medical Decision Making  59-year-old female presenting to the ED for abdominal pain.    Concerning for pancreatitis, gastroenteritis, reflux, biliary colic.  Patient has a past cholecystectomy.    Will get CBC, CMP, lipase, CT abdomen pelvis with contrast.  See ED course for interpretation of results.    Patient's pain managed with Zofran, lactated Ringer's, Dilaudid, Carafate, Pepcid.    Patient endorsed minimal pain relief with different pain regimens.  Patient's labs are not significant for pancreatitis at this time and CT scan does not show obvious inflammation in the region however patient still endorsing significant epigastric pain.    Cussed with Izzyim and while there was no specific finding on labs, will admit for serial exams and pain management.  Patient admitted.    Amount and/or Complexity of Data Reviewed  Labs:  Decision-making details documented in ED Course.  Radiology: ordered and independent interpretation performed.    Risk  Prescription drug management.  Decision regarding hospitalization.        ED Course as of 01/30/25 0106   Wed Jan 29, 2025 1913 CBC and differential  Unremarkable and reassuring   1915 Pain 1pm  Took no meds  RUQ, no radiation    Nausea no vomiting  Mild diarrhea   1916 Gallbladder out  Appendix still in   2128 IMPRESSION:     No acute findings in the abdomen or pelvis.     Colonic diverticulosis without diverticulitis.      Workstation performed: [id number]     2128 Comprehensive metabolic panel  Unremarkable and reassuring   2128 Lipase  Unremarkable and reassuring   2138 Will try oral Carafate and Pepcid       Medications   lactated ringers infusion ( Intravenous Canceled Entry 1/29/25 2349)   buPROPion (WELLBUTRIN XL) 24 hr tablet 300 mg (has no administration in time range)   busPIRone (BUSPAR) tablet 10 mg (10 mg Oral Given 1/30/25 0011)   DULoxetine (CYMBALTA) delayed release capsule 20 mg (has no administration in time range)   fenofibrate (TRICOR) tablet 145 mg (has no administration in time range)   hydrOXYzine HCL (ATARAX) tablet 50 mg (50 mg Oral Given 1/29/25 2354)   pantoprazole (PROTONIX) EC tablet 40 mg (has no administration in time range)   traZODone (DESYREL) tablet 300 mg (300 mg Oral Given 1/29/25 2354)   acetaminophen (TYLENOL) tablet 975 mg (has no administration in time range)   HYDROmorphone (DILAUDID) injection 1 mg (1 mg Intravenous Given 1/30/25 0011)   polyethylene glycol (MIRALAX) packet 17 g (has no administration in time range)   methocarbamol (ROBAXIN) tablet 750 mg (has no administration in time range)   ondansetron (ZOFRAN) injection 4 mg (has no administration in time range)   aluminum-magnesium hydroxide-simethicone (MAALOX) oral suspension 30 mL (has no administration in time range)   nicotine (NICODERM CQ) 21 mg/24 hr TD 24 hr patch 1 patch (1 patch Transdermal Medication Applied 1/29/25 2354)   enoxaparin (LOVENOX) subcutaneous injection 40 mg (has no administration in time range)   ondansetron (ZOFRAN) injection 4 mg (4 mg Intravenous Given 1/29/25 1938)   HYDROmorphone (DILAUDID) injection 0.5 mg (0.5 mg Intravenous Given 1/29/25 1938)   iohexol (OMNIPAQUE) 350 MG/ML injection (MULTI-DOSE) 100 mL (100 mL Intravenous Given 1/29/25 2035)   Famotidine (PF) (PEPCID) injection 20 mg (20 mg Intravenous Given 1/29/25 2214)   sucralfate (CARAFATE) tablet 1 g (1 g Oral Given 1/29/25 2214)       ED Risk  Strat Scores                                              History of Present Illness       Chief Complaint   Patient presents with    Abdominal Pain     Pt reports upper abdominal pain starting today. Hx of pancreatitis. Reporting heart racing, nausea, loose stools. Denies vomiting, chest pain.        Past Medical History:   Diagnosis Date    Anxiety     Back pain     Depression     GERD (gastroesophageal reflux disease)     SOB (shortness of breath)     Vertigo       Past Surgical History:   Procedure Laterality Date    CERVICAL DISC SURGERY      CHOLECYSTECTOMY      CHOLECYSTECTOMY      COLONOSCOPY      FOOT SURGERY      LAPAROTOMY N/A 02/02/2021    Procedure: LAPAROTOMY FOR SPINAL SURGERY;  Surgeon: Edison Glynn MD;  Location: [facility];  Service: General    LUMBAR FUSION N/A 02/02/2021    Procedure: Anterior lumbar interbody fusion L5-S1; Posterior lumbar laminectomy, decompression, instrumented fusion L5-S1 with allograft and neuromonitoring;  Surgeon: Aries Rivera MD;  Location: [facility];  Service: Orthopedics    WY EGD TRANSORAL BIOPSY SINGLE/MULTIPLE N/A 04/20/2016    Procedure: ESOPHAGOGASTRODUODENOSCOPY (EGD);  Surgeon: Darius Barr MD;  Location: [facility];  Service: Gastroenterology    THYROID LOBECTOMY N/A 07/03/2023    Procedure: LEFT THYROID LOBECTOMY;  Surgeon: Aishwarya Field MD;  Location: [facility];  Service: Surgical Oncology    TONSILLECTOMY      TUBAL LIGATION      UPPER GASTROINTESTINAL ENDOSCOPY      US GUIDED THYROID BIOPSY  05/30/2023      Family History   Problem Relation Age of Onset    Aneurysm Mother     Hypertension Mother     Heart attack Father     Aneurysm Sister     Aneurysm Brother     Mental illness Son       Social History     Tobacco Use    Smoking status: Every Day     Current packs/day: 0.50     Types: Cigarettes     Passive exposure: Current    Smokeless tobacco: Never    Tobacco comments:     started patch 1/2021, still using 3/4 ppd.    Vaping Use    Vaping  status: Never Used   Substance Use Topics    Alcohol use: Not Currently     Comment: 0    Drug use: Not Currently     Types: Oxycodone, Methamphetamines     Comment: 7/18/2020 last use      E-Cigarette/Vaping    E-Cigarette Use Never User       E-Cigarette/Vaping Substances    Nicotine No     THC No     CBD No     Flavoring No     Other No     Unknown No       I have reviewed and agree with the history as documented.     59-year-old female with significant history of pancreatitis, obesity, GERD, cholecystectomy presenting to the ED for severe right upper quadrant abdominal pain.  Patient states that pain started at 1 PM today and she took nothing for the pain.  Patient states she has mild nausea but no vomiting and endorses mild diarrhea.  Patient states that this pain is sharp and worse on palpation and is very similar to the pain she had when she was last seen for pancreatitis.  Of note last time she had pancreatitis her lipase was normal but CT scan was significant for inflammation around the pancreas.  Patient is denying fever, chills at this time.        Review of Systems   Constitutional:  Negative for chills and fever.   HENT:  Negative for congestion and rhinorrhea.    Eyes:  Negative for visual disturbance.   Respiratory:  Negative for chest tightness and shortness of breath.    Cardiovascular:  Negative for chest pain and palpitations.   Gastrointestinal:  Positive for abdominal pain, diarrhea and nausea. Negative for blood in stool and vomiting.   Endocrine: Negative for polyuria.   Genitourinary:  Negative for dysuria.   Musculoskeletal:  Negative for back pain and myalgias.   Skin:  Negative for color change.   Neurological:  Negative for dizziness, weakness and headaches.   Psychiatric/Behavioral:  Negative for agitation and confusion.            Objective       ED Triage Vitals [01/29/25 1732]   Temperature Pulse Blood Pressure Respirations SpO2 Patient Position - Orthostatic VS   98.3 °F (36.8 °C) 95  143/81 17 96 % Sitting      Temp Source Heart Rate Source BP Location FiO2 (%) Pain Score    Oral Monitor Left arm -- 7      Vitals      Date and Time Temp Pulse SpO2 Resp BP Pain Score FACES Pain Rating User   01/30/25 0000 -- -- -- -- -- 7 -- DB   01/29/25 2353 -- 61 96 % 18 123/58 7 -- DB   01/29/25 1945 -- 78 96 % -- 122/76 -- -- KL   01/29/25 1938 -- -- -- -- -- 8 -- KL   01/29/25 1732 98.3 °F (36.8 °C) 95 96 % 17 143/81 7 -- RL            Physical Exam  Constitutional:       Appearance: She is obese.   HENT:      Head: Normocephalic and atraumatic.      Mouth/Throat:      Mouth: Mucous membranes are moist.   Eyes:      Extraocular Movements: Extraocular movements intact.   Cardiovascular:      Rate and Rhythm: Normal rate.      Heart sounds: Normal heart sounds.   Pulmonary:      Effort: Pulmonary effort is normal.      Breath sounds: Normal breath sounds.   Abdominal:      General: Bowel sounds are normal.      Palpations: Abdomen is soft.      Tenderness: There is abdominal tenderness in the right upper quadrant. There is no right CVA tenderness, left CVA tenderness, guarding or rebound. Negative signs include Dillon's sign, Rovsing's sign, McBurney's sign and psoas sign.      Hernia: No hernia is present.   Skin:     General: Skin is warm.      Capillary Refill: Capillary refill takes less than 2 seconds.   Neurological:      General: No focal deficit present.      Mental Status: She is alert and oriented to person, place, and time.   Psychiatric:         Mood and Affect: Mood normal.         Behavior: Behavior normal.         Results Reviewed       Procedure Component Value Units Date/Time    Platelet count [[id number]]     Lab Status: No result Specimen: Blood     Comprehensive metabolic panel [[id number]] Collected: 01/29/25 1740    Lab Status: Final result Specimen: Blood from Arm, Left Updated: 01/29/25 1813     Sodium 139 mmol/L      Potassium 4.5 mmol/L      Chloride 106 mmol/L      CO2 27 mmol/L       ANION GAP 6 mmol/L      BUN 18 mg/dL      Creatinine 1.27 mg/dL      Glucose 110 mg/dL      Calcium 9.3 mg/dL      AST 16 U/L      ALT 11 U/L      Alkaline Phosphatase 41 U/L      Total Protein 6.8 g/dL      Albumin 4.0 g/dL      Total Bilirubin 0.27 mg/dL      eGFR 45 ml/min/1.73sq m     Narrative:      National Kid[employer] guidelines for Chronic Kidney Disease (CKD):     Stage 1 with normal or high GFR (GFR > 90 mL/min/1.73 square meters)    Stage 2 Mild CKD (GFR = 60-89 mL/min/1.73 square meters)    Stage 3A Moderate CKD (GFR = 45-59 mL/min/1.73 square meters)    Stage 3B Moderate CKD (GFR = 30-44 mL/min/1.73 square meters)    Stage 4 Severe CKD (GFR = 15-29 mL/min/1.73 square meters)    Stage 5 End Stage CKD (GFR <15 mL/min/1.73 square meters)  Note: GFR calculation is accurate only with a steady state creatinine    Lipase [[id number]]  (Normal) Collected: 01/29/25 1740    Lab Status: Final result Specimen: Blood from Arm, Left Updated: 01/29/25 1813     Lipase 48 u/L     CBC and differential [[id number]] Collected: 01/29/25 1740    Lab Status: Final result Specimen: Blood from Arm, Left Updated: 01/29/25 1749     WBC 10.06 Thousand/uL      RBC 4.38 Million/uL      Hemoglobin 13.4 g/dL      Hematocrit 41.3 %      MCV 94 fL      MCH 30.6 pg      MCHC 32.4 g/dL      RDW 13.1 %      MPV 9.7 fL      Platelets 288 Thousands/uL      nRBC 0 /100 WBCs      Segmented % 61 %      Immature Grans % 0 %      Lymphocytes % 33 %      Monocytes % 5 %      Eosinophils Relative 1 %      Basophils Relative 0 %      Absolute Neutrophils 6.12 Thousands/µL      Absolute Immature Grans 0.02 Thousand/uL      Absolute Lymphocytes 3.31 Thousands/µL      Absolute Monocytes 0.45 Thousand/µL      Eosinophils Absolute 0.13 Thousand/µL      Basophils Absolute 0.03 Thousands/µL             CT abdomen pelvis with contrast   Final Interpretation by Cornelio Pina DO (01/29 2114)      No acute findings in the abdomen or pelvis.       Colonic diverticulosis without diverticulitis.      Workstation performed: [id number]         XR chest 1 view portable   ED Interpretation by Dequan Looney MD (01/29 1949)   No acute cardiopulmonary disease.          ECG 12 Lead Documentation Only    Date/Time: 1/29/2025 7:30 PM    Performed by: Dequan Looney MD  Authorized by: Dequan Looney MD    Indications / Diagnosis:  Abdominal pain  ECG reviewed by me, the ED Provider: yes    Patient location:  ED  Previous ECG:     Previous ECG:  Unavailable  Interpretation:     Interpretation: normal    Rate:     ECG rate:  94    ECG rate assessment: normal    Rhythm:     Rhythm: sinus rhythm    Ectopy:     Ectopy: none    QRS:     QRS axis:  Normal    QRS intervals:  Normal  Conduction:     Conduction: normal    ST segments:     ST segments:  Normal  T waves:     T waves: normal        ED Medication and Procedure Management   Prior to Admission Medications   Prescriptions Last Dose Informant Patient Reported? Taking?   DULoxetine (CYMBALTA) 20 mg capsule   No No   Sig: Take 1 capsule (20 mg total) by mouth daily   buPROPion (WELLBUTRIN XL) 300 mg 24 hr tablet   No No   Sig: Take 1 tablet (300 mg total) by mouth daily   busPIRone (BUSPAR) 10 mg tablet   No No   Sig: Take 1 tablet (10 mg total) by mouth 3 (three) times a day   fenofibrate (TRICOR) 145 mg tablet   No No   Sig: Take 1 tablet (145 mg total) by mouth daily   hydrOXYzine HCL (ATARAX) 50 mg tablet   No No   Sig: Take 1 tablet (50 mg total) by mouth daily at bedtime   pantoprazole (PROTONIX) 40 mg tablet   No No   Sig: Take 1 tablet (40 mg total) by mouth 2 (two) times a day   traZODone (DESYREL) 300 MG tablet   No No   Sig: Take 1 tablet (300 mg total) by mouth daily at bedtime      Facility-Administered Medications: None     Patient's Medications   Discharge Prescriptions    No medications on file     No discharge procedures on file.  ED SEPSIS DOCUMENTATION   Time reflects when diagnosis was  documented in both MDM as applicable and the Disposition within this note       Time User Action Codes Description Comment    1/29/2025 10:31 PM Dequan Looney Add [R10.11] Right upper quadrant abdominal pain                  Dequan Looney MD  01/30/25 0106     No

## 2025-01-30 NOTE — CONSULTS
Consultation - Gastroenterology   Name: Sara Law 59 y.o. female I MRN: [id number]  Unit/Bed#: ED-21 I Date of Admission: 1/29/2025   Date of Service: 1/30/2025 I Hospital Day: 0   Inpatient consult to gastroenterology  Consult performed by: Jalyn Santo PA-C  Consult ordered by: HERBIE Harding        Physician Requesting Evaluation: Benny Aceves MD   Reason for Evaluation / Principal Problem: Right sided abdominal pain    Assessment & Plan  Right upper quadrant abdominal pain  59-year-old female with history of tobacco use, cholecystectomy and previous recurrent pancreatitis of unclear etiology who presents to the emergency room with right-sided abdominal pain x 1 day and diarrhea x 2 days.  CT scan was unremarkable.  Lab work showing normal lipase, CBC and CMP.  Unclear etiology of symptoms, no evidence of pancreatitis on this admission, possible gastritis, bowel spasm.    -Okay to advance diet at this time.  Will see how she does with increased p.o. intake.  - Continue PPI twice daily.  - Start Carafate before meals    -Would hold off on EGD at this time in the setting of recent endoscopic ultrasound which looked relatively unremarkable.    - Patient will try to continue to decreased tobacco use.    -Will consider adding Bentyl if needed    I have discussed the above management plan in detail with the primary service.   Gastroenterology service will follow.    History of Present Illness   HPI:  Sara Law is a 59 y.o. female who presents with history of tobacco use, cholecystectomy, history of recurrent pancreatitis of unclear etiology with recent unremarkable endoscopic ultrasound who presented to the emergency room yesterday 1/29 for recent abdominal pain as well as 2 days of diarrhea.  On arrival CMP, lipase and CBC were unremarkable.  CT scan was performed which was also unremarkable with no signs of acute pancreatitis.    Patient reports she has been doing relatively well however  yesterday had relatively acute onset right sided abdominal pain in the afternoon.  She did not try to take anything for this.  She had some nausea but no vomiting.  No fevers.  She reports she ate after this and this did not make it worse.  She does report 2 days of diarrhea with 2 loose bowel movements a day.  No blood in the stool.  She reports compliance with Protonix 40 mg twice daily.  No alcohol use.  Denies NSAID use.    Patient is still smoking however reports she has been decreasing.  Previously 1 pack a day now about a half a pack a day.  Denies any recent medications, sick contacts or antibiotics    Patient was last seen a few months ago for an episode of recurrent pancreatitis.  Etiology was unclear.  She underwent endoscopic ultrasound 12/2/2024 showing grade a esophagitis, normal stomach.  Endoscopic ultrasound showed moderate fatty infiltration of the pancreas however otherwise no honeycombing or signs of chronic pancreatitis.    Review of Systems   All other systems reviewed and are negative.    I have reviewed the patient's PMH, PSH, Social History, Family History, Meds, and Allergies    Objective :  Temp:  [98.3 °F (36.8 °C)] 98.3 °F (36.8 °C)  HR:  [59-95] 68  BP: ()/(52-81) 95/62  Resp:  [17-18] 18  SpO2:  [90 %-96 %] 95 %  O2 Device: None (Room air)    Physical Exam  Vitals reviewed.   Constitutional:       General: She is not in acute distress.     Appearance: Normal appearance. She is not ill-appearing.   HENT:      Head: Normocephalic and atraumatic.   Eyes:      Conjunctiva/sclera: Conjunctivae normal.   Cardiovascular:      Rate and Rhythm: Normal rate and regular rhythm.      Heart sounds: No murmur heard.  Pulmonary:      Effort: Pulmonary effort is normal. No respiratory distress.      Breath sounds: Normal breath sounds.   Abdominal:      General: Abdomen is flat. There is no distension.      Palpations: Abdomen is soft.      Tenderness: There is abdominal tenderness (some  tenderness in the right mid/upper abdomen). There is no guarding or rebound.   Musculoskeletal:         General: No swelling.      Cervical back: Normal range of motion.      Right lower leg: No edema.      Left lower leg: No edema.   Skin:     General: Skin is warm.      Coloration: Skin is not jaundiced.   Neurological:      General: No focal deficit present.      Mental Status: She is alert and oriented to person, place, and time. Mental status is at baseline.   Psychiatric:         Mood and Affect: Mood normal.         Behavior: Behavior normal.         Lab Results: I have reviewed the following results:

## 2025-01-30 NOTE — ASSESSMENT & PLAN NOTE
Pain starter on 1/29/2025 in right upper quadrant and per patient report moved to the epigastric area, reports nausea and 1 episode of diarrhea at home.  The patient has a history of idiopathic acute pancreatitis without infection or necrosis, history of colon polyps and GERD.  Discharge in the month of October due to acute pancreatitis  Patient reports the pain felt the same as acute pancreatitis so decided to seek further evaluation.  Last colonoscopy showed 3 small polyps, hemorrhoids and diverticulosis, EGD/EUS on December 2024 was obtained due to acute recurrent pancreatitis and showed grade a esophagitis with a 4 mm duodenal polyp which was biopsied results were negative, EUS showed moderate fatty infiltration  CT abdomen on admission is negative for any acute findings  Blood work is unremarkable  On assessment the patient is in very good spirits, hemodynamically stable, reports right upper quadrant moderate/severe, states Dilaudid works for her  Etiology of right upper quadrant could be related to acid reflux  Control pain  Continue with Protonix  Clear liquid diet  GI consult

## 2025-01-30 NOTE — ASSESSMENT & PLAN NOTE
59-year-old female with history of tobacco use, cholecystectomy and previous recurrent pancreatitis of unclear etiology who presents to the emergency room with right-sided abdominal pain x 1 day and diarrhea x 2 days.  CT scan was unremarkable.  Lab work showing normal lipase, CBC and CMP.  Unclear etiology of symptoms, no evidence of pancreatitis on this admission, possible gastritis, bowel spasm.    -Okay to advance diet at this time.  Will see how she does with increased p.o. intake.  - Continue PPI twice daily.  - Start Carafate before meals    -Would hold off on EGD at this time in the setting of recent endoscopic ultrasound which looked relatively unremarkable.    - Patient will try to continue to decreased tobacco use.    -Will consider adding Bentyl if needed

## 2025-01-31 VITALS
OXYGEN SATURATION: 93 % | HEART RATE: 75 BPM | TEMPERATURE: 98.5 F | RESPIRATION RATE: 19 BRPM | SYSTOLIC BLOOD PRESSURE: 109 MMHG | DIASTOLIC BLOOD PRESSURE: 61 MMHG

## 2025-01-31 PROCEDURE — 99232 SBSQ HOSP IP/OBS MODERATE 35: CPT | Performed by: PHYSICIAN ASSISTANT

## 2025-01-31 PROCEDURE — 99239 HOSP IP/OBS DSCHRG MGMT >30: CPT | Performed by: HOSPITALIST

## 2025-01-31 RX ORDER — OXYCODONE HYDROCHLORIDE 10 MG/1
10 TABLET ORAL EVERY 6 HOURS PRN
Refills: 0 | Status: DISCONTINUED | OUTPATIENT
Start: 2025-01-31 | End: 2025-01-31 | Stop reason: HOSPADM

## 2025-01-31 RX ORDER — SUCRALFATE 1 G/1
1 TABLET ORAL
Qty: 28 TABLET | Refills: 0 | Status: SHIPPED | OUTPATIENT
Start: 2025-01-31 | End: 2025-02-07

## 2025-01-31 RX ORDER — OXYCODONE HYDROCHLORIDE 5 MG/1
5 TABLET ORAL EVERY 6 HOURS PRN
Qty: 12 TABLET | Refills: 0 | Status: SHIPPED | OUTPATIENT
Start: 2025-01-31 | End: 2025-02-04

## 2025-01-31 RX ORDER — OXYCODONE HYDROCHLORIDE 5 MG/1
5 TABLET ORAL EVERY 6 HOURS PRN
Refills: 0 | Status: DISCONTINUED | OUTPATIENT
Start: 2025-01-31 | End: 2025-01-31 | Stop reason: HOSPADM

## 2025-01-31 RX ADMIN — PANTOPRAZOLE SODIUM 40 MG: 40 TABLET, DELAYED RELEASE ORAL at 09:16

## 2025-01-31 RX ADMIN — HYDROMORPHONE HYDROCHLORIDE 1 MG: 1 INJECTION, SOLUTION INTRAMUSCULAR; INTRAVENOUS; SUBCUTANEOUS at 09:16

## 2025-01-31 RX ADMIN — HYDROMORPHONE HYDROCHLORIDE 1 MG: 1 INJECTION, SOLUTION INTRAMUSCULAR; INTRAVENOUS; SUBCUTANEOUS at 03:49

## 2025-01-31 RX ADMIN — OXYCODONE HYDROCHLORIDE 10 MG: 10 TABLET ORAL at 11:38

## 2025-01-31 RX ADMIN — BUSPIRONE HYDROCHLORIDE 10 MG: 10 TABLET ORAL at 09:16

## 2025-01-31 RX ADMIN — FENOFIBRATE 145 MG: 145 TABLET ORAL at 09:16

## 2025-01-31 RX ADMIN — BUPROPION HYDROCHLORIDE 300 MG: 150 TABLET, EXTENDED RELEASE ORAL at 09:16

## 2025-01-31 RX ADMIN — SUCRALFATE 1 G: 1 TABLET ORAL at 09:16

## 2025-01-31 RX ADMIN — ONDANSETRON 4 MG: 2 INJECTION INTRAMUSCULAR; INTRAVENOUS at 09:16

## 2025-01-31 RX ADMIN — SUCRALFATE 1 G: 1 TABLET ORAL at 11:37

## 2025-01-31 RX ADMIN — BUSPIRONE HYDROCHLORIDE 10 MG: 10 TABLET ORAL at 15:02

## 2025-01-31 RX ADMIN — METHOCARBAMOL TABLETS 750 MG: 750 TABLET, COATED ORAL at 15:02

## 2025-01-31 RX ADMIN — ENOXAPARIN SODIUM 40 MG: 40 INJECTION SUBCUTANEOUS at 09:17

## 2025-01-31 RX ADMIN — NICOTINE 1 PATCH: 21 PATCH, EXTENDED RELEASE TRANSDERMAL at 09:17

## 2025-01-31 RX ADMIN — SUCRALFATE 1 G: 1 TABLET ORAL at 15:02

## 2025-01-31 RX ADMIN — DULOXETINE 20 MG: 20 CAPSULE, DELAYED RELEASE ORAL at 11:38

## 2025-01-31 RX ADMIN — ACETAMINOPHEN 975 MG: 325 TABLET, FILM COATED ORAL at 15:01

## 2025-01-31 NOTE — DISCHARGE INSTR - AVS FIRST PAGE
Dear Sara Law,     It was our pleasure to care for you here at [facility].  It is our hope that we were always able to exceed the expected standards for your care during your stay.  You were hospitalized due to abdominal pain, possibly due to gastroenteritis.  You were cared for on the 3rd floor under the service of Nasir Ramirez MD with the [facility] who covers for your primary care physician (PCP), Cipriano White MD, while you were hospitalized.  If you have any questions or concerns related to this hospitalization, you may contact us at .  For follow up as well as medication refills, we recommend that you follow up with your primary care physician.  A registered nurse will reach out to you by phone within a few days after your discharge to answer any additional questions that you may have after going home.  However, at this time we provide for you here, the most important instructions / recommendations at discharge:     Notable Medication Adjustments -   Continue pantoprazole twice daily.   Add sucralfate 4x daily with meals and before bed  Oxycodone 5mg as needed every 6 hours for pain not controlled with tylenol   Testing Required after Discharge -   None   ** Please contact your PCP to request testing orders for any of the testing recommended here **  Important follow up information -   Follow-up with your PCP to ensure resolution of symptoms   Other Instructions -   None   Please review this entire after visit summary as additional general instructions including medication list, appointments, activity, diet, any pertinent wound care, and other additional recommendations from your care team that may be provided for you.      Sincerely,     Nasir Ramirez MD

## 2025-01-31 NOTE — ASSESSMENT & PLAN NOTE
Continues smoking cigarettes  Education on the importance of smoking cessation was provided at bedside greater than 3 minutes  NRT ordered

## 2025-01-31 NOTE — PROGRESS NOTES
Progress Note - Gastroenterology   Name: Sara Law 59 y.o. female I MRN: [id number]  Unit/Bed#: S -01 I Date of Admission: 1/29/2025   Date of Service: 1/31/2025 I Hospital Day: 0    Assessment & Plan  Right upper quadrant abdominal pain  59-year-old female with history of tobacco use, cholecystectomy and previous recurrent pancreatitis of unclear etiology who presents to the emergency room with right-sided abdominal pain x 1 day and diarrhea x 2 days.  CT scan was unremarkable.  Lab work showing normal lipase, CBC and CMP on arrival. Possible infectious etiology in the setting of symptoms of diarrhea, nausea, possible gastritis, no signs of pancreatitis.    -Continue diet as tolerated.  - Continue PPI twice daily.  - Continue short course of Carafate before meals.  - Continue Bentyl as needed    -Continue bland diet, small meals.  - Patient will likely be discharged this afternoon.  - Advised patient to reach out to us with any worsening symptoms.  Discussed ER precautions symptoms.        Subjective   Patient reports doing okay today.  She does report some nausea for which she took nausea medicine.  She tolerated her breakfast.  Reports pain is improving.  She is being transition from IV medications to oral medicines and hopeful for discharge this afternoon.    Objective :  Temp:  [97.9 °F (36.6 °C)-99.4 °F (37.4 °C)] 99.4 °F (37.4 °C)  HR:  [62-74] 69  BP: ()/(58-80) 119/64  Resp:  [16-19] 19  SpO2:  [88 %-97 %] 88 %  O2 Device: None (Room air)    Physical Exam  Vitals reviewed.   Constitutional:       General: She is not in acute distress.     Appearance: Normal appearance. She is not ill-appearing.   HENT:      Head: Normocephalic and atraumatic.   Eyes:      Conjunctiva/sclera: Conjunctivae normal.   Cardiovascular:      Rate and Rhythm: Normal rate and regular rhythm.      Heart sounds: No murmur heard.  Pulmonary:      Effort: Pulmonary effort is normal. No respiratory distress.       Breath sounds: Normal breath sounds.   Abdominal:      General: Abdomen is flat. There is no distension.      Palpations: Abdomen is soft.      Tenderness: There is abdominal tenderness (mild tenderness with palpation in RUQ/epigastric area). There is no guarding or rebound.   Musculoskeletal:         General: No swelling.      Cervical back: Normal range of motion.      Right lower leg: No edema.      Left lower leg: No edema.   Skin:     General: Skin is warm.      Coloration: Skin is not jaundiced.   Neurological:      General: No focal deficit present.      Mental Status: She is alert and oriented to person, place, and time. Mental status is at baseline.   Psychiatric:         Mood and Affect: Mood normal.         Behavior: Behavior normal.         Lab Results: I have reviewed the following results:

## 2025-01-31 NOTE — ED ATTENDING ATTESTATION
1/29/2025  ILeroy DO, saw and evaluated the patient. I have discussed the patient with the resident/non-physician practitioner and agree with the resident's/non-physician practitioner's findings, Plan of Care, and MDM as documented in the resident's/non-physician practitioner's note, except where noted. All available labs and Radiology studies were reviewed.  I was present for key portions of any procedure(s) performed by the resident/non-physician practitioner and I was immediately available to provide assistance.       At this point I agree with the current assessment done in the Emergency Department.  I have conducted an independent evaluation of this patient a history and physical is as follows:    60 yo F coming in for eval of mid-upper abdominal pain starting around 1 pm today, with associated nausea. She's had pancreatitis before, and symptoms were similar.    PE:  The patient is well appearing, non-toxic, in NAD. Head: normocephalic, atraumatic. HEENT: mucous membranes moist.  Lungs: CTA b/l, no resp distress. Heart: RRR. No M/R/G.   Abdomen: epigastric and periumbilical abdominal tenderness, ND, no R/R/G.   Neuro: CN2-12 intact, GCS 15. Normal strength and sensation, normal speech and gait. Cap refill < 2 sec, skin warm and dry. No rashes or lesions.    A/P/MDM:  ED workup for ddx including pancreatitis, PUD, choledocolithiasis, appy unremarkable, labs and CT negative. Symptoms not improved w/ meds however. Admit for intractable abdominal pain, likely PUD/gastriitis.      ED Course         Critical Care Time  Procedures

## 2025-01-31 NOTE — ASSESSMENT & PLAN NOTE
59-year-old female with history of tobacco use, cholecystectomy and previous recurrent pancreatitis of unclear etiology who presents to the emergency room with right-sided abdominal pain x 1 day and diarrhea x 2 days.  CT scan was unremarkable.  Lab work showing normal lipase, CBC and CMP on arrival. Possible infectious etiology in the setting of symptoms of diarrhea, nausea, possible gastritis, no signs of pancreatitis.    -Continue diet as tolerated.  - Continue PPI twice daily.  - Continue short course of Carafate before meals.  - Continue Bentyl as needed    -Continue bland diet, small meals.  - Patient will likely be discharged this afternoon.  - Advised patient to reach out to us with any worsening symptoms.  Discussed ER precautions symptoms.

## 2025-01-31 NOTE — DISCHARGE SUMMARY
Discharge Summary - Hospitalist   Name: Sara Law 59 y.o. female I MRN: [id number]  Unit/Bed#: S -01 I Date of Admission: 1/29/2025   Date of Service: 1/31/2025 I Hospital Day: 0     Assessment & Plan  Right upper quadrant abdominal pain  Pain starter on 1/29/2025 in right upper quadrant and per patient report moved to the epigastric area, reports nausea and 1 episode of diarrhea at home.  The patient has a history of idiopathic acute pancreatitis without infection or necrosis, history of colon polyps and GERD.  Discharge in the month of October due to acute pancreatitis  Patient reports the pain felt the same as acute pancreatitis so decided to seek further evaluation.  Last colonoscopy showed 3 small polyps, hemorrhoids and diverticulosis, EGD/EUS on December 2024 was obtained due to acute recurrent pancreatitis and showed grade a esophagitis with a 4 mm duodenal polyp which was biopsied results were negative, EUS showed moderate fatty infiltration  CT abdomen on admission is negative for any acute findings   Etiology of right upper quadrant could be related to acid reflux vs gastroenteritis   Continue with Protonix, carafate and short course of oral opioids on dc.   GI consult; no further inpt w/u     Gastroesophageal reflux disease  Patient is currently treated for acid reflux by GI  Continue with Protonix twice daily  Cigarette nicotine dependence with nicotine-induced disorder  Continues smoking cigarettes  Education on the importance of smoking cessation was provided at bedside greater than 3 minutes  NRT ordered  Anxiety and depression  On Wellbutrin, BuSpar, Cymbalta and Atarax daily, continue     Medical Problems       Resolved Problems  Date Reviewed: 10/31/2024   None       Discharging Physician / Practitioner: Nasir Ramirez MD  PCP: Cipriano White MD  Admission Date:   Admission Orders (From admission, onward)       Ordered        01/29/25 2232  Place in Observation  Once                           Discharge Date: 01/31/25    Consultations During Hospital Stay:  GI     Procedures Performed:   None     Significant Findings / Test Results:   CT abd/pelvis:  IMPRESSION:  No acute findings in the abdomen or pelvis.  Colonic diverticulosis without diverticulitis.    Incidental Findings:   None      Test Results Pending at Discharge (will require follow up):   None      Outpatient Tests Requested:  None     Complications:  none     Reason for Admission: abd pain     Hospital Course:   Sara Law is a 59 y.o. female patient history of pancreatitis, GERD who originally presented to the hospital on 1/29/2025 due to right upper quadrant abdominal pain with 2 days of loose stool.  Patient felt symptoms were similar to her last episode of pancreatitis and she presented for further evaluation.  Patient was compliant with her oral PPI as an outpatient.  CT imaging on admission did not show any acute findings.  Patient was started on PPI twice daily along with Carafate, Bentyl and oral opioids.  She reported gradual resolution of pain.  No clear etiology but suspect mild gastroenteritis given concurrent diarrhea.  Patient to follow-up with GI as needed.    Condition at Discharge: fair    Discharge Day Visit / Exam:   Subjective: Continues to have mild right upper quadrant pain with nausea but overall improved.  Tolerated oral diet  Vitals: Blood Pressure: 109/61 (01/31/25 1441)  Pulse: 75 (01/31/25 1441)  Temperature: 98.5 °F (36.9 °C) (01/31/25 1441)  Temp Source: Oral (01/31/25 0742)  Respirations: 19 (01/31/25 0742)  SpO2: 93 % (01/31/25 1441)  Physical Exam  Vitals and nursing note reviewed.   Constitutional:       General: She is not in acute distress.     Appearance: Normal appearance. She is not ill-appearing or toxic-appearing.   Cardiovascular:      Rate and Rhythm: Normal rate and regular rhythm.   Abdominal:      Tenderness: There is abdominal tenderness (generalized). There is no guarding or rebound.    Musculoskeletal:      Right lower leg: No edema.      Left lower leg: No edema.   Neurological:      General: No focal deficit present.      Mental Status: She is alert and oriented to person, place, and time.   Psychiatric:         Mood and Affect: Mood normal.         Behavior: Behavior normal.        Discussion with Family: Patient declined call to .     Discharge instructions/Information to patient and family:   See after visit summary for information provided to patient and family.      Provisions for Follow-Up Care:  See after visit summary for information related to follow-up care and any pertinent home health orders.      Mobility at time of Discharge:   Basic Mobility Inpatient Raw Score: 24  JH-HLM Goal: 8: Walk 250 feet or more  JH-HLM Achieved: 7: Walk 25 feet or more  HLM Goal achieved. Continue to encourage appropriate mobility.     Disposition:   Home    Planned Readmission: none     Discharge Medications:  See after visit summary for reconciled discharge medications provided to patient and/or family.      Administrative Statements   Discharge Statement:  I have spent a total time of 32 minutes in caring for this patient on the day of the visit/encounter. >30 minutes of time was spent on: Diagnostic results, Prognosis, Instructions for management, Patient and family education, and Importance of tx compliance.    **Please Note: This note may have been constructed using a voice recognition system**

## 2025-01-31 NOTE — PLAN OF CARE
Problem: PAIN - ADULT  Goal: Verbalizes/displays adequate comfort level or baseline comfort level  Description: Interventions:  - Encourage patient to monitor pain and request assistance  - Assess pain using appropriate pain scale  - Administer analgesics based on type and severity of pain and evaluate response  - Implement non-pharmacological measures as appropriate and evaluate response  - Consider cultural and social influences on pain and pain management  - Notify physician/advanced practitioner if interventions unsuccessful or patient reports new pain  Outcome: Progressing     Problem: INFECTION - ADULT  Goal: Absence or prevention of progression during hospitalization  Description: INTERVENTIONS:  - Assess and monitor for signs and symptoms of infection  - Monitor lab/diagnostic results  - Monitor all insertion sites, i.e. indwelling lines, tubes, and drains  - Monitor endotracheal if appropriate and nasal secretions for changes in amount and color  - [place] appropriate cooling/warming therapies per order  - Administer medications as ordered  - Instruct and encourage patient and family to use good hand hygiene technique  - Identify and instruct in appropriate isolation precautions for identified infection/condition  Outcome: Progressing  Goal: Absence of fever/infection during neutropenic period  Description: INTERVENTIONS:  - Monitor WBC    Outcome: Progressing     Problem: SAFETY ADULT  Goal: Patient will remain free of falls  Description: INTERVENTIONS:  - Educate patient/family on patient safety including physical limitations  - Instruct patient to call for assistance with activity   - Consult OT/PT to assist with strengthening/mobility   - Keep Call bell within reach  - Keep bed low and locked with side rails adjusted as appropriate  - Keep care items and personal belongings within reach  - Initiate and maintain comfort rounds  - Make Fall Risk Sign visible to staff  - Offer Toileting every 2 Hours,  in advance of need  - Initiate/Maintain bed alarm  - Obtain necessary fall risk management equipment: bed alarm  - Apply yellow socks and bracelet for high fall risk patients  - Consider moving patient to room near nurses station  Outcome: Progressing  Goal: Maintain or return to baseline ADL function  Description: INTERVENTIONS:  -  Assess patient's ability to carry out ADLs; assess patient's baseline for ADL function and identify physical deficits which impact ability to perform ADLs (bathing, care of mouth/teeth, toileting, grooming, dressing, etc.)  - Assess/evaluate cause of self-care deficits   - Assess range of motion  - Assess patient's mobility; develop plan if impaired  - Assess patient's need for assistive devices and provide as appropriate  - Encourage maximum independence but intervene and supervise when necessary  - Involve family in performance of ADLs  - Assess for home care needs following discharge   - Consider OT consult to assist with ADL evaluation and planning for discharge  - Provide patient education as appropriate  Outcome: Progressing  Goal: Maintains/Returns to pre admission functional level  Description: INTERVENTIONS:  - Perform AM-PAC 6 Click Basic Mobility/ Daily Activity assessment daily.  - Set and communicate daily mobility goal to care team and patient/family/caregiver.   - Collaborate with rehabilitation services on mobility goals if consulted  - Perform Range of Motion 2 times a day.  - Reposition patient every 2 hours.  - Dangle patient 2 times a day  - Stand patient 2 times a day  - Ambulate patient 3 times a day  - Out of bed to chair 3 times a day   - Out of bed for meals 3 times a day  - Out of bed for toileting  - Record patient progress and toleration of activity level   Outcome: Progressing     Problem: DISCHARGE PLANNING  Goal: Discharge to home or other facility with appropriate resources  Description: INTERVENTIONS:  - Identify barriers to discharge w/patient and  caregiver  - Arrange for needed discharge resources and transportation as appropriate  - Identify discharge learning needs (meds, wound care, etc.)  - Arrange for interpretive services to assist at discharge as needed  - Refer to Case Management Department for coordinating discharge planning if the patient needs post-hospital services based on physician/advanced practitioner order or complex needs related to functional status, cognitive ability, or social support system  Outcome: Progressing     Problem: Knowledge Deficit  Goal: Patient/family/caregiver demonstrates understanding of disease process, treatment plan, medications, and discharge instructions  Description: Complete learning assessment and assess knowledge base.  Interventions:  - Provide teaching at level of understanding  - Provide teaching via preferred learning methods  Outcome: Progressing

## 2025-01-31 NOTE — PLAN OF CARE
Problem: PAIN - ADULT  Goal: Verbalizes/displays adequate comfort level or baseline comfort level  Description: Interventions:  - Encourage patient to monitor pain and request assistance  - Assess pain using appropriate pain scale  - Administer analgesics based on type and severity of pain and evaluate response  - Implement non-pharmacological measures as appropriate and evaluate response  - Consider cultural and social influences on pain and pain management  - Notify physician/advanced practitioner if interventions unsuccessful or patient reports new pain  Outcome: Progressing     Problem: INFECTION - ADULT  Goal: Absence or prevention of progression during hospitalization  Description: INTERVENTIONS:  - Assess and monitor for signs and symptoms of infection  - Monitor lab/diagnostic results  - Monitor all insertion sites, i.e. indwelling lines, tubes, and drains  - Monitor endotracheal if appropriate and nasal secretions for changes in amount and color  - [place] appropriate cooling/warming therapies per order  - Administer medications as ordered  - Instruct and encourage patient and family to use good hand hygiene technique  - Identify and instruct in appropriate isolation precautions for identified infection/condition  Outcome: Progressing  Goal: Absence of fever/infection during neutropenic period  Description: INTERVENTIONS:  - Monitor WBC    Outcome: Progressing     Problem: SAFETY ADULT  Goal: Patient will remain free of falls  Description: INTERVENTIONS:  - Educate patient/family on patient safety including physical limitations  - Instruct patient to call for assistance with activity   - Consult OT/PT to assist with strengthening/mobility   - Keep Call bell within reach  - Keep bed low and locked with side rails adjusted as appropriate  - Keep care items and personal belongings within reach  - Initiate and maintain comfort rounds  - Make Fall Risk Sign visible to staff  - Offer Toileting every 2 Hours,  in advance of need  - Apply yellow socks and bracelet for high fall risk patients  - Consider moving patient to room near nurses station  Outcome: Progressing  Goal: Maintain or return to baseline ADL function  Description: INTERVENTIONS:  -  Assess patient's ability to carry out ADLs; assess patient's baseline for ADL function and identify physical deficits which impact ability to perform ADLs (bathing, care of mouth/teeth, toileting, grooming, dressing, etc.)  - Assess/evaluate cause of self-care deficits   - Assess range of motion  - Assess patient's mobility; develop plan if impaired  - Assess patient's need for assistive devices and provide as appropriate  - Encourage maximum independence but intervene and supervise when necessary  - Involve family in performance of ADLs  - Assess for home care needs following discharge   - Consider OT consult to assist with ADL evaluation and planning for discharge  - Provide patient education as appropriate  Outcome: Progressing  Goal: Maintains/Returns to pre admission functional level  Description: INTERVENTIONS:  - Perform AM-PAC 6 Click Basic Mobility/ Daily Activity assessment daily.  - Set and communicate daily mobility goal to care team and patient/family/caregiver.   - Collaborate with rehabilitation services on mobility goals if consulted  - Perform Range of Motion 3 times a day.  -- Out of bed to chair 3 times a day   - Out of bed for meals 3 times a day  - Out of bed for toileting  - Record patient progress and toleration of activity level   Outcome: Progressing     Problem: DISCHARGE PLANNING  Goal: Discharge to home or other facility with appropriate resources  Description: INTERVENTIONS:  - Identify barriers to discharge w/patient and caregiver  - Arrange for needed discharge resources and transportation as appropriate  - Identify discharge learning needs (meds, wound care, etc.)  - Arrange for interpretive services to assist at discharge as needed  - Refer to  Case Management Department for coordinating discharge planning if the patient needs post-hospital services based on physician/advanced practitioner order or complex needs related to functional status, cognitive ability, or social support system  Outcome: Progressing     Problem: Knowledge Deficit  Goal: Patient/family/caregiver demonstrates understanding of disease process, treatment plan, medications, and discharge instructions  Description: Complete learning assessment and assess knowledge base.  Interventions:  - Provide teaching at level of understanding  - Provide teaching via preferred learning methods  Outcome: Progressing

## 2025-01-31 NOTE — ASSESSMENT & PLAN NOTE
Pain starter on 1/29/2025 in right upper quadrant and per patient report moved to the epigastric area, reports nausea and 1 episode of diarrhea at home.  The patient has a history of idiopathic acute pancreatitis without infection or necrosis, history of colon polyps and GERD.  Discharge in the month of October due to acute pancreatitis  Patient reports the pain felt the same as acute pancreatitis so decided to seek further evaluation.  Last colonoscopy showed 3 small polyps, hemorrhoids and diverticulosis, EGD/EUS on December 2024 was obtained due to acute recurrent pancreatitis and showed grade a esophagitis with a 4 mm duodenal polyp which was biopsied results were negative, EUS showed moderate fatty infiltration  CT abdomen on admission is negative for any acute findings   Etiology of right upper quadrant could be related to acid reflux vs gastroenteritis   Continue with Protonix, carafate and short course of oral opioids on dc.   GI consult; no further inpt w/u

## 2025-02-03 ENCOUNTER — TRANSITIONAL CARE MANAGEMENT (OUTPATIENT)
Dept: FAMILY MEDICINE CLINIC | Facility: CLINIC | Age: 60
End: 2025-02-03

## 2025-03-28 DIAGNOSIS — F41.9 ANXIETY: ICD-10-CM

## 2025-03-28 RX ORDER — BUSPIRONE HYDROCHLORIDE 10 MG/1
10 TABLET ORAL 3 TIMES DAILY
Qty: 90 TABLET | Refills: 2 | Status: SHIPPED | OUTPATIENT
Start: 2025-03-28

## 2025-04-23 ENCOUNTER — VBI (OUTPATIENT)
Dept: ADMINISTRATIVE | Facility: OTHER | Age: 60
End: 2025-04-23

## 2025-04-23 NOTE — TELEPHONE ENCOUNTER
04/23/25 7:34 AM     Chart reviewed for Pap Smear (HPV) aka Cervical Cancer Screening ; nothing is submitted to the patient's insurance at this time.     Marisa Adan   [facility]

## 2025-05-05 DIAGNOSIS — F51.01 PRIMARY INSOMNIA: ICD-10-CM

## 2025-05-05 DIAGNOSIS — F33.2 SEVERE EPISODE OF RECURRENT MAJOR DEPRESSIVE DISORDER, WITHOUT PSYCHOTIC FEATURES (HCC): ICD-10-CM

## 2025-05-05 RX ORDER — DULOXETIN HYDROCHLORIDE 20 MG/1
20 CAPSULE, DELAYED RELEASE ORAL DAILY
Qty: 90 CAPSULE | Refills: 1 | Status: SHIPPED | OUTPATIENT
Start: 2025-05-05

## 2025-05-05 RX ORDER — TRAZODONE HYDROCHLORIDE 300 MG/1
300 TABLET ORAL
Qty: 90 TABLET | Refills: 1 | Status: SHIPPED | OUTPATIENT
Start: 2025-05-05

## 2025-05-07 ENCOUNTER — HOSPITAL ENCOUNTER (EMERGENCY)
Facility: HOSPITAL | Age: 60
Discharge: HOME | End: 2025-05-07
Attending: EMERGENCY MEDICINE
Payer: COMMERCIAL

## 2025-05-07 VITALS
HEART RATE: 76 BPM | SYSTOLIC BLOOD PRESSURE: 159 MMHG | RESPIRATION RATE: 18 BRPM | OXYGEN SATURATION: 96 % | TEMPERATURE: 98.1 F | DIASTOLIC BLOOD PRESSURE: 88 MMHG

## 2025-05-07 DIAGNOSIS — G43.909 MIGRAINE: Primary | ICD-10-CM

## 2025-05-07 DIAGNOSIS — S46.811A TRAPEZIUS MUSCLE STRAIN, RIGHT, INITIAL ENCOUNTER: ICD-10-CM

## 2025-05-07 PROCEDURE — 96375 TX/PRO/DX INJ NEW DRUG ADDON: CPT

## 2025-05-07 PROCEDURE — 96365 THER/PROPH/DIAG IV INF INIT: CPT

## 2025-05-07 PROCEDURE — 99283 EMERGENCY DEPT VISIT LOW MDM: CPT

## 2025-05-07 PROCEDURE — 99284 EMERGENCY DEPT VISIT MOD MDM: CPT

## 2025-05-07 RX ORDER — METOCLOPRAMIDE HYDROCHLORIDE 5 MG/ML
10 INJECTION INTRAMUSCULAR; INTRAVENOUS EVERY 8 HOURS SCHEDULED
Status: DISCONTINUED | OUTPATIENT
Start: 2025-05-07 | End: 2025-05-08 | Stop reason: HOSPADM

## 2025-05-07 RX ORDER — ACETAMINOPHEN 10 MG/ML
1000 INJECTION, SOLUTION INTRAVENOUS ONCE
Status: COMPLETED | OUTPATIENT
Start: 2025-05-07 | End: 2025-05-07

## 2025-05-07 RX ORDER — MAGNESIUM SULFATE HEPTAHYDRATE 40 MG/ML
2 INJECTION, SOLUTION INTRAVENOUS EVERY 24 HOURS
Status: DISCONTINUED | OUTPATIENT
Start: 2025-05-07 | End: 2025-05-08 | Stop reason: HOSPADM

## 2025-05-07 RX ORDER — DIPHENHYDRAMINE HYDROCHLORIDE 50 MG/ML
25 INJECTION, SOLUTION INTRAMUSCULAR; INTRAVENOUS EVERY 8 HOURS SCHEDULED
Status: DISCONTINUED | OUTPATIENT
Start: 2025-05-07 | End: 2025-05-08 | Stop reason: HOSPADM

## 2025-05-07 RX ORDER — SODIUM CHLORIDE 9 MG/ML
100 INJECTION, SOLUTION INTRAVENOUS ONCE
Status: COMPLETED | OUTPATIENT
Start: 2025-05-07 | End: 2025-05-07

## 2025-05-07 RX ADMIN — METOCLOPRAMIDE 10 MG: 5 INJECTION, SOLUTION INTRAMUSCULAR; INTRAVENOUS at 21:11

## 2025-05-07 RX ADMIN — SODIUM CHLORIDE 100 ML/HR: 0.9 INJECTION, SOLUTION INTRAVENOUS at 20:57

## 2025-05-07 RX ADMIN — ACETAMINOPHEN 1000 MG: 10 INJECTION INTRAVENOUS at 21:04

## 2025-05-07 RX ADMIN — DIPHENHYDRAMINE HYDROCHLORIDE 25 MG: 50 INJECTION, SOLUTION INTRAMUSCULAR; INTRAVENOUS at 21:11

## 2025-05-07 RX ADMIN — MAGNESIUM SULFATE HEPTAHYDRATE 2 G: 40 INJECTION, SOLUTION INTRAVENOUS at 21:11

## 2025-05-08 NOTE — ED PROVIDER NOTES
Time reflects when diagnosis was documented in both MDM as applicable and the Disposition within this note       Time User Action Codes Description Comment    5/7/2025 10:20 PM Desire Rachel Add [G43.909] Migraine     5/7/2025 10:20 PM Desire Rachel Add [S46.811A] Trapezius muscle strain, right, initial encounter           ED Disposition       ED Disposition   Discharge    Condition   Stable    Date/Time   Wed May 7, 2025 10:20 PM    Comment   Sara Law discharge to home/self care.                   Assessment & Plan       Medical Decision Making  Patient seen, examined and noted to have migraine and trapezius muscle strain.  Patient has a history of migraines.  Coming on with a headache that has been ongoing for the last 3 days.  States she has been taking Advil which will relieve the pain but once the Advil wears off.  Woke up with some mild right sided neck pain today, feels she may have slept wrong on her pillow last night.  Patient is neurologically intact and has no numbness or tingling.  Migraine cocktail entirely relieved patient's headache.  Supportive care at home was discussed.  Primary care follow-up encouraged.  Return precautions given.    Differential diagnosis includes but is not limited to tension headache, migraine, muscle spasm, neck sprain, doubt acute intracranial pathology     Patient appears well, is nontoxic appearing, she expresses understanding and agrees with plan of care at this time.  In light of this patient would benefit from outpatient management.    Patient at time of discharge well-appearing in no acute distress, all questions answered. Patient agreeable to plan.  Patient's vitals, lab/imaging results, diagnosis, and treatment plan were discussed with the patient. All new/changed medications were discussed with patient, specifically, route of administration, how often and when to take, and where they can be picked up. Strict return precautions as well as close follow  up with PCP was discussed with the patient and the patient was agreeable to my recommendations. Patient verbally acknowledged understanding of the above communications. Strict return precautions were provided. All labs reviewed and utilized in the medical decision making process.    Risk  Prescription drug management.             Medications   sodium chloride 0.9 % infusion (0 mL/hr Intravenous Stopped 5/7/25 2222)   acetaminophen (Ofirmev) injection 1,000 mg (0 mg Intravenous Stopped 5/7/25 2110)       ED Risk Strat Scores                    No data recorded        SBIRT 22yo+      Flowsheet Row Most Recent Value   Initial Alcohol Screen: US AUDIT-C     1. How often do you have a drink containing alcohol? 0 Filed at: 05/07/2025 2030   2. How many drinks containing alcohol do you have on a typical day you are drinking?  0 Filed at: 05/07/2025 2030   3a. Male UNDER 65: How often do you have five or more drinks on one occasion? 0 Filed at: 05/07/2025 2030   3b. FEMALE Any Age, or MALE 65+: How often do you have 4 or more drinks on one occassion? 0 Filed at: 05/07/2025 2030   Audit-C Score 0 Filed at: 05/07/2025 2030   HERMINIA: How many times in the past year have you...    Used an illegal drug or used a prescription medication for non-medical reasons? Never Filed at: 05/07/2025 2030                            History of Present Illness       Chief Complaint   Patient presents with    Headache     Pt arrived c/o HA ongoing x3 days, as well as pain to R side of her neck. States this has happened once or twice in the past. Has taken advil with mild relief. Ambulatory to triage.        Past Medical History:   Diagnosis Date    Anxiety     Back pain     Depression     GERD (gastroesophageal reflux disease)     SOB (shortness of breath)     Vertigo       Past Surgical History:   Procedure Laterality Date    CERVICAL DISC SURGERY      CHOLECYSTECTOMY      CHOLECYSTECTOMY      COLONOSCOPY      FOOT SURGERY      LAPAROTOMY N/A  02/02/2021    Procedure: LAPAROTOMY FOR SPINAL SURGERY;  Surgeon: Edison Glynn MD;  Location: [facility];  Service: General    LUMBAR FUSION N/A 02/02/2021    Procedure: Anterior lumbar interbody fusion L5-S1; Posterior lumbar laminectomy, decompression, instrumented fusion L5-S1 with allograft and neuromonitoring;  Surgeon: Aries Rivera MD;  Location: [facility];  Service: Orthopedics    FL EGD TRANSORAL BIOPSY SINGLE/MULTIPLE N/A 04/20/2016    Procedure: ESOPHAGOGASTRODUODENOSCOPY (EGD);  Surgeon: Darius Barr MD;  Location: [facility];  Service: Gastroenterology    THYROID LOBECTOMY N/A 07/03/2023    Procedure: LEFT THYROID LOBECTOMY;  Surgeon: Aishwarya Field MD;  Location: [facility];  Service: Surgical Oncology    TONSILLECTOMY      TUBAL LIGATION      UPPER GASTROINTESTINAL ENDOSCOPY      US GUIDED THYROID BIOPSY  05/30/2023      Family History   Problem Relation Age of Onset    Aneurysm Mother     Hypertension Mother     Heart attack Father     Aneurysm Sister     Aneurysm Brother     Mental illness Son       Social History     Tobacco Use    Smoking status: Every Day     Current packs/day: 0.50     Types: Cigarettes     Passive exposure: Current    Smokeless tobacco: Never    Tobacco comments:     started patch 1/2021, still using 3/4 ppd.    Vaping Use    Vaping status: Never Used   Substance Use Topics    Alcohol use: Not Currently     Comment: 0    Drug use: Not Currently     Types: Oxycodone, Methamphetamines     Comment: 7/18/2020 last use      E-Cigarette/Vaping    E-Cigarette Use Never User       E-Cigarette/Vaping Substances    Nicotine No     THC No     CBD No     Flavoring No     Other No     Unknown No       I have reviewed and agree with the history as documented.     Sara Law is a 59 y.o. female with a PMH of migraines, pancreatitis, anxiety presenting to the ED on May 8, 2025 with headache. Patient endorses that she has had a headache for the last 3 days.  The headache is  relieved with Advil however once the Advil wears off the headache returns.  Patient also notes that this morning she woke up with some right-sided neck pain.  Is concerned she slept on her pillow wrong.  No trauma to her head or her neck.  History of migraines. Patient denies blurry vision, ear pain, facial pain, numbness, weakness, tingling, chest pain, shortness of breath fevers, chills or any other complaints at this time.             Review of Systems   Constitutional:  Negative for chills and fever.   HENT:  Negative for ear discharge and ear pain.    Eyes:  Positive for photophobia. Negative for visual disturbance.   Respiratory:  Negative for cough and shortness of breath.    Cardiovascular:  Negative for chest pain and palpitations.   Gastrointestinal:  Negative for abdominal pain and vomiting.   Genitourinary:  Negative for dysuria and hematuria.   Musculoskeletal:  Positive for myalgias and neck pain. Negative for arthralgias, back pain, joint swelling and neck stiffness.   Skin:  Negative for color change and rash.   Neurological:  Positive for headaches. Negative for dizziness, seizures, syncope, weakness, light-headedness and numbness.   All other systems reviewed and are negative.          Objective       ED Triage Vitals [05/07/25 2028]   Temperature Pulse Blood Pressure Respirations SpO2 Patient Position - Orthostatic VS   98.1 °F (36.7 °C) 76 159/88 18 96 % Sitting      Temp Source Heart Rate Source BP Location FiO2 (%) Pain Score    Oral Monitor Right arm -- --      Vitals      Date and Time Temp Pulse SpO2 Resp BP Pain Score FACES Pain Rating User   05/07/25 2028 98.1 °F (36.7 °C) 76 96 % 18 159/88 -- -- DW            Physical Exam  Vitals and nursing note reviewed.   Constitutional:       General: She is not in acute distress.     Appearance: Normal appearance. She is normal weight. She is not ill-appearing, toxic-appearing or diaphoretic.   HENT:      Head: Normocephalic and atraumatic.       Right Ear: Tympanic membrane, ear canal and external ear normal. There is no impacted cerumen.      Left Ear: Tympanic membrane, ear canal and external ear normal. There is no impacted cerumen.      Nose: Nose normal. No congestion or rhinorrhea.      Mouth/Throat:      Mouth: Mucous membranes are moist.   Eyes:      General: No scleral icterus.        Right eye: No discharge.         Left eye: No discharge.      Extraocular Movements: Extraocular movements intact.      Conjunctiva/sclera: Conjunctivae normal.   Neck:     Cardiovascular:      Rate and Rhythm: Normal rate and regular rhythm.      Pulses: Normal pulses.      Heart sounds: Normal heart sounds. No murmur heard.     No friction rub. No gallop.   Pulmonary:      Effort: Pulmonary effort is normal. No respiratory distress.      Breath sounds: Normal breath sounds. No wheezing, rhonchi or rales.   Musculoskeletal:         General: No signs of injury. Normal range of motion.      Right shoulder: Tenderness present. No bony tenderness. Normal range of motion.      Left shoulder: Normal.      Cervical back: Normal range of motion and neck supple. Tenderness present. No signs of trauma or rigidity. Normal range of motion.   Skin:     General: Skin is warm.      Capillary Refill: Capillary refill takes less than 2 seconds.      Coloration: Skin is not pale.      Findings: No erythema or rash.   Neurological:      General: No focal deficit present.      Mental Status: She is alert and oriented to person, place, and time. Mental status is at baseline.      Cranial Nerves: No cranial nerve deficit.      Sensory: No sensory deficit.      Motor: No weakness.      Coordination: Coordination normal.      Gait: Gait normal.   Psychiatric:         Mood and Affect: Mood normal.         Behavior: Behavior normal.         Results Reviewed       None            No orders to display       Procedures    ED Medication and Procedure Management   Prior to Admission Medications    Prescriptions Last Dose Informant Patient Reported? Taking?   DULoxetine (CYMBALTA) 20 mg capsule   No No   Sig: Take 1 capsule (20 mg total) by mouth daily   buPROPion (WELLBUTRIN XL) 300 mg 24 hr tablet   No No   Sig: Take 1 tablet (300 mg total) by mouth daily   busPIRone (BUSPAR) 10 mg tablet   No No   Sig: Take 1 tablet (10 mg total) by mouth 3 (three) times a day   fenofibrate (TRICOR) 145 mg tablet   No No   Sig: Take 1 tablet (145 mg total) by mouth daily   hydrOXYzine HCL (ATARAX) 50 mg tablet   No No   Sig: Take 1 tablet (50 mg total) by mouth daily at bedtime   pantoprazole (PROTONIX) 40 mg tablet   No No   Sig: Take 1 tablet (40 mg total) by mouth 2 (two) times a day   sucralfate (CARAFATE) 1 g tablet   No No   Sig: Take 1 tablet (1 g total) by mouth 4 (four) times a day (before meals and at bedtime) for 7 days   traZODone (DESYREL) 300 MG tablet   No No   Sig: Take 1 tablet (300 mg total) by mouth daily at bedtime      Facility-Administered Medications: None     Discharge Medication List as of 5/7/2025 10:22 PM        CONTINUE these medications which have NOT CHANGED    Details   buPROPion (WELLBUTRIN XL) 300 mg 24 hr tablet Take 1 tablet (300 mg total) by mouth daily, Starting Thu 10/31/2024, Normal      busPIRone (BUSPAR) 10 mg tablet Take 1 tablet (10 mg total) by mouth 3 (three) times a day, Starting Fri 3/28/2025, Normal      DULoxetine (CYMBALTA) 20 mg capsule Take 1 capsule (20 mg total) by mouth daily, Starting Mon 5/5/2025, Normal      fenofibrate (TRICOR) 145 mg tablet Take 1 tablet (145 mg total) by mouth daily, Starting Wed 11/20/2024, Normal      hydrOXYzine HCL (ATARAX) 50 mg tablet Take 1 tablet (50 mg total) by mouth daily at bedtime, Starting Wed 11/20/2024, Normal      pantoprazole (PROTONIX) 40 mg tablet Take 1 tablet (40 mg total) by mouth 2 (two) times a day, Starting Mon 12/2/2024, Until Sat 5/31/2025, Normal      sucralfate (CARAFATE) 1 g tablet Take 1 tablet (1 g total) by  mouth 4 (four) times a day (before meals and at bedtime) for 7 days, Starting Fri 1/31/2025, Until Fri 2/7/2025, Normal      traZODone (DESYREL) 300 MG tablet Take 1 tablet (300 mg total) by mouth daily at bedtime, Starting Mon 5/5/2025, Normal           No discharge procedures on file.  ED SEPSIS DOCUMENTATION   Time reflects when diagnosis was documented in both MDM as applicable and the Disposition within this note       Time User Action Codes Description Comment    5/7/2025 10:20 PM Desire Rachel [G43.909] Migraine     5/7/2025 10:20 PM Desire Rachel [S46.811A] Trapezius muscle strain, right, initial encounter                  Desire Rachel PA-C  05/08/25 0660

## 2025-06-04 ENCOUNTER — HOSPITAL ENCOUNTER (INPATIENT)
Facility: HOSPITAL | Age: 60
LOS: 2 days | Discharge: HOME | End: 2025-06-06
Attending: EMERGENCY MEDICINE | Admitting: INTERNAL MEDICINE
Payer: COMMERCIAL

## 2025-06-04 ENCOUNTER — APPOINTMENT (EMERGENCY)
Dept: CT IMAGING | Facility: HOSPITAL | Age: 60
End: 2025-06-04
Payer: COMMERCIAL

## 2025-06-04 DIAGNOSIS — F41.9 ANXIETY: ICD-10-CM

## 2025-06-04 DIAGNOSIS — K21.9 GASTROESOPHAGEAL REFLUX DISEASE: ICD-10-CM

## 2025-06-04 DIAGNOSIS — R10.32 ABDOMINAL CRAMPING, BILATERAL LOWER QUADRANT: ICD-10-CM

## 2025-06-04 DIAGNOSIS — R10.31 ABDOMINAL CRAMPING, BILATERAL LOWER QUADRANT: ICD-10-CM

## 2025-06-04 DIAGNOSIS — K92.2 LOWER GI BLEED: Primary | ICD-10-CM

## 2025-06-04 LAB
ALBUMIN SERPL BCG-MCNC: 4 G/DL (ref 3.5–5)
ALP SERPL-CCNC: 34 U/L (ref 34–104)
ALT SERPL W P-5'-P-CCNC: 13 U/L (ref 7–52)
ANION GAP SERPL CALCULATED.3IONS-SCNC: 9 MMOL/L (ref 4–13)
AST SERPL W P-5'-P-CCNC: 16 U/L (ref 13–39)
BASOPHILS # BLD AUTO: 0.04 THOUSANDS/ÂΜL (ref 0–0.1)
BASOPHILS NFR BLD AUTO: 0 % (ref 0–1)
BILIRUB SERPL-MCNC: 0.31 MG/DL (ref 0.2–1)
BUN SERPL-MCNC: 14 MG/DL (ref 5–25)
CALCIUM SERPL-MCNC: 9.3 MG/DL (ref 8.4–10.2)
CHLORIDE SERPL-SCNC: 106 MMOL/L (ref 96–108)
CO2 SERPL-SCNC: 23 MMOL/L (ref 21–32)
CREAT SERPL-MCNC: 1.18 MG/DL (ref 0.6–1.3)
EOSINOPHIL # BLD AUTO: 0.15 THOUSAND/ÂΜL (ref 0–0.61)
EOSINOPHIL NFR BLD AUTO: 2 % (ref 0–6)
ERYTHROCYTE [DISTWIDTH] IN BLOOD BY AUTOMATED COUNT: 13.1 % (ref 11.6–15.1)
GFR SERPL CREATININE-BSD FRML MDRD: 50 ML/MIN/1.73SQ M
GLUCOSE SERPL-MCNC: 146 MG/DL (ref 65–140)
HCT VFR BLD AUTO: 38.8 % (ref 34.8–46.1)
HCT VFR BLD AUTO: 40.9 % (ref 34.8–46.1)
HGB BLD-MCNC: 12.5 G/DL (ref 11.5–15.4)
HGB BLD-MCNC: 13.2 G/DL (ref 11.5–15.4)
IMM GRANULOCYTES # BLD AUTO: 0.03 THOUSAND/UL (ref 0–0.2)
IMM GRANULOCYTES NFR BLD AUTO: 0 % (ref 0–2)
LACTATE SERPL-SCNC: 1.9 MMOL/L (ref 0.5–2)
LIPASE SERPL-CCNC: 36 U/L (ref 11–82)
LYMPHOCYTES # BLD AUTO: 3.59 THOUSANDS/ÂΜL (ref 0.6–4.47)
LYMPHOCYTES NFR BLD AUTO: 36 % (ref 14–44)
MAGNESIUM SERPL-MCNC: 1.9 MG/DL (ref 1.9–2.7)
MCH RBC QN AUTO: 31.4 PG (ref 26.8–34.3)
MCHC RBC AUTO-ENTMCNC: 32.3 G/DL (ref 31.4–37.4)
MCV RBC AUTO: 97 FL (ref 82–98)
MONOCYTES # BLD AUTO: 0.46 THOUSAND/ÂΜL (ref 0.17–1.22)
MONOCYTES NFR BLD AUTO: 5 % (ref 4–12)
NEUTROPHILS # BLD AUTO: 5.61 THOUSANDS/ÂΜL (ref 1.85–7.62)
NEUTS SEG NFR BLD AUTO: 57 % (ref 43–75)
NRBC BLD AUTO-RTO: 0 /100 WBCS
PLATELET # BLD AUTO: 254 THOUSANDS/UL (ref 149–390)
PMV BLD AUTO: 9.9 FL (ref 8.9–12.7)
POTASSIUM SERPL-SCNC: 3.8 MMOL/L (ref 3.5–5.3)
PROT SERPL-MCNC: 6.6 G/DL (ref 6.4–8.4)
RBC # BLD AUTO: 4.21 MILLION/UL (ref 3.81–5.12)
SODIUM SERPL-SCNC: 138 MMOL/L (ref 135–147)
WBC # BLD AUTO: 9.88 THOUSAND/UL (ref 4.31–10.16)

## 2025-06-04 PROCEDURE — 80053 COMPREHEN METABOLIC PANEL: CPT | Performed by: EMERGENCY MEDICINE

## 2025-06-04 PROCEDURE — 85025 COMPLETE CBC W/AUTO DIFF WBC: CPT | Performed by: EMERGENCY MEDICINE

## 2025-06-04 PROCEDURE — 96375 TX/PRO/DX INJ NEW DRUG ADDON: CPT

## 2025-06-04 PROCEDURE — 96374 THER/PROPH/DIAG INJ IV PUSH: CPT

## 2025-06-04 PROCEDURE — 83735 ASSAY OF MAGNESIUM: CPT | Performed by: EMERGENCY MEDICINE

## 2025-06-04 PROCEDURE — 99285 EMERGENCY DEPT VISIT HI MDM: CPT | Performed by: EMERGENCY MEDICINE

## 2025-06-04 PROCEDURE — 99285 EMERGENCY DEPT VISIT HI MDM: CPT

## 2025-06-04 PROCEDURE — 36415 COLL VENOUS BLD VENIPUNCTURE: CPT | Performed by: EMERGENCY MEDICINE

## 2025-06-04 PROCEDURE — 74177 CT ABD & PELVIS W/CONTRAST: CPT

## 2025-06-04 PROCEDURE — 83690 ASSAY OF LIPASE: CPT | Performed by: EMERGENCY MEDICINE

## 2025-06-04 PROCEDURE — 85014 HEMATOCRIT: CPT | Performed by: EMERGENCY MEDICINE

## 2025-06-04 PROCEDURE — 83605 ASSAY OF LACTIC ACID: CPT | Performed by: EMERGENCY MEDICINE

## 2025-06-04 PROCEDURE — 85018 HEMOGLOBIN: CPT | Performed by: EMERGENCY MEDICINE

## 2025-06-04 RX ORDER — HYDROMORPHONE HCL/PF 1 MG/ML
0.3 SYRINGE (ML) INJECTION ONCE
Status: COMPLETED | OUTPATIENT
Start: 2025-06-04 | End: 2025-06-04

## 2025-06-04 RX ORDER — ACETAMINOPHEN 10 MG/ML
1000 INJECTION, SOLUTION INTRAVENOUS ONCE
Status: COMPLETED | OUTPATIENT
Start: 2025-06-04 | End: 2025-06-04

## 2025-06-04 RX ORDER — PANTOPRAZOLE SODIUM 40 MG/10ML
40 INJECTION, POWDER, LYOPHILIZED, FOR SOLUTION INTRAVENOUS EVERY 12 HOURS SCHEDULED
Status: DISCONTINUED | OUTPATIENT
Start: 2025-06-05 | End: 2025-06-06 | Stop reason: HOSPADM

## 2025-06-04 RX ADMIN — IOHEXOL 100 ML: 350 INJECTION, SOLUTION INTRAVENOUS at 21:56

## 2025-06-04 RX ADMIN — ACETAMINOPHEN 1000 MG: 10 INJECTION INTRAVENOUS at 20:18

## 2025-06-04 RX ADMIN — HYDROMORPHONE HYDROCHLORIDE 0.3 MG: 1 INJECTION, SOLUTION INTRAMUSCULAR; INTRAVENOUS; SUBCUTANEOUS at 23:03

## 2025-06-05 ENCOUNTER — APPOINTMENT (INPATIENT)
Dept: GASTROENTEROLOGY | Facility: HOSPITAL | Age: 60
End: 2025-06-05
Attending: NURSE PRACTITIONER
Payer: COMMERCIAL

## 2025-06-05 ENCOUNTER — ANESTHESIA EVENT (INPATIENT)
Dept: GASTROENTEROLOGY | Facility: HOSPITAL | Age: 60
End: 2025-06-05
Payer: COMMERCIAL

## 2025-06-05 ENCOUNTER — ANESTHESIA (INPATIENT)
Dept: GASTROENTEROLOGY | Facility: HOSPITAL | Age: 60
End: 2025-06-05
Payer: COMMERCIAL

## 2025-06-05 LAB
HGB BLD-MCNC: 11.9 G/DL (ref 11.5–15.4)
HGB BLD-MCNC: 12 G/DL (ref 11.5–15.4)
HGB BLD-MCNC: 12.5 G/DL (ref 11.5–15.4)
HGB BLD-MCNC: 14 G/DL (ref 11.5–15.4)

## 2025-06-05 PROCEDURE — 85018 HEMOGLOBIN: CPT

## 2025-06-05 PROCEDURE — 0DJD8ZZ INSPECTION OF LOWER INTESTINAL TRACT, VIA NATURAL OR ARTIFICIAL OPENING ENDOSCOPIC: ICD-10-PCS | Performed by: INTERNAL MEDICINE

## 2025-06-05 PROCEDURE — 45378 DIAGNOSTIC COLONOSCOPY: CPT | Performed by: INTERNAL MEDICINE

## 2025-06-05 PROCEDURE — RECHECK: Performed by: FAMILY MEDICINE

## 2025-06-05 PROCEDURE — 99254 IP/OBS CNSLTJ NEW/EST MOD 60: CPT | Performed by: INTERNAL MEDICINE

## 2025-06-05 PROCEDURE — 85018 HEMOGLOBIN: CPT | Performed by: INTERNAL MEDICINE

## 2025-06-05 RX ORDER — NICOTINE 21 MG/24HR
1 PATCH, TRANSDERMAL 24 HOURS TRANSDERMAL DAILY
Status: DISCONTINUED | OUTPATIENT
Start: 2025-06-05 | End: 2025-06-06 | Stop reason: HOSPADM

## 2025-06-05 RX ORDER — SODIUM CHLORIDE, SODIUM LACTATE, POTASSIUM CHLORIDE, CALCIUM CHLORIDE 600; 310; 30; 20 MG/100ML; MG/100ML; MG/100ML; MG/100ML
INJECTION, SOLUTION INTRAVENOUS CONTINUOUS PRN
Status: DISCONTINUED | OUTPATIENT
Start: 2025-06-05 | End: 2025-06-05

## 2025-06-05 RX ORDER — PANTOPRAZOLE SODIUM 40 MG/1
40 TABLET, DELAYED RELEASE ORAL 2 TIMES DAILY
Qty: 60 TABLET | Refills: 0 | Status: SHIPPED | OUTPATIENT
Start: 2025-06-05 | End: 2025-06-06

## 2025-06-05 RX ORDER — PROMETHAZINE HYDROCHLORIDE 25 MG/ML
12.5 INJECTION, SOLUTION INTRAMUSCULAR; INTRAVENOUS ONCE
Status: COMPLETED | OUTPATIENT
Start: 2025-06-05 | End: 2025-06-05

## 2025-06-05 RX ORDER — KETOROLAC TROMETHAMINE 30 MG/ML
15 INJECTION, SOLUTION INTRAMUSCULAR; INTRAVENOUS ONCE
Status: DISCONTINUED | OUTPATIENT
Start: 2025-06-05 | End: 2025-06-05

## 2025-06-05 RX ORDER — BUSPIRONE HYDROCHLORIDE 5 MG/1
10 TABLET ORAL 2 TIMES DAILY
Status: DISCONTINUED | OUTPATIENT
Start: 2025-06-05 | End: 2025-06-06 | Stop reason: HOSPADM

## 2025-06-05 RX ORDER — DULOXETIN HYDROCHLORIDE 20 MG/1
20 CAPSULE, DELAYED RELEASE ORAL DAILY
Status: DISCONTINUED | OUTPATIENT
Start: 2025-06-05 | End: 2025-06-06 | Stop reason: HOSPADM

## 2025-06-05 RX ORDER — ACETAMINOPHEN 325 MG/1
975 TABLET ORAL EVERY 8 HOURS PRN
Status: DISCONTINUED | OUTPATIENT
Start: 2025-06-05 | End: 2025-06-06 | Stop reason: HOSPADM

## 2025-06-05 RX ORDER — DICYCLOMINE HYDROCHLORIDE 10 MG/1
10 CAPSULE ORAL 4 TIMES DAILY PRN
Status: DISCONTINUED | OUTPATIENT
Start: 2025-06-05 | End: 2025-06-06 | Stop reason: HOSPADM

## 2025-06-05 RX ORDER — TRAZODONE HYDROCHLORIDE 100 MG/1
300 TABLET ORAL
Status: DISCONTINUED | OUTPATIENT
Start: 2025-06-05 | End: 2025-06-06 | Stop reason: HOSPADM

## 2025-06-05 RX ORDER — ONDANSETRON 2 MG/ML
4 INJECTION INTRAMUSCULAR; INTRAVENOUS EVERY 6 HOURS PRN
Status: DISCONTINUED | OUTPATIENT
Start: 2025-06-05 | End: 2025-06-06 | Stop reason: HOSPADM

## 2025-06-05 RX ORDER — SODIUM CHLORIDE 9 MG/ML
75 INJECTION, SOLUTION INTRAVENOUS CONTINUOUS
Status: DISCONTINUED | OUTPATIENT
Start: 2025-06-05 | End: 2025-06-06

## 2025-06-05 RX ORDER — BUPROPION HYDROCHLORIDE 150 MG/1
300 TABLET ORAL DAILY
Status: DISCONTINUED | OUTPATIENT
Start: 2025-06-05 | End: 2025-06-06 | Stop reason: HOSPADM

## 2025-06-05 RX ORDER — FENOFIBRATE 145 MG/1
145 TABLET, FILM COATED ORAL DAILY
Status: DISCONTINUED | OUTPATIENT
Start: 2025-06-05 | End: 2025-06-06 | Stop reason: HOSPADM

## 2025-06-05 RX ORDER — SODIUM CHLORIDE, SODIUM LACTATE, POTASSIUM CHLORIDE, CALCIUM CHLORIDE 600; 310; 30; 20 MG/100ML; MG/100ML; MG/100ML; MG/100ML
125 INJECTION, SOLUTION INTRAVENOUS CONTINUOUS
Status: CANCELLED | OUTPATIENT
Start: 2025-06-05

## 2025-06-05 RX ORDER — HYDROXYZINE HYDROCHLORIDE 25 MG/1
50 TABLET, FILM COATED ORAL
Status: DISCONTINUED | OUTPATIENT
Start: 2025-06-05 | End: 2025-06-06 | Stop reason: HOSPADM

## 2025-06-05 RX ORDER — PROPOFOL 10 MG/ML
INJECTION, EMULSION INTRAVENOUS AS NEEDED
Status: DISCONTINUED | OUTPATIENT
Start: 2025-06-05 | End: 2025-06-05

## 2025-06-05 RX ADMIN — SODIUM CHLORIDE, SODIUM LACTATE, POTASSIUM CHLORIDE, AND CALCIUM CHLORIDE: .6; .31; .03; .02 INJECTION, SOLUTION INTRAVENOUS at 13:17

## 2025-06-05 RX ADMIN — FENOFIBRATE 145 MG: 145 TABLET ORAL at 08:20

## 2025-06-05 RX ADMIN — POLYETHYLENE GLYCOL 3350, SODIUM SULFATE ANHYDROUS, SODIUM BICARBONATE, SODIUM CHLORIDE, POTASSIUM CHLORIDE 4000 ML: 236; 22.74; 6.74; 5.86; 2.97 POWDER, FOR SOLUTION ORAL at 02:00

## 2025-06-05 RX ADMIN — ACETAMINOPHEN 975 MG: 325 TABLET ORAL at 15:33

## 2025-06-05 RX ADMIN — PROPOFOL 30 MG: 10 INJECTION, EMULSION INTRAVENOUS at 13:45

## 2025-06-05 RX ADMIN — HYDROXYZINE HYDROCHLORIDE 50 MG: 25 TABLET ORAL at 21:02

## 2025-06-05 RX ADMIN — PROMETHAZINE HYDROCHLORIDE 12.5 MG: 25 INJECTION INTRAMUSCULAR; INTRAVENOUS at 19:28

## 2025-06-05 RX ADMIN — SODIUM CHLORIDE 75 ML/HR: 0.9 INJECTION, SOLUTION INTRAVENOUS at 02:38

## 2025-06-05 RX ADMIN — BUSPIRONE HYDROCHLORIDE 10 MG: 5 TABLET ORAL at 08:20

## 2025-06-05 RX ADMIN — PROPOFOL 30 MG: 10 INJECTION, EMULSION INTRAVENOUS at 13:49

## 2025-06-05 RX ADMIN — TRAZODONE HYDROCHLORIDE 300 MG: 100 TABLET ORAL at 02:27

## 2025-06-05 RX ADMIN — DULOXETINE HYDROCHLORIDE 20 MG: 20 CAPSULE, DELAYED RELEASE PELLETS ORAL at 08:20

## 2025-06-05 RX ADMIN — BUSPIRONE HYDROCHLORIDE 10 MG: 5 TABLET ORAL at 02:17

## 2025-06-05 RX ADMIN — PANTOPRAZOLE SODIUM 40 MG: 40 INJECTION, POWDER, LYOPHILIZED, FOR SOLUTION INTRAVENOUS at 02:18

## 2025-06-05 RX ADMIN — BUSPIRONE HYDROCHLORIDE 10 MG: 5 TABLET ORAL at 17:09

## 2025-06-05 RX ADMIN — DICYCLOMINE HYDROCHLORIDE 10 MG: 10 CAPSULE ORAL at 04:14

## 2025-06-05 RX ADMIN — ONDANSETRON 4 MG: 2 INJECTION, SOLUTION INTRAMUSCULAR; INTRAVENOUS at 05:17

## 2025-06-05 RX ADMIN — PANTOPRAZOLE SODIUM 40 MG: 40 INJECTION, POWDER, LYOPHILIZED, FOR SOLUTION INTRAVENOUS at 20:15

## 2025-06-05 RX ADMIN — NICOTINE 1 PATCH: 14 PATCH, EXTENDED RELEASE TRANSDERMAL at 02:18

## 2025-06-05 RX ADMIN — BUPROPION HYDROCHLORIDE 300 MG: 150 TABLET, EXTENDED RELEASE ORAL at 08:20

## 2025-06-05 RX ADMIN — PROPOFOL 120 MG: 10 INJECTION, EMULSION INTRAVENOUS at 13:42

## 2025-06-05 RX ADMIN — PANTOPRAZOLE SODIUM 40 MG: 40 INJECTION, POWDER, LYOPHILIZED, FOR SOLUTION INTRAVENOUS at 09:40

## 2025-06-05 RX ADMIN — PROPOFOL 30 MG: 10 INJECTION, EMULSION INTRAVENOUS at 13:47

## 2025-06-05 RX ADMIN — TRAZODONE HYDROCHLORIDE 300 MG: 100 TABLET ORAL at 21:02

## 2025-06-05 RX ADMIN — HYDROXYZINE HYDROCHLORIDE 50 MG: 25 TABLET ORAL at 02:27

## 2025-06-05 RX ADMIN — ACETAMINOPHEN 975 MG: 325 TABLET ORAL at 04:14

## 2025-06-05 NOTE — ANESTHESIA POSTPROCEDURE EVALUATION
Post-Op Assessment Note    CV Status:  Stable    Pain management: adequate       Mental Status:  Alert and awake   Hydration Status:  Euvolemic   PONV Controlled:  Controlled   Airway Patency:  Patent     Post Op Vitals Reviewed: Yes    No anethesia notable event occurred.    Staff: CRNA           Last Filed PACU Vitals:  Vitals Value Taken Time   Temp 97.8 °F (36.6 °C) 06/05/25 13:54   Pulse 61 06/05/25 13:54   /65 06/05/25 13:54   Resp 15 06/05/25 13:54   SpO2 96 % 06/05/25 13:54       Modified Dasha:     Vitals Value Taken Time   Activity 1 06/05/25 13:55   Respiration 2 06/05/25 13:55   Circulation 2 06/05/25 13:55   Consciousness 1 06/05/25 13:55   Oxygen Saturation 2 06/05/25 13:55     Modified Dasha Score: 8

## 2025-06-05 NOTE — ANESTHESIA POSTPROCEDURE EVALUATION
Post-Op Assessment Note            No anethesia notable event occurred.    Comments: uneventful post procedure course        Last Filed PACU Vitals:  Vitals Value Taken Time   Temp 97.8 °F (36.6 °C) 06/05/25 13:54   Pulse 67 06/05/25 14:13   BP 99/53 06/05/25 14:13   Resp 16 06/05/25 14:13   SpO2 97 % 06/05/25 14:13       Modified Dasha:     Vitals Value Taken Time   Activity 2 06/05/25 14:13   Respiration 2 06/05/25 14:13   Circulation 2 06/05/25 14:13   Consciousness 2 06/05/25 14:13   Oxygen Saturation 2 06/05/25 14:13     Modified Dasha Score: 10

## 2025-06-05 NOTE — ASSESSMENT & PLAN NOTE
-Pt presented with intermittent rectal bleeding with bright red blood per rectum x7 mixed with brown stool since Monday with associated abdominal cramping since Wednesday. No AC. +NSAID use for thumb pain. HDS, VSS on presentation with Hgb 13.2 stable from prior. CTAP with diverticulosis no evidence of diverticulitis.  -Last colonoscopy was in October 2020 with 3 polyps removed, sigmoid/descending diverticulosis, small hemorrhoids; recent EGD  last June was normal.  -Suspect most likely lower GI bleeding from hemorrhoids other differentials would be diverticulosis, AVM, or ulceration. Repeat HGB 14 this AM  -Maintain large bore IV access  -Monitor serial H&H, transfuse blood products as needed  -Continue NPO  -s/p GoLytely in the ED finished at 4:45 per pt, reports no more blood seen since yesterday  -Will plan for colonoscopy today for further evaluation, prep and procedure explained

## 2025-06-05 NOTE — PLAN OF CARE
Problem: PAIN - ADULT  Goal: Verbalizes/displays adequate comfort level or baseline comfort level  Description: Interventions:  - Encourage patient to monitor pain and request assistance  - Assess pain using appropriate pain scale  - Administer analgesics as ordered based on type and severity of pain and evaluate response  - Implement non-pharmacological measures as appropriate and evaluate response  - Consider cultural and social influences on pain and pain management  - Notify physician/advanced practitioner if interventions unsuccessful or patient reports new pain  - Educate patient/family on pain management process including their role and importance of  reporting pain   - Provide non-pharmacologic/complimentary pain relief interventions  6/5/2025 0448 by Jacque Boateng RN  Outcome: Progressing  6/5/2025 0448 by Jacque Boateng RN  Outcome: Progressing  6/5/2025 0440 by Jacque Boateng RN  Outcome: Progressing     Problem: SAFETY ADULT  Goal: Maintain or return to baseline ADL function  Description: INTERVENTIONS:  -  Assess patient's ability to carry out ADLs; assess patient's baseline for ADL function and identify physical deficits which impact ability to perform ADLs (bathing, care of mouth/teeth, toileting, grooming, dressing, etc.)  - Assess/evaluate cause of self-care deficits   - Assess range of motion  - Assess patient's mobility; develop plan if impaired  - Assess patient's need for assistive devices and provide as appropriate  - Encourage maximum independence but intervene and supervise when necessary  - Involve family in performance of ADLs  - Assess for home care needs following discharge   - Consider OT consult to assist with ADL evaluation and planning for discharge  - Provide patient education as appropriate  - Monitor functional capacity and physical performance, use of AM PAC & JH-HLM   - Monitor gait, balance and fatigue with ambulation    6/5/2025 0448 by Jacque Boateng  RN  Outcome: Progressing  6/5/2025 0448 by Jacque Boateng RN  Outcome: Progressing  6/5/2025 0440 by Jacque Boateng RN  Outcome: Progressing     Problem: Knowledge Deficit  Goal: Patient/family/caregiver demonstrates understanding of disease process, treatment plan, medications, and discharge instructions  Description: Complete learning assessment and assess knowledge base.  Interventions:  - Provide teaching at level of understanding  - Provide teaching via preferred learning methods  6/5/2025 0448 by Jacque Boateng RN  Outcome: Progressing  6/5/2025 0448 by Jacque Boateng RN  Outcome: Progressing  6/5/2025 0440 by Jacque Boateng RN  Outcome: Progressing

## 2025-06-05 NOTE — UTILIZATION REVIEW
Initial Clinical Review    Admission: Date/Time/Statement:   Admission Orders (From admission, onward)       Ordered        06/04/25 2355  Inpatient Admission  Once                          Orders Placed This Encounter   Procedures    Inpatient Admission     Standing Status:   Standing     Number of Occurrences:   1     Level of Care:   Med Surg [16]     Estimated length of stay:   More than 2 Midnights     Certification:   I certify that inpatient services are medically necessary for this patient for a duration of greater than two midnights. See H&P and MD Progress Notes for additional information about the patient's course of treatment.     ED Arrival Information       Expected   -    Arrival   6/4/2025 19:38    Acuity   Urgent              Means of arrival   Walk-In    Escorted by   Self    Service   Hospitalist    Admission type   Emergency              Arrival complaint   Bloody Stool             Chief Complaint   Patient presents with    Rectal Bleeding     Pt reports bright red blood in stool starting yesterday. +nausea and lower abdominal cramping. -dizziness       Initial Presentation: 59 y.o. female with hx of anxiety, depression, obesity, GERD, colon polyps, nicotine dependence who presents to ED from home with 7 episodes of bright red bloody stool per rectum since yesterday  .Associated lower abdominal cramping .  Recent colonoscopy did show 3 polyps which were removed and were negative for acute pathology . Pt has been taking NSAIDs more frequently over the past 4 days for thumb pain- taking  800 mg in the morning and 600 mg in the evening x 4 days . On exam, abdominal tenderness RLQ abdomen. Normal rectal tone.  No palpable internal/internal masses.  No significant rectal tenderness . Brown stool in rectal vault.  No melena.  No hematochezia. Labs : Hgb 13.2--> 12.5 . Mag 1.9 . CT A/P shows diverticulosis . Pt given IV analgesics in ED. Pt admitted as Inpatient with GI bleed . Plan- GI consult . Start  bowel prep to be completed by 0600 tomorrow. H/H q6h . Transfuse Hgb <7. Monitor hemodynamics . Transition PTA po PPI to IV . Avoid NSAID's .   Anticipated Length of Stay/Certification Statement:  Patient will be admitted on an inpatient basis with an anticipated length of stay of greater than 2 midnights secondary to acute GI bleeding requiring GI consultation and hemodynamic monitoring.     Date: 6/5    Day 2:     GI consult - Rectal bleeding .  Patient reports having intermittent episodes of bright bleeding per rectum along with brown-colored stool. Hemoglobin has been stable, 14 this am .  Appear to be most likely secondary to hemorrhoids. Rule out any colorectal lesions. For Colonoscopy today . s/p GoLytely in the ED finished at 4:45 per pt, reports no more blood seen since yesterday    Monitor serial H&H .   6/5/25 @ 1351    Colonoscopy w/ IV sedation    IMPRESSION:  The cecum appeared normal.  Diverticulosis in the sigmoid colon  Hemorrhoids   Post procedure pt place on reg diet . Continues on H/H q6h .     ED Treatment-Medication Administration from 06/04/2025 1938 to 06/05/2025 0041         Date/Time Order Dose Route Action     06/04/2025 2018 acetaminophen (Ofirmev) injection 1,000 mg 1,000 mg Intravenous New Bag     06/04/2025 2156 iohexol (OMNIPAQUE) 350 MG/ML injection (MULTI-DOSE) 100 mL 100 mL Intravenous Given     06/04/2025 2303 HYDROmorphone (DILAUDID) injection 0.3 mg 0.3 mg Intravenous Given            Scheduled Medications:  buPROPion, 300 mg, Oral, Daily  busPIRone, 10 mg, Oral, BID  DULoxetine, 20 mg, Oral, Daily  fenofibrate, 145 mg, Oral, Daily  hydrOXYzine HCL, 50 mg, Oral, HS  nicotine, 1 patch, Transdermal, Daily  pantoprazole, 40 mg, Intravenous, Q12H LAVON  traZODone, 300 mg, Oral, HS    polyethylene glycol (GOLYTELY) bowel prep 4,000 mL  Dose: 4,000 mL  Freq: Once Route: PO  Start: 06/05/25 0000 End: 06/05/25 0200  Continuous IV Infusions:  sodium chloride, 75 mL/hr, Intravenous,  Continuous      PRN Meds:  acetaminophen, 975 mg, Oral, Q8H PRN x1 6/5   dicyclomine, 10 mg, Oral, 4x Daily PRN x1 6/5   ondansetron, 4 mg, Intravenous, Q6H PRN x1 6/5       ED Triage Vitals   Temperature Pulse Respirations Blood Pressure SpO2 Pain Score   06/04/25 1946 06/04/25 1946 06/04/25 1946 06/04/25 1946 06/04/25 1946 06/04/25 2303   98.1 °F (36.7 °C) 95 18 130/76 96 % 7     Weight (last 2 days)       Date/Time Weight    06/05/25 00:46:37 110 (243)    06/05/25 0045 110 (243)            Vital Signs (last 3 days)       Date/Time Temp Pulse Resp BP MAP (mmHg) SpO2 O2 Device Patient Position - Orthostatic VS Pain    06/05/25 07:10:11 97.6 °F (36.4 °C) 67 -- 98/61 73 93 % -- -- --    06/05/25 07:09:30 97.6 °F (36.4 °C) 64 -- 98/61 73 92 % -- -- --    06/05/25 0414 -- -- -- -- -- -- -- -- 7 06/05/25 0100 -- -- -- -- -- 95 % None (Room air) -- --    06/05/25 00:46:37 98.1 °F (36.7 °C) 72 16 118/77 91 95 % -- -- --    06/05/25 0045 -- -- -- -- -- -- -- -- 7 06/05/25 0013 98.1 °F (36.7 °C) 72 16 118/77 -- 95 % -- -- --    06/04/25 2303 -- -- -- -- -- -- -- -- 7 06/04/25 2213 -- 72 18 118/55 79 94 % None (Room air) Lying --    06/04/25 1946 98.1 °F (36.7 °C) 95 18 130/76 95 96 % None (Room air) -- --              Pertinent Labs/Diagnostic Test Results:   Radiology:  CT Abdomen pelvis with contrast   Final Interpretation by Brian Link MD (06/04 2317)      Diverticulosis without evidence of diverticulitis.         Workstation performed: [id number]                 Results from last 7 days   Lab Units 06/05/25 0611 06/04/25 2232 06/04/25 2018   WBC Thousand/uL  --   --  9.88   HEMOGLOBIN g/dL 14.0 12.5 13.2   HEMATOCRIT %  --  38.8 40.9   PLATELETS Thousands/uL  --   --  254   TOTAL NEUT ABS Thousands/µL  --   --  5.61         Results from last 7 days   Lab Units 06/04/25 2018   SODIUM mmol/L 138   POTASSIUM mmol/L 3.8   CHLORIDE mmol/L 106   CO2 mmol/L 23   ANION GAP mmol/L 9   BUN mg/dL 14   CREATININE  mg/dL 1.18   EGFR ml/min/1.73sq m 50   CALCIUM mg/dL 9.3   MAGNESIUM mg/dL 1.9     Results from last 7 days   Lab Units 06/04/25 2018   AST U/L 16   ALT U/L 13   ALK PHOS U/L 34   TOTAL PROTEIN g/dL 6.6   ALBUMIN g/dL 4.0   TOTAL BILIRUBIN mg/dL 0.31         Results from last 7 days   Lab Units 06/04/25 2018   GLUCOSE RANDOM mg/dL 146*                   Results from last 7 days   Lab Units 06/04/25 2018   LACTIC ACID mmol/L 1.9                   Results from last 7 days   Lab Units 06/04/25 2018   LIPASE u/L 36           Past Medical History[1]  Present on Admission:   Cigarette nicotine dependence with nicotine-induced disorder   GERD (gastroesophageal reflux disease)   Anxiety and depression      Admitting Diagnosis: Lower GI bleed [K92.2]  Abdominal cramping, bilateral lower quadrant [R10.31, R10.32]  Age/Sex: 59 y.o. female    Network Utilization Review Department  ATTENTION: Please call with any questions or concerns to [phone] and carefully listen to the prompts so that you are directed to the right person. All voicemails are confidential.   For Discharge needs, contact Care Management DC Support Team at [phone] opt. 2  Send all requests for admission clinical reviews, approved or denied determinations and any other requests to dedicated fax number below belonging to the campus where the patient is receiving treatment. List of dedicated fax numbers for the Facilities:  FACILITY NAME UR FAX NUMBER   ADMISSION DENIALS (Administrative/Medical Necessity) [phone]   DISCHARGE SUPPORT TEAM (NETWORK) [phone]   PARENT CHILD HEALTH (Maternity/NICU/Pediatrics) [phone]   [facility] [phone]   [facility] [phone]   [facility] [phone]   [facility] [phone]   [facility] [phone]   [facility] [phone]   [facility]  [facility] [phone]   [facility] [phone]   [facility] [phone]   [facility] [phone]   [facility] [phone]   [facility] [phone]              [1]   Past Medical History:  Diagnosis Date    Anxiety     Back pain     Depression     GERD (gastroesophageal reflux disease)     SOB (shortness of breath)     Vertigo

## 2025-06-05 NOTE — ED PROVIDER NOTES
Time reflects when diagnosis was documented in both MDM as applicable and the Disposition within this note       Time User Action Codes Description Comment    6/4/2025 11:55 PM Simone Mcduffie Add [K92.2] Lower GI bleed     6/4/2025 11:55 PM Lupillojustine Simonemandi Richardson Add [R10.31,  R10.32] Abdominal cramping, bilateral lower quadrant           ED Disposition       ED Disposition   Admit    Condition   Stable    Date/Time   Wed Jun 4, 2025 11:55 PM    Comment   Case was discussed with SLIM AP and the patient's admission status was agreed to be Admission Status: inpatient status to the service of Dr. Perez .               Assessment & Plan       Medical Decision Making  DDx includes but is not limited to: Colitis, enteritis, diverticular bleeding, diverticulitis, AVM, polyp disease, etc..  CBC/CMP/lipase/lactic acid.  CT abdomen/pelvis with IV contrast.  Symptomatic management in the meantime. Potential d/w GI depending upon results of workup. Disposition pending.          Amount and/or Complexity of Data Reviewed  Labs: ordered. Decision-making details documented in ED Course.  Radiology: ordered. Decision-making details documented in ED Course.    Risk  Prescription drug management.  Decision regarding hospitalization.        ED Course as of 06/05/25 0005   Wed Jun 04, 2025 2045 CBC and differential  WBC wnl  Hg/Hct wnl  Plt wnl   2056 CMP(!)   2057 Magnesium   2057 Lipase   2057 Lactic acid, plasma (w/reflex if result > 2.0)   2058 Normal electrolytes and transaminases.  Normal lactic acid.  Normal lipase.  Renal function has been slightly variable in the past, but currently at baseline.   2157 CT completed and awaiting interpretation   2227 Pending CT report, will check H&H.   2248 Hemoglobin and hematocrit, blood  Minimal decrement within range of what is expected given phlebotomy performed in the ED   2309 CT is being reviewed now   2324 CT Abdomen pelvis with contrast  FINDINGS:     ABDOMEN     LOWER CHEST: No  clinically significant abnormality in the visualized lower chest.     LIVER/BILIARY TREE: Unremarkable.     GALLBLADDER: Post cholecystectomy.     SPLEEN: Unremarkable.     PANCREAS: Unremarkable.     ADRENAL GLANDS: Unremarkable.     KIDNEYS/URETERS: Unremarkable. No hydronephrosis.     STOMACH AND BOWEL: No bowel obstruction. Diverticulosis without evidence of diverticulitis.     APPENDIX: No findings to suggest appendicitis.     ABDOMINOPELVIC CAVITY: No ascites. No pneumoperitoneum. No lymphadenopathy.     VESSELS: Unremarkable for patient's age.     PELVIS     REPRODUCTIVE ORGANS: Unremarkable for patient's age.     URINARY BLADDER: Unremarkable.     ABDOMINAL WALL/INGUINAL REGIONS: Small fat-containing umbilical hernia.     BONES: No acute fracture or suspicious osseous lesion. Spinal degenerative changes. Grade 1 retrolisthesis of L3 on L4. Chronic bilateral rib deformities. Posterior spinal fixation at L5-S1.     IMPRESSION:     Diverticulosis without evidence of diverticulitis.     2332 Secure message Dr Ochoa of GI to discuss   2343 GI advises starting bowel prep through 0600 for colonoscopy tomorrow; if not able to complete, can prep tomorrow for colonoscopy Friday.  Discussed with internal medicine CRNP regarding admission   2356 Admit inpatient to service of Dr. Perez         Medications   pantoprazole (PROTONIX) injection 40 mg (has no administration in time range)   polyethylene glycol (GOLYTELY) bowel prep 4,000 mL (has no administration in time range)   acetaminophen (Ofirmev) injection 1,000 mg (0 mg Intravenous Stopped 6/4/25 2033)   iohexol (OMNIPAQUE) 350 MG/ML injection (MULTI-DOSE) 100 mL (100 mL Intravenous Given 6/4/25 2156)   HYDROmorphone (DILAUDID) injection 0.3 mg (0.3 mg Intravenous Given 6/4/25 2303)       ED Risk Strat Scores                    No data recorded        SBIRT 22yo+      Flowsheet Row Most Recent Value   Initial Alcohol Screen: US AUDIT-C     1. How often do you have a  drink containing alcohol? 0 Filed at: 06/04/2025 2205   2. How many drinks containing alcohol do you have on a typical day you are drinking?  0 Filed at: 06/04/2025 2205   3b. FEMALE Any Age, or MALE 65+: How often do you have 4 or more drinks on one occassion? 0 Filed at: 06/04/2025 2205   Audit-C Score 0 Filed at: 06/04/2025 2205   HERMINIA: How many times in the past year have you...    Used an illegal drug or used a prescription medication for non-medical reasons? Never Filed at: 06/04/2025 2205                            History of Present Illness       Chief Complaint   Patient presents with    Rectal Bleeding     Pt reports bright red blood in stool starting yesterday. +nausea and lower abdominal cramping. -dizziness       Past Medical History[1]   Past Surgical History[2]   Family History[3]   Social History[4]   E-Cigarette/Vaping    E-Cigarette Use Never User       E-Cigarette/Vaping Substances    Nicotine No     THC No     CBD No     Flavoring No     Other No     Unknown No       I have reviewed and agree with the history as documented.     59F with noted past medical history presents to emergency department stating that she has had 7 episodes of bright red blood per rectum since yesterday accompanied by intermittent cramping lower abdominal pain.  4 episodes occurred yesterday and 3 today; each consisted of passage of small amount of formed to slightly loose stool along with small amount of clotted red blood.  Does not have substantial pain between episodes. No melanotic stool at any point. No fevers or chills.  No abdominal distention.  No hematemesis.  No hemoptysis.  No epistaxis.  No prior GI bleeding.  Has had 1 prior colonoscopy which was routine and demonstrated polyp disease for which she is slated to have repeat colonoscopies every 5 years.  No prior GI/ surgery.  Did have prior anterior approach to lumbar disc fusion.  No antibiotic use in past 30 days.  No contact with anyone with GI symptoms in  the past week.  Otherwise in normal state of health until symptom onset.  Has not taken or used anything for symptoms thus far.      History provided by:  Patient and medical records      Review of Systems   Constitutional:  Negative for chills, diaphoresis, fatigue and fever.   Respiratory: Negative.          No hemoptysis   Cardiovascular: Negative.    Gastrointestinal:  Positive for abdominal distention, abdominal pain and blood in stool. Negative for constipation, diarrhea, nausea and vomiting.   Genitourinary:  Negative for difficulty urinating, dysuria and hematuria.   Musculoskeletal: Negative.    Skin: Negative.    Neurological:  Negative for weakness and light-headedness.           Objective       ED Triage Vitals   Temperature Pulse Blood Pressure Respirations SpO2 Patient Position - Orthostatic VS   06/04/25 1946 06/04/25 1946 06/04/25 1946 06/04/25 1946 06/04/25 1946 06/04/25 2213   98.1 °F (36.7 °C) 95 130/76 18 96 % Lying      Temp Source Heart Rate Source BP Location FiO2 (%) Pain Score    06/04/25 1946 06/04/25 1946 06/04/25 1946 -- 06/04/25 2303    Oral Monitor Right arm  7      Vitals      Date and Time Temp Pulse SpO2 Resp BP Pain Score FACES Pain Rating User   06/04/25 2303 -- -- -- -- -- 7 -- NB   06/04/25 2213 -- 72 94 % 18 118/55 -- -- MLR   06/04/25 1946 98.1 °F (36.7 °C) 95 96 % 18 130/76 -- -- ROSA            Physical Exam  Vitals and nursing note reviewed.   Constitutional:       General: She is awake. She is not in acute distress.     Appearance: Normal appearance. She is well-developed.   HENT:      Head: Normocephalic and atraumatic.      Right Ear: Hearing and external ear normal.      Left Ear: Hearing and external ear normal.   Neck:      Trachea: Trachea and phonation normal.     Cardiovascular:      Rate and Rhythm: Normal rate and regular rhythm.      Pulses:           Radial pulses are 2+ on the right side and 2+ on the left side.        Dorsalis pedis pulses are 2+ on the right  side and 2+ on the left side.        Posterior tibial pulses are 2+ on the right side and 2+ on the left side.      Heart sounds: Normal heart sounds, S1 normal and S2 normal. No murmur heard.     No friction rub. No gallop.   Pulmonary:      Effort: Pulmonary effort is normal. No respiratory distress.      Breath sounds: Normal breath sounds. No stridor. No decreased breath sounds, wheezing, rhonchi or rales.   Abdominal:      General: There is no distension.      Palpations: There is no mass.      Tenderness: There is abdominal tenderness in the right lower quadrant. There is no guarding or rebound.      Comments: Rectal examination:  Normal rectal tone.  No palpable internal/internal masses.  No significant rectal tenderness  Brown stool in rectal vault.  No melena.  No hematochezia.  Exam assisted by GEORGE OJEDA     Skin:     General: Skin is warm and dry.     Neurological:      Mental Status: She is alert and oriented to person, place, and time.      GCS: GCS eye subscore is 4. GCS verbal subscore is 5. GCS motor subscore is 6.      Cranial Nerves: No cranial nerve deficit.      Sensory: No sensory deficit.      Motor: No abnormal muscle tone.      Comments: PERRLA; EOMI. Sensation intact to light touch over face in V1-V3 distribution bilaterally. Facial expressions symmetric. Tongue/uvula midline. Shoulder shrug equal bilaterally. Strength 5/5 in UE/LE bilaterally. Sensation intact to light touch in UE/LE bilaterally.       Results Reviewed       Procedure Component Value Units Date/Time    Hemoglobin and hematocrit, blood [[id number]]  (Normal) Collected: 06/04/25 2232    Lab Status: Final result Specimen: Blood from Arm, Right Updated: 06/04/25 2238     Hemoglobin 12.5 g/dL      Hematocrit 38.8 %     CMP [[id number]]  (Abnormal) Collected: 06/04/25 2018    Lab Status: Final result Specimen: Blood from Arm, Right Updated: 06/04/25 2049     Sodium 138 mmol/L      Potassium 3.8 mmol/L      Chloride 106 mmol/L       CO2 23 mmol/L      ANION GAP 9 mmol/L      BUN 14 mg/dL      Creatinine 1.18 mg/dL      Glucose 146 mg/dL      Calcium 9.3 mg/dL      AST 16 U/L      ALT 13 U/L      Alkaline Phosphatase 34 U/L      Total Protein 6.6 g/dL      Albumin 4.0 g/dL      Total Bilirubin 0.31 mg/dL      eGFR 50 ml/min/1.73sq m     Narrative:      National Kid[employer] Disease Foundation guidelines for Chronic Kidney Disease (CKD):     Stage 1 with normal or high GFR (GFR > 90 mL/min/1.73 square meters)    Stage 2 Mild CKD (GFR = 60-89 mL/min/1.73 square meters)    Stage 3A Moderate CKD (GFR = 45-59 mL/min/1.73 square meters)    Stage 3B Moderate CKD (GFR = 30-44 mL/min/1.73 square meters)    Stage 4 Severe CKD (GFR = 15-29 mL/min/1.73 square meters)    Stage 5 End Stage CKD (GFR <15 mL/min/1.73 square meters)  Note: GFR calculation is accurate only with a steady state creatinine    Lipase [[id number]]  (Normal) Collected: 06/04/25 2018    Lab Status: Final result Specimen: Blood from Arm, Right Updated: 06/04/25 2049     Lipase 36 u/L     Magnesium [[id number]]  (Normal) Collected: 06/04/25 2018    Lab Status: Final result Specimen: Blood from Arm, Right Updated: 06/04/25 2049     Magnesium 1.9 mg/dL     Lactic acid, plasma (w/reflex if result > 2.0) [[id number]]  (Normal) Collected: 06/04/25 2018    Lab Status: Final result Specimen: Blood from Arm, Right Updated: 06/04/25 2048     LACTIC ACID 1.9 mmol/L     Narrative:      Result may be elevated if tourniquet was used during collection.    CBC and differential [[id number]] Collected: 06/04/25 2018    Lab Status: Final result Specimen: Blood from Arm, Right Updated: 06/04/25 2033     WBC 9.88 Thousand/uL      RBC 4.21 Million/uL      Hemoglobin 13.2 g/dL      Hematocrit 40.9 %      MCV 97 fL      MCH 31.4 pg      MCHC 32.3 g/dL      RDW 13.1 %      MPV 9.9 fL      Platelets 254 Thousands/uL      nRBC 0 /100 WBCs      Segmented % 57 %      Immature Grans % 0 %      Lymphocytes % 36 %      Monocytes  % 5 %      Eosinophils Relative 2 %      Basophils Relative 0 %      Absolute Neutrophils 5.61 Thousands/µL      Absolute Immature Grans 0.03 Thousand/uL      Absolute Lymphocytes 3.59 Thousands/µL      Absolute Monocytes 0.46 Thousand/µL      Eosinophils Absolute 0.15 Thousand/µL      Basophils Absolute 0.04 Thousands/µL             CT Abdomen pelvis with contrast   Final Interpretation by Brian Link MD (06/04 2577)      Diverticulosis without evidence of diverticulitis.         Workstation performed: [id number]             Procedures    ED Medication and Procedure Management   Prior to Admission Medications   Prescriptions Last Dose Informant Patient Reported? Taking?   DULoxetine (CYMBALTA) 20 mg capsule   No No   Sig: Take 1 capsule (20 mg total) by mouth daily   buPROPion (WELLBUTRIN XL) 300 mg 24 hr tablet   No No   Sig: Take 1 tablet (300 mg total) by mouth daily   busPIRone (BUSPAR) 10 mg tablet   No No   Sig: Take 1 tablet (10 mg total) by mouth 3 (three) times a day   fenofibrate (TRICOR) 145 mg tablet   No No   Sig: Take 1 tablet (145 mg total) by mouth daily   hydrOXYzine HCL (ATARAX) 50 mg tablet   No No   Sig: Take 1 tablet (50 mg total) by mouth daily at bedtime   pantoprazole (PROTONIX) 40 mg tablet   No No   Sig: Take 1 tablet (40 mg total) by mouth 2 (two) times a day   sucralfate (CARAFATE) 1 g tablet   No No   Sig: Take 1 tablet (1 g total) by mouth 4 (four) times a day (before meals and at bedtime) for 7 days   traZODone (DESYREL) 300 MG tablet   No No   Sig: Take 1 tablet (300 mg total) by mouth daily at bedtime      Facility-Administered Medications: None     Patient's Medications   Discharge Prescriptions    No medications on file     No discharge procedures on file.  ED SEPSIS DOCUMENTATION   Time reflects when diagnosis was documented in both MDM as applicable and the Disposition within this note       Time User Action Codes Description Comment    6/4/2025 11:55 PM Simone Mcduffie  Add [K92.2] Lower GI bleed     6/4/2025 11:55 PM Simone Mcduffie Add [R10.31,  R10.32] Abdominal cramping, bilateral lower quadrant                      [1]   Past Medical History:  Diagnosis Date    Anxiety     Back pain     Depression     GERD (gastroesophageal reflux disease)     SOB (shortness of breath)     Vertigo    [2]   Past Surgical History:  Procedure Laterality Date    CERVICAL DISC SURGERY      CHOLECYSTECTOMY      CHOLECYSTECTOMY      COLONOSCOPY      FOOT SURGERY      LAPAROTOMY N/A 02/02/2021    Procedure: LAPAROTOMY FOR SPINAL SURGERY;  Surgeon: Edison Glynn MD;  Location: [facility];  Service: General    LUMBAR FUSION N/A 02/02/2021    Procedure: Anterior lumbar interbody fusion L5-S1; Posterior lumbar laminectomy, decompression, instrumented fusion L5-S1 with allograft and neuromonitoring;  Surgeon: Aries Rivera MD;  Location: [facility];  Service: Orthopedics    CA EGD TRANSORAL BIOPSY SINGLE/MULTIPLE N/A 04/20/2016    Procedure: ESOPHAGOGASTRODUODENOSCOPY (EGD);  Surgeon: Darius Barr MD;  Location: [facility];  Service: Gastroenterology    THYROID LOBECTOMY N/A 07/03/2023    Procedure: LEFT THYROID LOBECTOMY;  Surgeon: Aishwarya Field MD;  Location: [facility];  Service: Surgical Oncology    TONSILLECTOMY      TUBAL LIGATION      UPPER GASTROINTESTINAL ENDOSCOPY      US GUIDED THYROID BIOPSY  05/30/2023   [3]   Family History  Problem Relation Name Age of Onset    Aneurysm Mother      Hypertension Mother      Heart attack Father      Aneurysm Sister      Aneurysm Brother      Mental illness Son     [4]   Social History  Tobacco Use    Smoking status: Every Day     Current packs/day: 0.50     Types: Cigarettes     Passive exposure: Current    Smokeless tobacco: Never    Tobacco comments:     started patch 1/2021, still using 3/4 ppd.    Vaping Use    Vaping status: Never Used   Substance Use Topics    Alcohol use: Not Currently     Comment: 0    Drug use: Not Currently     Types:  Oxycodone, Methamphetamines     Comment: 7/18/2020 last use        Simone Mcduffie DO  06/05/25 0006

## 2025-06-05 NOTE — ANESTHESIA PREPROCEDURE EVALUATION
Procedure:  COLONOSCOPY    Relevant Problems   CARDIO   (+) Elevated cholesterol with elevated triglycerides      GI/HEPATIC   (+) Acute pancreatitis   (+) GERD (gastroesophageal reflux disease)   (+) GI bleed      MUSCULOSKELETAL   (+) Lower back pain      NEURO/PSYCH   (+) Anxiety   (+) Anxiety and depression   (+) Chronic pain syndrome        Physical Exam    Airway     Mallampati score: II          Cardiovascular      Dental       Pulmonary      Neurological      Other Findings  post-pubertal.      Anesthesia Plan  ASA Score- 2     Anesthesia Type- IV sedation with anesthesia with ASA Monitors.         Additional Monitors:     Airway Plan:     Comment: IV sedation,  standard ASA monitors. Risks and benefits discussed with patient; patient consented and agrees to proceed.    I saw and evaluated the patient. If seen with CRNA, we have discussed the anesthetic plan and I am in agreement that the plan is appropriate for the patient.  .       Plan Factors-    Chart reviewed.   Existing labs reviewed.                   Induction-     Postoperative Plan- .   Monitoring Plan - Monitoring plan - standard ASA monitoring          Informed Consent- Anesthetic plan and risks discussed with patient.  I personally reviewed this patient with the CRNA. Discussed and agreed on the Anesthesia Plan with the CRNA..      NPO Status:  Vitals Value Taken Time   Date of last liquid 06/05/25 06/05/25 12:36   Time of last liquid 0900 06/05/25 12:36   Date of last solid 06/04/25 06/05/25 12:36   Time of last solid 1700 06/05/25 12:36

## 2025-06-05 NOTE — DISCHARGE SUMMARY
Discharge Summary - Hospitalist   Name: Sara Law 59 y.o. female I MRN: [id number]  Unit/Bed#: W -01 I Date of Admission: 6/4/2025   Date of Service: 6/5/2025 I Hospital Day: 1     Assessment & Plan  GI bleed  Patient presenting with 7 episodes of bright red bloody stool over the past 24 hours.  Not currently on AC/AP.  She denies any alcohol use.  But does report taking 1400 mg of ibuprofen daily for the past 4 days for thumb pain.  Denies any associate abdominal pain.  Hemoglobin trended, remains at 14  Patient denies any further GI bleed  Patient underwent colonoscopy that is negative for active bleeding, shows diverticulosis and hemorrhoids.  Patient stable for discharge home with Protonix  Avoid NSAIDs  GERD (gastroesophageal reflux disease)  On Protonix 40 mg twice daily      Cigarette nicotine dependence with nicotine-induced disorder  NRT therapy offered  Encourage cessation  Anxiety and depression  Continue PTA Cymbalta, Wellbutrin, BuSpar, and trazodone     Discharging Physician / Practitioner: Evans Berg MD  PCP: Cipriano White MD  Admission Date:   Admission Orders (From admission, onward)       Ordered        06/04/25 2355  Inpatient Admission  Once                          Discharge Date: 06/05/25    Medical Problems       Resolved Problems  Date Reviewed: 10/31/2024   None         Consultations During Hospital Stay:  Gastroenterology    Procedures Performed: Colonoscopy  DATE OF SERVICE:  6/05/25     PHYSICIAN(S):  Attending:   Ignacio Christiansen MD      Fellow:   No Staff Documented         INDICATION:  Lower GI bleed           POST-OP DIAGNOSIS:  See the impression below.     HISTORY:  Prior colonoscopy: 5 years ago.     BOWEL PREPARATION:  Golytely/Colyte/Trilyte        PREPROCEDURE:  Informed consent was obtained for the procedure, including sedation. Risks including but not limited to bleeding, infection, perforation, adverse drug reaction and aspiration were explained in detail. Also  explained about less than 100% sensitivity with the exam and other alternatives. The patient was placed in the left lateral decubitus position.     Procedure: Colonoscopy      DETAILS OF PROCEDURE:   Patient was taken to the procedure room where a time out was performed to confirm correct patient and correct procedure.   The patient underwent monitored anesthesia care, which was administered by an anesthesia professional. The patient's blood pressure, ECG, ETCO2, heart rate, level of consciousness, oxygen and respirations were monitored throughout the procedure. A digital rectal exam was performed. The scope was introduced through the anus and advanced to the cecum. Retroflexion was performed in the rectum. The quality of bowel preparation was evaluated using the [place] Bowel Preparation Scale with scores of: right colon = 2, transverse colon = 2, left colon = 2. The total BBPS score was 6. Bowel prep was adequate. The patient experienced no blood loss. The procedure was not difficult. The patient tolerated the procedure well. There were no apparent adverse events.            ANESTHESIA INFORMATION:  ASA: II  Anesthesia Type: IV Sedation with Anesthesia     MEDICATIONS:  No administrations occurring from 1330 to 1352 on 06/05/25            FINDINGS:  The cecum appeared normal.  Multiple diverticula in the sigmoid colon  Internal hemorrhoids        EVENTS:      Procedure Events   Event Event Time   ENDO CECUM REACHED 6/5/2025  1:45 PM   ENDO SCOPE OUT TIME 6/5/2025  1:51 PM         SPECIMENS:  * No specimens in log *     EQUIPMENT:  Colonoscope -[id number]            IMPRESSION:  The cecum appeared normal.  Diverticulosis in the sigmoid colon  Hemorrhoids           RECOMMENDATION:  Repeat colonoscopy in 7 years, due: 6/3/2032  Personal history of colon polyps          Reason for Admission: GI bleed    Hospital Course:     Sara Law is a 59 y.o. female patient who originally presented to the hospital on  "6/4/2025 59-year-old female with PMH of obesity, colon polyps, depression, presented to ED with complaint of BRBPR for 1 day, for 7 episodes. Patient has history of polyp removal in 2020. Patient denies any recent antibiotic use, recent travel. Denies any alcohol use. However reports of using NSAIDs frequently for last few days.  Patient's hemoglobin trended, last hemoglobin 14+.  Patient underwent colonoscopy that was negative for any active bleeding.  Patient is ready for discharge home.  Avoid NSAIDs.  Ordered Protonix 40 mg twice daily.    Patient originally admitted as inpatient, however discharged prior to 2 midnights due to resolution of GI bleed and GI clearance.    Condition at Discharge: good     Discharge Day Visit / Exam:     Subjective: Patient denies any further GI bleed.  No abdominal pain.  Vitals: Blood Pressure: 99/53 (06/05/25 1413)  Pulse: 67 (06/05/25 1413)  Temperature: 97.8 °F (36.6 °C) (06/05/25 1354)  Temp Source: Temporal (06/05/25 1354)  Respirations: 16 (06/05/25 1413)  Height: 5' 6\" (167.6 cm) (06/05/25 0046)  Weight - Scale: 110 kg (243 lb) (06/05/25 0046)  SpO2: 97 % (06/05/25 1413)  Exam:   Physical Exam  Constitutional:       Appearance: She is well-developed.   HENT:      Head: Normocephalic and atraumatic.   Pulmonary:      Effort: Pulmonary effort is normal. No respiratory distress.      Breath sounds: Normal breath sounds.     Musculoskeletal:      Cervical back: Normal range of motion.     Skin:     General: Skin is warm and dry.           Discharge instructions/Information to patient and family:   See after visit summary for information provided to patient and family.      Provisions for Follow-Up Care:  See after visit summary for information related to follow-up care and any pertinent home health orders.      Disposition:     Home       Planned Readmission: no     Discharge Statement:  I spent 45 minutes discharging the patient. This time was spent on the day of discharge. I " had direct contact with the patient on the day of discharge. Greater than 50% of the total time was spent examining patient, answering all patient questions, arranging and discussing plan of care with patient as well as directly providing post-discharge instructions.  Additional time then spent on discharge activities.    Discharge Medications:  See after visit summary for reconciled discharge medications provided to patient and family.      ** Please Note: This note has been constructed using a voice recognition system **

## 2025-06-05 NOTE — PLAN OF CARE
Problem: PAIN - ADULT  Goal: Verbalizes/displays adequate comfort level or baseline comfort level  Description: Interventions:  - Encourage patient to monitor pain and request assistance  - Assess pain using appropriate pain scale  - Administer analgesics as ordered based on type and severity of pain and evaluate response  - Implement non-pharmacological measures as appropriate and evaluate response  - Consider cultural and social influences on pain and pain management  - Notify physician/advanced practitioner if interventions unsuccessful or patient reports new pain  - Educate patient/family on pain management process including their role and importance of  reporting pain   - Provide non-pharmacologic/complimentary pain relief interventions  6/5/2025 0448 by Jacque Boateng RN  Outcome: Progressing  6/5/2025 0440 by Jacque Boateng RN  Outcome: Progressing     Problem: SAFETY ADULT  Goal: Maintain or return to baseline ADL function  Description: INTERVENTIONS:  -  Assess patient's ability to carry out ADLs; assess patient's baseline for ADL function and identify physical deficits which impact ability to perform ADLs (bathing, care of mouth/teeth, toileting, grooming, dressing, etc.)  - Assess/evaluate cause of self-care deficits   - Assess range of motion  - Assess patient's mobility; develop plan if impaired  - Assess patient's need for assistive devices and provide as appropriate  - Encourage maximum independence but intervene and supervise when necessary  - Involve family in performance of ADLs  - Assess for home care needs following discharge   - Consider OT consult to assist with ADL evaluation and planning for discharge  - Provide patient education as appropriate  - Monitor functional capacity and physical performance, use of AM PAC & JH-HLM   - Monitor gait, balance and fatigue with ambulation    6/5/2025 0448 by Jacque Boateng RN  Outcome: Progressing  6/5/2025 0440 by Jacque Boateng  RN  Outcome: Progressing     Problem: Knowledge Deficit  Goal: Patient/family/caregiver demonstrates understanding of disease process, treatment plan, medications, and discharge instructions  Description: Complete learning assessment and assess knowledge base.  Interventions:  - Provide teaching at level of understanding  - Provide teaching via preferred learning methods  6/5/2025 0448 by Jacque Boateng RN  Outcome: Progressing  6/5/2025 0440 by Jacque Boateng RN  Outcome: Progressing

## 2025-06-05 NOTE — CONSULTS
Consultation - Gastroenterology   Name: Sara Law 59 y.o. female I MRN: [id number]  Unit/Bed#: W -01 I Date of Admission: 6/4/2025   Date of Service: 6/5/2025 I Hospital Day: 1   Inpatient consult to gastroenterology  Consult performed by: HERBIE Warren  Consult ordered by: HERBIE Greer        Physician Requesting Evaluation: Evans Berg MD   Reason for Evaluation / Principal Problem: GI bleed        Assessment & Plan  GI bleed  -Pt presented with intermittent rectal bleeding with bright red blood per rectum x7 mixed with brown stool since Monday with associated abdominal cramping since Wednesday. No AC. +NSAID use for thumb pain. HDS, VSS on presentation with Hgb 13.2 stable from prior. CTAP with diverticulosis no evidence of diverticulitis.  -Last colonoscopy was in October 2020 with 3 polyps removed, sigmoid/descending diverticulosis, small hemorrhoids; recent EGD  last June was normal.  -Suspect most likely lower GI bleeding from hemorrhoids other differentials would be diverticulosis, AVM, or ulceration. Repeat HGB 14 this AM  -Maintain large bore IV access  -Monitor serial H&H, transfuse blood products as needed  -Continue NPO  -s/p GoLytely in the ED finished at 4:45 per pt, reports no more blood seen since yesterday  -Will plan for colonoscopy today for further evaluation, prep and procedure explained    GERD (gastroesophageal reflux disease)  Continue PPI  Gastroenterology service will follow.    History of Present Illness   HPI:  Sara Law is a 59 y.o. female with history of anxiety, depression, obesity, GERD, colon polyps, and nicotine dependence who presented 6/4 due to rectal bleeding since Monday.  She reports intermittent BRBPR mixed with brown stool starting Monday, it improved on Tuesday, and then came back Wednesday associated with lower abdominal cramping.  Denied any prior episodes of rectal bleeding in the past. No changes from normal  bowel regimen except the intermittent bleeding-typically has 4 BM per day small & hard.  Denied any dizziness, lightheadedness, nausea/vomiting.    In the ER, she was hemodynamically stable on presentation.  Hemoglobin 13.2 and stable from prior baseline.  CT scan abdomen pelvis performed showing diverticulosis without evidence of diverticulitis.  Case was discussed with GI and she was prepped with plan for colonoscopy today.    Her last colonoscopy was in October 2020 with 3 polyps removed, sigmoid/descending colon diverticulosis, and small hemorrhoids    She had an EGD in June 2024 for epigastric pain, nausea/vomiting which was normal.   EUS in Dec 2024 performed due to recurrent pancreatitis notable for grade A esophagitis, duodenal polyp removed, normal celiac takeoff. No evidence of PD dilation. Moderate fatty infiltration of the pancreas most notably head/uncinate process. No honeycombing or signs of chronic pancreatitis. PD did appear prominent at head/neck. No clear PD noted  On pantoprazole BID at home.      Review of Systems   Constitutional:  Negative for fever.   Gastrointestinal:  Positive for abdominal pain, anal bleeding and blood in stool. Negative for constipation, diarrhea, nausea and vomiting.   Neurological:  Negative for dizziness, weakness and light-headedness.     Historical Information   Past Medical History[1]  Past Surgical History[2]  Social History[3]  E-Cigarette/Vaping    E-Cigarette Use Never User      E-Cigarette/Vaping Substances    Nicotine No     THC No     CBD No     Flavoring No     Other No     Unknown No          Objective :  Temp:  [97.6 °F (36.4 °C)-98.1 °F (36.7 °C)] 97.6 °F (36.4 °C)  HR:  [64-95] 67  BP: ()/(55-77) 98/61  Resp:  [16-18] 16  SpO2:  [92 %-96 %] 93 %  O2 Device: None (Room air)    Physical Exam  Vitals and nursing note reviewed.   Constitutional:       General: She is not in acute distress.     Appearance: She is well-developed.   HENT:      Head:  Normocephalic and atraumatic.     Eyes:      Conjunctiva/sclera: Conjunctivae normal.       Cardiovascular:      Rate and Rhythm: Normal rate and regular rhythm.      Heart sounds: No murmur heard.  Pulmonary:      Effort: Pulmonary effort is normal. No respiratory distress.      Breath sounds: Normal breath sounds.   Abdominal:      Palpations: Abdomen is soft.      Tenderness: There is no abdominal tenderness.     Musculoskeletal:         General: No swelling.      Cervical back: Neck supple.     Skin:     General: Skin is warm and dry.      Capillary Refill: Capillary refill takes less than 2 seconds.     Neurological:      Mental Status: She is alert and oriented to person, place, and time.     Psychiatric:         Mood and Affect: Mood normal.           Lab Results: I have reviewed the following results:                 [1]   Past Medical History:  Diagnosis Date    Anxiety     Back pain     Depression     GERD (gastroesophageal reflux disease)     SOB (shortness of breath)     Vertigo    [2]   Past Surgical History:  Procedure Laterality Date    CERVICAL DISC SURGERY      CHOLECYSTECTOMY      CHOLECYSTECTOMY      COLONOSCOPY      FOOT SURGERY      LAPAROTOMY N/A 02/02/2021    Procedure: LAPAROTOMY FOR SPINAL SURGERY;  Surgeon: Edison Glynn MD;  Location: [facility];  Service: General    LUMBAR FUSION N/A 02/02/2021    Procedure: Anterior lumbar interbody fusion L5-S1; Posterior lumbar laminectomy, decompression, instrumented fusion L5-S1 with allograft and neuromonitoring;  Surgeon: Aries Rivera MD;  Location: [facility];  Service: Orthopedics    WI EGD TRANSORAL BIOPSY SINGLE/MULTIPLE N/A 04/20/2016    Procedure: ESOPHAGOGASTRODUODENOSCOPY (EGD);  Surgeon: Darius Barr MD;  Location: [facility];  Service: Gastroenterology    THYROID LOBECTOMY N/A 07/03/2023    Procedure: LEFT THYROID LOBECTOMY;  Surgeon: Aishwarya Field MD;  Location: [facility];  Service: Surgical Oncology    TONSILLECTOMY       TUBAL LIGATION      UPPER GASTROINTESTINAL ENDOSCOPY      US GUIDED THYROID BIOPSY  05/30/2023   [3]   Social History  Tobacco Use    Smoking status: Every Day     Current packs/day: 0.50     Average packs/day: 0.5 packs/day for 0.4 years (0.2 ttl pk-yrs)     Types: Cigarettes     Start date: 2025     Passive exposure: Current    Smokeless tobacco: Never    Tobacco comments:     started patch 1/2021, still using 3/4 ppd.    Vaping Use    Vaping status: Never Used   Substance and Sexual Activity    Alcohol use: Not Currently     Comment: 0    Drug use: Not Currently     Types: Oxycodone, Methamphetamines     Comment: 7/18/2020 last use    Sexual activity: Not Currently

## 2025-06-05 NOTE — H&P
H&P - Hospitalist   Name: Sara Law 59 y.o. female I MRN: [id number]  Unit/Bed#: ED-26 I Date of Admission: 6/4/2025   Date of Service: 6/5/2025 I Hospital Day: 1     Assessment & Plan  GI bleed  Patient presenting with 7 episodes of bright red bloody stool over the past 24 hours.  Not currently on AC/AP.  She denies any alcohol use.  But does report taking 1400 mg of ibuprofen daily for the past 4 days for thumb pain.  Denies any associate abdominal pain.  Hemoglobin 13.5-> 12.5  Previous colonoscopy showing 3 polyps with normal pathology.  Concern for lower GI bleed  ED discussed with GI recommending to start bowel prep overnight with plans for colonoscopy in the morning  Plan  Monitor hemodynamics closely  Check hemoglobin every 6 hours  Transfuse for hgb < 7  IV Protonix 40 mg twice daily  Start bowel prep to be completed by 6 AM  GI consult  GERD (gastroesophageal reflux disease)  On Protonix 40 mg twice daily  Will transition to IV due to GI bleeding  Avoid further NSAIDs    Cigarette nicotine dependence with nicotine-induced disorder  NRT therapy offered  Encourage cessation  Anxiety and depression  Continue PTA Cymbalta, Wellbutrin, BuSpar, and trazodone      VTE Pharmacologic Prophylaxis:   Moderate Risk (Score 3-4) - Pharmacological DVT Prophylaxis Contraindicated. Sequential Compression Devices Ordered.  Code Status: Level 3 - DNAR and DNI   Discussion with family: Patient declined call to .     Anticipated Length of Stay: Patient will be admitted on an inpatient basis with an anticipated length of stay of greater than 2 midnights secondary to acute GI bleeding requiring GI consultation and hemodynamic monitoring.    History of Present Illness   Chief Complaint: GI bleed    Sara Law is a 59 y.o. female with a PMH of anxiety, depression, obesity, GERD, colon polyps, nicotine dependence who presents with 7 episodes of bright red bloody stool per rectum since yesterday.  She  denies any prior history of GI bleeding in the past.  Recent colonoscopy did show 3 polyps which were removed and were negative for acute pathology.  She denies any unplanned weight loss, fevers, chills, or change in bowel habit.  She does endorse night sweats.  She states that she does not drink any alcohol but has been taking NSAIDs more frequently over the past 4 days for thumb pain.  She states that she takes 800 mg in the morning and 600 mg in the evening.  She has been doing this for for the past 4 days.  She has associated lower abdominal cramping denies any dizziness, nausea, vomiting, chest pain, or shortness of breath.  ED discussed with GI who is recommending bowel prep with colonoscopy in the morning.  At present denies any acute complaints.    Review of Systems   Constitutional:  Positive for appetite change.   Gastrointestinal:  Positive for blood in stool.   Musculoskeletal:  Positive for arthralgias.       Historical Information   Past Medical History[1]  Past Surgical History[2]  Social History[3]  E-Cigarette/Vaping    E-Cigarette Use Never User      E-Cigarette/Vaping Substances    Nicotine No     THC No     CBD No     Flavoring No     Other No     Unknown No      Family History[4]  Social History:  Marital Status: /Civil Union   Occupation: N/A  Patient Pre-hospital Living Situation: Home  Patient Pre-hospital Level of Mobility: walks  Patient Pre-hospital Diet Restrictions: None    Meds/Allergies   I have reviewed home medications with patient personally.  Prior to Admission medications    Medication Sig Start Date End Date Taking? Authorizing Provider   buPROPion (WELLBUTRIN XL) 300 mg 24 hr tablet Take 1 tablet (300 mg total) by mouth daily 10/31/24   Cipriano White MD   busPIRone (BUSPAR) 10 mg tablet Take 1 tablet (10 mg total) by mouth 3 (three) times a day 3/28/25   Cipriano White MD   DULoxetine (CYMBALTA) 20 mg capsule Take 1 capsule (20 mg total) by mouth daily 5/5/25   Cipriano White MD    fenofibrate (TRICOR) 145 mg tablet Take 1 tablet (145 mg total) by mouth daily 11/20/24   Cipriano White MD   hydrOXYzine HCL (ATARAX) 50 mg tablet Take 1 tablet (50 mg total) by mouth daily at bedtime 11/20/24   Cipriano White MD   pantoprazole (PROTONIX) 40 mg tablet Take 1 tablet (40 mg total) by mouth 2 (two) times a day 12/2/24 5/31/25  Colleen Rueda MD   sucralfate (CARAFATE) 1 g tablet Take 1 tablet (1 g total) by mouth 4 (four) times a day (before meals and at bedtime) for 7 days 1/31/25 2/7/25  Nasir Ramirez MD   traZODone (DESYREL) 300 MG tablet Take 1 tablet (300 mg total) by mouth daily at bedtime 5/5/25   Cipriano White MD     No Known Allergies    Objective :  Temp:  [98.1 °F (36.7 °C)] 98.1 °F (36.7 °C)  HR:  [72-95] 72  BP: (118-130)/(55-76) 118/55  Resp:  [18] 18  SpO2:  [94 %-96 %] 94 %  O2 Device: None (Room air)    Physical Exam  Vitals and nursing note reviewed.   Constitutional:       General: She is not in acute distress.     Appearance: She is well-developed. She is obese.   HENT:      Head: Normocephalic and atraumatic.     Eyes:      Conjunctiva/sclera: Conjunctivae normal.       Cardiovascular:      Rate and Rhythm: Normal rate and regular rhythm.      Heart sounds: No murmur heard.  Pulmonary:      Effort: Pulmonary effort is normal. No respiratory distress.      Breath sounds: Normal breath sounds. No wheezing or rales.   Abdominal:      Palpations: Abdomen is soft.      Tenderness: There is no abdominal tenderness.     Musculoskeletal:         General: No swelling.      Cervical back: Neck supple.      Right lower leg: No edema.      Left lower leg: No edema.     Skin:     General: Skin is warm and dry.      Capillary Refill: Capillary refill takes less than 2 seconds.     Neurological:      Mental Status: She is alert.     Psychiatric:         Mood and Affect: Mood normal.          Lines/Drains:            Lab Results: I have reviewed the following results:  Results from last 7 days   Lab  Units 06/04/25 2232 06/04/25 2018   WBC Thousand/uL  --  9.88   HEMOGLOBIN g/dL 12.5 13.2   HEMATOCRIT % 38.8 40.9   PLATELETS Thousands/uL  --  254   SEGS PCT %  --  57   LYMPHO PCT %  --  36   MONO PCT %  --  5   EOS PCT %  --  2     Results from last 7 days   Lab Units 06/04/25 2018   SODIUM mmol/L 138   POTASSIUM mmol/L 3.8   CHLORIDE mmol/L 106   CO2 mmol/L 23   BUN mg/dL 14   CREATININE mg/dL 1.18   ANION GAP mmol/L 9   CALCIUM mg/dL 9.3   ALBUMIN g/dL 4.0   TOTAL BILIRUBIN mg/dL 0.31   ALK PHOS U/L 34   ALT U/L 13   AST U/L 16   GLUCOSE RANDOM mg/dL 146*             Lab Results   Component Value Date    HGBA1C 5.6 03/30/2023    HGBA1C 5.5 01/18/2021     Results from last 7 days   Lab Units 06/04/25 2018   LACTIC ACID mmol/L 1.9       Imaging Results Review: I reviewed radiology reports from this admission including: CT abdomen/pelvis.  Other Study Results Review: No additional pertinent studies reviewed.    Administrative Statements   I have spent a total time of 55 minutes in caring for this patient on the day of the visit/encounter including Diagnostic results, Prognosis, Risks and benefits of tx options, Instructions for management, Patient and family education, Importance of tx compliance, Risk factor reductions, Impressions, Counseling / Coordination of care, Documenting in the medical record, Reviewing/placing orders in the medical record (including tests, medications, and/or procedures), Obtaining or reviewing history  , and Communicating with other healthcare professionals .    ** Please Note: This note has been constructed using a voice recognition system. **         [1]   Past Medical History:  Diagnosis Date    Anxiety     Back pain     Depression     GERD (gastroesophageal reflux disease)     SOB (shortness of breath)     Vertigo    [2]   Past Surgical History:  Procedure Laterality Date    CERVICAL DISC SURGERY      CHOLECYSTECTOMY      CHOLECYSTECTOMY      COLONOSCOPY      FOOT SURGERY       LAPAROTOMY N/A 02/02/2021    Procedure: LAPAROTOMY FOR SPINAL SURGERY;  Surgeon: Edison Glynn MD;  Location: [facility];  Service: General    LUMBAR FUSION N/A 02/02/2021    Procedure: Anterior lumbar interbody fusion L5-S1; Posterior lumbar laminectomy, decompression, instrumented fusion L5-S1 with allograft and neuromonitoring;  Surgeon: Aries Rivera MD;  Location: [facility];  Service: Orthopedics    CT EGD TRANSORAL BIOPSY SINGLE/MULTIPLE N/A 04/20/2016    Procedure: ESOPHAGOGASTRODUODENOSCOPY (EGD);  Surgeon: Darius Barr MD;  Location: [facility];  Service: Gastroenterology    THYROID LOBECTOMY N/A 07/03/2023    Procedure: LEFT THYROID LOBECTOMY;  Surgeon: Aishwarya Field MD;  Location: [facility];  Service: Surgical Oncology    TONSILLECTOMY      TUBAL LIGATION      UPPER GASTROINTESTINAL ENDOSCOPY      US GUIDED THYROID BIOPSY  05/30/2023   [3]   Social History  Tobacco Use    Smoking status: Every Day     Current packs/day: 0.50     Types: Cigarettes     Passive exposure: Current    Smokeless tobacco: Never    Tobacco comments:     started patch 1/2021, still using 3/4 ppd.    Vaping Use    Vaping status: Never Used   Substance and Sexual Activity    Alcohol use: Not Currently     Comment: 0    Drug use: Not Currently     Types: Oxycodone, Methamphetamines     Comment: 7/18/2020 last use    Sexual activity: Not Currently   [4]   Family History  Problem Relation Name Age of Onset    Aneurysm Mother      Hypertension Mother      Heart attack Father      Aneurysm Sister      Aneurysm Brother      Mental illness Son

## 2025-06-05 NOTE — ASSESSMENT & PLAN NOTE
Patient presenting with 7 episodes of bright red bloody stool over the past 24 hours.  Not currently on AC/AP.  She denies any alcohol use.  But does report taking 1400 mg of ibuprofen daily for the past 4 days for thumb pain.  Denies any associate abdominal pain.  Hemoglobin 13.5-> 12.5  Previous colonoscopy showing 3 polyps with normal pathology.  Concern for lower GI bleed  ED discussed with GI recommending to start bowel prep overnight with plans for colonoscopy in the morning  Plan  Monitor hemodynamics closely  Check hemoglobin every 6 hours  Transfuse for hgb < 7  IV Protonix 40 mg twice daily  Start bowel prep to be completed by 6 AM  GI consult

## 2025-06-05 NOTE — PLAN OF CARE
Problem: PAIN - ADULT  Goal: Verbalizes/displays adequate comfort level or baseline comfort level  Description: Interventions:  - Encourage patient to monitor pain and request assistance  - Assess pain using appropriate pain scale  - Administer analgesics as ordered based on type and severity of pain and evaluate response  - Implement non-pharmacological measures as appropriate and evaluate response  - Consider cultural and social influences on pain and pain management  - Notify physician/advanced practitioner if interventions unsuccessful or patient reports new pain  - Educate patient/family on pain management process including their role and importance of  reporting pain   - Provide non-pharmacologic/complimentary pain relief interventions  Outcome: Progressing     Problem: INFECTION - ADULT  Goal: Absence or prevention of progression during hospitalization  Description: INTERVENTIONS:  - Assess and monitor for signs and symptoms of infection  - Monitor lab/diagnostic results  - Monitor all insertion sites, i.e. indwelling lines, tubes, and drains  - Monitor endotracheal if appropriate and nasal secretions for changes in amount and color  - [place] appropriate cooling/warming therapies per order  - Administer medications as ordered  - Instruct and encourage patient and family to use good hand hygiene technique  - Identify and instruct in appropriate isolation precautions for identified infection/condition  Outcome: Progressing  Goal: Absence of fever/infection during neutropenic period  Description: INTERVENTIONS:  - Monitor WBC  - Perform strict hand hygiene  - Limit to healthy visitors only  - No plants, dried, fresh or silk flowers with sahni in patient room  Outcome: Progressing     Problem: SAFETY ADULT  Goal: Patient will remain free of falls  Description: INTERVENTIONS:  - Educate patient/family on patient safety including physical limitations  - Instruct patient to call for assistance with activity   -  Consider consulting OT/PT to assist with strengthening/mobility based on AM PAC & JH-HLM score  - Consult OT/PT to assist with strengthening/mobility   - Keep Call bell within reach  - Keep bed low and locked with side rails adjusted as appropriate  - Keep care items and personal belongings within reach  - Initiate and maintain comfort rounds  - Make Fall Risk Sign visible to staff  - Offer Toileting every  Hours, in advance of need  - Initiate/Maintain alarm  - Obtain necessary fall risk management equipment:   - Apply yellow socks and bracelet for high fall risk patients  - Consider moving patient to room near nurses station  Outcome: Progressing  Goal: Maintain or return to baseline ADL function  Description: INTERVENTIONS:  -  Assess patient's ability to carry out ADLs; assess patient's baseline for ADL function and identify physical deficits which impact ability to perform ADLs (bathing, care of mouth/teeth, toileting, grooming, dressing, etc.)  - Assess/evaluate cause of self-care deficits   - Assess range of motion  - Assess patient's mobility; develop plan if impaired  - Assess patient's need for assistive devices and provide as appropriate  - Encourage maximum independence but intervene and supervise when necessary  - Involve family in performance of ADLs  - Assess for home care needs following discharge   - Consider OT consult to assist with ADL evaluation and planning for discharge  - Provide patient education as appropriate  - Monitor functional capacity and physical performance, use of AM PAC & JH-HLM   - Monitor gait, balance and fatigue with ambulation    Outcome: Progressing  Goal: Maintains/Returns to pre admission functional level  Description: INTERVENTIONS:  - Perform AM-PAC 6 Click Basic Mobility/ Daily Activity assessment daily.  - Set and communicate daily mobility goal to care team and patient/family/caregiver.   - Collaborate with rehabilitation services on mobility goals if consulted  -  Perform Range of Motion  times a day.  - Reposition patient every  hours.  - Dangle patient  times a day  - Stand patient  times a day  - Ambulate patient  times a day  - Out of bed to chair  times a day   - Out of bed for meals  times a day  - Out of bed for toileting  - Record patient progress and toleration of activity level   Outcome: Progressing     Problem: DISCHARGE PLANNING  Goal: Discharge to home or other facility with appropriate resources  Description: INTERVENTIONS:  - Identify barriers to discharge w/patient and caregiver  - Arrange for needed discharge resources and transportation as appropriate  - Identify discharge learning needs (meds, wound care, etc.)  - Arrange for interpretive services to assist at discharge as needed  - Refer to Case Management Department for coordinating discharge planning if the patient needs post-hospital services based on physician/advanced practitioner order or complex needs related to functional status, cognitive ability, or social support system  Outcome: Progressing     Problem: Knowledge Deficit  Goal: Patient/family/caregiver demonstrates understanding of disease process, treatment plan, medications, and discharge instructions  Description: Complete learning assessment and assess knowledge base.  Interventions:  - Provide teaching at level of understanding  - Provide teaching via preferred learning methods  Outcome: Progressing

## 2025-06-05 NOTE — ASSESSMENT & PLAN NOTE
Patient presenting with 7 episodes of bright red bloody stool over the past 24 hours.  Not currently on AC/AP.  She denies any alcohol use.  But does report taking 1400 mg of ibuprofen daily for the past 4 days for thumb pain.  Denies any associate abdominal pain.  Hemoglobin trended, remains at 14  Patient denies any further GI bleed  Patient underwent colonoscopy that is negative for active bleeding, shows diverticulosis and hemorrhoids.  Patient stable for discharge home with Protonix  Avoid NSAIDs

## 2025-06-06 ENCOUNTER — TRANSITIONAL CARE MANAGEMENT (OUTPATIENT)
Dept: FAMILY MEDICINE CLINIC | Facility: CLINIC | Age: 60
End: 2025-06-06

## 2025-06-06 VITALS
TEMPERATURE: 98.1 F | HEART RATE: 66 BPM | DIASTOLIC BLOOD PRESSURE: 61 MMHG | RESPIRATION RATE: 20 BRPM | OXYGEN SATURATION: 95 % | HEIGHT: 66 IN | BODY MASS INDEX: 39.05 KG/M2 | WEIGHT: 243 LBS | SYSTOLIC BLOOD PRESSURE: 107 MMHG

## 2025-06-06 LAB
ABO GROUP BLD: NORMAL
BLD GP AB SCN SERPL QL: NEGATIVE
HGB BLD-MCNC: 12.1 G/DL (ref 11.5–15.4)
RH BLD: POSITIVE
SPECIMEN EXPIRATION DATE: NORMAL

## 2025-06-06 PROCEDURE — 86850 RBC ANTIBODY SCREEN: CPT | Performed by: INTERNAL MEDICINE

## 2025-06-06 PROCEDURE — 99239 HOSP IP/OBS DSCHRG MGMT >30: CPT | Performed by: PHYSICIAN ASSISTANT

## 2025-06-06 PROCEDURE — 85018 HEMOGLOBIN: CPT | Performed by: INTERNAL MEDICINE

## 2025-06-06 PROCEDURE — 86900 BLOOD TYPING SEROLOGIC ABO: CPT | Performed by: INTERNAL MEDICINE

## 2025-06-06 PROCEDURE — 86901 BLOOD TYPING SEROLOGIC RH(D): CPT | Performed by: INTERNAL MEDICINE

## 2025-06-06 RX ORDER — PANTOPRAZOLE SODIUM 40 MG/1
40 TABLET, DELAYED RELEASE ORAL 2 TIMES DAILY
Start: 2025-06-06

## 2025-06-06 RX ORDER — BUSPIRONE HYDROCHLORIDE 10 MG/1
10 TABLET ORAL 2 TIMES DAILY
Start: 2025-06-06

## 2025-06-06 RX ADMIN — DULOXETINE HYDROCHLORIDE 20 MG: 20 CAPSULE, DELAYED RELEASE PELLETS ORAL at 08:02

## 2025-06-06 RX ADMIN — NICOTINE 1 PATCH: 14 PATCH, EXTENDED RELEASE TRANSDERMAL at 02:05

## 2025-06-06 RX ADMIN — BUSPIRONE HYDROCHLORIDE 10 MG: 5 TABLET ORAL at 08:02

## 2025-06-06 RX ADMIN — FENOFIBRATE 145 MG: 145 TABLET ORAL at 08:02

## 2025-06-06 RX ADMIN — BUPROPION HYDROCHLORIDE 300 MG: 150 TABLET, EXTENDED RELEASE ORAL at 08:02

## 2025-06-06 NOTE — DISCHARGE SUMMARY
Discharge Summary - Hospitalist   Name: Sara Law 59 y.o. female I MRN: [id number]  Unit/Bed#: W -01 I Date of Admission: 6/4/2025   Date of Service: 6/6/2025 I Hospital Day: 2     Assessment & Plan  GI bleed  Patient presented with 7 episodes of bright red blood stool over a 24 hr period prior to admission.  Not on AC/AP.  Was taking motrin for a few days prior.  BUN WNL. No melena - no suspicion of UGI bleeding  Hgb 12-14 during admission. Initial drop from 14-12.5 is likely from IV fluid use.   Patient underwent colonoscopy that is negative for active bleeding, shows diverticulosis and hemorrhoids.  Patient stable for discharge home   Colonoscopy 7 years  GERD (gastroesophageal reflux disease)  On Protonix 40 mg twice daily at baseline  Cigarette nicotine dependence with nicotine-induced disorder  NRT therapy offered  Encourage cessation  Anxiety and depression  Continue PTA Cymbalta, Wellbutrin, BuSpar, and trazodone     Medical Problems       Resolved Problems  Date Reviewed: 10/31/2024   None       Discharging Physician / Practitioner: Elizabeth Holt PA-C  PCP: Cipriano White MD  Admission Date:   Admission Orders (From admission, onward)       Ordered        06/04/25 2355  Inpatient Admission  Once                          Discharge Date: 06/06/25    Next Steps for Physician/AP Assuming Care:  Colonoscopy 7 years    Test Results Pending at Discharge (will require follow up):  none    Medication Changes for Discharge & Rationale:   none  See after visit summary for reconciled discharge medications provided to patient and/or family.     Consultations During Hospital Stay:  GI    Procedures Performed:   Colonoscopy: The cecum appeared normal. Diverticulosis in the sigmoid colon. Hemorrhoids    Significant Findings / Test Results:   CT AP: Diverticulosis without evidence of diverticulitis.     Incidental Findings:   None    Hospital Course:   Sara Law is a 59 y.o. female patient who originally  "presented to the hospital on 6/4/2025 due to bright red blood per rectum for approximately 1 day.  She was noted be subsequently mated, hemoglobin was stable.  She was seen in consultation by GI.  She underwent colonoscopy on 6/5/2025 and was noted to show diverticulosis and hemorrhoids.  Ultimately felt to be hemorrhoidal in nature.  There was no evidence to suggest upper GI bleed.  Patient was recommended to be discharged home however she had worsening headache and felt unwell post colonoscopy and therefore she was kept another additional night.  Her headache has resolved.  She still feels well today.  She will be discharged home.     Please see above list of diagnoses and related plan for additional information.     Discharge Day Visit / Exam:   Subjective: Headache resolved.  Has not had a bowel movement.  Vitals: Blood Pressure: 107/61 (06/06/25 0829)  Pulse: 66 (06/06/25 0829)  Temperature: 98.1 °F (36.7 °C) (06/06/25 0829)  Temp Source: Oral (06/06/25 0829)  Respirations: 20 (06/06/25 0829)  Height: 5' 6\" (167.6 cm) (06/05/25 0046)  Weight - Scale: 110 kg (243 lb) (06/05/25 0046)  SpO2: 95 % (06/06/25 0829)  Physical Exam  Vitals and nursing note reviewed.   Constitutional:       General: She is not in acute distress.     Appearance: Normal appearance. She is obese. She is not ill-appearing or diaphoretic.   HENT:      Head: Normocephalic and atraumatic.      Mouth/Throat:      Mouth: Mucous membranes are moist.     Cardiovascular:      Rate and Rhythm: Normal rate and regular rhythm.   Pulmonary:      Effort: Pulmonary effort is normal.      Breath sounds: Normal breath sounds. No stridor. No wheezing, rhonchi or rales.   Abdominal:      General: Bowel sounds are normal.      Palpations: Abdomen is soft. There is no mass.      Tenderness: There is no abdominal tenderness. There is no guarding.     Musculoskeletal:      Right lower leg: No edema.      Left lower leg: No edema.     Skin:     General: Skin is " warm and dry.      Coloration: Skin is not pale.     Neurological:      Mental Status: She is alert.          Discussion with Family: Patient declined call to .     Discharge instructions/Information to patient and family:   See after visit summary for information provided to patient and family.      Provisions for Follow-Up Care:  See after visit summary for information related to follow-up care and any pertinent home health orders.      Mobility at time of Discharge:   Basic Mobility Inpatient Raw Score: 21  JH-HLM Goal: 6: Walk 10 steps or more  JH-HLM Achieved: 8: Walk 250 feet ot more  HLM Goal achieved. Continue to encourage appropriate mobility.     Disposition:   Home    Planned Readmission: no    Administrative Statements   Discharge Statement:  I have spent a total time of 50 minutes in caring for this patient on the day of the visit/encounter. .    **Please Note: This note may have been constructed using a voice recognition system**   right

## 2025-06-06 NOTE — PLAN OF CARE
Problem: PAIN - ADULT  Goal: Verbalizes/displays adequate comfort level or baseline comfort level  Description: Interventions:  - Encourage patient to monitor pain and request assistance  - Assess pain using appropriate pain scale  - Administer analgesics as ordered based on type and severity of pain and evaluate response  - Implement non-pharmacological measures as appropriate and evaluate response  - Consider cultural and social influences on pain and pain management  - Notify physician/advanced practitioner if interventions unsuccessful or patient reports new pain  - Educate patient/family on pain management process including their role and importance of  reporting pain   - Provide non-pharmacologic/complimentary pain relief interventions  Outcome: Progressing     Problem: DISCHARGE PLANNING  Goal: Discharge to home or other facility with appropriate resources  Description: INTERVENTIONS:  - Identify barriers to discharge w/patient and caregiver  - Arrange for needed discharge resources and transportation as appropriate  - Identify discharge learning needs (meds, wound care, etc.)  - Arrange for interpretive services to assist at discharge as needed  - Refer to Case Management Department for coordinating discharge planning if the patient needs post-hospital services based on physician/advanced practitioner order or complex needs related to functional status, cognitive ability, or social support system  Outcome: Progressing

## 2025-06-06 NOTE — ASSESSMENT & PLAN NOTE
Patient presented with 7 episodes of bright red blood stool over a 24 hr period prior to admission.  Not on AC/AP.  Was taking motrin for a few days prior.  BUN WNL. No melena - no suspicion of UGI bleeding  Hgb 12-14 during admission. Initial drop from 14-12.5 is likely from IV fluid use.   Patient underwent colonoscopy that is negative for active bleeding, shows diverticulosis and hemorrhoids.  Patient stable for discharge home   Colonoscopy 7 years

## 2025-06-06 NOTE — DISCHARGE SUMMARY
Discharge Summary - Hospitalist   Name: Sara Law 59 y.o. female I MRN: [id number]  Unit/Bed#: W -01 I Date of Admission: 6/4/2025   Date of Service: 6/6/2025 I Hospital Day: 2   { ?Quick Links I Problem List I PORCH I Billing Tip:20390}  Assessment & Plan  GI bleed  Patient presenting with 7 episodes of bright red bloody stool over the past 24 hours.  Not currently on AC/AP.  She denies any alcohol use.  But does report taking 1400 mg of ibuprofen daily for the past 4 days for thumb pain.  Denies any associate abdominal pain.  Hemoglobin trended, remains at 14  Patient denies any further GI bleed  Patient underwent colonoscopy that is negative for active bleeding, shows diverticulosis and hemorrhoids.  Patient stable for discharge home with Protonix  Avoid NSAIDs  GERD (gastroesophageal reflux disease)  On Protonix 40 mg twice daily      Cigarette nicotine dependence with nicotine-induced disorder  NRT therapy offered  Encourage cessation  Anxiety and depression  Continue PTA Cymbalta, Wellbutrin, BuSpar, and trazodone     Medical Problems       Resolved Problems  Date Reviewed: 10/31/2024   None       Discharging Physician / Practitioner: [place]  PCP: Cipriano White MD  Admission Date:   Admission Orders (From admission, onward)       Ordered        06/04/25 7821  Inpatient Admission  Once                          Discharge Date: 06/06/25    Next Steps for Physician/AP Assuming Care:  ***    Test Results Pending at Discharge (will require follow up):  None    Medication Changes for Discharge & Rationale: {TIP I (Copy and Paste to/from Inbox Message):11134}  Resume pantoprazole (Protonix) Take 1 tablet (40 mg total) by mouth 2 (two) times a day   Refrain from using NSAID medications such as ibuprofen, motrin, advil consistently. Regular use could lead to increased bleeding from GI tract and hemorrhoids.   See after visit summary for reconciled discharge medications provided to patient and/or  family.     Consultations During Hospital Stay:  Gastroenterology    Procedures Performed:   Colonoscopy (6/5)    Significant Findings / Test Results:   Colonoscopy (6/5): diverticulosis in colon, internal hemorrhoids    Incidental Findings:   Diverticula in colon  I reviewed the above mentioned incidental findings with the patient and/or family and they expressed understanding.    Hospital Course:   Sara Law is a 59 y.o. female patient with PMH of colon polyps depression, nicotine dependence and GERD who originally presented to the hospital on 6/4/2025 due to nausea, abdominal cramping, and seven episodes of bright red blood in stool starting 1 day prior to presentation.    Assessment & Plan  GI bleed  Patient presenting with 7 episodes of bright red bloody stool over the past 24 hours.  Not currently on AC/AP.  She denies any alcohol use.  But does report taking 1-2 400 mg of ibuprofen daily for the past 4 days for thumb pain.  Denies any associate abdominal pain.  Hemoglobin trended, remains at 12  Patient denies any further GI bleed  Patient underwent colonoscopy that is negative for active bleeding, shows diverticulosis and hemorrhoids.  Patient stable for discharge home with Protonix  Avoid NSAIDs  GERD (gastroesophageal reflux disease)  On Protonix 40 mg twice daily  Cigarette nicotine dependence with nicotine-induced disorder  NRT therapy offered  Encourage cessation  Anxiety and depression  Continue PTA Cymbalta, Wellbutrin, BuSpar, and trazodone    Please see above list of diagnoses and related plan for additional information.     Discharge Day Visit / Exam:   Subjective:  Patient denies any concerns this morning. Overnight she had a headache overnight but that resolved into the morning. She denies nausea, fevers, abdominal pain, changes in vision, and lightheadedness. She has not had a bowel movement since yesterday's colonoscopy.     Vitals: Blood Pressure: 107/61 (06/06/25 0829)  Pulse: 66  "(06/06/25 0829)  Temperature: 98.1 °F (36.7 °C) (06/06/25 0829)  Temp Source: Oral (06/06/25 0829)  Respirations: 20 (06/06/25 0829)  Height: 5' 6\" (167.6 cm) (06/05/25 0046)  Weight - Scale: 110 kg (243 lb) (06/05/25 0046)  SpO2: 95 % (06/06/25 0829)  Physical Exam  Vitals and nursing note reviewed.   Constitutional:       General: She is not in acute distress.     Appearance: Normal appearance.   HENT:      Head: Normocephalic and atraumatic.      Mouth/Throat:      Mouth: Mucous membranes are moist.     Eyes:      Conjunctiva/sclera: Conjunctivae normal.       Cardiovascular:      Rate and Rhythm: Normal rate and regular rhythm.      Heart sounds: No murmur heard.  Pulmonary:      Effort: Pulmonary effort is normal. No respiratory distress.      Breath sounds: Normal breath sounds.   Abdominal:      General: There is no distension.      Palpations: Abdomen is soft.      Tenderness: There is no abdominal tenderness.     Musculoskeletal:      Cervical back: Neck supple.      Right lower leg: No edema.      Left lower leg: No edema.     Skin:     General: Skin is warm and dry.      Capillary Refill: Capillary refill takes less than 2 seconds.     Neurological:      General: No focal deficit present.      Mental Status: She is alert.         Discussion with Family: Patient said that she would call family to update them and come pick her up when ready.     Discharge instructions/Information to patient and family:   See after visit summary for information provided to patient and family.      Provisions for Follow-Up Care:  See after visit summary for information related to follow-up care and any pertinent home health orders.      Mobility at time of Discharge:   Basic Mobility Inpatient Raw Score: 21  JH-HLM Goal: 6: Walk 10 steps or more  JH-HLM Achieved: 8: Walk 250 feet ot more  HLM Goal achieved. Continue to encourage appropriate mobility.     Disposition:   Home    Planned Readmission: ***    Administrative " Statements   Discharge Statement:  I have spent a total time of *** minutes in caring for this patient on the day of the visit/encounter. {>30 minutes of time was spent on:79425}.    **Please Note: This note may have been constructed using a voice recognition system**

## 2025-06-06 NOTE — PLAN OF CARE
Problem: PAIN - ADULT  Goal: Verbalizes/displays adequate comfort level or baseline comfort level  Description: Interventions:  - Encourage patient to monitor pain and request assistance  - Assess pain using appropriate pain scale  - Administer analgesics as ordered based on type and severity of pain and evaluate response  - Implement non-pharmacological measures as appropriate and evaluate response  - Consider cultural and social influences on pain and pain management  - Notify physician/advanced practitioner if interventions unsuccessful or patient reports new pain  - Educate patient/family on pain management process including their role and importance of  reporting pain   - Provide non-pharmacologic/complimentary pain relief interventions  Outcome: Progressing     Problem: INFECTION - ADULT  Goal: Absence or prevention of progression during hospitalization  Description: INTERVENTIONS:  - Assess and monitor for signs and symptoms of infection  - Monitor lab/diagnostic results  - Monitor all insertion sites, i.e. indwelling lines, tubes, and drains  - Monitor endotracheal if appropriate and nasal secretions for changes in amount and color  - [place] appropriate cooling/warming therapies per order  - Administer medications as ordered  - Instruct and encourage patient and family to use good hand hygiene technique  - Identify and instruct in appropriate isolation precautions for identified infection/condition  Outcome: Progressing  Goal: Absence of fever/infection during neutropenic period  Description: INTERVENTIONS:  - Monitor WBC  - Perform strict hand hygiene  - Limit to healthy visitors only  - No plants, dried, fresh or silk flowers with sahni in patient room  Outcome: Progressing     Problem: SAFETY ADULT  Goal: Patient will remain free of falls  Description: INTERVENTIONS:  - Educate patient/family on patient safety including physical limitations  - Instruct patient to call for assistance with activity   -  Consider consulting OT/PT to assist with strengthening/mobility based on AM PAC & JH-HLM score  - Consult OT/PT to assist with strengthening/mobility   - Keep Call bell within reach  - Keep bed low and locked with side rails adjusted as appropriate  - Keep care items and personal belongings within reach  - Initiate and maintain comfort rounds  - Make Fall Risk Sign visible to staff  - Offer Toileting every  Hours, in advance of need  - Initiate/Maintainalarm  - Obtain necessary fall risk management equipment:   - Apply yellow socks and bracelet for high fall risk patients  - Consider moving patient to room near nurses station  Outcome: Progressing  Goal: Maintain or return to baseline ADL function  Description: INTERVENTIONS:  -  Assess patient's ability to carry out ADLs; assess patient's baseline for ADL function and identify physical deficits which impact ability to perform ADLs (bathing, care of mouth/teeth, toileting, grooming, dressing, etc.)  - Assess/evaluate cause of self-care deficits   - Assess range of motion  - Assess patient's mobility; develop plan if impaired  - Assess patient's need for assistive devices and provide as appropriate  - Encourage maximum independence but intervene and supervise when necessary  - Involve family in performance of ADLs  - Assess for home care needs following discharge   - Consider OT consult to assist with ADL evaluation and planning for discharge  - Provide patient education as appropriate  - Monitor functional capacity and physical performance, use of AM PAC & JH-HLM   - Monitor gait, balance and fatigue with ambulation    Outcome: Progressing  Goal: Maintains/Returns to pre admission functional level  Description: INTERVENTIONS:  - Perform AM-PAC 6 Click Basic Mobility/ Daily Activity assessment daily.  - Set and communicate daily mobility goal to care team and patient/family/caregiver.   - Collaborate with rehabilitation services on mobility goals if consulted  -  Perform Range of Motion  times a day.  - Reposition patient every  hours.  - Dangle patient  times a day  - Stand patient  times a day  - Ambulate patient  times a day  - Out of bed to chair  times a day   - Out of bed for meals  times a day  - Out of bed for toileting  - Record patient progress and toleration of activity level   Outcome: Progressing     Problem: DISCHARGE PLANNING  Goal: Discharge to home or other facility with appropriate resources  Description: INTERVENTIONS:  - Identify barriers to discharge w/patient and caregiver  - Arrange for needed discharge resources and transportation as appropriate  - Identify discharge learning needs (meds, wound care, etc.)  - Arrange for interpretive services to assist at discharge as needed  - Refer to Case Management Department for coordinating discharge planning if the patient needs post-hospital services based on physician/advanced practitioner order or complex needs related to functional status, cognitive ability, or social support system  Outcome: Progressing     Problem: Knowledge Deficit  Goal: Patient/family/caregiver demonstrates understanding of disease process, treatment plan, medications, and discharge instructions  Description: Complete learning assessment and assess knowledge base.  Interventions:  - Provide teaching at level of understanding  - Provide teaching via preferred learning methods  Outcome: Progressing

## 2025-06-09 DIAGNOSIS — Z59.82 TRANSPORTATION INSECURITY: Primary | ICD-10-CM

## 2025-06-09 SDOH — ECONOMIC STABILITY - TRANSPORTATION SECURITY: TRANSPORTATION INSECURITY: Z59.82

## 2025-06-09 NOTE — UTILIZATION REVIEW
NOTIFICATION OF ADMISSION DISCHARGE   This is a Notification of Discharge from [facility]. Please be advised that this patient has been discharge from our facility. Below you will find the admission and discharge date and time including the patient’s disposition.   UTILIZATION REVIEW CONTACT:  Utilization Review Assistants  Network Utilization Review Department  Phone: [phone] x carefully listen to the prompts. All voicemails are confidential.  Email: [email or web address]     ADMISSION INFORMATION  PRESENTATION DATE: 6/4/2025  7:43 PM  OBERVATION ADMISSION DATE: N/A  INPATIENT ADMISSION DATE: 6/4/25 11:55 PM   DISCHARGE DATE: 6/6/2025  1:53 PM   DISPOSITION:Home/Self Care    Network Utilization Review Department  ATTENTION: Please call with any questions or concerns to [phone] and carefully listen to the prompts so that you are directed to the right person. All voicemails are confidential.   For Discharge needs, contact Care Management DC Support Team at [phone] opt. 2  Send all requests for admission clinical reviews, approved or denied determinations and any other requests to dedicated fax number below belonging to the campus where the patient is receiving treatment. List of dedicated fax numbers for the Facilities:  FACILITY NAME UR FAX NUMBER   ADMISSION DENIALS (Administrative/Medical Necessity) [phone]   DISCHARGE SUPPORT TEAM ([facility]) [phone]   PARENT CHILD HEALTH (Maternity/NICU/Pediatrics) [phone]   [facility] [phone]   [facility] [phone]   [facility] [phone]   [facility] [phone]   [facility] [phone]   [facility] [phone]   [facility] [phone]   [facility] [phone]   [facility]  [facility] [phone]   [facility] [phone]   [facility] [phone]   [facility] [phone]

## 2025-06-11 ENCOUNTER — PATIENT OUTREACH (OUTPATIENT)
Dept: CASE MANAGEMENT | Facility: OTHER | Age: 60
End: 2025-06-11

## 2025-06-11 NOTE — PROGRESS NOTES
Chart review indicates that an order was placed to follow up with pt regarding transportation needs. Pt was IP at [facility] 6/4-6/6/25 for GI bleed. Notes reviewed. No prior OP SWCM involvement found.     SWCM outreached pt who did not , VM left. SWCM to follow.

## 2025-06-12 ENCOUNTER — TELEMEDICINE (OUTPATIENT)
Dept: FAMILY MEDICINE CLINIC | Facility: CLINIC | Age: 60
End: 2025-06-12
Payer: COMMERCIAL

## 2025-06-12 VITALS — HEIGHT: 66 IN | BODY MASS INDEX: 39.05 KG/M2 | WEIGHT: 243 LBS

## 2025-06-12 DIAGNOSIS — E78.5 DYSLIPIDEMIA: ICD-10-CM

## 2025-06-12 DIAGNOSIS — Z09 HOSPITAL DISCHARGE FOLLOW-UP: Primary | ICD-10-CM

## 2025-06-12 DIAGNOSIS — F17.219 CIGARETTE NICOTINE DEPENDENCE WITH NICOTINE-INDUCED DISORDER: ICD-10-CM

## 2025-06-12 DIAGNOSIS — K92.2 GASTROINTESTINAL HEMORRHAGE, UNSPECIFIED GASTROINTESTINAL HEMORRHAGE TYPE: ICD-10-CM

## 2025-06-12 DIAGNOSIS — F41.9 ANXIETY AND DEPRESSION: ICD-10-CM

## 2025-06-12 DIAGNOSIS — F32.A ANXIETY AND DEPRESSION: ICD-10-CM

## 2025-06-12 DIAGNOSIS — Z12.31 ENCOUNTER FOR SCREENING MAMMOGRAM FOR BREAST CANCER: ICD-10-CM

## 2025-06-12 DIAGNOSIS — E66.9 OBESITY (BMI 30-39.9): ICD-10-CM

## 2025-06-12 PROCEDURE — 99495 TRANSJ CARE MGMT MOD F2F 14D: CPT | Performed by: FAMILY MEDICINE

## 2025-06-12 NOTE — PROGRESS NOTES
Virtual TCM Visit:Name: Sara Law      : 1965      MRN: [id number]  Encounter Provider: Cipriano White MD  Encounter Date: 2025   Encounter department: [facility]  :  Assessment & Plan  Hospital discharge follow-up  Reviewed hospital records.        Gastrointestinal hemorrhage, unspecified gastrointestinal hemorrhage type  No more bright red blood in stool.   Recheck CBC.     Orders:    CBC and differential; Future    Comprehensive metabolic panel; Future    Hemoglobin A1C; Future    Lipid Panel with Direct LDL reflex; Future    TSH, 3rd generation with Free T4 reflex; Future    Anxiety and depression  Depression Screening Follow-up Plan: Patient's depression screening was positive with a PHQ-9 score of 21. Patient assessed for underlying major depression. They have no active suicidal ideations. Brief counseling provided and recommend additional follow-up/re-evaluation next office visit.    Refer to psych/therapy. Advised pt to call insurance for resources.   Go to ER if SI/HI.     Orders:    CBC and differential; Future    Comprehensive metabolic panel; Future    Hemoglobin A1C; Future    Lipid Panel with Direct LDL reflex; Future    TSH, 3rd generation with Free T4 reflex; Future    Ambulatory referral to Psych Services; Future    Cigarette nicotine dependence with nicotine-induced disorder  Strongly advised pt to quit!  Check lung screen.   Orders:    CT Lung Screening Program; Future    Encounter for screening mammogram for breast cancer    Orders:    Mammo screening bilateral w 3d and cad; Future    Obesity (BMI 30-39.9)      Orders:    CBC and differential; Future    Comprehensive metabolic panel; Future    Hemoglobin A1C; Future    Lipid Panel with Direct LDL reflex; Future    TSH, 3rd generation with Free T4 reflex; Future    Dyslipidemia  Low fat diet. Continue fenofibrate 145mg QD. Check labs.   Orders:    CBC and differential; Future    Comprehensive metabolic  panel; Future    Hemoglobin A1C; Future    Lipid Panel with Direct LDL reflex; Future    TSH, 3rd generation with Free T4 reflex; Future           History of Present Illness     Transitional Care Management Review:   Sara Law is a 59 y.o. female here for TCM follow up.    During the TCM phone call patient stated:  TCM Call (since 5/29/2025)       Date and time call was made  6/10/2025 11:19 AM    Patient was hospitialized at  [facility]    Date of Admission  06/04/25    Date of discharge  06/06/25    Diagnosis  GI bleed    Disposition  Home    Were the patients medications reviewed and updated  Yes    Current Symptoms  None          TCM Call (since 5/29/2025)       Post hospital issues  None    Scheduled for follow up?  Yes    Did you obtain your prescribed medications  Yes    Do you need help managing your prescriptions or medications  No    Is transportation to your appointment needed  No    I have advised the patient to call PCP with any new or worsening symptoms  Che Soliman     Living Arrangements  Family members    Support System  Family    The type of support provided  None    Do you have social support  Yes, as much as I need    Are you recieving home care services  Yes          Depression  Pertinent negatives include no abdominal pain, arthralgias, chest pain, chills, congestion, coughing, fever, headaches, myalgias, nausea, rash, sore throat or vomiting.       Was in hospital 6/4-6/6/2025 for bright red blood stool.   Hg stable 12-14.   No melena, no suspicion of upper GI bleeding.   Colonoscopy negative for active bleeding, showed diverticulosis and hemorrhoids.   Discharged home.   Pt states no more bright red blood in stool.     Depression/anxiety/insomnia---Stress in life. Feels depressed/anxious. Denies SI/HI.   She is on wellbutrin 300mg QD, cymbalta 20mg QD, buspar 10mg bid and hydroxyzine 50mg qhs.   She is on trazodone 300mg qhs for sleep.   FU psychiatrist  "Dr. Appiah from [facility] before. Got referral to [facility] psychiatry/therapy but no appts.      GERD---She is on pantoprazole 40mg QD. FU GI.      Thyroid nodule---Had left thyroid lobectomy in 7/2023. Final path NIFTP.   No additional surveillance. FU surg/onc prn.      Smoke 1ppd for 40 years. Less now 0.5ppd. 4/2023 CT lung screen negative for nodules. OK to screen again.            Review of Systems   Constitutional:  Negative for appetite change, chills and fever.   HENT:  Negative for congestion, ear pain, sinus pain and sore throat.    Eyes:  Negative for discharge and itching.   Respiratory:  Negative for apnea, cough, chest tightness, shortness of breath and wheezing.    Cardiovascular:  Negative for chest pain, palpitations and leg swelling.   Gastrointestinal:  Negative for abdominal pain, anal bleeding, constipation, diarrhea, nausea and vomiting.   Endocrine: Negative for cold intolerance, heat intolerance and polyuria.   Genitourinary:  Negative for difficulty urinating and dysuria.   Musculoskeletal:  Negative for arthralgias, back pain and myalgias.   Skin:  Negative for rash.   Neurological:  Negative for dizziness and headaches.   Psychiatric/Behavioral:  Positive for depression and sleep disturbance. Negative for agitation and self-injury. The patient is nervous/anxious.      Objective   Ht 5' 6\" (1.676 m)   Wt 110 kg (243 lb)   BMI 39.22 kg/m²     Physical Exam  Constitutional:       Appearance: Normal appearance.     Eyes:      General:         Right eye: No discharge.         Left eye: No discharge.      Conjunctiva/sclera: Conjunctivae normal.     Pulmonary:      Effort: Pulmonary effort is normal.     Musculoskeletal:         General: Normal range of motion.      Cervical back: Normal range of motion.     Neurological:      Mental Status: She is alert.       Medications have been reviewed by provider in current encounter    Administrative Statements   Encounter provider Cipriano White, " MD    The Patient is located at Home and in the following state in which I hold an active license [place].    The patient was identified by name and date of birth. Sara Law was informed that this is a telemedicine visit and that the visit is being conducted through the [employer] platform. She agrees to proceed..  My office door was closed. No one else was in the room.  She acknowledged consent and understanding of privacy and security of the video platform. The patient has agreed to participate and understands they can discontinue the visit at any time.    I have spent a total time of 25 minutes in caring for this patient on the day of the visit/encounter including Diagnostic results, Prognosis, Risks and benefits of tx options, Instructions for management, Patient and family education, Importance of tx compliance, Risk factor reductions, Impressions, and Documenting in the medical record, not including the time spent for establishing the audio/video connection.    Cipriano White MD

## 2025-06-12 NOTE — ASSESSMENT & PLAN NOTE
Depression Screening Follow-up Plan: Patient's depression screening was positive with a PHQ-9 score of 21. Patient assessed for underlying major depression. They have no active suicidal ideations. Brief counseling provided and recommend additional follow-up/re-evaluation next office visit.    Refer to psych/therapy. Advised pt to call insurance for resources.   Go to ER if SI/HI.     Orders:    CBC and differential; Future    Comprehensive metabolic panel; Future    Hemoglobin A1C; Future    Lipid Panel with Direct LDL reflex; Future    TSH, 3rd generation with Free T4 reflex; Future    Ambulatory referral to Psych Services; Future

## 2025-06-12 NOTE — ASSESSMENT & PLAN NOTE
Orders:    CBC and differential; Future    Comprehensive metabolic panel; Future    Hemoglobin A1C; Future    Lipid Panel with Direct LDL reflex; Future    TSH, 3rd generation with Free T4 reflex; Future

## 2025-06-12 NOTE — ASSESSMENT & PLAN NOTE
No more bright red blood in stool.   Recheck CBC.     Orders:    CBC and differential; Future    Comprehensive metabolic panel; Future    Hemoglobin A1C; Future    Lipid Panel with Direct LDL reflex; Future    TSH, 3rd generation with Free T4 reflex; Future

## 2025-06-18 ENCOUNTER — PATIENT OUTREACH (OUTPATIENT)
Dept: CASE MANAGEMENT | Facility: OTHER | Age: 60
End: 2025-06-18

## 2025-06-18 NOTE — PROGRESS NOTES
RAOUL PASTOR received inbasket message from per silvia RAOUL Phipps who attempted to outreach patient once and left a message.     Patient is in need of transportation resources. RAOUL PASTOR reviewed patient's chart and called patient ([phone]). Patient did not answer. RAOUL PASTOR left a message including RAOUL PASTOR contact information and requested a call back should she be interested in speaking. RAOUL PASTOR send unable to reach letter via [employer] and will close. Please re consult RAOUL PASTOR as needed.

## 2025-06-18 NOTE — LETTER
[address] [facility] [address]  [phone]    Re: Unable to Reach   6/18/2025       Dear Sara,    I am a [facility]  and wanted to be certain you had information to contact me should you desire assistance with or have questions about non-medical aspects of your care such as [but not limited to] medical insurance, housing, transportation, material needs, or emergency needs.  If I do not have an answer I will assist you in finding the appropriate agency or individual who can help.      Please feel free to contact me at [phone]. Thank You.    Sincerely,         Nila Medina LCSW

## 2025-07-06 ENCOUNTER — APPOINTMENT (OUTPATIENT)
Dept: LAB | Facility: CLINIC | Age: 60
End: 2025-07-06
Payer: COMMERCIAL

## 2025-07-06 DIAGNOSIS — K92.2 GASTROINTESTINAL HEMORRHAGE, UNSPECIFIED GASTROINTESTINAL HEMORRHAGE TYPE: ICD-10-CM

## 2025-07-06 DIAGNOSIS — E78.5 DYSLIPIDEMIA: ICD-10-CM

## 2025-07-06 DIAGNOSIS — F32.A ANXIETY AND DEPRESSION: ICD-10-CM

## 2025-07-06 DIAGNOSIS — E66.9 OBESITY (BMI 30-39.9): ICD-10-CM

## 2025-07-06 DIAGNOSIS — F41.9 ANXIETY AND DEPRESSION: ICD-10-CM

## 2025-07-06 LAB
ALBUMIN SERPL BCG-MCNC: 3.9 G/DL (ref 3.5–5)
ALP SERPL-CCNC: 33 U/L (ref 34–104)
ALT SERPL W P-5'-P-CCNC: 12 U/L (ref 7–52)
ANION GAP SERPL CALCULATED.3IONS-SCNC: 6 MMOL/L (ref 4–13)
AST SERPL W P-5'-P-CCNC: 14 U/L (ref 13–39)
BASOPHILS # BLD AUTO: 0.03 THOUSANDS/ÂΜL (ref 0–0.1)
BASOPHILS NFR BLD AUTO: 0 % (ref 0–1)
BILIRUB SERPL-MCNC: 0.38 MG/DL (ref 0.2–1)
BUN SERPL-MCNC: 16 MG/DL (ref 5–25)
CALCIUM SERPL-MCNC: 9 MG/DL (ref 8.4–10.2)
CHLORIDE SERPL-SCNC: 107 MMOL/L (ref 96–108)
CHOLEST SERPL-MCNC: 143 MG/DL (ref ?–200)
CO2 SERPL-SCNC: 26 MMOL/L (ref 21–32)
CREAT SERPL-MCNC: 0.89 MG/DL (ref 0.6–1.3)
EOSINOPHIL # BLD AUTO: 0.14 THOUSAND/ÂΜL (ref 0–0.61)
EOSINOPHIL NFR BLD AUTO: 2 % (ref 0–6)
ERYTHROCYTE [DISTWIDTH] IN BLOOD BY AUTOMATED COUNT: 13.2 % (ref 11.6–15.1)
EST. AVERAGE GLUCOSE BLD GHB EST-MCNC: 117 MG/DL
GFR SERPL CREATININE-BSD FRML MDRD: 70 ML/MIN/1.73SQ M
GLUCOSE P FAST SERPL-MCNC: 113 MG/DL (ref 65–99)
HBA1C MFR BLD: 5.7 %
HCT VFR BLD AUTO: 41.2 % (ref 34.8–46.1)
HDLC SERPL-MCNC: 45 MG/DL
HGB BLD-MCNC: 13.4 G/DL (ref 11.5–15.4)
IMM GRANULOCYTES # BLD AUTO: 0.02 THOUSAND/UL (ref 0–0.2)
IMM GRANULOCYTES NFR BLD AUTO: 0 % (ref 0–2)
LDLC SERPL CALC-MCNC: 69 MG/DL (ref 0–100)
LYMPHOCYTES # BLD AUTO: 2.91 THOUSANDS/ÂΜL (ref 0.6–4.47)
LYMPHOCYTES NFR BLD AUTO: 43 % (ref 14–44)
MCH RBC QN AUTO: 31.1 PG (ref 26.8–34.3)
MCHC RBC AUTO-ENTMCNC: 32.5 G/DL (ref 31.4–37.4)
MCV RBC AUTO: 96 FL (ref 82–98)
MONOCYTES # BLD AUTO: 0.44 THOUSAND/ÂΜL (ref 0.17–1.22)
MONOCYTES NFR BLD AUTO: 7 % (ref 4–12)
NEUTROPHILS # BLD AUTO: 3.19 THOUSANDS/ÂΜL (ref 1.85–7.62)
NEUTS SEG NFR BLD AUTO: 48 % (ref 43–75)
NRBC BLD AUTO-RTO: 0 /100 WBCS
PLATELET # BLD AUTO: 308 THOUSANDS/UL (ref 149–390)
PMV BLD AUTO: 10 FL (ref 8.9–12.7)
POTASSIUM SERPL-SCNC: 4.1 MMOL/L (ref 3.5–5.3)
PROT SERPL-MCNC: 6.5 G/DL (ref 6.4–8.4)
RBC # BLD AUTO: 4.31 MILLION/UL (ref 3.81–5.12)
SODIUM SERPL-SCNC: 139 MMOL/L (ref 135–147)
TRIGL SERPL-MCNC: 146 MG/DL (ref ?–150)
TSH SERPL DL<=0.05 MIU/L-ACNC: 3.38 UIU/ML (ref 0.45–4.5)
WBC # BLD AUTO: 6.73 THOUSAND/UL (ref 4.31–10.16)

## 2025-07-06 PROCEDURE — 80061 LIPID PANEL: CPT

## 2025-07-06 PROCEDURE — 85025 COMPLETE CBC W/AUTO DIFF WBC: CPT

## 2025-07-06 PROCEDURE — 36415 COLL VENOUS BLD VENIPUNCTURE: CPT

## 2025-07-06 PROCEDURE — 80053 COMPREHEN METABOLIC PANEL: CPT

## 2025-07-06 PROCEDURE — 84443 ASSAY THYROID STIM HORMONE: CPT

## 2025-07-06 PROCEDURE — 83036 HEMOGLOBIN GLYCOSYLATED A1C: CPT

## 2025-07-11 ENCOUNTER — TELEPHONE (OUTPATIENT)
Age: 60
End: 2025-07-11

## 2025-07-11 NOTE — TELEPHONE ENCOUNTER
Contacted patient off of Medication Management  and Talk Therapy  wait list to verify needs of services in attempts to update list with patient preferences. spoke with patient whom stated verified needs of services, information, and preferences. Pt shared doesn't drive and understood first appt will have to be in person due to MA but virtual is an option for f/u's.     Anastasia, no prov pref, open to virtual (f/u's due to MA)

## 2025-07-14 DIAGNOSIS — K21.9 GASTROESOPHAGEAL REFLUX DISEASE: ICD-10-CM

## 2025-07-14 RX ORDER — PANTOPRAZOLE SODIUM 40 MG/1
40 TABLET, DELAYED RELEASE ORAL DAILY
Qty: 30 TABLET | Refills: 2 | Status: SHIPPED | OUTPATIENT
Start: 2025-07-14

## 2025-07-14 NOTE — TELEPHONE ENCOUNTER
Reason for call:   [x] Refill   [] Prior Auth  [] Other:     Office:   [x] PCP/Provider -   [] Specialty/Provider -       pantoprazole (PROTONIX) 40 mg tablet 40 mg, Oral, 2 times daily       Quantity: 90    Pharmacy: [facility]   Does the patient have enough for 3 days?   [] Yes   [x] No - Send as HP to POD    [facility] Away Pharmacy   Does the patient have enough for 10 days?   [] Yes   [] No - Send as HP to POD

## 2025-07-31 ENCOUNTER — HOSPITAL ENCOUNTER (EMERGENCY)
Facility: HOSPITAL | Age: 60
Discharge: HOME | End: 2025-08-01
Attending: EMERGENCY MEDICINE | Admitting: EMERGENCY MEDICINE
Payer: COMMERCIAL

## 2025-07-31 ENCOUNTER — APPOINTMENT (EMERGENCY)
Dept: CT IMAGING | Facility: HOSPITAL | Age: 60
End: 2025-07-31
Payer: COMMERCIAL

## 2025-07-31 VITALS
HEART RATE: 67 BPM | SYSTOLIC BLOOD PRESSURE: 139 MMHG | WEIGHT: 245 LBS | RESPIRATION RATE: 18 BRPM | DIASTOLIC BLOOD PRESSURE: 76 MMHG | TEMPERATURE: 97.8 F | OXYGEN SATURATION: 97 % | BODY MASS INDEX: 39.54 KG/M2

## 2025-07-31 DIAGNOSIS — R10.10 PAIN OF UPPER ABDOMEN: Primary | ICD-10-CM

## 2025-07-31 LAB
ALBUMIN SERPL BCG-MCNC: 4.3 G/DL (ref 3.5–5)
ALP SERPL-CCNC: 42 U/L (ref 34–104)
ALT SERPL W P-5'-P-CCNC: 10 U/L (ref 7–52)
ANION GAP SERPL CALCULATED.3IONS-SCNC: 9 MMOL/L (ref 4–13)
AST SERPL W P-5'-P-CCNC: 15 U/L (ref 13–39)
BACTERIA UR QL AUTO: ABNORMAL /HPF
BASOPHILS # BLD AUTO: 0.04 THOUSANDS/ÂΜL (ref 0–0.1)
BASOPHILS NFR BLD AUTO: 0 % (ref 0–1)
BILIRUB SERPL-MCNC: 0.28 MG/DL (ref 0.2–1)
BILIRUB UR QL STRIP: NEGATIVE
BUN SERPL-MCNC: 16 MG/DL (ref 5–25)
CALCIUM SERPL-MCNC: 9.3 MG/DL (ref 8.4–10.2)
CHLORIDE SERPL-SCNC: 106 MMOL/L (ref 96–108)
CLARITY UR: CLEAR
CO2 SERPL-SCNC: 24 MMOL/L (ref 21–32)
COLOR UR: ABNORMAL
CREAT SERPL-MCNC: 0.89 MG/DL (ref 0.6–1.3)
EOSINOPHIL # BLD AUTO: 0.19 THOUSAND/ÂΜL (ref 0–0.61)
EOSINOPHIL NFR BLD AUTO: 2 % (ref 0–6)
ERYTHROCYTE [DISTWIDTH] IN BLOOD BY AUTOMATED COUNT: 13.1 % (ref 11.6–15.1)
GFR SERPL CREATININE-BSD FRML MDRD: 70 ML/MIN/1.73SQ M
GLUCOSE SERPL-MCNC: 102 MG/DL (ref 65–140)
GLUCOSE UR STRIP-MCNC: NEGATIVE MG/DL
HCT VFR BLD AUTO: 42.9 % (ref 34.8–46.1)
HGB BLD-MCNC: 13.6 G/DL (ref 11.5–15.4)
HGB UR QL STRIP.AUTO: ABNORMAL
IMM GRANULOCYTES # BLD AUTO: 0.03 THOUSAND/UL (ref 0–0.2)
IMM GRANULOCYTES NFR BLD AUTO: 0 % (ref 0–2)
KETONES UR STRIP-MCNC: NEGATIVE MG/DL
LEUKOCYTE ESTERASE UR QL STRIP: ABNORMAL
LIPASE SERPL-CCNC: 48 U/L (ref 11–82)
LYMPHOCYTES # BLD AUTO: 4.66 THOUSANDS/ÂΜL (ref 0.6–4.47)
LYMPHOCYTES NFR BLD AUTO: 44 % (ref 14–44)
MCH RBC QN AUTO: 30.7 PG (ref 26.8–34.3)
MCHC RBC AUTO-ENTMCNC: 31.7 G/DL (ref 31.4–37.4)
MCV RBC AUTO: 97 FL (ref 82–98)
MONOCYTES # BLD AUTO: 0.75 THOUSAND/ÂΜL (ref 0.17–1.22)
MONOCYTES NFR BLD AUTO: 7 % (ref 4–12)
NEUTROPHILS # BLD AUTO: 5.05 THOUSANDS/ÂΜL (ref 1.85–7.62)
NEUTS SEG NFR BLD AUTO: 47 % (ref 43–75)
NITRITE UR QL STRIP: NEGATIVE
NON-SQ EPI CELLS URNS QL MICRO: ABNORMAL /HPF
NRBC BLD AUTO-RTO: 0 /100 WBCS
PH UR STRIP.AUTO: 5 [PH]
PLATELET # BLD AUTO: 303 THOUSANDS/UL (ref 149–390)
PMV BLD AUTO: 10.6 FL (ref 8.9–12.7)
POTASSIUM SERPL-SCNC: 3.9 MMOL/L (ref 3.5–5.3)
PROT SERPL-MCNC: 7 G/DL (ref 6.4–8.4)
PROT UR STRIP-MCNC: NEGATIVE MG/DL
RBC # BLD AUTO: 4.43 MILLION/UL (ref 3.81–5.12)
RBC #/AREA URNS AUTO: ABNORMAL /HPF
SODIUM SERPL-SCNC: 139 MMOL/L (ref 135–147)
SP GR UR STRIP.AUTO: 1.02 (ref 1–1.03)
UROBILINOGEN UR STRIP-ACNC: <2 MG/DL
WBC # BLD AUTO: 10.72 THOUSAND/UL (ref 4.31–10.16)
WBC #/AREA URNS AUTO: ABNORMAL /HPF

## 2025-07-31 PROCEDURE — 99284 EMERGENCY DEPT VISIT MOD MDM: CPT

## 2025-07-31 PROCEDURE — 81001 URINALYSIS AUTO W/SCOPE: CPT | Performed by: STUDENT IN AN ORGANIZED HEALTH CARE EDUCATION/TRAINING PROGRAM

## 2025-07-31 PROCEDURE — 83690 ASSAY OF LIPASE: CPT | Performed by: STUDENT IN AN ORGANIZED HEALTH CARE EDUCATION/TRAINING PROGRAM

## 2025-07-31 PROCEDURE — 85025 COMPLETE CBC W/AUTO DIFF WBC: CPT | Performed by: STUDENT IN AN ORGANIZED HEALTH CARE EDUCATION/TRAINING PROGRAM

## 2025-07-31 PROCEDURE — 96365 THER/PROPH/DIAG IV INF INIT: CPT

## 2025-07-31 PROCEDURE — 96375 TX/PRO/DX INJ NEW DRUG ADDON: CPT

## 2025-07-31 PROCEDURE — 80053 COMPREHEN METABOLIC PANEL: CPT | Performed by: STUDENT IN AN ORGANIZED HEALTH CARE EDUCATION/TRAINING PROGRAM

## 2025-07-31 PROCEDURE — 93005 ELECTROCARDIOGRAM TRACING: CPT

## 2025-07-31 PROCEDURE — 96366 THER/PROPH/DIAG IV INF ADDON: CPT

## 2025-07-31 PROCEDURE — 74177 CT ABD & PELVIS W/CONTRAST: CPT

## 2025-07-31 PROCEDURE — 36415 COLL VENOUS BLD VENIPUNCTURE: CPT | Performed by: STUDENT IN AN ORGANIZED HEALTH CARE EDUCATION/TRAINING PROGRAM

## 2025-07-31 RX ORDER — PANTOPRAZOLE SODIUM 40 MG/10ML
40 INJECTION, POWDER, LYOPHILIZED, FOR SOLUTION INTRAVENOUS ONCE
Status: COMPLETED | OUTPATIENT
Start: 2025-07-31 | End: 2025-07-31

## 2025-07-31 RX ORDER — MAGNESIUM HYDROXIDE/ALUMINUM HYDROXICE/SIMETHICONE 120; 1200; 1200 MG/30ML; MG/30ML; MG/30ML
30 SUSPENSION ORAL ONCE
Status: COMPLETED | OUTPATIENT
Start: 2025-07-31 | End: 2025-07-31

## 2025-07-31 RX ORDER — ONDANSETRON 4 MG/1
4 TABLET, ORALLY DISINTEGRATING ORAL ONCE
Status: COMPLETED | OUTPATIENT
Start: 2025-07-31 | End: 2025-07-31

## 2025-07-31 RX ADMIN — PANTOPRAZOLE SODIUM 40 MG: 40 INJECTION, POWDER, FOR SOLUTION INTRAVENOUS at 21:44

## 2025-07-31 RX ADMIN — SODIUM CHLORIDE, SODIUM LACTATE, POTASSIUM CHLORIDE, AND CALCIUM CHLORIDE 500 ML: .6; .31; .03; .02 INJECTION, SOLUTION INTRAVENOUS at 22:05

## 2025-07-31 RX ADMIN — ONDANSETRON 4 MG: 4 TABLET, ORALLY DISINTEGRATING ORAL at 21:44

## 2025-07-31 RX ADMIN — ALUMINUM HYDROXIDE, MAGNESIUM HYDROXIDE, AND SIMETHICONE 30 ML: 200; 200; 20 SUSPENSION ORAL at 21:45

## 2025-07-31 RX ADMIN — IOHEXOL 100 ML: 350 INJECTION, SOLUTION INTRAVENOUS at 22:41

## 2025-08-01 LAB
ATRIAL RATE: 71 BPM
P AXIS: 70 DEGREES
PR INTERVAL: 156 MS
QRS AXIS: 84 DEGREES
QRSD INTERVAL: 94 MS
QT INTERVAL: 410 MS
QTC INTERVAL: 445 MS
T WAVE AXIS: 69 DEGREES
VENTRICULAR RATE: 71 BPM

## 2025-08-01 PROCEDURE — 93010 ELECTROCARDIOGRAM REPORT: CPT | Performed by: STUDENT IN AN ORGANIZED HEALTH CARE EDUCATION/TRAINING PROGRAM

## 2025-08-01 PROCEDURE — 99285 EMERGENCY DEPT VISIT HI MDM: CPT | Performed by: EMERGENCY MEDICINE

## 2025-08-01 PROCEDURE — 96366 THER/PROPH/DIAG IV INF ADDON: CPT

## 2025-08-01 RX ORDER — FAMOTIDINE 20 MG/1
20 TABLET, FILM COATED ORAL 2 TIMES DAILY PRN
Qty: 30 TABLET | Refills: 0 | Status: SHIPPED | OUTPATIENT
Start: 2025-08-01 | End: 2025-08-31

## 2025-08-01 RX ORDER — ACETAMINOPHEN 325 MG/1
975 TABLET ORAL ONCE
Status: COMPLETED | OUTPATIENT
Start: 2025-08-01 | End: 2025-08-01

## 2025-08-01 RX ORDER — OMEPRAZOLE 20 MG/1
20 CAPSULE, DELAYED RELEASE ORAL DAILY
Qty: 14 CAPSULE | Refills: 0 | Status: SHIPPED | OUTPATIENT
Start: 2025-08-01 | End: 2025-08-15

## 2025-08-01 RX ADMIN — ACETAMINOPHEN 975 MG: 325 TABLET ORAL at 00:52

## 2025-08-25 PROBLEM — F17.200 NICOTINE DEPENDENCE: Status: ACTIVE | Noted: 2025-08-25

## 2025-08-25 PROBLEM — T50.902A INTENTIONAL OVERDOSE (HCC): Status: ACTIVE | Noted: 2025-08-25

## 2025-08-26 PROBLEM — R94.31 EKG ABNORMALITIES: Status: ACTIVE | Noted: 2025-08-26

## (undated) DEVICE — SPONGE 4 X 4 XRAY 16 PLY STRL LF RFD

## (undated) DEVICE — HEMOSTATIC MATRIX SURGIFLO 8ML W/THROMBIN

## (undated) DEVICE — GLOVE SRG BIOGEL ORTHOPEDIC 7.5

## (undated) DEVICE — SUT PDS II 0 CTX 60 IN Z990G

## (undated) DEVICE — BETHLEHEM UNIVERSAL SPINE, KIT: Brand: CARDINAL HEALTH

## (undated) DEVICE — MONITORING SPINAL IMPULSE CASE FEE

## (undated) DEVICE — DRAPE C-ARMOUR

## (undated) DEVICE — TRAY FOLEY 16FR URIMETER SURESTEP

## (undated) DEVICE — INTENDED FOR TISSUE SEPARATION, AND OTHER PROCEDURES THAT REQUIRE A SHARP SURGICAL BLADE TO PUNCTURE OR CUT.: Brand: BARD-PARKER SAFETY BLADES SIZE 10, STERILE

## (undated) DEVICE — ELECTRODE BLADE MOD  E-Z CLEAN 6.5IN -0014M

## (undated) DEVICE — POOLE SUCTION HANDLE: Brand: CARDINAL HEALTH

## (undated) DEVICE — MINOR PROCEDURE DRAPE: Brand: CONVERTORS

## (undated) DEVICE — ANTIBACTERIAL UNDYED BRAIDED (POLYGLACTIN 910), SYNTHETIC ABSORBABLE SUTURE: Brand: COATED VICRYL

## (undated) DEVICE — DISPOSABLE EQUIPMENT COVER: Brand: SMALL TOWEL DRAPE

## (undated) DEVICE — DRAPE SHEET X-LG

## (undated) DEVICE — DRAPE EQUIPMENT RF WAND

## (undated) DEVICE — GLOVE INDICATOR PI UNDERGLOVE SZ 8 BLUE

## (undated) DEVICE — SPONGE LAP 18 X 18 IN STRL RFD

## (undated) DEVICE — SUT SILK 2-0 SH 30 IN K833H

## (undated) DEVICE — DRESSING MEPILEX AG BORDER 4 X 8 IN

## (undated) DEVICE — SUT VICRYL 2-0 SH 27 IN UNDYED J417H

## (undated) DEVICE — 3M™ TEGADERM™ TRANSPARENT FILM DRESSING FRAME STYLE, 1626W, 4 IN X 4-3/4 IN (10 CM X 12 CM), 50/CT 4CT/CASE: Brand: 3M™ TEGADERM™

## (undated) DEVICE — ADHESIVE SKIN HIGH VISCOSITY EXOFIN 1ML

## (undated) DEVICE — 3M™ STERI-STRIP™ REINFORCED ADHESIVE SKIN CLOSURES, R1547, 1/2 IN X 4 IN (12 MM X 100 MM), 6 STRIPS/ENVELOPE: Brand: 3M™ STERI-STRIP™

## (undated) DEVICE — SUT VICRYL PLUS 2-0 CTB-1 27 IN VCPB259H

## (undated) DEVICE — TOWEL SET X-RAY

## (undated) DEVICE — SUT SILK 3-0 18 IN A184H

## (undated) DEVICE — DRAPE SURGIKIT SADDLE BAG

## (undated) DEVICE — PLUMEPEN PRO 10FT

## (undated) DEVICE — ASTOUND STANDARD SURGICAL GOWN, XL: Brand: CONVERTORS

## (undated) DEVICE — SUT VICRYL 2-0 CT-1 27 IN J259H

## (undated) DEVICE — 3M™ TEGADERM™ TRANSPARENT FILM DRESSING FRAME STYLE, 1628, 6 IN X 8 IN (15 CM X 20 CM), 10/CT 8CT/CASE: Brand: 3M™ TEGADERM™

## (undated) DEVICE — HEAVY DUTY TABLE COVER: Brand: CONVERTORS

## (undated) DEVICE — SUT STRATAFIX SPIRAL 4-0 PGA/PCL 30 X 30 CM SXMD2B409

## (undated) DEVICE — HARMONIC 1100 SHEARS, 20CM SHAFT LENGTH: Brand: HARMONIC

## (undated) DEVICE — BIPOLAR CORD DISP

## (undated) DEVICE — HOOK ELASTIC STAY 12MM BLUNT SNGL STRL

## (undated) DEVICE — SUT SILK 2-0 TIES 144 IN LA55G

## (undated) DEVICE — SUT VICRYL 3-0 SH 27 IN J416H

## (undated) DEVICE — PACK UNIVERSAL NECK

## (undated) DEVICE — PENCIL ELECTROSURG E-Z CLEAN -0035H

## (undated) DEVICE — TOOL 14MH30 LEGEND 14CM 3MM: Brand: MIDAS REX ™

## (undated) DEVICE — DRAPE SHEET THREE QUARTER

## (undated) DEVICE — ELECTRODE BLADE MOD E-Z CLEAN 2.5IN 6.4CM -0012M

## (undated) DEVICE — DURASEAL® EXACT SPINAL SEALANT SYSTEM 3ML 5 PACK: Brand: DURASEAL EXACT SPINAL SEALANT SYSTEM

## (undated) DEVICE — SUPPLY FEE STD

## (undated) DEVICE — SWABSTCK, BENZOIN TINCTURE, 1/PK, STRL: Brand: APLICARE

## (undated) DEVICE — 3M™ IOBAN™ 2 ANTIMICROBIAL INCISE DRAPE 6650EZ: Brand: IOBAN™ 2

## (undated) DEVICE — ROSEBUD DISSECTORS: Brand: DEROYAL

## (undated) DEVICE — GLOVE INDICATOR PI UNDERGLOVE SZ 8.5 BLUE

## (undated) DEVICE — DRAPE C-ARM X-RAY

## (undated) DEVICE — SURGICEL 4 X 8

## (undated) DEVICE — VESSEL LOOPS X-RAY DETECTABLE: Brand: DEROYAL

## (undated) DEVICE — SPONGE PVP SCRUB WING STERILE

## (undated) DEVICE — CHLORAPREP HI-LITE 26ML ORANGE

## (undated) DEVICE — LIGHT HANDLE COVER SLEEVE DISP BLUE STELLAR

## (undated) DEVICE — SPECIMEN CONTAINER STERILE PEEL PACK

## (undated) DEVICE — INTENDED FOR TISSUE SEPARATION, AND OTHER PROCEDURES THAT REQUIRE A SHARP SURGICAL BLADE TO PUNCTURE OR CUT.: Brand: BARD-PARKER ® CARBON RIB-BACK BLADES

## (undated) DEVICE — MODULAR TAP: Brand: XIA 3

## (undated) DEVICE — SUT SILK 3-0 SH CR/8 18 IN C013D

## (undated) DEVICE — SUT PROLENE 5-0 C-1/C-1 36 IN 8321H

## (undated) DEVICE — BIPOLAR SEALER 23-113-1 AQM 2.3: Brand: AQUAMANTYS™

## (undated) DEVICE — GLOVE SRG BIOGEL 6.5

## (undated) DEVICE — GLOVE SRG BIOGEL 8.5

## (undated) DEVICE — SUT ETHILON 2-0 FSLX 30 IN 1674H

## (undated) DEVICE — INTENDED FOR TISSUE SEPARATION, AND OTHER PROCEDURES THAT REQUIRE A SHARP SURGICAL BLADE TO PUNCTURE OR CUT.: Brand: BARD-PARKER SAFETY BLADES SIZE 15, STERILE

## (undated) DEVICE — JACKSON TABLE FOAM POSITIONING KIT: Brand: CARDINAL HEALTH

## (undated) DEVICE — NEEDLE SPINAL18G X 3.5 IN QUINCKE

## (undated) DEVICE — SUT PDS II 0 CT-1 27 IN Z340H

## (undated) DEVICE — PROXIMATE SKIN STAPLERS (35 WIDE) CONTAINS 35 STAINLESS STEEL STAPLES (FIXED HEAD): Brand: PROXIMATE

## (undated) DEVICE — SUT MONOCRYL PLUS 4-0 PS-2 18 IN MCP496G

## (undated) DEVICE — DRAPE LAPAROTOMY W/POUCHES

## (undated) DEVICE — TUBING SUCTION 5MM X 12 FT

## (undated) DEVICE — SUT SILK 2-0 SH CR/8 18 IN C012D

## (undated) DEVICE — NEEDLE 22 G X 1 1/2 SAFETY

## (undated) DEVICE — 3M™ STERI-STRIP™ REINFORCED ADHESIVE SKIN CLOSURES, R1546, 1/4 IN X 4 IN (6 MM X 100 MM), 10 STRIPS/ENVELOPE: Brand: 3M™ STERI-STRIP™

## (undated) DEVICE — SUT SILK 2-0 18 IN A185H

## (undated) DEVICE — HEMOCLIP CARTRIDGE MED